# Patient Record
Sex: FEMALE | Race: WHITE | NOT HISPANIC OR LATINO | Employment: FULL TIME | URBAN - METROPOLITAN AREA
[De-identification: names, ages, dates, MRNs, and addresses within clinical notes are randomized per-mention and may not be internally consistent; named-entity substitution may affect disease eponyms.]

---

## 2017-01-03 ENCOUNTER — TELEPHONE (OUTPATIENT)
Dept: NEUROLOGY | Facility: CLINIC | Age: 57
End: 2017-01-03

## 2017-01-03 DIAGNOSIS — G36.0 DEVIC'S DISEASE (HCC): Primary | ICD-10-CM

## 2017-01-03 NOTE — TELEPHONE ENCOUNTER
1. Patient would like an order for PT    2. Patient wants to know if she has DDD. If so she wants to know if she should get MRIs of L-spine and sacrum

## 2017-01-16 ENCOUNTER — HOSPITAL ENCOUNTER (OUTPATIENT)
Dept: PHYSICAL THERAPY | Facility: HOSPITAL | Age: 57
Setting detail: THERAPIES SERIES
Discharge: HOME | End: 2017-01-16
Attending: PSYCHIATRY & NEUROLOGY

## 2017-01-16 DIAGNOSIS — R20.0 NUMBNESS AND TINGLING: ICD-10-CM

## 2017-01-16 DIAGNOSIS — M54.2 NECK PAIN: Primary | ICD-10-CM

## 2017-01-16 DIAGNOSIS — R20.2 NUMBNESS AND TINGLING: ICD-10-CM

## 2017-01-16 PROCEDURE — 97162 PT EVAL MOD COMPLEX 30 MIN: CPT | Performed by: PHYSICAL THERAPIST

## 2017-01-16 PROCEDURE — 97110 THERAPEUTIC EXERCISES: CPT | Performed by: PHYSICAL THERAPIST

## 2017-01-16 NOTE — PROGRESS NOTES
"    Outpatient Physical Therapy Ortho Initial Evaluation  [facility]     Patient Name: Brittny Han  : 1960  MRN: [id number]  Today's Date: 2017      Visit Date: 2017    Patient Active Problem List   Diagnosis   • Dyspnea on exertion   • Obstructive sleep apnea, adult   • Allergic rhinitis   • Anxiety   • Gastroesophageal reflux disease   • Anemia   • Essential hypertension   • Asthma   • Peptic ulcer   • Periodic headache syndrome, not intractable   • Numbness and tingling        Past Medical History   Diagnosis Date   • Allergic rhinitis    • Anxiety    •  1 para 1    • History of echocardiogram 2011     mild ascending aortic root dilatation; mod RVC enlargement; global left EF 0.72 (normal 0.55-0.75) mild concentric LV hypertrophy; trace MR; mild TR    • History of PFTs 2016     good effort; normal lung volumes   • Hyperlipidemia         Past Surgical History   Procedure Laterality Date   • Hysterectomy     • Breast lumpectomy     • Bladder suspension     • Skin cancer excision         Visit Dx:     ICD-10-CM ICD-9-CM   1. Neck pain M54.2 723.1   2. Numbness and tingling R20.0 782.0    R20.2              Patient History       17 1400          History    Chief Complaint Tingling;Numbness;Pain  -MW      Type of Pain Back pain;Neck pain  -MW      Date Current Problem(s) Began --     -MW      Brief Description of Current Complaint Pt. reports having her hands and feet \"go to sleep\" this past summer and headaches in .  Pt. began seeing Dr. Yoo for her pain and had an MRI where they discoverd DJD along C .  Pt. referred to PT for increased pain.  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 No  -MW      What clinical tests have you " "had for this problem? MRI  -MW      Results of Clinical Tests C5/6 DJD  -MW      Pain     Pain Location Back   mid and low back  -MW      Pain at Present 1  -MW      Pain at Best 0  -MW      Pain at Worst 7  -MW      Pain Frequency Intermittent  -MW      What Performance Factors Make the Current Problem(s) WORSE? standing  -MW      What Performance Factors Make the Current Problem(s) BETTER? sitting  -MW      Difficulties at work? no  -MW      Difficulties with ADL's? no  -MW      Fall Risk Assessment    Any falls in the past year: No  -MW        User Key  (r) = Recorded By, (t) = Taken By, (c) = Cosigned By    Initials Name Provider Type    MW Madalyn Garcia, PT Physical Therapist                PT Ortho       01/16/17 1400    Subjective Comments    Subjective Comments \"I also have a pinched nerve in my R arm and carpal tunnel in my R wrist.  I also feel off balance b/c I can be dizzy with turns.\"  -MW    Subjective Pain    Able to rate subjective pain? yes  -MW    Pre-Treatment Pain Level 0  -MW    Post-Treatment Pain Level 0  -MW    Pain Assessment    Pain Assessment --   LBP: 0-7/10, mid back: 1-3-4/10, neck: 0-10/10  -MW    Posture/Observations    Forward Head Mild;Moderate  -MW    Cervical Lordosis Mild;Moderate  -MW    Thoracic Kyphosis Mild  -MW    Rounded Shoulders Mild;Moderate  -MW    Sensation    Light Touch No apparent deficits  -MW    DTR- Upper Quarter Clearing    Biceps (C5/6) 2- Normal response  -MW    Brachioradialis (C6) 2- Normal response  -MW    Triceps (C7) 2- Normal response  -MW    Sensory Screen for Light Touch- Upper Quarter Clearing    C4 (posterior shoulder) Intact  -MW    C5 (lateral upper arm) Intact  -MW    C6 (tip of thumb) Intact  -MW    C7 (tip of 3rd finger) Intact  -MW    C8 (tip of 5th finger) Intact  -MW    T1 (medial lower arm) Intact  -MW    Myotomal Screen- Upper Quarter Clearing    Shoulder flexion (C5) 4+ (Good +)  -MW    Elbow flexion/wrist extension (C6) 4 (Good)  -MW " "   Elbow extension/wrist flexion (C7) 4 (Good)  -MW    Finger flexion/ (C8) 4 (Good)  -MW    Finger abduction (T1) 4- (Good -)  -MW    Cervical/Shoulder ROM Screen    Cervical flexion --   27  -MW    Cervical extension --   35  -MW    Cervical rotation --   45  -MW    Cervical quadrant (Spurling's) Normal  -MW    Shoulder elevation  Normal  -MW    Special Tests/Palpation    Special Tests/Palpation Cervical/Thoracic  -MW    Cervical Palpation    Cervical Palpation- Location? Suboccipital;Spinous process;Cervical facets;Scalenes;Upper traps;Middle traps;Lower traps   tenderness upon palpation  -MW    Suboccipital Tender  -MW    Cervical Facets Tender  -MW    Scalenes Tender  -MW    Spinous Process Tender   C5/6/7  -MW    Upper Traps Tender  -MW    Middle Traps Tender  -MW    Lower Traps Tender  -MW    Cervical/Thoracic Special Tests    Cervical Compression (Forarminal Compression vs. Facet Pain) Negative  -MW    Cervical Distraction (Foraminal Compression vs. Facet Pain) Negative   pt reports \"that feels good\"  -MW    Left Hip    Hip Flexion Gross Movement (4+/5) good plus  -MW    Hip Extension Gross Movement (4/5) good  -MW    Hip ABduction Gross Movement (4+/5) good plus  -MW    Hip ADduction Gross Movement (4+/5) good plus  -MW    Right Hip    Hip Flexion Gross Movement (4+/5) good plus  -MW    Hip Extension Gross Movement (4/5) good  -MW    Hip ABduction Gross Movement (4+/5) good plus  -MW    Hip ADduction Gross Movement (4+/5) good plus  -MW    Left Knee    Knee Extension Gross Movement (5/5) normal  -MW    Knee Flexion Gross Movement (4+/5) good plus  -MW    Right Knee    Knee Extension Gross Movement (5/5) normal  -MW    Knee Flexion Gross Movement (4+/5) good plus  -MW    Left Ankle/Foot    Ankle PF Gross Movement (4+/5) good plus  -MW    Ankle Dorsiflexion Gross Movement (5/5) normal  -MW    Right Ankle/Foot    Ankle PF Gross Movement (4+/5) good plus  -MW    Ankle Dorsiflexion Gross Movement (5/5) " normal  -MW    Flexibility    Flexibility Tested? --   WNL  -MW      User Key  (r) = Recorded By, (t) = Taken By, (c) = Cosigned By    Initials Name Provider Type    MERVAT Garcia PT Physical Therapist                            Therapy Education       01/16/17 2051    Therapy Education    Given Symptoms/condition management  -MW    How Provided Verbal  -MW    Provided to Patient  -MW    Level of Understanding Verbalized  -MW      User Key  (r) = Recorded By, (t) = Taken By, (c) = Cosigned By    Initials Name Provider Type    MERVAT Garcia PT Physical Therapist                PT OP Goals       01/16/17 1400          PT Short Term Goals    STG Date to Achieve 02/06/17  -MW      STG 1 Pt. to report pain in neck to be no greater then 5/10 VAS for improved functional mobility.  -MW      STG 2 Pt. to report not having any off balance episodes secondary to being dizzy with turning for improved functional mobility.  -MW      Long Term Goals    LTG Date to Achieve 02/27/17  -MW      LTG 1 Pt. to demonstrate B cervical AROM >/= 55 degrees for improved functional mobility.  -MW      LTG 2 Pt. to report pain in low back to be no greater then 3/10 VAS for improved functional mobility.  -MW      LTG 3 Pt. to report pain in neck to be no greater then 3/10 VAS for improved functional mobility.  -MW      LTG 4 Pt. to be I with HEP.  -MW      Time Calculation    PT Goal Re-Cert Due Date 02/13/17  -MW        User Key  (r) = Recorded By, (t) = Taken By, (c) = Cosigned By    Initials Name Provider Type    MERVAT Garcia PT Physical Therapist                PT Assessment/Plan       01/16/17 1400          PT Assessment    Functional Limitations --   impaired functional mobilty secondary to pain  -MW      Impairments Pain;Muscle strength;Sensation;Posture  -MW      Assessment Comments Pt. to benefit from skilled PT services to improve posture, strength and decrease pain.  -MW      Please refer to paper survey for  "additional self-reported information Yes  -MW      Rehab Potential Good  -MW      Patient/caregiver participated in establishment of treatment plan and goals Yes  -MW      Patient would benefit from skilled therapy intervention Yes  -MW      PT Plan    PT Frequency 2x/week  -MW      Predicted Duration of Therapy Intervention (days/wks) 6 weeks  -MW      Planned CPT's? PT EVAL: 15163;PT THER PROC EA 15 MIN: 60284;PT ELECTRICAL STIM UNATTEND: ;PT ELECTRICAL STIM ATTD EA 15 MIN: 44733;PT ULTRASOUND EA 15 MIN: 22493;PT HOT/COLD PACK WC NONMCARE: 81144;PT MANUAL THERAPY EA 15 MIN: 99728  -MW      PT Plan Comments PT services to improve strength, ROM, decrease pain and improve functional mobility.  -MW        User Key  (r) = Recorded By, (t) = Taken By, (c) = Cosigned By    Initials Name Provider Type    MERVAT Garcia PT Physical Therapist                  Exercises       01/16/17 1400          Subjective Comments    Subjective Comments \"I also have a pinched nerve in my R arm and carpal tunnel in my R wrist.  I also feel off balance b/c I can be dizzy with turns.\"  -MW      Subjective Pain    Able to rate subjective pain? yes  -MW      Pre-Treatment Pain Level 0  -MW      Post-Treatment Pain Level 0  -MW        User Key  (r) = Recorded By, (t) = Taken By, (c) = Cosigned By    Initials Name Provider Type    MERVAT Garcia PT Physical Therapist                              Time Calculation:   Start Time: 1400  Total Timed Code Minutes- PT: 15 minute(s)     Therapy Charges for Today     Code Description Service Date Service Provider Modifiers Qty    21656064482 HC PT EVAL MOD COMPLEXITY 3 1/16/2017 Madalyn Garcia, PT GP 1    [id number]  PT THER PROC EA 15 MIN 1/16/2017 Madalyn Garcia PT GP 1                    Madalyn Garcia PT  1/16/2017      "

## 2017-01-17 DIAGNOSIS — M54.2 NECK PAIN: Primary | ICD-10-CM

## 2017-01-24 ENCOUNTER — HOSPITAL ENCOUNTER (OUTPATIENT)
Dept: PHYSICAL THERAPY | Facility: HOSPITAL | Age: 57
Setting detail: THERAPIES SERIES
Discharge: HOME | End: 2017-01-24

## 2017-01-24 DIAGNOSIS — M54.2 NECK PAIN: Primary | ICD-10-CM

## 2017-01-24 PROCEDURE — 97012 MECHANICAL TRACTION THERAPY: CPT | Performed by: PHYSICAL THERAPIST

## 2017-01-24 PROCEDURE — 97110 THERAPEUTIC EXERCISES: CPT | Performed by: PHYSICAL THERAPIST

## 2017-01-24 NOTE — PROGRESS NOTES
Outpatient Physical Therapy Ortho Treatment Note   [facility]     Patient Name: Brittny Han  : 1960  MRN: [id number]  Today's Date: 2017      Visit Date: 2017    Visit Dx:    ICD-10-CM ICD-9-CM   1. Neck pain M54.2 723.1       Patient Active Problem List   Diagnosis   • Dyspnea on exertion   • Obstructive sleep apnea, adult   • Allergic rhinitis   • Anxiety   • Gastroesophageal reflux disease   • Anemia   • Essential hypertension   • Asthma   • Peptic ulcer   • Periodic headache syndrome, not intractable   • Numbness and tingling        Past Medical History   Diagnosis Date   • Allergic rhinitis    • Anxiety    •  1 para 1    • History of echocardiogram 2011     mild ascending aortic root dilatation; mod RVC enlargement; global left EF 0.72 (normal 0.55-0.75) mild concentric LV hypertrophy; trace MR; mild TR    • History of PFTs 2016     good effort; normal lung volumes   • Hyperlipidemia         Past Surgical History   Procedure Laterality Date   • Hysterectomy     • Breast lumpectomy     • Bladder suspension     • Skin cancer excision                               PT Assessment/Plan       17 1700          PT Assessment    Assessment Comments Minimal neck pain today.  No change in R hand tingling with cervical traction.  Pt. is known to have CTS and has night splint which she does not wear regularly.  -ISABEL      PT Plan    PT Plan Comments Continue PT.  Progress stabilization and postural exercises.  -ISABEL        User Key  (r) = Recorded By, (t) = Taken By, (c) = Cosigned By    Initials Name Provider Type    ISABEL Osborne, PT Physical Therapist                Modalities       17 1700          Traction 75917    Traction Type Cervical  -ISABEL      Rx Minutes 20  -ISABEL      Position Hook-lying  -ISABEL      Weight 18  -ISABEL      Hold 60  -ISABEL      Relax 12  -ISABEL      Progression 1  -ISABEL      Regression 2  -ISABEL        User Key  (r) = Recorded By, (t) = Taken By, (c) =  Cosigned By    Initials Name Provider Type    ISABEL Osborne, PT Physical Therapist                Exercises       01/24/17 1700          Subjective Comments    Subjective Comments Pt. reports improvement in neck pain with use of gabapentin.  She has very mild neck discomfort today.  She continues to reports tingling in primarily R hand today.  -ISABEL      Subjective Pain    Able to rate subjective pain? yes  -ISABEL      Pre-Treatment Pain Level 1  -ISABEL      Post-Treatment Pain Level 1  -ISABEL      Exercise 1    Exercise Name 1 Reviewed and discussed office ergonomics, posture, frequent position changes, use of towel roll below shoulder blades to promote scapular retraction and depression.  -ISABEL        User Key  (r) = Recorded By, (t) = Taken By, (c) = Cosigned By    Initials Name Provider Type    ISABEL Osborne, PT Physical Therapist                               PT OP Goals       01/16/17 1400          PT Short Term Goals    STG Date to Achieve 02/06/17  -MW      STG 1 Pt. to report pain in neck to be no greater then 5/10 VAS for improved functional mobility.  -MW      STG 2 Pt. to report not having any off balance episodes secondary to being dizzy with turning for improved functional mobility.  -MW      Long Term Goals    LTG Date to Achieve 02/27/17  -MW      LTG 1 Pt. to demonstrate B cervical AROM >/= 55 degrees for improved functional mobility.  -MW      LTG 2 Pt. to report pain in low back to be no greater then 3/10 VAS for improved functional mobility.  -MW      LTG 3 Pt. to report pain in neck to be no greater then 3/10 VAS for improved functional mobility.  -MW      LTG 4 Pt. to be I with HEP.  -MW      Time Calculation    PT Goal Re-Cert Due Date 02/13/17  -        User Key  (r) = Recorded By, (t) = Taken By, (c) = Cosigned By    Initials Name Provider Type    MERVAT Garcia, PT Physical Therapist                    Time Calculation:   Start Time: 0800  Total Timed Code Minutes- PT: 25  minute(s)    Therapy Charges for Today     Code Description Service Date Service Provider Modifiers Qty    52246448151 HC PT THER PROC EA 15 MIN 1/24/2017 Fifi Osborne, PT GP 2    [id number]  PT-TRACTION MECHANICAL 1/24/2017 Fifi Osborne, PT  1                    Fifi Osborne, PT  1/24/2017

## 2017-01-26 ENCOUNTER — HOSPITAL ENCOUNTER (OUTPATIENT)
Dept: PHYSICAL THERAPY | Facility: HOSPITAL | Age: 57
Setting detail: THERAPIES SERIES
Discharge: HOME | End: 2017-01-26

## 2017-01-26 DIAGNOSIS — M54.2 NECK PAIN: Primary | ICD-10-CM

## 2017-01-26 PROCEDURE — 97110 THERAPEUTIC EXERCISES: CPT | Performed by: PHYSICAL THERAPIST

## 2017-01-26 NOTE — PROGRESS NOTES
Outpatient Physical Therapy Ortho Treatment Note  [facility]     Patient Name: Brittny Han  : 1960  MRN: [id number]  Today's Date: 2017      Visit Date: 2017    Visit Dx:    ICD-10-CM ICD-9-CM   1. Neck pain M54.2 723.1       Patient Active Problem List   Diagnosis   • Dyspnea on exertion   • Obstructive sleep apnea, adult   • Allergic rhinitis   • Anxiety   • Gastroesophageal reflux disease   • Anemia   • Essential hypertension   • Asthma   • Peptic ulcer   • Periodic headache syndrome, not intractable   • Numbness and tingling        Past Medical History   Diagnosis Date   • Allergic rhinitis    • Anxiety    •  1 para 1    • History of echocardiogram 2011     mild ascending aortic root dilatation; mod RVC enlargement; global left EF 0.72 (normal 0.55-0.75) mild concentric LV hypertrophy; trace MR; mild TR    • History of PFTs 2016     good effort; normal lung volumes   • Hyperlipidemia         Past Surgical History   Procedure Laterality Date   • Hysterectomy     • Breast lumpectomy     • Bladder suspension     • Skin cancer excision               PT Ortho       17 1000    Subjective Comments    Subjective Comments Pt. reports very minimal discomfort in her neck.  She has intermittent UE paresthesia.  -ISABEL    Subjective Pain    Able to rate subjective pain? yes  -ISABEL    Pre-Treatment Pain Level 1  -ISABEL    Post-Treatment Pain Level 1  -ISABEL      User Key  (r) = Recorded By, (t) = Taken By, (c) = Cosigned By    Initials Name Provider Type    ISABEL Osborne, PT Physical Therapist                            PT Assessment/Plan       17 1000 17 1700       PT Assessment    Assessment Comments Pt. demonstrated all exercises in clinic with minimal cueing.  -ISABEL Minimal neck pain today.  No change in R hand tingling with cervical traction.  Pt. is known to have CTS and has night splint which she does not wear regularly.  -ISABEL     PT Plan    PT Plan Comments  Continue and progress as tolerated.  -ISABEL Continue PT.  Progress stabilization and postural exercises.  -ISABEL       User Key  (r) = Recorded By, (t) = Taken By, (c) = Cosigned By    Initials Name Provider Type    ISABEL Osborne, CITLALI Physical Therapist                    Exercises       01/26/17 1000          Subjective Comments    Subjective Comments Pt. reports very minimal discomfort in her neck.  She has intermittent UE paresthesia.  -ISABEL      Subjective Pain    Able to rate subjective pain? yes  -ISABEL      Pre-Treatment Pain Level 1  -ISABEL      Post-Treatment Pain Level 1  -ISABEL      Exercise 1    Exercise Name 1 Initiated exercise in clinical setting per flow sheet, emphasizing postural stability and control and neural pliability.  Provided illustrations and red theraband for HEP.  -ISABEL        User Key  (r) = Recorded By, (t) = Taken By, (c) = Cosigned By    Initials Name Provider Type    ISABEL Osborne, PT Physical Therapist                               PT OP Goals       01/16/17 1400          PT Short Term Goals    STG Date to Achieve 02/06/17  -MW      STG 1 Pt. to report pain in neck to be no greater then 5/10 VAS for improved functional mobility.  -MW      STG 2 Pt. to report not having any off balance episodes secondary to being dizzy with turning for improved functional mobility.  -MW      Long Term Goals    LTG Date to Achieve 02/27/17  -MW      LTG 1 Pt. to demonstrate B cervical AROM >/= 55 degrees for improved functional mobility.  -MW      LTG 2 Pt. to report pain in low back to be no greater then 3/10 VAS for improved functional mobility.  -MW      LTG 3 Pt. to report pain in neck to be no greater then 3/10 VAS for improved functional mobility.  -MW      LTG 4 Pt. to be I with HEP.  -MW      Time Calculation    PT Goal Re-Cert Due Date 02/13/17  -MW        User Key  (r) = Recorded By, (t) = Taken By, (c) = Cosigned By    Initials Name Provider Type    MERVAT Garcia, PT Physical Therapist                 Therapy Education       01/26/17 1005    Therapy Education    Given HEP  -ISABEL    Program New  -ISABEL    How Provided Verbal;Demonstration;Written  -ISABEL    Provided to Patient  -ISABEL    Level of Understanding Demonstrated  -ISABEL      User Key  (r) = Recorded By, (t) = Taken By, (c) = Cosigned By    Initials Name Provider Type    ISABEL Osborne, PT Physical Therapist                Time Calculation:   Start Time: 0925  Total Timed Code Minutes- PT: 40 minute(s)    Therapy Charges for Today     Code Description Service Date Service Provider Modifiers Qty    56113508771  PT THER PROC EA 15 MIN 1/26/2017 Fifi Osborne, PT GP 3                    Fifi Osborne, PT  1/26/2017

## 2017-01-31 ENCOUNTER — HOSPITAL ENCOUNTER (OUTPATIENT)
Dept: PHYSICAL THERAPY | Facility: HOSPITAL | Age: 57
Setting detail: THERAPIES SERIES
Discharge: HOME | End: 2017-01-31

## 2017-01-31 DIAGNOSIS — M54.2 NECK PAIN: Primary | ICD-10-CM

## 2017-01-31 PROCEDURE — 97110 THERAPEUTIC EXERCISES: CPT | Performed by: PHYSICAL THERAPIST

## 2017-01-31 PROCEDURE — 97140 MANUAL THERAPY 1/> REGIONS: CPT | Performed by: PHYSICAL THERAPIST

## 2017-02-02 ENCOUNTER — HOSPITAL ENCOUNTER (OUTPATIENT)
Dept: PHYSICAL THERAPY | Facility: HOSPITAL | Age: 57
Setting detail: THERAPIES SERIES
Discharge: HOME | End: 2017-02-02

## 2017-02-02 DIAGNOSIS — M54.2 NECK PAIN: Primary | ICD-10-CM

## 2017-02-02 PROCEDURE — 97110 THERAPEUTIC EXERCISES: CPT | Performed by: PHYSICAL THERAPIST

## 2017-02-02 PROCEDURE — 97035 APP MDLTY 1+ULTRASOUND EA 15: CPT | Performed by: PHYSICAL THERAPIST

## 2017-02-02 PROCEDURE — 97140 MANUAL THERAPY 1/> REGIONS: CPT | Performed by: PHYSICAL THERAPIST

## 2017-02-02 NOTE — PROGRESS NOTES
Outpatient Physical Therapy Ortho Treatment Note  [facility]     Patient Name: Brittny Han  : 1960  MRN: [id number]  Today's Date: 2017      Visit Date: 2017    Visit Dx:    ICD-10-CM ICD-9-CM   1. Neck pain M54.2 723.1       Patient Active Problem List   Diagnosis   • Dyspnea on exertion   • Obstructive sleep apnea, adult   • Allergic rhinitis   • Anxiety   • Gastroesophageal reflux disease   • Anemia   • Essential hypertension   • Asthma   • Peptic ulcer   • Periodic headache syndrome, not intractable   • Numbness and tingling        Past Medical History   Diagnosis Date   • Allergic rhinitis    • Anxiety    •  1 para 1    • History of echocardiogram 2011     mild ascending aortic root dilatation; mod RVC enlargement; global left EF 0.72 (normal 0.55-0.75) mild concentric LV hypertrophy; trace MR; mild TR    • History of PFTs 2016     good effort; normal lung volumes   • Hyperlipidemia         Past Surgical History   Procedure Laterality Date   • Hysterectomy     • Breast lumpectomy     • Bladder suspension     • Skin cancer excision               PT Ortho       17 1000    Subjective Comments    Subjective Comments Pt. reports increase in neck pain which she noticed last night and has continued this morning.  -ISABEL    Subjective Pain    Able to rate subjective pain? yes  -ISABEL    Pre-Treatment Pain Level 6  -ISABEL    Post-Treatment Pain Level 1  -ISABEL      17 0800    Subjective Comments    Subjective Comments Pt. reports neck tightness, a little more than last week.  She has been doing her exercises mostly at work.  -ISABEL    Subjective Pain    Able to rate subjective pain? yes  -ISABEL    Pre-Treatment Pain Level 3  -ISABEL    Post-Treatment Pain Level 1  -ISABEL      User Key  (r) = Recorded By, (t) = Taken By, (c) = Cosigned By    Initials Name Provider Type    ISABEL Osborne PT Physical Therapist                            PT Assessment/Plan       17 1000  01/31/17 0900       PT Assessment    Assessment Comments Pt. reported significant decrease in pain and feeling much looser after treatment today.  -ISABEL Pt. is beginning to see benefits of exercise and posture in controlling symptoms.  -ISABEL     PT Plan    PT Plan Comments Continue and progress as tolerated.  -ISABEL Continue and progress as tolerated.  -ISABEL       User Key  (r) = Recorded By, (t) = Taken By, (c) = Cosigned By    Initials Name Provider Type    ISABEL Osborne PT Physical Therapist                Modalities       02/02/17 1000          Ultrasound [id number]    Location bilateral cervical paraspinals and UT bilaterally  -ISABEL      Rx Minutes 8  -ISABEL      Duty Cycle 100  -ISABEL      Frequency 1.0 MHz  -ISABEL      Intensity - Wts/cm 1.6  -ISABEL        User Key  (r) = Recorded By, (t) = Taken By, (c) = Cosigned By    Initials Name Provider Type    ISABEL Osborne PT Physical Therapist                Exercises       02/02/17 1000          Subjective Comments    Subjective Comments Pt. reports increase in neck pain which she noticed last night and has continued this morning.  -ISABEL      Subjective Pain    Able to rate subjective pain? yes  -ISABEL      Pre-Treatment Pain Level 6  -ISABEL      Post-Treatment Pain Level 1  -ISABEL      Exercise 1    Exercise Name 1 Active warm up on UBE prior to Astym and active stretching and scapular stability exercises after Astym per flow sheet.  Reinforced role of posture in controlling symptoms.  -ISABEL        User Key  (r) = Recorded By, (t) = Taken By, (c) = Cosigned By    Initials Name Provider Type    ISABEL Osborne PT Physical Therapist                        Manual Rx (last 36 hours)      Manual Treatments       02/02/17 0900          Manual Rx 1    Manual Rx 1 Location CT spine  -ISABEL      Manual Rx 1 Type Astym and STM to bilateral cervical and UT region in sitting.  -ISABEL        User Key  (r) = Recorded By, (t) = Taken By, (c) = Cosigned By    Initials Name Provider Type    ISABEL CARRIZALES  Dylon PT Physical Therapist                        Time Calculation:   Start Time: 0930  Total Timed Code Minutes- PT: 45 minute(s)    Therapy Charges for Today     Code Description Service Date Service Provider Modifiers Qty    25414091895  PT THER PROC EA 15 MIN 2/2/2017 Fifi Osborne, PT GP 1    85620331695 HC PT MANUAL THERAPY EA 15 MIN 2/2/2017 Fifi Osborne, PT GP 1    21002116836 HC PT ULTRASOUND EA 15 MIN 2/2/2017 Fifi Osborne, PT GP 1                    Fifi Osborne, PT  2/2/2017

## 2017-02-06 ENCOUNTER — APPOINTMENT (OUTPATIENT)
Dept: PHYSICAL THERAPY | Facility: HOSPITAL | Age: 57
End: 2017-02-06

## 2017-02-07 ENCOUNTER — HOSPITAL ENCOUNTER (OUTPATIENT)
Dept: PHYSICAL THERAPY | Facility: HOSPITAL | Age: 57
Setting detail: THERAPIES SERIES
Discharge: HOME | End: 2017-02-07

## 2017-02-07 DIAGNOSIS — M54.2 NECK PAIN: Primary | ICD-10-CM

## 2017-02-07 PROCEDURE — 97140 MANUAL THERAPY 1/> REGIONS: CPT | Performed by: PHYSICAL THERAPIST

## 2017-02-07 PROCEDURE — 97110 THERAPEUTIC EXERCISES: CPT | Performed by: PHYSICAL THERAPIST

## 2017-02-07 NOTE — PROGRESS NOTES
Outpatient Physical Therapy Ortho Treatment Note  [facility]     Patient Name: Brittny Han  : 1960  MRN: [id number]  Today's Date: 2017      Visit Date: 2017    Visit Dx:    ICD-10-CM ICD-9-CM   1. Neck pain M54.2 723.1       Patient Active Problem List   Diagnosis   • Dyspnea on exertion   • Obstructive sleep apnea, adult   • Allergic rhinitis   • Anxiety   • Gastroesophageal reflux disease   • Anemia   • Essential hypertension   • Asthma   • Peptic ulcer   • Periodic headache syndrome, not intractable   • Numbness and tingling        Past Medical History   Diagnosis Date   • Allergic rhinitis    • Anxiety    •  1 para 1    • History of echocardiogram 2011     mild ascending aortic root dilatation; mod RVC enlargement; global left EF 0.72 (normal 0.55-0.75) mild concentric LV hypertrophy; trace MR; mild TR    • History of PFTs 2016     good effort; normal lung volumes   • Hyperlipidemia         Past Surgical History   Procedure Laterality Date   • Hysterectomy     • Breast lumpectomy     • Bladder suspension     • Skin cancer excision               PT Ortho       17 0900    Subjective Comments    Subjective Comments Pt. reports tightness in her neck.  She feels better for about 2 days after coming for treatment.  She continues to have difficulty with sleeping posture.  She uses a CPAP device and also needs to have head elevated due to GERD.    -ISABEL    Subjective Pain    Able to rate subjective pain? yes  -ISABEL    Pre-Treatment Pain Level 3  -ISABEL    Post-Treatment Pain Level 2  -ISABEL      User Key  (r) = Recorded By, (t) = Taken By, (c) = Cosigned By    Initials Name Provider Type    ISABEL Osborne PT Physical Therapist                            PT Assessment/Plan       17 0900 17 1000       PT Assessment    Assessment Comments Pt. responds well to treatment in clinic.  Needs to be more consistent in applying techniques outside of treatment sessions  to achieve lasting relief of symptoms.  -ISABEL Pt. reported significant decrease in pain and feeling much looser after treatment today.  -ISABEL     PT Plan    PT Plan Comments Continue and progress as tolerated.  -ISABEL Continue and progress as tolerated.  -ISABEL       User Key  (r) = Recorded By, (t) = Taken By, (c) = Cosigned By    Initials Name Provider Type    ISABEL Osborne PT Physical Therapist                    Exercises       02/07/17 0900          Subjective Comments    Subjective Comments Pt. reports tightness in her neck.  She feels better for about 2 days after coming for treatment.  She continues to have difficulty with sleeping posture.  She uses a CPAP device and also needs to have head elevated due to GERD.    -ISABEL      Subjective Pain    Able to rate subjective pain? yes  -ISABEL      Pre-Treatment Pain Level 3  -ISABEL      Post-Treatment Pain Level 2  -ISABEL      Exercise 1    Exercise Name 1 Active warm up on UBE prior to Astym and active stretching and exercise per flow sheet after Astym.  Practiced active CROM combined with chin tuck.  Added light cervical isometrics to program today to improve cervical muscualr control during motion.  -ISABEL        User Key  (r) = Recorded By, (t) = Taken By, (c) = Cosigned By    Initials Name Provider Type    ISABEL Osborne PT Physical Therapist                        Manual Rx (last 36 hours)      Manual Treatments       02/07/17 0900          Manual Rx 1    Manual Rx 1 Location CT spine  -ISABEL      Manual Rx 1 Type Astym and STM to bilateral cervical and UT region in sitting.  -ISABEL        User Key  (r) = Recorded By, (t) = Taken By, (c) = Cosigned By    Initials Name Provider Type    ISABEL Osborne PT Physical Therapist                    Therapy Education       02/07/17 0940    Therapy Education    Given Posture/body mechanics   sleeping posture, towel roll in pillow case  -ISABEL    Program Reinforced  -ISABEL    How Provided Verbal;Demonstration  -ISABEL    Provided to  Patient  -ISABEL    Level of Understanding Verbalized  -ISABLE      User Key  (r) = Recorded By, (t) = Taken By, (c) = Cosigned By    Initials Name Provider Type    ISABEL Osborne PT Physical Therapist                Time Calculation:   Start Time: 0805  Total Timed Code Minutes- PT: 40 minute(s)    Therapy Charges for Today     Code Description Service Date Service Provider Modifiers Qty    45537384258  PT THER PROC EA 15 MIN 2/7/2017 Fifi Osborne, PT GP 2    52918063515  PT MANUAL THERAPY EA 15 MIN 2/7/2017 Fifi Osborne PT GP 1                    Fifi Osborne, PT  2/7/2017

## 2017-02-09 ENCOUNTER — HOSPITAL ENCOUNTER (OUTPATIENT)
Dept: PHYSICAL THERAPY | Facility: HOSPITAL | Age: 57
Setting detail: THERAPIES SERIES
Discharge: HOME | End: 2017-02-09

## 2017-02-09 DIAGNOSIS — M54.2 NECK PAIN: Primary | ICD-10-CM

## 2017-02-09 PROCEDURE — 97140 MANUAL THERAPY 1/> REGIONS: CPT | Performed by: PHYSICAL THERAPIST

## 2017-02-09 PROCEDURE — 97035 APP MDLTY 1+ULTRASOUND EA 15: CPT | Performed by: PHYSICAL THERAPIST

## 2017-02-09 PROCEDURE — 97110 THERAPEUTIC EXERCISES: CPT | Performed by: PHYSICAL THERAPIST

## 2017-02-09 NOTE — PROGRESS NOTES
"    Outpatient Physical Therapy Ortho Treatment Note  [facility]     Patient Name: Brittny Han  : 1960  MRN: [id number]  Today's Date: 2017      Visit Date: 2017    Visit Dx:    ICD-10-CM ICD-9-CM   1. Neck pain M54.2 723.1       Patient Active Problem List   Diagnosis   • Dyspnea on exertion   • Obstructive sleep apnea, adult   • Allergic rhinitis   • Anxiety   • Gastroesophageal reflux disease   • Anemia   • Essential hypertension   • Asthma   • Peptic ulcer   • Periodic headache syndrome, not intractable   • Numbness and tingling        Past Medical History   Diagnosis Date   • Allergic rhinitis    • Anxiety    •  1 para 1    • History of echocardiogram 2011     mild ascending aortic root dilatation; mod RVC enlargement; global left EF 0.72 (normal 0.55-0.75) mild concentric LV hypertrophy; trace MR; mild TR    • History of PFTs 2016     good effort; normal lung volumes   • Hyperlipidemia         Past Surgical History   Procedure Laterality Date   • Hysterectomy     • Breast lumpectomy     • Bladder suspension     • Skin cancer excision               PT Ortho       17 1000    Subjective Comments    Subjective Comments Pt. reports a \"twinge\" and tingling near CT junction upon arrival today.  She did not rest well last night because of her pets going in and out.  -ISABEL    Subjective Pain    Able to rate subjective pain? yes  -ISABEL    Pre-Treatment Pain Level 3  -ISABEL    Post-Treatment Pain Level 0  -ISABEL      17 0900    Subjective Comments    Subjective Comments Pt. reports tightness in her neck.  She feels better for about 2 days after coming for treatment.  She continues to have difficulty with sleeping posture.  She uses a CPAP device and also needs to have head elevated due to GERD.    -ISABEL    Subjective Pain    Able to rate subjective pain? yes  -ISABEL    Pre-Treatment Pain Level 3  -ISABEL    Post-Treatment Pain Level 2  -ISABEL      User Key  (r) = Recorded By, (t) = Taken " "By, (c) = Cosigned By    Initials Name Provider Type    ISABEL Osborne PT Physical Therapist                            PT Assessment/Plan       02/09/17 1000 02/07/17 0900       PT Assessment    Assessment Comments No symptoms reported after treatment today.  UT trigger points are palpably more relaxed and CT kyphosis is temporarily reduced with active postural correction.   -ISABEL Pt. responds well to treatment in clinic.  Needs to be more consistent in applying techniques outside of treatment sessions to achieve lasting relief of symptoms.  -ISABEL     PT Plan    PT Plan Comments Continue PT.  -ISABEL Continue and progress as tolerated.  -ISABEL       User Key  (r) = Recorded By, (t) = Taken By, (c) = Cosigned By    Initials Name Provider Type    ISABEL Osborne PT Physical Therapist                Modalities       02/09/17 1000          Ultrasound 28920    Location bilateral cervical paraspinals and UT bilaterally  -ISABEL      Rx Minutes 10  -ISABEL      Duty Cycle 100  -ISABEL      Frequency 1.0 MHz  -ISABEL      Intensity - Wts/cm 1.6  -ISABEL        User Key  (r) = Recorded By, (t) = Taken By, (c) = Cosigned By    Initials Name Provider Type    ISABEL Osborne PT Physical Therapist                Exercises       02/09/17 1000          Subjective Comments    Subjective Comments Pt. reports a \"twinge\" and tingling near CT junction upon arrival today.  She did not rest well last night because of her pets going in and out.  -ISABEL      Subjective Pain    Able to rate subjective pain? yes  -ISABEL      Pre-Treatment Pain Level 3  -ISABEL      Post-Treatment Pain Level 0  -ISABEL      Exercise 1    Exercise Name 1 Active warm up on UBE followed by foam roll postural and neural gliding exercises per flow sheet.  Continued postural education and practice in clinic.  -ISABEL        User Key  (r) = Recorded By, (t) = Taken By, (c) = Cosigned By    Initials Name Provider Type    ISABEL Osborne PT Physical Therapist                        Manual " Rx (last 36 hours)      Manual Treatments       02/09/17 0900          Manual Rx 1    Manual Rx 1 Location CT spine  -ISABEL      Manual Rx 1 Type Astym and STM to bilateral cervical and UT region in sitting.  -ISABEL      Manual Rx 2    Manual Rx 2 Location Bilateral passive scapular retraction and depression.  -ISABEL        User Key  (r) = Recorded By, (t) = Taken By, (c) = Cosigned By    Initials Name Provider Type    ISABEL Osborne PT Physical Therapist                    Therapy Education       02/07/17 0940    Therapy Education    Given Posture/body mechanics   sleeping posture, towel roll in pillow case  -ISABEL    Program Reinforced  -ISABEL    How Provided Verbal;Demonstration  -ISABEL    Provided to Patient  -ISABEL    Level of Understanding Verbalized  -ISABEL      User Key  (r) = Recorded By, (t) = Taken By, (c) = Cosigned By    Initials Name Provider Type    ISABEL Osborne PT Physical Therapist                Time Calculation:   Start Time: 0930  Total Timed Code Minutes- PT: 45 minute(s)    Therapy Charges for Today     Code Description Service Date Service Provider Modifiers Qty    14120465402 HC PT THER PROC EA 15 MIN 2/9/2017 Fifi Osborne PT GP 1    85471871098 HC PT MANUAL THERAPY EA 15 MIN 2/9/2017 Fifi Osborne PT GP 1    64942164129 HC PT ULTRASOUND EA 15 MIN 2/9/2017 Fifi Osborne PT GP 1                    Fifi Osborne PT  2/9/2017

## 2017-02-13 ENCOUNTER — HOSPITAL ENCOUNTER (OUTPATIENT)
Dept: PHYSICAL THERAPY | Facility: HOSPITAL | Age: 57
Setting detail: THERAPIES SERIES
Discharge: HOME | End: 2017-02-13

## 2017-02-13 DIAGNOSIS — M54.2 NECK PAIN: Primary | ICD-10-CM

## 2017-02-13 PROCEDURE — 97035 APP MDLTY 1+ULTRASOUND EA 15: CPT | Performed by: PHYSICAL THERAPIST

## 2017-02-13 PROCEDURE — 97110 THERAPEUTIC EXERCISES: CPT | Performed by: PHYSICAL THERAPIST

## 2017-02-13 PROCEDURE — 97140 MANUAL THERAPY 1/> REGIONS: CPT | Performed by: PHYSICAL THERAPIST

## 2017-02-13 NOTE — PROGRESS NOTES
Outpatient Physical Therapy Ortho Re-Assessment   [place]     Patient Name: Brittny Han  : 1960  MRN: [id number]  Today's Date: 2017      Visit Date: 2017    Patient Active Problem List   Diagnosis   • Dyspnea on exertion   • Obstructive sleep apnea, adult   • Allergic rhinitis   • Anxiety   • Gastroesophageal reflux disease   • Anemia   • Essential hypertension   • Asthma   • Peptic ulcer   • Periodic headache syndrome, not intractable   • Numbness and tingling        Past Medical History   Diagnosis Date   • Allergic rhinitis    • Anxiety    •  1 para 1    • History of echocardiogram 2011     mild ascending aortic root dilatation; mod RVC enlargement; global left EF 0.72 (normal 0.55-0.75) mild concentric LV hypertrophy; trace MR; mild TR    • History of PFTs 2016     good effort; normal lung volumes   • Hyperlipidemia         Past Surgical History   Procedure Laterality Date   • Hysterectomy     • Breast lumpectomy     • Bladder suspension     • Skin cancer excision         Visit Dx:     ICD-10-CM ICD-9-CM   1. Neck pain M54.2 723.1             Patient History       17 1600          Pain     Pain Location Neck  -ISABEL      Pain at Present 3  -ISABEL      Pain at Best 1  -ISABEL      Pain at Worst 6  -ISABEL        User Key  (r) = Recorded By, (t) = Taken By, (c) = Cosigned By    Initials Name Provider Type    ISABEL Osborne, PT Physical Therapist                PT Ortho       17 1600    Subjective Comments    Subjective Comments Headache frequency decreased to one per week vs. one per day.  Exercises seem to help when pain begins to increase.  -ISABEL    Subjective Pain    Able to rate subjective pain? yes  -ISABEL    Pre-Treatment Pain Level 3  -ISABEL    Post-Treatment Pain Level 1  -ISABEL    Myotomal Screen- Upper Quarter Clearing    Shoulder flexion (C5) 4+ (Good +);Bilateral:  -ISABEL    Elbow flexion/wrist extension (C6) 4+ (Good +);Bilateral:  -ISABEL    Elbow extension/wrist  flexion (C7) 5 (Normal);Bilateral:  -ISABEL    Neck    Flexion AROM Deficit 49  -ISABEL    Extension AROM Deficit 50  -ISABEL    Lt Lat Flexion AROM Deficit 39   R stretch  -ISABEL    Rt Lateral Flexion AROM Deficit 35   L stretch  -ISABEL    Lt Rotation AROM Deficit 61  -ISABEL    Rt Rotation AROM Deficit 65  -ISABEL      User Key  (r) = Recorded By, (t) = Taken By, (c) = Cosigned By    Initials Name Provider Type    ISABEL Osborne PT Physical Therapist           Hand Therapy (last 24 hours)      Hand Eval       02/13/17 1600           Strength Right    # Reps 3  -ISABEL      Right Rung 2  -ISABEL      Right  Test 1 60  -ISABEL      Right  Test 2 55  -ISABEL      Right  Test 3 65  -ISABEL       Strength Average Right 60  -ISABEL       Strength Left    # Reps 3  -ISABEL      Left Rung 2  -ISABEL      Left  Test 1 50  -ISABEL      Left  Test 2 53  -ISABEL      Left  Test 3 53  -ISABEL       Strength Average Left 52  -ISABEL        User Key  (r) = Recorded By, (t) = Taken By, (c) = Cosigned By    Initials Name Provider Type    ISABEL Osborne PT Physical Therapist                          Therapy Education       02/07/17 0940    Therapy Education    Given Posture/body mechanics   sleeping posture, towel roll in pillow case  -ISABEL    Program Reinforced  -ISABEL    How Provided Verbal;Demonstration  -ISABEL    Provided to Patient  -ISABEL    Level of Understanding Verbalized  -ISABEL      User Key  (r) = Recorded By, (t) = Taken By, (c) = Cosigned By    Initials Name Provider Type    ISABEL Osborne PT Physical Therapist                PT OP Goals       02/13/17 1600          PT Short Term Goals    STG Date to Achieve 02/06/17  -ISABEL      STG 1 Pt. to report pain in neck to be no greater than 5/10 NPRS for improved functional mobility.  -ISABEL      STG 1 Progress Progressing;Ongoing  -ISABEL      STG 1 Progress Comments Pain can be as bad as 6/10, but not as often as before.  -ISABEL      STG 2 Pt. to report not having any off balance episodes secondary to being  dizzy with turning for improved functional mobility.  -ISABEL      STG 2 Progress Met  -ISABEL      Long Term Goals    LTG Date to Achieve 02/27/17  -ISABEL      LTG 1 Pt. to demonstrate B cervical rotation >/= 55 degrees for improved functional mobility.  -ISABEL      LTG 1 Progress Met  -ISABEL      LTG 2 Pt. demonstrates corresct sitting and standing posture to reduce recurrence of symptoms.  -ISABEL      LTG 3 Pt. to report pain in neck to be no greater than 3/10 NPRS for improved functional mobility.  -ISABEL      LTG 3 Progress Progressing;Ongoing  -ISABEL      LTG 4 Pt. to be I with HEP.  -ISABEL      LTG 4 Progress Met  -ISABEL      LTG 4 Progress Comments NDI score is improved by 6% (24% or less).  -ISABEL        User Key  (r) = Recorded By, (t) = Taken By, (c) = Cosigned By    Initials Name Provider Type    ISABEL Osborne, PT Physical Therapist                PT Assessment/Plan       02/13/17 1617 02/09/17 1000 02/07/17 0900    PT Assessment    Functional Limitations Limitations in functional capacity and performance  -ISABEL      Impairments Pain;Posture;Impaired flexibility;Muscle strength  -      Assessment Comments Pt. demonstrates improvement with decreasing frequency and intensity of symptoms overall.  -ISABEL No symptoms reported after treatment today.  UT trigger points are palpably more relaxed and CT kyphosis is temporarily reduced with active postural correction.   -ISABEL Pt. responds well to treatment in clinic.  Needs to be more consistent in applying techniques outside of treatment sessions to achieve lasting relief of symptoms.  -ISABEL    Please refer to paper survey for additional self-reported information Yes  -ISABEL      Rehab Potential Good  -ISABEL      Patient/caregiver participated in establishment of treatment plan and goals Yes  -ISABEL      Patient would benefit from skilled therapy intervention Yes  -ISABEL      PT Plan    PT Frequency 2x/week  -ISABEL      Predicted Duration of Therapy Intervention (days/wks) 4 weeks  -ISABEL      Planned CPT's? PT  THER PROC EA 15 MIN: 84231;PT NEUROMUSC RE-EDUCATION EA 15 MIN: 41341;PT MANUAL THERAPY EA 15 MIN: 83005;PT ELECTRICAL STIM UNATTEND: ;PT HOT/COLD PACK WC NONMCARE: 05170;PT ULTRASOUND EA 15 MIN: 05397;PT TRACTION CERVICAL: 30244  -ISABEL      PT Plan Comments Continue PT.  Progress exercise as tolerated.  -ISABEL Continue PT.  -ISABEL Continue and progress as tolerated.  -ISABEL      User Key  (r) = Recorded By, (t) = Taken By, (c) = Cosigned By    Initials Name Provider Type    ISABEL Osborne PT Physical Therapist                  Exercises       02/13/17 1600          Subjective Comments    Subjective Comments Headache frequency decreased to one per week vs. one per day.  Exercises seem to help when pain begins to increase.  -ISABEL      Subjective Pain    Able to rate subjective pain? yes  -ISABEL      Pre-Treatment Pain Level 3  -ISABEL      Post-Treatment Pain Level 1  -ISABEL        User Key  (r) = Recorded By, (t) = Taken By, (c) = Cosigned By    Initials Name Provider Type    ISABEL Osborne PT Physical Therapist                              Outcome Measures       02/13/17 1600          Neck Disability Index    Section 1 - Pain Intensity 2  -ISABEL      Section 2 - Personal Care 0  -ISABEL      Section 3 - Lifting 1  -ISABEL      Section 4 - Work 2  -ISABEL      Section 5 - Headaches 1  -ISABEL      Section 6 - Concentration 1  -ISABEL      Section 7 - Sleeping 2  -ISABEL      Section 8 - Driving 2  -ISABEL      Section 9 - Reading 3  -ISABEL      Section 10 - Recreation 1  -ISABEL      Neck Disability Index Score 15  -ISABEL      Neck Disability Index Comments 30%  -ISABEL      Functional Assessment    Outcome Measure Options Neck Disability Index (NDI)  -        User Key  (r) = Recorded By, (t) = Taken By, (c) = Cosigned By    Initials Name Provider Type    ISABEL Osborne PT Physical Therapist            Time Calculation:   Start Time: 0930  Total Timed Code Minutes- PT: 45 minute(s)     Therapy Charges for Today     Code Description Service Date Service  Provider Modifiers Qty    76336249542 HC PT THER PROC EA 15 MIN 2/13/2017 Fifi Osborne, PT GP 1    69563423287 HC PT MANUAL THERAPY EA 15 MIN 2/13/2017 Fifi Osborne, PT GP 1    71160585234 HC PT ULTRASOUND EA 15 MIN 2/13/2017 Fifi Osborne, PT GP 1          PT G-Codes  Outcome Measure Options: Neck Disability Index (NDI)         Fifi Osborne, PT  2/13/2017

## 2017-02-15 ENCOUNTER — HOSPITAL ENCOUNTER (OUTPATIENT)
Dept: PHYSICAL THERAPY | Facility: HOSPITAL | Age: 57
Setting detail: THERAPIES SERIES
Discharge: HOME | End: 2017-02-15

## 2017-02-15 DIAGNOSIS — M54.2 NECK PAIN: Primary | ICD-10-CM

## 2017-02-15 PROCEDURE — 97140 MANUAL THERAPY 1/> REGIONS: CPT | Performed by: PHYSICAL THERAPIST

## 2017-02-15 PROCEDURE — 97035 APP MDLTY 1+ULTRASOUND EA 15: CPT | Performed by: PHYSICAL THERAPIST

## 2017-02-15 PROCEDURE — 97010 HOT OR COLD PACKS THERAPY: CPT | Performed by: PHYSICAL THERAPIST

## 2017-02-15 PROCEDURE — 97110 THERAPEUTIC EXERCISES: CPT | Performed by: PHYSICAL THERAPIST

## 2017-02-15 NOTE — PROGRESS NOTES
Outpatient Physical Therapy Ortho Treatment Note   [place]     Patient Name: Brittny Han  : 1960  MRN: [id number]  Today's Date: 2/15/2017      Visit Date: 02/15/2017    Visit Dx:    ICD-10-CM ICD-9-CM   1. Neck pain M54.2 723.1       Patient Active Problem List   Diagnosis   • Dyspnea on exertion   • Obstructive sleep apnea, adult   • Allergic rhinitis   • Anxiety   • Gastroesophageal reflux disease   • Anemia   • Essential hypertension   • Asthma   • Peptic ulcer   • Periodic headache syndrome, not intractable   • Numbness and tingling        Past Medical History   Diagnosis Date   • Allergic rhinitis    • Anxiety    •  1 para 1    • History of echocardiogram 2011     mild ascending aortic root dilatation; mod RVC enlargement; global left EF 0.72 (normal 0.55-0.75) mild concentric LV hypertrophy; trace MR; mild TR    • History of PFTs 2016     good effort; normal lung volumes   • Hyperlipidemia         Past Surgical History   Procedure Laterality Date   • Hysterectomy     • Breast lumpectomy     • Bladder suspension     • Skin cancer excision               PT Ortho       02/15/17 1100    Subjective Comments    Subjective Comments Pt. reports increased stress at work which has contributed to increase in neck pain and headache.  -ISABEL    Subjective Pain    Able to rate subjective pain? yes  -ISABEL    Pre-Treatment Pain Level 5  -ISABEL    Post-Treatment Pain Level 2  -ISABEL      17 1600    Subjective Comments    Subjective Comments Headache frequency decreased to one per week vs. one per day.  Exercises seem to help when pain begins to increase.  -ISABEL    Subjective Pain    Able to rate subjective pain? yes  -ISABEL    Pre-Treatment Pain Level 3  -ISABEL    Post-Treatment Pain Level 1  -ISABEL    Myotomal Screen- Upper Quarter Clearing    Shoulder flexion (C5) 4+ (Good +);Bilateral:  -ISABEL    Elbow flexion/wrist extension (C6) 4+ (Good +);Bilateral:  -ISABEL    Elbow extension/wrist flexion (C7) 5  (Normal);Bilateral:  -ISABEL    Neck    Flexion AROM Deficit 49  -ISABEL    Extension AROM Deficit 50  -ISABEL    Lt Lat Flexion AROM Deficit 39   R stretch  -ISABEL    Rt Lateral Flexion AROM Deficit 35   L stretch  -ISABEL    Lt Rotation AROM Deficit 61  -ISABEL    Rt Rotation AROM Deficit 65  -ISABEL      User Key  (r) = Recorded By, (t) = Taken By, (c) = Cosigned By    Initials Name Provider Type    ISABEL Osborne PT Physical Therapist                            PT Assessment/Plan       02/13/17 1617 02/09/17 1000       PT Assessment    Functional Limitations Limitations in functional capacity and performance  -ISABEL      Impairments Pain;Posture;Impaired flexibility;Muscle strength  -ISABEL      Assessment Comments Pt. demonstrates improvement with decreasing frequency and intensity of symptoms overall.  -ISABEL No symptoms reported after treatment today.  UT trigger points are palpably more relaxed and CT kyphosis is temporarily reduced with active postural correction.   -ISABEL     Please refer to paper survey for additional self-reported information Yes  -ISABEL      Rehab Potential Good  -ISABEL      Patient/caregiver participated in establishment of treatment plan and goals Yes  -ISABEL      Patient would benefit from skilled therapy intervention Yes  -ISABEL      PT Plan    PT Frequency 2x/week  -ISABEL      Predicted Duration of Therapy Intervention (days/wks) 4 weeks  -ISABEL      Planned CPT's? PT THER PROC EA 15 MIN: 21516;PT NEUROMUSC RE-EDUCATION EA 15 MIN: 15174;PT MANUAL THERAPY EA 15 MIN: 04713;PT ELECTRICAL STIM UNATTEND: ;PT HOT/COLD PACK WC NONMCARE: 17164;PT ULTRASOUND EA 15 MIN: 69469;PT TRACTION CERVICAL: 87125  -ISABEL      PT Plan Comments Continue PT.  Progress exercise as tolerated.  -ISABEL Continue PT.  -ISABEL       User Key  (r) = Recorded By, (t) = Taken By, (c) = Cosigned By    Initials Name Provider Type    ISABEL Osborne PT Physical Therapist                Modalities       02/15/17 1100          Moist Heat    MH Applied Yes  -ISABEL       Location cervical spine including CT junction  -ISABEL      Rx Minutes 10 mins  -ISABEL      MH S/P Rx Yes  -ISABEL      Ultrasound 46912    Location bilateral cervical paraspinals and UT bilaterally  -ISABEL      Rx Minutes 10  -ISABEL      Duty Cycle 100  -ISABEL      Frequency 1.0 MHz  -ISABEL      Intensity - Wts/cm 1.6  -ISABEL        User Key  (r) = Recorded By, (t) = Taken By, (c) = Cosigned By    Initials Name Provider Type    ISABEL Osborne PT Physical Therapist                Exercises       02/15/17 1100          Subjective Comments    Subjective Comments Pt. reports increased stress at work which has contributed to increase in neck pain and headache.  -ISABEL      Subjective Pain    Able to rate subjective pain? yes  -ISABEL      Pre-Treatment Pain Level 5  -ISABEL      Post-Treatment Pain Level 2  -ISABEL      Exercise 1    Exercise Name 1 Active warm up on UBE with resistance increased to level 5.  -        User Key  (r) = Recorded By, (t) = Taken By, (c) = Cosigned By    Initials Name Provider Type    ISABEL Osborne PT Physical Therapist                        Manual Rx (last 36 hours)      Manual Treatments       02/15/17 1100          Manual Rx 1    Manual Rx 1 Location CT spine  -      Manual Rx 1 Type Astym and STM to bilateral cervical and UT region in sitting.  -ISABEL      Manual Rx 2    Manual Rx 2 Location Bilateral passive scapular retraction and depression, suboccipital release and manual cervical distraction.  Passive stretching into sidebending and rotation bilaterally.  -        User Key  (r) = Recorded By, (t) = Taken By, (c) = Cosigned By    Initials Name Provider Type    ISABEL Osborne PT Physical Therapist                PT OP Goals       02/13/17 1600          PT Short Term Goals    STG Date to Achieve 02/06/17  -      STG 1 Pt. to report pain in neck to be no greater than 5/10 NPRS for improved functional mobility.  -ISABEL      STG 1 Progress Progressing;Ongoing  -      STG 1 Progress Comments Pain can be  as bad as 6/10, but not as often as before.  -      STG 2 Pt. to report not having any off balance episodes secondary to being dizzy with turning for improved functional mobility.  -      STG 2 Progress Met  -      Long Term Goals    LTG Date to Achieve 02/27/17  -      LTG 1 Pt. to demonstrate B cervical rotation >/= 55 degrees for improved functional mobility.  -      LTG 1 Progress Met  -      LTG 2 Pt. demonstrates corresct sitting and standing posture to reduce recurrence of symptoms.  -      LTG 3 Pt. to report pain in neck to be no greater than 3/10 NPRS for improved functional mobility.  -      LTG 3 Progress Progressing;Ongoing  -      LTG 4 Pt. to be I with HEP.  -      LTG 4 Progress Met  -      LTG 4 Progress Comments NDI score is improved by 6% (24% or less).  -        User Key  (r) = Recorded By, (t) = Taken By, (c) = Cosigned By    Initials Name Provider Type    ISABEL Osborne PT Physical Therapist                    Outcome Measures       02/13/17 1600          Neck Disability Index    Section 1 - Pain Intensity 2  -ISABEL      Section 2 - Personal Care 0  -ISABEL      Section 3 - Lifting 1  -ISABEL      Section 4 - Work 2  -ISABEL      Section 5 - Headaches 1  -ISABEL      Section 6 - Concentration 1  -ISABEL      Section 7 - Sleeping 2  -ISABEL      Section 8 - Driving 2  -ISABEL      Section 9 - Reading 3  -ISABEL      Section 10 - Recreation 1  -      Neck Disability Index Score 15  -      Neck Disability Index Comments 30%  -      Functional Assessment    Outcome Measure Options Neck Disability Index (NDI)  -        User Key  (r) = Recorded By, (t) = Taken By, (c) = Cosigned By    Initials Name Provider Type    ISABEL Osborne PT Physical Therapist            Time Calculation:   Start Time: 0920  Total Timed Code Minutes- PT: 45 minute(s)    Therapy Charges for Today     Code Description Service Date Service Provider Modifiers Qty    01859215914  PT HOT/COLD PACK WC NONMCARE 2/15/2017  Fifi Osborne, PT GP 1    59395306286 HC PT ULTRASOUND EA 15 MIN 2/15/2017 Fifi Osborne, PT GP 1    98267895632 HC PT MANUAL THERAPY EA 15 MIN 2/15/2017 Fifi Osborne, PT GP 1    70870733446 HC PT THER PROC EA 15 MIN 2/15/2017 Fifi Osborne, PT GP 1                    Fifi Osborne, PT  2/15/2017

## 2017-02-16 ENCOUNTER — APPOINTMENT (OUTPATIENT)
Dept: PHYSICAL THERAPY | Facility: HOSPITAL | Age: 57
End: 2017-02-16

## 2017-02-20 ENCOUNTER — APPOINTMENT (OUTPATIENT)
Dept: PHYSICAL THERAPY | Facility: HOSPITAL | Age: 57
End: 2017-02-20

## 2017-02-21 ENCOUNTER — HOSPITAL ENCOUNTER (OUTPATIENT)
Dept: PHYSICAL THERAPY | Facility: HOSPITAL | Age: 57
Setting detail: THERAPIES SERIES
Discharge: HOME | End: 2017-02-21

## 2017-02-21 DIAGNOSIS — M54.2 NECK PAIN: Primary | ICD-10-CM

## 2017-02-21 PROCEDURE — 97110 THERAPEUTIC EXERCISES: CPT | Performed by: PHYSICAL THERAPIST

## 2017-02-21 NOTE — PROGRESS NOTES
Outpatient Physical Therapy Ortho Treatment Note  [facility]     Patient Name: Brittny Han  : 1960  MRN: [id number]  Today's Date: 2017      Visit Date: 2017    Visit Dx:    ICD-10-CM ICD-9-CM   1. Neck pain M54.2 723.1       Patient Active Problem List   Diagnosis   • Dyspnea on exertion   • Obstructive sleep apnea, adult   • Allergic rhinitis   • Anxiety   • Gastroesophageal reflux disease   • Anemia   • Essential hypertension   • Asthma   • Peptic ulcer   • Periodic headache syndrome, not intractable   • Numbness and tingling        Past Medical History   Diagnosis Date   • Allergic rhinitis    • Anxiety    •  1 para 1    • History of echocardiogram 2011     mild ascending aortic root dilatation; mod RVC enlargement; global left EF 0.72 (normal 0.55-0.75) mild concentric LV hypertrophy; trace MR; mild TR    • History of PFTs 2016     good effort; normal lung volumes   • Hyperlipidemia         Past Surgical History   Procedure Laterality Date   • Hysterectomy     • Breast lumpectomy     • Bladder suspension     • Skin cancer excision               PT Ortho       17 0800    Subjective Comments    Subjective Comments Pt. is feeling much better today than at last session.  -ISABEL    Subjective Pain    Able to rate subjective pain? yes  -ISABEL    Pre-Treatment Pain Level 1  -ISABEL    Post-Treatment Pain Level 1  -ISABEL      User Key  (r) = Recorded By, (t) = Taken By, (c) = Cosigned By    Initials Name Provider Type    ISABEL Osborne, PT Physical Therapist                            PT Assessment/Plan       17 0800          PT Assessment    Assessment Comments Palpably decreased muscle tension in suprascapular region.  Improving postural awareness.  Decreasing pain.  -ISABEL      PT Plan    PT Plan Comments Continue PT.  Consider transition to independent exercise if pt. continues to do well.  -ISABEL        User Key  (r) = Recorded By, (t) = Taken By, (c) = Cosigned By     Initials Name Provider Type    ISABEL Osborne, CITLALI Physical Therapist                    Exercises       02/21/17 0800          Subjective Comments    Subjective Comments Pt. is feeling much better today than at last session.  -ISABEL      Subjective Pain    Able to rate subjective pain? yes  -ISABEL      Pre-Treatment Pain Level 1  -ISABEL      Post-Treatment Pain Level 1  -ISABEL      Exercise 1    Exercise Name 1 Acive warm-up on UBE followed by postural/scapular stability exercises per flow sheet.  -ISABEL        User Key  (r) = Recorded By, (t) = Taken By, (c) = Cosigned By    Initials Name Provider Type    ISABEL Osborne PT Physical Therapist                               PT OP Goals       02/13/17 1600          PT Short Term Goals    STG Date to Achieve 02/06/17  -      STG 1 Pt. to report pain in neck to be no greater than 5/10 NPRS for improved functional mobility.  -      STG 1 Progress Progressing;Ongoing  -      STG 1 Progress Comments Pain can be as bad as 6/10, but not as often as before.  -      STG 2 Pt. to report not having any off balance episodes secondary to being dizzy with turning for improved functional mobility.  -      STG 2 Progress Met  -      Long Term Goals    LTG Date to Achieve 02/27/17  -ISABEL      LTG 1 Pt. to demonstrate B cervical rotation >/= 55 degrees for improved functional mobility.  -      LTG 1 Progress Met  -      LTG 2 Pt. demonstrates corresct sitting and standing posture to reduce recurrence of symptoms.  -      LTG 3 Pt. to report pain in neck to be no greater than 3/10 NPRS for improved functional mobility.  -      LTG 3 Progress Progressing;Ongoing  -      LTG 4 Pt. to be I with HEP.  -      LTG 4 Progress Met  -      LTG 4 Progress Comments NDI score is improved by 6% (24% or less).  -ISABEL        User Key  (r) = Recorded By, (t) = Taken By, (c) = Cosigned By    Initials Name Provider Type    ISABEL Osborne PT Physical Therapist                     Time Calculation:   Start Time: 0800  Total Timed Code Minutes- PT: 30 minute(s)    Therapy Charges for Today     Code Description Service Date Service Provider Modifiers Qty    38278225109  PT THER PROC EA 15 MIN 2/21/2017 Fifi Osborne, PT GP 2                    Fifi Osborne, PT  2/21/2017

## 2017-02-23 ENCOUNTER — HOSPITAL ENCOUNTER (OUTPATIENT)
Dept: PHYSICAL THERAPY | Facility: HOSPITAL | Age: 57
Setting detail: THERAPIES SERIES
Discharge: HOME | End: 2017-02-23

## 2017-02-23 DIAGNOSIS — M54.2 NECK PAIN: Primary | ICD-10-CM

## 2017-02-23 PROCEDURE — 97140 MANUAL THERAPY 1/> REGIONS: CPT | Performed by: PHYSICAL THERAPIST

## 2017-02-23 PROCEDURE — 97035 APP MDLTY 1+ULTRASOUND EA 15: CPT | Performed by: PHYSICAL THERAPIST

## 2017-02-23 PROCEDURE — 97110 THERAPEUTIC EXERCISES: CPT | Performed by: PHYSICAL THERAPIST

## 2017-02-23 NOTE — PROGRESS NOTES
Outpatient Physical Therapy Ortho Treatment Note   [place]     Patient Name: Brittny Han  : 1960  MRN: [id number]  Today's Date: 2017      Visit Date: 2017    Visit Dx:    ICD-10-CM ICD-9-CM   1. Neck pain M54.2 723.1       Patient Active Problem List   Diagnosis   • Dyspnea on exertion   • Obstructive sleep apnea, adult   • Allergic rhinitis   • Anxiety   • Gastroesophageal reflux disease   • Anemia   • Essential hypertension   • Asthma   • Peptic ulcer   • Periodic headache syndrome, not intractable   • Numbness and tingling        Past Medical History   Diagnosis Date   • Allergic rhinitis    • Anxiety    •  1 para 1    • History of echocardiogram 2011     mild ascending aortic root dilatation; mod RVC enlargement; global left EF 0.72 (normal 0.55-0.75) mild concentric LV hypertrophy; trace MR; mild TR    • History of PFTs 2016     good effort; normal lung volumes   • Hyperlipidemia         Past Surgical History   Procedure Laterality Date   • Hysterectomy     • Breast lumpectomy     • Bladder suspension     • Skin cancer excision               PT Ortho       17 1100    Subjective Comments    Subjective Comments Pt. reports mild neck discomfort this morning.  -ISABEL    Subjective Pain    Able to rate subjective pain? yes  -ISABEL    Pre-Treatment Pain Level 2  -ISABEL    Post-Treatment Pain Level 1  -ISABEL      17 0800    Subjective Comments    Subjective Comments Pt. is feeling much better today than at last session.  -ISABEL    Subjective Pain    Able to rate subjective pain? yes  -ISABEL    Pre-Treatment Pain Level 1  -ISABEL    Post-Treatment Pain Level 1  -ISABEL      User Key  (r) = Recorded By, (t) = Taken By, (c) = Cosigned By    Initials Name Provider Type    ISABEL Osborne PT Physical Therapist                            PT Assessment/Plan       17 1100 17 0800       PT Assessment    Assessment Comments Symptoms localized to mid-lower cervical spine  today.  Pain decreased after treatment.  -ISABEL Palpably decreased muscle tension in suprascapular region.  Improving postural awareness.  Decreasing pain.  -ISABEL     PT Plan    PT Plan Comments Continue PT.  Transition to independent exercise if symptoms remain well-controlled.  -ISABEL Continue PT.  Consider transition to independent exercise if pt. continues to do well.  -ISABEL       User Key  (r) = Recorded By, (t) = Taken By, (c) = Cosigned By    Initials Name Provider Type    ISABEL Osborne PT Physical Therapist                Modalities       02/23/17 1100          Ultrasound [id number]    Location bilateral cervical paraspinals and UT bilaterally  -ISABEL      Rx Minutes 10  -ISABEL      Duty Cycle 100  -ISABEL      Frequency 1.0 MHz  -ISABEL      Intensity - Wts/cm 1.6  -ISABEL        User Key  (r) = Recorded By, (t) = Taken By, (c) = Cosigned By    Initials Name Provider Type    ISABEL Osborne PT Physical Therapist                Exercises       02/23/17 1100          Subjective Comments    Subjective Comments Pt. reports mild neck discomfort this morning.  -ISABEL      Subjective Pain    Able to rate subjective pain? yes  -ISABEL      Pre-Treatment Pain Level 2  -ISABEL      Post-Treatment Pain Level 1  -ISABEL      Exercise 1    Exercise Name 1 Active warm-up on UBE prior to Astym and active stretching after Astym.  -ISABEL        User Key  (r) = Recorded By, (t) = Taken By, (c) = Cosigned By    Initials Name Provider Type    ISABEL Osborne PT Physical Therapist                        Manual Rx (last 36 hours)      Manual Treatments       02/23/17 1100          Manual Rx 1    Manual Rx 1 Location Cervical spine  -ISABEL      Manual Rx 1 Type Astym cervical and UT area.  -ISABEL        User Key  (r) = Recorded By, (t) = Taken By, (c) = Cosigned By    Initials Name Provider Type    ISABEL Osborne PT Physical Therapist                PT OP Goals       02/13/17 1600          PT Short Term Goals    STG Date to Achieve 02/06/17  -ISABEL      STG 1  Pt. to report pain in neck to be no greater than 5/10 NPRS for improved functional mobility.  -ISABEL      STG 1 Progress Progressing;Ongoing  -ISABEL      STG 1 Progress Comments Pain can be as bad as 6/10, but not as often as before.  -      STG 2 Pt. to report not having any off balance episodes secondary to being dizzy with turning for improved functional mobility.  -      STG 2 Progress Met  -      Long Term Goals    LTG Date to Achieve 02/27/17  -      LTG 1 Pt. to demonstrate B cervical rotation >/= 55 degrees for improved functional mobility.  -      LTG 1 Progress Met  -      LTG 2 Pt. demonstrates corresct sitting and standing posture to reduce recurrence of symptoms.  -      LTG 3 Pt. to report pain in neck to be no greater than 3/10 NPRS for improved functional mobility.  -      LTG 3 Progress Progressing;Ongoing  -      LTG 4 Pt. to be I with HEP.  -      LTG 4 Progress Met  -      LTG 4 Progress Comments NDI score is improved by 6% (24% or less).  -        User Key  (r) = Recorded By, (t) = Taken By, (c) = Cosigned By    Initials Name Provider Type    ISABEL Osborne PT Physical Therapist                    Time Calculation:   Start Time: 0805  Total Timed Code Minutes- PT: 40 minute(s)    Therapy Charges for Today     Code Description Service Date Service Provider Modifiers Qty    63495917251 HC PT THER PROC EA 15 MIN 2/23/2017 Fifi Osborne PT GP 1    52283934354 HC PT MANUAL THERAPY EA 15 MIN 2/23/2017 Fifi Osborne PT GP 1    77354628908 HC PT ULTRASOUND EA 15 MIN 2/23/2017 Fifi Osborne, PT GP 1                    Fifi Osborne, PT  2/23/2017

## 2017-02-27 ENCOUNTER — APPOINTMENT (OUTPATIENT)
Dept: PHYSICAL THERAPY | Facility: HOSPITAL | Age: 57
End: 2017-02-27

## 2017-02-28 ENCOUNTER — HOSPITAL ENCOUNTER (OUTPATIENT)
Dept: PHYSICAL THERAPY | Facility: HOSPITAL | Age: 57
Setting detail: THERAPIES SERIES
Discharge: HOME | End: 2017-02-28

## 2017-02-28 DIAGNOSIS — M54.2 NECK PAIN: Primary | ICD-10-CM

## 2017-02-28 PROCEDURE — 97110 THERAPEUTIC EXERCISES: CPT | Performed by: PHYSICAL THERAPIST

## 2017-02-28 PROCEDURE — 97035 APP MDLTY 1+ULTRASOUND EA 15: CPT | Performed by: PHYSICAL THERAPIST

## 2017-02-28 PROCEDURE — 97140 MANUAL THERAPY 1/> REGIONS: CPT | Performed by: PHYSICAL THERAPIST

## 2017-03-02 ENCOUNTER — HOSPITAL ENCOUNTER (OUTPATIENT)
Dept: PHYSICAL THERAPY | Facility: HOSPITAL | Age: 57
Setting detail: THERAPIES SERIES
Discharge: HOME | End: 2017-03-02

## 2017-03-02 DIAGNOSIS — M54.2 NECK PAIN: Primary | ICD-10-CM

## 2017-03-02 PROCEDURE — 97035 APP MDLTY 1+ULTRASOUND EA 15: CPT | Performed by: PHYSICAL THERAPIST

## 2017-03-02 PROCEDURE — 97110 THERAPEUTIC EXERCISES: CPT | Performed by: PHYSICAL THERAPIST

## 2017-03-02 PROCEDURE — 97140 MANUAL THERAPY 1/> REGIONS: CPT | Performed by: PHYSICAL THERAPIST

## 2017-03-02 NOTE — THERAPY DISCHARGE NOTE
Outpatient Physical Therapy Ortho Treatment Note/Discharge Summary   [facility]     Patient Name: Brittny Han  : 1960  MRN: [id number]  Today's Date: 3/2/2017      Visit Date: 2017    Visit Dx:    ICD-10-CM ICD-9-CM   1. Neck pain M54.2 723.1       Patient Active Problem List   Diagnosis   • Dyspnea on exertion   • Obstructive sleep apnea, adult   • Allergic rhinitis   • Anxiety   • Gastroesophageal reflux disease   • Anemia   • Essential hypertension   • Asthma   • Peptic ulcer   • Periodic headache syndrome, not intractable   • Numbness and tingling        Past Medical History   Diagnosis Date   • Allergic rhinitis    • Anxiety    •  1 para 1    • History of echocardiogram 2011     mild ascending aortic root dilatation; mod RVC enlargement; global left EF 0.72 (normal 0.55-0.75) mild concentric LV hypertrophy; trace MR; mild TR    • History of PFTs 2016     good effort; normal lung volumes   • Hyperlipidemia         Past Surgical History   Procedure Laterality Date   • Hysterectomy     • Breast lumpectomy     • Bladder suspension     • Skin cancer excision               PT Ortho       17 0800    Subjective Comments    Subjective Comments Pt. feels good today.    -ISABEL    Subjective Pain    Able to rate subjective pain? yes  -ISABEL    Pre-Treatment Pain Level 1  -ISABEL    Post-Treatment Pain Level 1  -ISABEL      17 1000    Subjective Comments    Subjective Comments Pt. had discomfort last evening related to work stress.  This morning, symptoms are mild and pt. notices improvement in distal UE symptoms overall.  -ISABEL    Subjective Pain    Able to rate subjective pain? yes  -ISABEL    Pre-Treatment Pain Level 2  -ISABEL    Post-Treatment Pain Level 1  -ISABEL      User Key  (r) = Recorded By, (t) = Taken By, (c) = Cosigned By    Initials Name Provider Type    ISABEL Osborne PT Physical Therapist                            PT Assessment/Plan       17 0800       PT  Assessment    Assessment Comments Pt. feels that she can continue with self-management of symptoms.  -ISABEL     PT Plan    PT Plan Comments DC to independent HEP.  -ISABEL       User Key  (r) = Recorded By, (t) = Taken By, (c) = Cosigned By    Initials Name Provider Type    ISABEL Osborne PT Physical Therapist                Modalities       03/02/17 0800          Ultrasound 28622    Location bilateral cervical paraspinals and UT bilaterally  -ISABEL      Rx Minutes 10  -ISABEL      Duty Cycle 100  -ISABEL      Frequency 1.0 MHz  -ISABEL      Intensity - Wts/cm 1.6  -ISABEL        User Key  (r) = Recorded By, (t) = Taken By, (c) = Cosigned By    Initials Name Provider Type    ISABEL Osborne PT Physical Therapist                Exercises       03/02/17 0800          Subjective Comments    Subjective Comments Pt. feels good today.    -ISABEL      Subjective Pain    Able to rate subjective pain? yes  -ISABEL      Pre-Treatment Pain Level 1  -ISABEL      Post-Treatment Pain Level 1  -ISABEL      Exercise 1    Exercise Name 1 Briefly reviewed HEP in preparation for discharge.  Pt. completed active stretching after Astym.  -ISABEL        User Key  (r) = Recorded By, (t) = Taken By, (c) = Cosigned By    Initials Name Provider Type    ISABEL Osborne PT Physical Therapist                        Manual Rx (last 36 hours)      Manual Treatments       03/02/17 0700          Manual Rx 1    Manual Rx 1 Location Cervical spine  -ISABEL      Manual Rx 1 Type Astym cervical and UT area.  -ISABEL        User Key  (r) = Recorded By, (t) = Taken By, (c) = Cosigned By    Initials Name Provider Type    ISABEL Osborne PT Physical Therapist                PT OP Goals       03/02/17 0800       PT Short Term Goals    STG Date to Achieve 02/06/17  -ISABEL     STG 1 Pt. to report pain in neck to be no greater than 5/10 NPRS for improved functional mobility.  -ISABEL     STG 1 Progress Met  -ISABEL     STG 2 Pt. to report not having any off balance episodes secondary to being dizzy  with turning for improved functional mobility.  -     STG 2 Progress Met  -     Long Term Goals    LTG Date to Achieve 02/27/17  -     LTG 1 Pt. to demonstrate B cervical rotation >/= 55 degrees for improved functional mobility.  -     LTG 1 Progress Met  -     LTG 2 Pt. demonstrates corresct sitting and standing posture to reduce recurrence of symptoms.  -     LTG 2 Progress Met  -     LTG 3 Pt. to report pain in neck to be no greater than 3/10 NPRS for improved functional mobility.  -     LTG 3 Progress Progressing  -     LTG 4 Pt. to be I with HEP.  -     LTG 4 Progress Met  -       User Key  (r) = Recorded By, (t) = Taken By, (c) = Cosigned By    Initials Name Provider Type    ISABEL Osborne PT Physical Therapist                    Outcome Measures       03/02/17 0900          Neck Disability Index    Section 1 - Pain Intensity 0  -ISABEL      Section 2 - Personal Care 0  -ISABEL      Section 3 - Lifting 1  -ISABEL      Section 4 - Work 0  -ISABEL      Section 5 - Headaches 1  -ISABEL      Section 6 - Concentration 0  -ISABEL      Section 7 - Sleeping 1  -ISABEL      Section 8 - Driving 1  -ISABEL      Section 9 - Reading 1  -ISABEL      Section 10 - Recreation 0  -ISABEL      Neck Disability Index Score 5  -ISABEL      Neck Disability Index Comments 10%  -ISABEL      Functional Assessment    Outcome Measure Options Neck Disability Index (NDI)  -        User Key  (r) = Recorded By, (t) = Taken By, (c) = Cosigned By    Initials Name Provider Type    ISABEL Osborne PT Physical Therapist            Time Calculation:   Start Time: 0805  Total Timed Code Minutes- PT: 40 minute(s)    Therapy Charges for Today     Code Description Service Date Service Provider Modifiers Qty    47039351384 HC PT ULTRASOUND EA 15 MIN 3/2/2017 Fifi Osborne, PT GP 1    25989890567 HC PT MANUAL THERAPY EA 15 MIN 3/2/2017 Fifi Osborne, PT GP 1    66033133137 HC PT THER PROC EA 15 MIN 3/2/2017 Fifi Osborne PT GP 1          PT  G-Codes  Outcome Measure Options: Neck Disability Index (NDI)     OP PT Discharge Summary  Date of Discharge: 03/02/17  Reason for Discharge: All goals achieved  Outcomes Achieved: Able to achieve all goals within established timeline  Discharge Destination: Home with home program  Discharge Instructions: Continue HEP and postural awareness.      Fifi Osborne, PT  3/2/2017

## 2017-03-07 ENCOUNTER — APPOINTMENT (OUTPATIENT)
Dept: PHYSICAL THERAPY | Facility: HOSPITAL | Age: 57
End: 2017-03-07

## 2017-03-09 ENCOUNTER — APPOINTMENT (OUTPATIENT)
Dept: PHYSICAL THERAPY | Facility: HOSPITAL | Age: 57
End: 2017-03-09

## 2017-03-16 ENCOUNTER — APPOINTMENT (OUTPATIENT)
Dept: PHYSICAL THERAPY | Facility: HOSPITAL | Age: 57
End: 2017-03-16

## 2017-03-21 ENCOUNTER — APPOINTMENT (OUTPATIENT)
Dept: PHYSICAL THERAPY | Facility: HOSPITAL | Age: 57
End: 2017-03-21

## 2017-03-23 ENCOUNTER — APPOINTMENT (OUTPATIENT)
Dept: PHYSICAL THERAPY | Facility: HOSPITAL | Age: 57
End: 2017-03-23

## 2017-03-28 ENCOUNTER — APPOINTMENT (OUTPATIENT)
Dept: PHYSICAL THERAPY | Facility: HOSPITAL | Age: 57
End: 2017-03-28

## 2017-03-30 ENCOUNTER — APPOINTMENT (OUTPATIENT)
Dept: PHYSICAL THERAPY | Facility: HOSPITAL | Age: 57
End: 2017-03-30

## 2017-05-02 ENCOUNTER — OFFICE VISIT (OUTPATIENT)
Dept: NEUROLOGY | Facility: CLINIC | Age: 57
End: 2017-05-02

## 2017-05-02 VITALS
WEIGHT: 221.2 LBS | HEIGHT: 67 IN | HEART RATE: 77 BPM | BODY MASS INDEX: 34.72 KG/M2 | OXYGEN SATURATION: 95 % | SYSTOLIC BLOOD PRESSURE: 124 MMHG | DIASTOLIC BLOOD PRESSURE: 86 MMHG

## 2017-05-02 DIAGNOSIS — G43.C0 PERIODIC HEADACHE SYNDROME, NOT INTRACTABLE: Primary | ICD-10-CM

## 2017-05-02 DIAGNOSIS — G25.81 RESTLESS LEGS SYNDROME (RLS): ICD-10-CM

## 2017-05-02 PROCEDURE — 99213 OFFICE O/P EST LOW 20 MIN: CPT | Performed by: PSYCHIATRY & NEUROLOGY

## 2017-05-12 ENCOUNTER — TRANSCRIBE ORDERS (OUTPATIENT)
Dept: ADMINISTRATIVE | Facility: HOSPITAL | Age: 57
End: 2017-05-12

## 2017-05-12 DIAGNOSIS — Z12.31 VISIT FOR SCREENING MAMMOGRAM: Primary | ICD-10-CM

## 2017-06-19 ENCOUNTER — OFFICE VISIT (OUTPATIENT)
Dept: PULMONOLOGY | Facility: CLINIC | Age: 57
End: 2017-06-19

## 2017-06-19 VITALS
WEIGHT: 212 LBS | BODY MASS INDEX: 33.27 KG/M2 | TEMPERATURE: 98.9 F | SYSTOLIC BLOOD PRESSURE: 116 MMHG | HEART RATE: 81 BPM | DIASTOLIC BLOOD PRESSURE: 78 MMHG | RESPIRATION RATE: 16 BRPM | HEIGHT: 67 IN | OXYGEN SATURATION: 97 %

## 2017-06-19 DIAGNOSIS — J30.1 SEASONAL ALLERGIC RHINITIS DUE TO POLLEN: ICD-10-CM

## 2017-06-19 DIAGNOSIS — G25.81 RESTLESS LEGS SYNDROME (RLS): ICD-10-CM

## 2017-06-19 DIAGNOSIS — K21.9 GASTROESOPHAGEAL REFLUX DISEASE WITHOUT ESOPHAGITIS: ICD-10-CM

## 2017-06-19 DIAGNOSIS — G47.33 OBSTRUCTIVE SLEEP APNEA, ADULT: ICD-10-CM

## 2017-06-19 DIAGNOSIS — J45.30 MILD PERSISTENT ASTHMA WITHOUT COMPLICATION: ICD-10-CM

## 2017-06-19 PROCEDURE — 99214 OFFICE O/P EST MOD 30 MIN: CPT | Performed by: INTERNAL MEDICINE

## 2017-06-19 RX ORDER — SACCHAROMYCES BOULARDII 250 MG
1 CAPSULE ORAL DAILY
Refills: 0 | COMMUNITY
Start: 2017-06-05 | End: 2018-11-06

## 2017-06-19 RX ORDER — DOCUSATE SODIUM 250 MG/1
CAPSULE, LIQUID FILLED ORAL 2 TIMES DAILY
Refills: 0 | COMMUNITY
Start: 2017-06-16 | End: 2018-11-06

## 2017-06-19 NOTE — PROGRESS NOTES
"  PULMONARY  NOTE    Chief Complaint     Chronic persistent asthma, obstructive sleep apnea, restless legs, allergic rhinitis    History of Present Illness     57-year-old white female returns today for follow-up.  She was last seen in November.    She has obstructive sleep apnea and is on CPAP.  She has had some trouble the past cutting supplies from her [employer] services company, [employer], although currently things are apparently okay.  She did not bring in her data chip today but she indicates that she uses the CPAP on a daily basis.  Despite that, she doesn't always feel rested when she wakes up in the morning although doesn't fall sleep easily during the day once she has gotten up.    She continues to see Dr. Casillas and is starting allergy shots again for her seasonal allergic rhinitis.    She remains on Advair, Singulair, and albuterol for her asthma.  Depending upon the time of the year, she uses the albuterol more frequently.  During the spring when allergies were \"the worst\" she was using albuterol 2-3 times per week.  She had qualified for Nucala but didn't want to go through an IV infusion on a monthly basis.  She did not qualify for Xolair.    She is fairly allergic to her pets but isn't willing to get rid of her pets.    Patient Active Problem List   Diagnosis   • Dyspnea on exertion   • Obstructive sleep apnea, adult   • Allergic rhinitis   • Anxiety   • Gastroesophageal reflux disease   • Anemia   • Essential hypertension   • Asthma   • Peptic ulcer   • Periodic headache syndrome, not intractable   • Restless legs syndrome (RLS)     Allergies   Allergen Reactions   • Brevital Sodium [Methohexital]    • Percocet [Oxycodone-Acetaminophen]      Breathing difficulty, tongue swelling, severe        Current Outpatient Prescriptions:   •  ADVAIR DISKUS 250-50 MCG/DOSE DISKUS, inhale 1 dose by mouth twice a day, Disp: , Rfl: 0  •  albuterol (PROVENTIL HFA;VENTOLIN HFA) 108 (90 BASE) MCG/ACT inhaler, Inhale 2 puffs as " "needed for wheezing., Disp: , Rfl:   •  Cholecalciferol (VITAMIN D3) 5000 UNITS tablet, Take 1 tablet by mouth Daily., Disp: , Rfl:   •  EPINEPHrine (EPIPEN) 0.3 MG/0.3ML solution auto-injector injection, Use as directed as needed for anaphylaxis., Disp: 2 each, Rfl: 2  •  FLORASTOR 250 MG capsule, Take 1 tablet by mouth Daily., Disp: , Rfl: 0  •  hydrochlorothiazide (HYDRODIURIL) 25 MG tablet, Take 25 mg by mouth Daily., Disp: , Rfl: 1  •  loratadine (CLARITIN) 10 MG tablet, Take 10 mg by mouth Daily., Disp: , Rfl:   •  montelukast (SINGULAIR) 10 MG tablet, take 1 tablet by mouth once daily, Disp: , Rfl: 1  •  potassium chloride (K-DUR) 10 MEQ CR tablet, Take 10 mEq by mouth Daily., Disp: , Rfl: 0  •  RA COL-RITE 250 MG capsule, 2 (Two) Times a Day., Disp: , Rfl: 0  •  RA VITAMIN B-12 TR 1000 MCG tablet controlled-release, Take 1 tablet by mouth Daily., Disp: , Rfl: 0  •  venlafaxine XR (EFFEXOR-XR) 75 MG 24 hr capsule, take 1 capsule by mouth once daily, Disp: , Rfl: 0  MEDICATION LIST AND ALLERGIES REVIEWED.    Family History   Problem Relation Age of Onset   • Heart disease Mother    • COPD Father      Father is alive at age 80 with COPD and asthma   • Asthma Father    • Basal cell carcinoma Father    • Sleep apnea Other    • Ulcerative colitis Other    • Crohn's disease Other    • Diabetes Other    • Hypertension Other      Social History   Substance Use Topics   • Smoking status: Never Smoker   • Smokeless tobacco: Not on file   • Alcohol use Yes      Comment: occasional     Social History     Social History Narrative    Single.    Rastafari.    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    Has a dog and cat.    Doesn't drink alcohol.    Is a lifelong nonsmoker.     FAMILY AND SOCIAL HISTORY REVIEWED.    Review of Systems  ALSO REFER TO SCANNED ROS SHEET FROM SAME DATE.    /78  Pulse 81  Temp 98.9 °F (37.2 °C)  Resp 16  Ht 67\" (170.2 cm)  Wt 212 lb (96.2 kg)  SpO2 97% Comment: RA  BMI 33.2 " kg/m2  Physical Exam   Constitutional: She is oriented to person, place, and time. She appears well-developed. No distress.   HENT:   Head: Normocephalic and atraumatic.   Neck: No thyromegaly present.   Cardiovascular: Normal rate, regular rhythm and normal heart sounds.    No murmur heard.  Pulmonary/Chest: Effort normal and breath sounds normal. No stridor.   Abdominal: Soft. Bowel sounds are normal.   Musculoskeletal: Normal range of motion. She exhibits no edema.   Lymphadenopathy:     She has no cervical adenopathy.        Right: No supraclavicular and no epitrochlear adenopathy present.        Left: No supraclavicular and no epitrochlear adenopathy present.   Neurological: She is alert and oriented to person, place, and time.   Skin: Skin is warm and dry. She is not diaphoretic.   Psychiatric: She has a normal mood and affect. Her behavior is normal.   Nursing note and vitals reviewed.      Results       Problem List       ICD-10-CM ICD-9-CM   1. Obstructive sleep apnea, adult G47.33 327.23   2. Seasonal allergic rhinitis  J30.1 477.0   3. Chronic persistent asthma J45.30 493.90   4. Restless legs syndrome (RLS) G25.81 333.94   5. Gastroesophageal reflux disease K21.9 530.81       Discussion     We reviewed her diagnoses today and treatment.    She appears to be compliant with her CPAP.  I've asked her to bring in her data chip for me to review.  Her reported compliance is high and she appears to be benefiting from it.  At least at this time, she appears to be getting supplies from her [employer] company okay.    She didn't want to proceed with Nucala.  She didn't want a IV infusion.  She didn't qualify for Xolair.  Therefore, she is going to remain on her current regimen of Advair, Singulair, and albuterol on an as-needed basis.  Her symptoms do seem to be aggravated on a seasonal basis.  In addition, she is fairly allergic to pets and isn't willing to get rid of her pets at this time.    We discussed adding more  inhaled steroids by adding another ICS but she doesn't want to do that at this time.    At the very least, I'll plan to see her back in 6 months with repeat spirometry    Jin Cat MD  Note electronically signed    CC: No primary care provider on file.

## 2017-10-05 ENCOUNTER — HOSPITAL ENCOUNTER (OUTPATIENT)
Dept: MAMMOGRAPHY | Facility: HOSPITAL | Age: 57
Discharge: HOME | End: 2017-10-05
Admitting: FAMILY MEDICINE

## 2017-10-05 DIAGNOSIS — Z12.31 VISIT FOR SCREENING MAMMOGRAM: ICD-10-CM

## 2017-10-05 PROCEDURE — G0202 SCR MAMMO BI INCL CAD: HCPCS

## 2017-10-05 PROCEDURE — 77063 BREAST TOMOSYNTHESIS BI: CPT | Performed by: RADIOLOGY

## 2017-10-05 PROCEDURE — 77067 SCR MAMMO BI INCL CAD: CPT | Performed by: RADIOLOGY

## 2017-10-05 PROCEDURE — 77063 BREAST TOMOSYNTHESIS BI: CPT

## 2017-10-16 ENCOUNTER — LAB REQUISITION (OUTPATIENT)
Dept: LAB | Facility: HOSPITAL | Age: 57
End: 2017-10-16

## 2017-10-16 ENCOUNTER — OUTSIDE FACILITY SERVICE (OUTPATIENT)
Dept: GASTROENTEROLOGY | Facility: CLINIC | Age: 57
End: 2017-10-16

## 2017-10-16 DIAGNOSIS — D12.4 BENIGN NEOPLASM OF DESCENDING COLON: ICD-10-CM

## 2017-10-16 PROCEDURE — 45385 COLONOSCOPY W/LESION REMOVAL: CPT | Performed by: INTERNAL MEDICINE

## 2017-10-16 PROCEDURE — 45388 COLONOSCOPY W/ABLATION: CPT | Performed by: INTERNAL MEDICINE

## 2017-10-16 PROCEDURE — G0500 MOD SEDAT ENDO SERVICE >5YRS: HCPCS | Performed by: INTERNAL MEDICINE

## 2017-10-16 PROCEDURE — 88305 TISSUE EXAM BY PATHOLOGIST: CPT | Performed by: INTERNAL MEDICINE

## 2017-10-17 LAB
CYTO UR: NORMAL
LAB AP CASE REPORT: NORMAL
LAB AP CLINICAL INFORMATION: NORMAL
Lab: NORMAL
PATH REPORT.FINAL DX SPEC: NORMAL
PATH REPORT.GROSS SPEC: NORMAL

## 2017-10-19 ENCOUNTER — TELEPHONE (OUTPATIENT)
Dept: GASTROENTEROLOGY | Facility: CLINIC | Age: 57
End: 2017-10-19

## 2017-10-19 NOTE — TELEPHONE ENCOUNTER
----- Message from Shaheed Christianson MD sent at 10/17/2017  4:22 PM EDT -----  Please call Brittny and inform her that she had 2 adenomas.  These are precancerous.  They're benign however.  A follow-up colonoscopy is indicated in 5 years time.  Dr. Christianson

## 2017-11-01 ENCOUNTER — OFFICE VISIT (OUTPATIENT)
Dept: NEUROLOGY | Facility: CLINIC | Age: 57
End: 2017-11-01

## 2017-11-01 VITALS
BODY MASS INDEX: 30.61 KG/M2 | HEIGHT: 67 IN | DIASTOLIC BLOOD PRESSURE: 74 MMHG | WEIGHT: 195 LBS | OXYGEN SATURATION: 99 % | HEART RATE: 88 BPM | SYSTOLIC BLOOD PRESSURE: 116 MMHG | RESPIRATION RATE: 18 BRPM

## 2017-11-01 DIAGNOSIS — G43.C0 PERIODIC HEADACHE SYNDROME, NOT INTRACTABLE: Primary | ICD-10-CM

## 2017-11-01 PROCEDURE — 99212 OFFICE O/P EST SF 10 MIN: CPT | Performed by: PSYCHIATRY & NEUROLOGY

## 2017-11-01 NOTE — PROGRESS NOTES
Subjective:   Chief Complaint   Patient presents with   • Periodic Headache syndrome       Patient ID: Brittny Han is a 57 y.o. female.    History of Present Illness     57 y.o.  woman returns in follow up for migraines, dizziness and N/T.  Last visit on 5/2/17 continued  mg BID    MRI Brain and Cervical spine - normal  Labs for inflammatory and hypercoaguable state are normal.      Update:    Finished PT and has minimal neck pain.  HA frequency less than once a month.  Stopped GBP but sx did not increase.  Feet continue with N/T.       PMH:    Two years ago developed gradual sensation of squeezing sensation in entire body.  Sx lasted for 3 to 4 months.  Started on water pill and sx improved with losing 18 lbs.  Followed by feet and hands falling asleep.  Sx have worsened and are constant.  Last two months has pain and swelling in feet going up to knees.  Night time sx are worse. Mid thoracic back pain at night.  Right arm has developed N/T  In last 2 weeks.     HA are daily in bitemporal location.  Squeezing and tingling sensation of moderate severity.  Sensitive to light and sound and dizziness.  Excedrin is taken daily with mild relief.      Reviewed Dr Ruffin's records:    11/2014 developed tingling in her hands and feet.  EMG of UE had mild CTS.  Resolved with PT.  Then recurrence of sx of bilateral hands and feet.  Sx worse with sitting and improved with movement.  Concurrent dizziness and HA.  2 - 3 ha weeks.    Treated for ALANNA by Dr Jin Cisse.  Dr Iglesia Cat for DINERO, allergic rhinitis.  Eosinophil count 440, IgE 1442    CBC unremarkable  The following portions of the patient's history were reviewed and updated as appropriate: allergies, current medications, past family history, past medical history, past social history, past surgical history and problem list.    Review of Systems   Constitutional: Positive for fatigue. Negative for activity change and unexpected weight change.   HENT:  "Negative for tinnitus and trouble swallowing.    Eyes: Negative for photophobia and visual disturbance.   Respiratory: Negative for apnea, cough and choking.    Cardiovascular: Negative for leg swelling.   Gastrointestinal: Negative for nausea and vomiting.   Endocrine: Negative for cold intolerance and heat intolerance.   Genitourinary: Negative for difficulty urinating, frequency, menstrual problem and urgency.   Musculoskeletal: Positive for myalgias and neck pain. Negative for back pain and gait problem.   Skin: Negative for color change and rash.   Allergic/Immunologic: Negative for immunocompromised state.   Neurological: Positive for dizziness, numbness and headaches. Negative for tremors, seizures, syncope, facial asymmetry, speech difficulty, weakness and light-headedness.   Hematological: Negative for adenopathy. Does not bruise/bleed easily.   Psychiatric/Behavioral: Positive for sleep disturbance. Negative for behavioral problems, confusion, decreased concentration and hallucinations.        Objective:  Vitals:    11/01/17 0905   BP: 116/74   Pulse: 88   Resp: 18   SpO2: 99%   Weight: 195 lb (88.5 kg)   Height: 67\" (170.2 cm)       Neurologic Exam     Mental Status   Registration: recalls 3 of 3 objects. Recall at 5 minutes: recalls 3 of 3 objects. Follows 3 step commands.   Attention: normal. Concentration: normal.   Level of consciousness: alert  Knowledge: good and consistent with education.   Able to name object. Able to read. Able to repeat. Able to write. Normal comprehension.     Cranial Nerves     CN II   Visual fields full to confrontation.   Visual acuity: normal  Right visual field deficit: none  Left visual field deficit: none     CN III, IV, VI   Right pupil: Shape: regular. Reactivity: brisk. Consensual response: intact.   Left pupil: Shape: regular. Reactivity: brisk. Consensual response: intact.   Nystagmus: none   Diplopia: none  Ophthalmoparesis: none  Upgaze: normal  Downgaze: " normal  Conjugate gaze: present  Vestibulo-ocular reflex: present    CN V   Facial sensation intact.   Right corneal reflex: normal  Left corneal reflex: normal    CN VII   Right facial weakness: none  Left facial weakness: none    CN VIII   Hearing: intact    CN IX, X   Palate: symmetric  Right gag reflex: normal  Left gag reflex: normal    CN XI   Right sternocleidomastoid strength: normal  Left sternocleidomastoid strength: normal    CN XII   Tongue: not atrophic  Fasciculations: absent  Tongue deviation: none    Motor Exam   Muscle bulk: normal  Overall muscle tone: normal  Right arm tone: normal  Left arm tone: normal  Right leg tone: normal  Left leg tone: normal    Sensory Exam   Right arm light touch: decreased from fingers  Left arm light touch: decreased from fingers  Right leg light touch: decreased from ankle  Left leg light touch: decreased from ankle  Right arm vibration: decreased from fingers  Left arm vibration: decreased from fingers  Right leg vibration: decreased from ankle  Left leg vibration: decreased from ankle  Right arm proprioception: decreased from fingers  Left arm proprioception: decreased from fingers  Right leg proprioception: decreased from ankle  Left leg proprioception: decreased from ankle  Right arm pinprick: decreased from fingers  Left arm pinprick: decreased from fingers  Right leg pinprick: decreased from ankle  Left leg pinprick: decreased from ankle             Gait, Coordination, and Reflexes     Tremor   Resting tremor: absent  Intention tremor: absent  Action tremor: absent    Reflexes   Reflexes 2+ except as noted.       Physical Exam   Constitutional: She appears well-developed and well-nourished.   Nursing note and vitals reviewed.      Assessment/Plan:       Problems Addressed this Visit        Cardiovascular and Mediastinum    Periodic headache syndrome, not intractable - Primary     Headaches are improving with treatment.  Continue current treatment regimen.

## 2018-01-24 ENCOUNTER — TELEPHONE (OUTPATIENT)
Dept: NEUROLOGY | Facility: CLINIC | Age: 58
End: 2018-01-24

## 2018-01-24 DIAGNOSIS — M54.2 NECK PAIN: Primary | ICD-10-CM

## 2018-01-24 NOTE — TELEPHONE ENCOUNTER
----- Message from Barb Nguyen sent at 1/24/2018  2:56 PM EST -----  Regarding: PT REQUEST  Contact: [phone]  Patient request PT order. Patient stated PT helped with her neck and her pain has started to return.

## 2018-01-25 PROBLEM — M54.2 NECK PAIN: Status: ACTIVE | Noted: 2018-01-25

## 2018-02-07 ENCOUNTER — OFFICE VISIT (OUTPATIENT)
Dept: PULMONOLOGY | Facility: CLINIC | Age: 58
End: 2018-02-07

## 2018-02-07 VITALS
OXYGEN SATURATION: 95 % | RESPIRATION RATE: 18 BRPM | SYSTOLIC BLOOD PRESSURE: 124 MMHG | TEMPERATURE: 97.9 F | HEART RATE: 76 BPM | BODY MASS INDEX: 32.55 KG/M2 | HEIGHT: 67 IN | DIASTOLIC BLOOD PRESSURE: 80 MMHG | WEIGHT: 207.4 LBS

## 2018-02-07 DIAGNOSIS — K21.9 CHRONIC GERD: ICD-10-CM

## 2018-02-07 DIAGNOSIS — J45.909 IDIOPATHIC ASTHMA: ICD-10-CM

## 2018-02-07 DIAGNOSIS — G47.33 OBSTRUCTIVE SLEEP APNEA, ADULT: ICD-10-CM

## 2018-02-07 DIAGNOSIS — J30.89 SEASONAL AND PERENNIAL ALLERGIC RHINITIS: ICD-10-CM

## 2018-02-07 DIAGNOSIS — R06.09 DYSPNEA ON EXERTION: Primary | ICD-10-CM

## 2018-02-07 DIAGNOSIS — J30.2 SEASONAL AND PERENNIAL ALLERGIC RHINITIS: ICD-10-CM

## 2018-02-07 PROCEDURE — 94010 BREATHING CAPACITY TEST: CPT | Performed by: INTERNAL MEDICINE

## 2018-02-07 PROCEDURE — 99214 OFFICE O/P EST MOD 30 MIN: CPT | Performed by: INTERNAL MEDICINE

## 2018-02-07 RX ORDER — LEVOCETIRIZINE DIHYDROCHLORIDE 5 MG/1
TABLET, FILM COATED ORAL
Refills: 0 | COMMUNITY
Start: 2018-02-06 | End: 2018-11-06

## 2018-02-07 RX ORDER — FLUTICASONE PROPIONATE AND SALMETEROL XINAFOATE 230; 21 UG/1; UG/1
AEROSOL, METERED RESPIRATORY (INHALATION)
Refills: 0 | COMMUNITY
Start: 2017-12-21 | End: 2018-11-06

## 2018-02-08 PROBLEM — J30.89 SEASONAL AND PERENNIAL ALLERGIC RHINITIS: Status: ACTIVE | Noted: 2018-02-08

## 2018-02-08 PROBLEM — J30.2 SEASONAL AND PERENNIAL ALLERGIC RHINITIS: Status: ACTIVE | Noted: 2018-02-08

## 2018-02-08 PROBLEM — K21.9 CHRONIC GERD: Status: ACTIVE | Noted: 2018-02-08

## 2018-02-08 PROBLEM — J45.909 IDIOPATHIC ASTHMA: Status: ACTIVE | Noted: 2018-02-08

## 2018-02-08 NOTE — PROGRESS NOTES
PULMONARY  NOTE    Chief Complaint     Asthma, obstructive sleep apnea, restless legs, allergic rhinitis    History of Present Illness     58-year-old white female returns today for follow-up.  I saw her in June 2017.    She has obstructive sleep apnea and is on CPAP.  When I last saw her she was having trouble getting supplies but that apparently has been resolved.  She is using it nightly and tolerating it well.    She continues to have seasonal sinus drainage and postnasal drip.  She is still getting allergy shots.  She remains on Singulair.    She has a history of chronic persistent asthma and has been on Advair, single air, and albuterol.  She's had no major exacerbations.  She had been on Advair HFA but would like to switch back to Advair Diskus.  She has pro-air which she uses infrequently    Patient Active Problem List   Diagnosis   • Dyspnea on exertion   • Obstructive sleep apnea, adult   • Anxiety   • Anemia   • Essential hypertension   • Peptic ulcer   • Periodic headache syndrome, not intractable   • Restless legs syndrome (RLS)   • Neck pain   • GERD   • Allergic rhinitis   • Asthma     Allergies   Allergen Reactions   • Brevital Sodium [Methohexital]    • Percocet [Oxycodone-Acetaminophen]      Breathing difficulty, tongue swelling, severe        Current Outpatient Prescriptions:   •  ADVAIR DISKUS 250-50 MCG/DOSE DISKUS, inhale 1 dose by mouth twice a day, Disp: , Rfl: 0  •  ADVAIR -21 MCG/ACT inhaler, , Disp: , Rfl: 0  •  albuterol (PROVENTIL HFA;VENTOLIN HFA) 108 (90 BASE) MCG/ACT inhaler, Inhale 2 puffs as needed for wheezing., Disp: , Rfl:   •  Cholecalciferol (VITAMIN D3) 5000 UNITS tablet, Take 1 tablet by mouth Daily., Disp: , Rfl:   •  EPINEPHrine (EPIPEN) 0.3 MG/0.3ML solution auto-injector injection, Use as directed as needed for anaphylaxis., Disp: 2 each, Rfl: 2  •  FLORASTOR 250 MG capsule, Take 1 tablet by mouth Daily., Disp: , Rfl: 0  •  hydrochlorothiazide (HYDRODIURIL) 25 MG  "tablet, Take 25 mg by mouth Daily., Disp: , Rfl: 1  •  levocetirizine (XYZAL) 5 MG tablet, take 1 tablet by mouth once daily, Disp: , Rfl: 0  •  montelukast (SINGULAIR) 10 MG tablet, take 1 tablet by mouth once daily, Disp: , Rfl: 1  •  potassium chloride (K-DUR) 10 MEQ CR tablet, Take 10 mEq by mouth Daily., Disp: , Rfl: 0  •  RA VITAMIN B-12 TR 1000 MCG tablet controlled-release, Take 1 tablet by mouth Daily., Disp: , Rfl: 0  •  venlafaxine XR (EFFEXOR-XR) 75 MG 24 hr capsule, take 1 capsule by mouth once daily, Disp: , Rfl: 0  •  loratadine (CLARITIN) 10 MG tablet, Take 10 mg by mouth Daily., Disp: , Rfl:   •  RA COL-RITE 250 MG capsule, 2 (Two) Times a Day., Disp: , Rfl: 0  MEDICATION LIST AND ALLERGIES REVIEWED.    Family History   Problem Relation Age of Onset   • Heart disease Mother    • COPD Father      Father is alive at age 80 with COPD and asthma   • Asthma Father    • Basal cell carcinoma Father    • Sleep apnea Other    • Ulcerative colitis Other    • Crohn's disease Other    • Diabetes Other    • Hypertension Other    • Breast cancer Neg Hx    • Ovarian cancer Neg Hx      Social History   Substance Use Topics   • Smoking status: Never Smoker   • Smokeless tobacco: None   • Alcohol use Yes      Comment: occasional     Social History     Social History Narrative    Single.    Mandaeism.    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    Has a dog and cat.    Doesn't drink alcohol.    Is a lifelong nonsmoker.     FAMILY AND SOCIAL HISTORY REVIEWED.    Review of Systems  ALSO REFER TO SCANNED ROS SHEET FROM SAME DATE.    /80  Pulse 76  Temp 97.9 °F (36.6 °C)  Resp 18  Ht 170.2 cm (67\")  Wt 94.1 kg (207 lb 6.4 oz)  SpO2 95%  BMI 32.48 kg/m2  Physical Exam   Constitutional: She is oriented to person, place, and time. She appears well-developed. No distress.   HENT:   Head: Normocephalic and atraumatic.   Neck: No thyromegaly present.   Cardiovascular: Normal rate, regular rhythm and normal " heart sounds.    No murmur heard.  Pulmonary/Chest: Effort normal and breath sounds normal. No stridor.   Abdominal: Bowel sounds are normal.   Musculoskeletal: Normal range of motion. She exhibits no edema.   Lymphadenopathy:     She has no cervical adenopathy.        Right: No supraclavicular and no epitrochlear adenopathy present.        Left: No supraclavicular and no epitrochlear adenopathy present.   Neurological: She is alert and oriented to person, place, and time.   Skin: Skin is warm and dry. She is not diaphoretic.   Psychiatric: She has a normal mood and affect. Her behavior is normal.   Nursing note and vitals reviewed.      Results     Spirometry reveals no airway obstruction and no restriction    Problem List       ICD-10-CM ICD-9-CM   1. Dyspnea on exertion R06.09 786.09   2. Obstructive sleep apnea, adult G47.33 327.23   3. Asthma J45.909 493.90   4. Allergic rhinitis J30.89 477.9    J30.2    5. GERD K21.9 530.81       Discussion     She's had no major exacerbations and appears stable from the standpoint of her asthma.  She would like to change from the Advair HFA back to the Advair Diskus.    She will also remain on Singulair for her asthma and allergic rhinitis.  She remains on allergy shots for her seasonal sinus symptoms.    I've encouraged continued compliance with her CPAP.  We reviewed her data printout today.    I'll plan to see her back in a year with spirometry or earlier if there are problems in the meantime    Jin Cat MD  Note electronically signed    CC: No Known Provider

## 2018-02-09 ENCOUNTER — HOSPITAL ENCOUNTER (OUTPATIENT)
Dept: PHYSICAL THERAPY | Facility: HOSPITAL | Age: 58
Setting detail: THERAPIES SERIES
Discharge: HOME | End: 2018-02-09

## 2018-02-09 DIAGNOSIS — M54.2 NECK PAIN: Primary | ICD-10-CM

## 2018-02-09 PROCEDURE — 97162 PT EVAL MOD COMPLEX 30 MIN: CPT | Performed by: PHYSICAL THERAPIST

## 2018-02-09 NOTE — THERAPY EVALUATION
Outpatient Physical Therapy Ortho Initial Evaluation  [facility]     Patient Name: Brittny Han  : 1960  MRN: [id number]  Today's Date: 2018      Visit Date: 2018    Patient Active Problem List   Diagnosis   • Dyspnea on exertion   • Obstructive sleep apnea, adult   • Anxiety   • Anemia   • Essential hypertension   • Peptic ulcer   • Periodic headache syndrome, not intractable   • Restless legs syndrome (RLS)   • Neck pain   • GERD   • Allergic rhinitis   • Asthma        Past Medical History:   Diagnosis Date   • Allergic rhinitis    • Anxiety    •  1 para 1    • History of echocardiogram 2011    mild ascending aortic root dilatation; mod RVC enlargement; global left EF 0.72 (normal 0.55-0.75) mild concentric LV hypertrophy; trace MR; mild TR    • History of PFTs 2016    good effort; normal lung volumes   • Hyperlipidemia         Past Surgical History:   Procedure Laterality Date   • BLADDER SUSPENSION     • BREAST EXCISIONAL BIOPSY Left    • HYSTERECTOMY     • SKIN CANCER EXCISION         Visit Dx:     ICD-10-CM ICD-9-CM   1. Neck pain M54.2 723.1             Patient History       18 1100 18 0900       History    Chief Complaint  Pain;Numbness;Tingling;Joint stiffness  -ISABEL     Type of Pain  Neck pain  -ISABEL     Date Current Problem(s) Began  18  -ISABEL     Brief Description of Current Complaint  This 58 year-old female presents with recurrence of neck pain, headaches, and intermittent numbness and tingling in hands and feet.  She says diuretics have been helpful in decreasing N&T with use of diuretics.  -ISABEL     Onset Date- PT  18  -ISABEL     Patient/Caregiver Goals  Relieve pain   review exercises which helped before  -ISABEL     Occupation/sports/leisure activities  Works at [employer] in [place].  This requires frequent computer and phone use.  No recreational interests besides gardening.  Sedentary in winter.  -ISABEL     What clinical tests have you had for this  problem?  --   no recent diagnostics  -ISABEL     Results of Clinical Tests  C5/6 DJD  -ISABEL     Pain     Pain Location  Neck  -ISABEL     Pain at Present  3  -ISABEL     Pain at Best  2  -ISABEL     Pain at Worst  7  -ISABEL     What Performance Factors Make the Current Problem(s) WORSE?  computer use, driving  -ISABEL     What Performance Factors Make the Current Problem(s) BETTER?  stretches  -ISABEL     Is your sleep disturbed?  --   intermittently, sleep apnea, wears CPAP  -ISABEL     Total hours of sleep per night  6-7  -ISABEL     Fall Risk Assessment    Any falls in the past year:  No  -ISABEL     Daily Activities    Primary Language English  -ISABEL      Are you able to read Yes  -ISABEL      Are you able to write Yes  -ISABEL      How does patient learn best? Demonstration  -ISABEL      Teaching needs identified Management of Condition;Home Exercise Program  -ISABEL      Safety    Are you being hurt, hit, or frightened by anyone at home or in your life? No  -ISABEL      Are you being neglected by a caregiver No  -ISABEL        User Key  (r) = Recorded By, (t) = Taken By, (c) = Cosigned By    Initials Name Provider Type    ISABEL Osborne PT Physical Therapist                PT Ortho       02/09/18 0900    Posture/Observations    Posture/Observations Comments FHP, increased CT kyphosis, upper cervical extension, lower cervical flexion  -ISABEL    Neural Tension Signs- Upper Quarter Clearing    ULNTT 1 Bilateral:;Postive  -ISABEL    ULNTT 3 Bilateral:;Postive  -ISABEL    ULNTT 4 Bilateral:;Negative  -ISABEL    Sensory Screen for Light Touch- Upper Quarter Clearing    C4 (posterior shoulder) Intact  -ISABEL    C5 (lateral upper arm) Intact  -ISABEL    C6 (tip of thumb) --   intermittent tingling  -ISABEL    C7 (tip of 3rd finger) --   intermittent tingling  -ISABEL    C8 (tip of 5th finger) --   intermittent tingling  -ISABEL    T1 (medial lower arm) Intact  -ISABEL    Myotomal Screen- Upper Quarter Clearing    Shoulder flexion (C5) 4+ (Good +);Bilateral:  -ISABEL    Elbow flexion/wrist extension (C6) 5  (Normal);Bilateral:  -ISABEL    Elbow extension/wrist flexion (C7) 5 (Normal);Bilateral:  -ISABEL     5 (Normal);Bilateral:  -ISABEL    Cervical/Shoulder ROM Screen    Cervical flexion --   53 degrees, mid-cervical mild pain  -ISABEL    Cervical extension --   50 degrees, mid-cervical pain  -ISABEL    Cervical lateral flexion --   R 40, L pulling: L 35, tighter on R  -ISABEL    Cervical rotation --   R 60, R pain: L 43, stiff on left  -ISABEL    Cervical Palpation    Upper Traps Bilateral:;Tender;Guarded/taut   mild tenderness  -ISABEL    ROM (Range of Motion)    General ROM Detail UE AROM is WNL's  -ISABEL    Pathomechanics    Spine Pathomechanics Cervical protrusion/OA hyperextension with cervical extension  -      User Key  (r) = Recorded By, (t) = Taken By, (c) = Cosigned By    Initials Name Provider Type    ISABEL Osborne PT Physical Therapist                      Therapy Education  Given: HEP  Program: New  How Provided: Verbal, Demonstration, Written  Provided to: Patient  Level of Understanding: Verbalized, Demonstrated           PT OP Goals       02/09/18 1100       PT Short Term Goals    STG Date to Achieve 03/09/18  -     STG 1 Pt. demonstrates independence in effective HEP.  -     STG 2 Pt. demonstrates CROM WNL's without increase in pain.  -     STG 3 NDI score is improved by 10%.  -     STG 4 Pt. demonstrates improving postural awareness.  -     Long Term Goals    LTG Date to Achieve 03/23/18  -     LTG 1 Pt. is independent in self-management of chronic symptoms to prevent/reduce recurrence.  -     LTG 2 Pain is no greater than 3/10 with performance of usual daily activities.  -     LTG 3 NDI score is 15% or less.  -     Time Calculation    PT Goal Re-Cert Due Date 05/10/18  -       User Key  (r) = Recorded By, (t) = Taken By, (c) = Cosigned By    Initials Name Provider Type    ISABEL Osborne PT Physical Therapist                PT Assessment/Plan       02/09/18 1156 02/09/18 1154    PT Assessment     Functional Limitations  Limitations in functional capacity and performance  -ISABEL    Impairments  Pain;Posture;Impaired flexibility;Muscle strength;Joint mobility;Range of motion  -ISABEL    Assessment Comments  Pt. presents with postural dysfunction contributing to neck pain.  She will benefit from PT intervention to improve postural awareness and support, as well as to educate in ongoing self-management of chronic symptoms.  -ISABEL    Please refer to paper survey for additional self-reported information  Yes  -ISABEL    Rehab Potential  Good  -ISABEL    Patient/caregiver participated in establishment of treatment plan and goals  Yes  -ISABEL    Patient would benefit from skilled therapy intervention  Yes  -ISABEL    PT Plan    PT Frequency  2x/week  -ISABEL    Predicted Duration of Therapy Intervention (days/wks)  12 visits  -ISABEL    Planned CPT's? PT EVAL MOD COMPLELITY: 62167;PT MANUAL THERAPY EA 15 MIN: 25988;PT ULTRASOUND EA 15 MIN: 96590;PT NEUROMUSC RE-EDUCATION EA 15 MIN: 96888;PT THER PROC EA 15 MIN: 70754  -ISABEL     PT Plan Comments PT up to 2x/week per POC.  -ISABEL       User Key  (r) = Recorded By, (t) = Taken By, (c) = Cosigned By    Initials Name Provider Type    ISABEL Osborne, PT Physical Therapist                              Outcome Measure Options: Neck Disability Index (NDI)  Neck Disability Index  Section 1 - Pain Intensity: The pain is very mild at the moment.  Section 2 - Personal Care: I can look after myself normally, but it causes extra pain.  Section 3 - Lifting: I can lift heavy weights without causing extra pain  Section 4 - Work: I can only do my usual work, but no more  Section 5 - Headaches: I have moderate headaches that come frequently  Section 6 - Concentration: I can concentrate fully with slight difficulty.  Section 7 - Sleeping: My sleep is slightly disturbed for less than 1 hour.  Section 8 - Driving: I can drive as long as I want with moderate neck pain.  Section 9 - Reading: I can't read as much as I  want because of moderate neck pain.  Section 10 - Recreation: I have some neck pain with all recreational activities.  Neck Disability Index Score: 14  Neck Disability Index Comments: 28%      Time Calculation:   Start Time: 0930     Therapy Charges for Today     Code Description Service Date Service Provider Modifiers Qty    96316177669 HC PT EVAL MOD COMPLEXITY 3 2/9/2018 Fifi Osborne, PT GP 1          PT G-Codes  Outcome Measure Options: Neck Disability Index (NDI)         Fifi Osborne, PT  2/9/2018

## 2018-02-15 ENCOUNTER — HOSPITAL ENCOUNTER (OUTPATIENT)
Dept: PHYSICAL THERAPY | Facility: HOSPITAL | Age: 58
Setting detail: THERAPIES SERIES
Discharge: HOME | End: 2018-02-15

## 2018-02-15 DIAGNOSIS — M54.2 NECK PAIN: Primary | ICD-10-CM

## 2018-02-15 PROCEDURE — 97110 THERAPEUTIC EXERCISES: CPT | Performed by: PHYSICAL THERAPIST

## 2018-02-15 NOTE — THERAPY TREATMENT NOTE
Outpatient Physical Therapy Ortho Treatment Note  [facility]     Patient Name: Brittny Han  : 1960  MRN: [id number]  Today's Date: 2/15/2018      Visit Date: 02/15/2018    Visit Dx:    ICD-10-CM ICD-9-CM   1. Neck pain M54.2 723.1       Patient Active Problem List   Diagnosis   • Dyspnea on exertion   • Obstructive sleep apnea, adult   • Anxiety   • Anemia   • Essential hypertension   • Peptic ulcer   • Periodic headache syndrome, not intractable   • Restless legs syndrome (RLS)   • Neck pain   • GERD   • Allergic rhinitis   • Asthma        Past Medical History:   Diagnosis Date   • Allergic rhinitis    • Anxiety    •  1 para 1    • History of echocardiogram 2011    mild ascending aortic root dilatation; mod RVC enlargement; global left EF 0.72 (normal 0.55-0.75) mild concentric LV hypertrophy; trace MR; mild TR    • History of PFTs 2016    good effort; normal lung volumes   • Hyperlipidemia         Past Surgical History:   Procedure Laterality Date   • BLADDER SUSPENSION     • BREAST EXCISIONAL BIOPSY Left    • HYSTERECTOMY     • SKIN CANCER EXCISION               PT Ortho       02/15/18 1100    Subjective Comments    Subjective Comments Pt. reports very mild symptoms this morning, rated 1/10.  She says they become more noticeable in the afternoon.  -ISABEL    Subjective Pain    Able to rate subjective pain? yes  -ISABEL    Pre-Treatment Pain Level 1  -ISABEL    Post-Treatment Pain Level 1  -ISABEL      User Key  (r) = Recorded By, (t) = Taken By, (c) = Cosigned By    Initials Name Provider Type    ISABEL Osborne, PT Physical Therapist                            PT Assessment/Plan       02/15/18 1100       PT Assessment    Assessment Comments Pt. recalls many of the exercise performed today from a previous course of treatment.  -ISABEL     PT Plan    PT Plan Comments Continue and progress as tolerated.  -ISABEL       User Key  (r) = Recorded By, (t) = Taken By, (c) = Cosigned By    Initials Name  Provider Type    ISABEL Osborne PT Physical Therapist                    Exercises       02/15/18 1100          Subjective Comments    Subjective Comments Pt. reports very mild symptoms this morning, rated 1/10.  She says they become more noticeable in the afternoon.  -ISABEL      Subjective Pain    Able to rate subjective pain? yes  -ISABEL      Pre-Treatment Pain Level 1  -ISABEL      Post-Treatment Pain Level 1  -ISABEL      Exercise 1    Exercise Name 1 Initiated exercise in clinical setting per flow sheet, emphasizing postural control and cervical and scapulothoracic mobility.  Reviewed sitting posture and office ergonomics.  Encouraged frequent, brief stretch breaks through the day.  -ISABEL        User Key  (r) = Recorded By, (t) = Taken By, (c) = Cosigned By    Initials Name Provider Type    ISABEL Osborne PT Physical Therapist                                            Time Calculation:   Start Time: 1030  Total Timed Code Minutes- PT: 30 minute(s)    Therapy Charges for Today     Code Description Service Date Service Provider Modifiers Qty    53131646136 HC PT THER PROC EA 15 MIN 2/15/2018 Fifi Osborne PT GP 2                    Fifi Osborne PT  2/15/2018

## 2018-02-22 ENCOUNTER — HOSPITAL ENCOUNTER (OUTPATIENT)
Dept: PHYSICAL THERAPY | Facility: HOSPITAL | Age: 58
Setting detail: THERAPIES SERIES
Discharge: HOME | End: 2018-02-22

## 2018-02-22 DIAGNOSIS — M54.2 NECK PAIN: Primary | ICD-10-CM

## 2018-02-22 PROCEDURE — 97110 THERAPEUTIC EXERCISES: CPT | Performed by: PHYSICAL THERAPIST

## 2018-02-22 PROCEDURE — 97140 MANUAL THERAPY 1/> REGIONS: CPT | Performed by: PHYSICAL THERAPIST

## 2018-02-22 NOTE — THERAPY TREATMENT NOTE
Outpatient Physical Therapy Ortho Treatment Note   [facility]     Patient Name: Brittny Han  : 1960  MRN: [id number]  Today's Date: 2018      Visit Date: 2018    Visit Dx:    ICD-10-CM ICD-9-CM   1. Neck pain M54.2 723.1       Patient Active Problem List   Diagnosis   • Dyspnea on exertion   • Obstructive sleep apnea, adult   • Anxiety   • Anemia   • Essential hypertension   • Peptic ulcer   • Periodic headache syndrome, not intractable   • Restless legs syndrome (RLS)   • Neck pain   • GERD   • Allergic rhinitis   • Asthma        Past Medical History:   Diagnosis Date   • Allergic rhinitis    • Anxiety    •  1 para 1    • History of echocardiogram 2011    mild ascending aortic root dilatation; mod RVC enlargement; global left EF 0.72 (normal 0.55-0.75) mild concentric LV hypertrophy; trace MR; mild TR    • History of PFTs 2016    good effort; normal lung volumes   • Hyperlipidemia         Past Surgical History:   Procedure Laterality Date   • BLADDER SUSPENSION     • BREAST EXCISIONAL BIOPSY Left    • HYSTERECTOMY     • SKIN CANCER EXCISION               PT Ortho       18 0900    Subjective Comments    Subjective Comments Pt. had trouble sleeping last night.  She has a little more discomfort this morning.  -ISABEL    Subjective Pain    Able to rate subjective pain? yes  -ISABEL    Pre-Treatment Pain Level 2  -ISABEL    Post-Treatment Pain Level 0  -ISABEL      User Key  (r) = Recorded By, (t) = Taken By, (c) = Cosigned By    Initials Name Provider Type    ISABEL Osborne, PT Physical Therapist                            PT Assessment/Plan       18 0900       PT Assessment    Assessment Comments Pt. tolerated progression of exercises and reported nop ain at end of session today.  Suprascapular musculature is palpably tight, but improves with scapular repositioning and Astym/STM.  -ISABEL     PT Plan    PT Plan Comments Continue PT.  Progress as tolerated.  -ISABEL       User  Key  (r) = Recorded By, (t) = Taken By, (c) = Cosigned By    Initials Name Provider Type    ISABEL Osborne PT Physical Therapist                    Exercises       02/22/18 0900          Subjective Comments    Subjective Comments Pt. had trouble sleeping last night.  She has a little more discomfort this morning.  -ISABEL      Subjective Pain    Able to rate subjective pain? yes  -ISABEL      Pre-Treatment Pain Level 2  -ISABEL      Post-Treatment Pain Level 0  -ISABEL      Exercise 1    Exercise Name 1 Continued and progressed exercise in clinical setting per flow sheet.  Pt. completed active warm-up on UBE followed by pectoral stretch in doorway and UE wall slide for scapular activation.  Continued with scapular stability exercises and cervical flexibility exercises per flow sheet.  -ISABEL        User Key  (r) = Recorded By, (t) = Taken By, (c) = Cosigned By    Initials Name Provider Type    ISABEL Osborne PT Physical Therapist                        Manual Rx (last 36 hours)      Manual Treatments       02/22/18 0900          Manual Rx 1    Manual Rx 1 Location Cervical spine  -ISABEL      Manual Rx 1 Type Shana, CECY  -ISABEL        User Key  (r) = Recorded By, (t) = Taken By, (c) = Cosigned By    Initials Name Provider Type    ISABEL Osborne PT Physical Therapist                             Time Calculation:   Start Time: 0800  Total Timed Code Minutes- PT: 40 minute(s)    Therapy Charges for Today     Code Description Service Date Service Provider Modifiers Qty    10641667490  PT THER PROC EA 15 MIN 2/22/2018 Fifi Osborne PT GP 2    [id number]  PT MANUAL THERAPY EA 15 MIN 2/22/2018 Fifi Osborne PT GP 1                    Fifi Osborne PT  2/22/2018

## 2018-02-28 ENCOUNTER — HOSPITAL ENCOUNTER (OUTPATIENT)
Dept: PHYSICAL THERAPY | Facility: HOSPITAL | Age: 58
Setting detail: THERAPIES SERIES
Discharge: HOME | End: 2018-02-28

## 2018-02-28 DIAGNOSIS — M54.2 NECK PAIN: Primary | ICD-10-CM

## 2018-02-28 PROCEDURE — 97110 THERAPEUTIC EXERCISES: CPT | Performed by: PHYSICAL THERAPIST

## 2018-02-28 PROCEDURE — 97140 MANUAL THERAPY 1/> REGIONS: CPT | Performed by: PHYSICAL THERAPIST

## 2018-03-01 ENCOUNTER — APPOINTMENT (OUTPATIENT)
Dept: PHYSICAL THERAPY | Facility: HOSPITAL | Age: 58
End: 2018-03-01

## 2018-03-06 ENCOUNTER — APPOINTMENT (OUTPATIENT)
Dept: PHYSICAL THERAPY | Facility: HOSPITAL | Age: 58
End: 2018-03-06

## 2018-03-13 ENCOUNTER — APPOINTMENT (OUTPATIENT)
Dept: PHYSICAL THERAPY | Facility: HOSPITAL | Age: 58
End: 2018-03-13

## 2018-03-20 ENCOUNTER — HOSPITAL ENCOUNTER (OUTPATIENT)
Dept: PHYSICAL THERAPY | Facility: HOSPITAL | Age: 58
Setting detail: THERAPIES SERIES
Discharge: HOME | End: 2018-03-20

## 2018-03-20 DIAGNOSIS — M54.2 NECK PAIN: Primary | ICD-10-CM

## 2018-03-20 PROCEDURE — 97110 THERAPEUTIC EXERCISES: CPT | Performed by: PHYSICAL THERAPIST

## 2018-03-20 PROCEDURE — 97140 MANUAL THERAPY 1/> REGIONS: CPT | Performed by: PHYSICAL THERAPIST

## 2018-03-20 NOTE — THERAPY PROGRESS REPORT/RE-CERT
Outpatient Physical Therapy Ortho Re-Assessment   [place]     Patient Name: Brittny Han  : 1960  MRN: [id number]  Today's Date: 3/20/2018      Visit Date: 2018    Patient Active Problem List   Diagnosis   • Dyspnea on exertion   • Obstructive sleep apnea, adult   • Anxiety   • Anemia   • Essential hypertension   • Peptic ulcer   • Periodic headache syndrome, not intractable   • Restless legs syndrome (RLS)   • Neck pain   • GERD   • Allergic rhinitis   • Asthma        Past Medical History:   Diagnosis Date   • Allergic rhinitis    • Anxiety    •  1 para 1    • History of echocardiogram 2011    mild ascending aortic root dilatation; mod RVC enlargement; global left EF 0.72 (normal 0.55-0.75) mild concentric LV hypertrophy; trace MR; mild TR    • History of PFTs 2016    good effort; normal lung volumes   • Hyperlipidemia         Past Surgical History:   Procedure Laterality Date   • BLADDER SUSPENSION     • BREAST EXCISIONAL BIOPSY Left    • HYSTERECTOMY     • SKIN CANCER EXCISION         Visit Dx:     ICD-10-CM ICD-9-CM   1. Neck pain M54.2 723.1             Patient History     Row Name 18 0800             Pain     Pain Location Neck  -ISABEL      Pain at Present 1  -ISABEL      Pain at Best 0  -ISABEL      Pain at Worst 6  -ISABEL        User Key  (r) = Recorded By, (t) = Taken By, (c) = Cosigned By    Initials Name Provider Type    ISABEL Osborne PT Physical Therapist                PT Ortho     Row Name 18 0800       Subjective Comments    Subjective Comments Pt. had increased discomfort after helping her sister move a couch, but overall, feels she is doing better.  She says she still needs to try using the neck roll for sleeping and needs to improve her sitting posture at work..  -ISABEL       Subjective Pain    Able to rate subjective pain? yes  -ISABEL    Pre-Treatment Pain Level 1  -ISABEL    Post-Treatment Pain Level 1  -ISABEL       Cervical/Shoulder ROM Screen    Cervical  flexion --   55, no pain  -ISABEL    Cervical extension --   50, no pain  -ISABEL    Cervical lateral flexion --   40 R, 40 L  -ISABEL    Cervical rotation --   R 63, L47  -      User Key  (r) = Recorded By, (t) = Taken By, (c) = Cosigned By    Initials Name Provider Type    ISABEL Osborne PT Physical Therapist                      Therapy Education  Given: HEP  Program: Progressed  How Provided: Verbal, Demonstration, Written  Provided to: Patient  Level of Understanding: Verbalized, Demonstrated           PT OP Goals     Row Name 03/20/18 0800          PT Short Term Goals    STG Date to Achieve 03/09/18  -     STG 1 Pt. demonstrates independence in effective HEP.  -ISABEL     STG 1 Progress Met  -     STG 2 Pt. demonstrates CROM WNL's without increase in pain.  -ISABEL     STG 2 Progress Partially Met  -     STG 3 NDI score is improved by 10%.  -ISABEL     STG 3 Progress Met  -     STG 4 Pt. demonstrates improving postural awareness.  -     STG 4 Progress Partially Met  -        Long Term Goals    LTG Date to Achieve 03/23/18  -     LTG 1 Pt. is independent in self-management of chronic symptoms to prevent/reduce recurrence.  -     LTG 1 Progress Met  -     LTG 2 Pain is no greater than 3/10 with performance of usual daily activities.  -     LTG 2 Progress Progressing;Ongoing  -     LTG 3 NDI score is 15% or less.  -     LTG 3 Progress Progressing  -       User Key  (r) = Recorded By, (t) = Taken By, (c) = Cosigned By    Initials Name Provider Type    ISABEL Osborne PT Physical Therapist                PT Assessment/Plan     Row Name 03/20/18 0837          PT Assessment    Functional Limitations Limitations in functional capacity and performance  -     Impairments Posture;Pain;Impaired flexibility;Joint mobility  -     Assessment Comments Pt. shows improvement on functional outcome measure and demonstrates improvement in CROM with less pain.  Cervical rotation is initially limited, with  improvement noted after treatment today.  -ISABEL     Please refer to paper survey for additional self-reported information Yes  -ISABEL     Rehab Potential Good  -ISABEL     Patient/caregiver participated in establishment of treatment plan and goals Yes  -ISABEL     Patient would benefit from skilled therapy intervention Yes  -ISABEL        PT Plan    PT Frequency 1x/week  -ISABEL     Predicted Duration of Therapy Intervention (OT Eval) 1 visit  -ISABEL     Planned CPT's? PT EVAL MOD COMPLELITY: 89576;PT MANUAL THERAPY EA 15 MIN: 98299;PT THER PROC EA 15 MIN: 59783;PT ULTRASOUND EA 15 MIN: 95802;PT NEUROMUSC RE-EDUCATION EA 15 MIN: 15962  -ISABEL     PT Plan Comments Continue for 1 remaining visit, then DC to independent self-management.  -ISABEL        Clinical Impression    Predicted Duration of Therapy Intervention (days/wks) 1 visit  -ISABEL       User Key  (r) = Recorded By, (t) = Taken By, (c) = Cosigned By    Initials Name Provider Type    ISABEL Osborne PT Physical Therapist                  Exercises     Row Name 03/20/18 0800             Subjective Comments    Subjective Comments Pt. had increased discomfort after helping her sister move a couch, but overall, feels she is doing better.  She says she still needs to try using the neck roll for sleeping and needs to improve her sitting posture at work..  -ISABEL         Subjective Pain    Able to rate subjective pain? yes  -ISABEL      Pre-Treatment Pain Level 1  -ISABEL      Post-Treatment Pain Level 1  -ISABEL         Exercise 1    Exercise Name 1 Reassessment completed today and new exercises added to HEP after practice in clinical setting.  -ISABEL        User Key  (r) = Recorded By, (t) = Taken By, (c) = Cosigned By    Initials Name Provider Type    ISABEL Osborne PT Physical Therapist           Manual Rx (last 36 hours)      Manual Treatments     Row Name 03/20/18 0700             Manual Rx 1    Manual Rx 1 Location Cervical spine  -ISABEL      Manual Rx 1 Type Assisted rotation, manual distraction,  upper thoracic PA glides in sitting  -ISABEL        User Key  (r) = Recorded By, (t) = Taken By, (c) = Cosigned By    Initials Name Provider Type    ISABEL Osborne PT Physical Therapist                      Outcome Measure Options: Neck Disability Index (NDI)  Neck Disability Index  Section 1 - Pain Intensity: The pain is very mild at the moment.  Section 2 - Personal Care: I can look after myself normally without causing extra pain.  Section 3 - Lifting: I can lift heavy weights without causing extra pain  Section 4 - Work: I can do as much work as I want.  Section 5 - Headaches: I have no headaches at all.  Section 6 - Concentration: I can concentrate fully with slight difficulty.  Section 7 - Sleeping: My sleep is slightly disturbed for less than 1 hour.  Section 8 - Driving: I can't drive as long as I want because of moderate neck pain.  Section 9 - Reading: I can read as much as I want with moderate neck pain.  Section 10 - Recreation: I have some neck pain with all recreational activities.  Neck Disability Index Score: 9  Neck Disability Index Comments: 18%      Time Calculation:   Start Time: 0800  Total Timed Code Minutes- PT: 35 minute(s)     Therapy Charges for Today     Code Description Service Date Service Provider Modifiers Qty    49941609244 HC PT THER PROC EA 15 MIN 3/20/2018 Fifi Osborne, PT GP 1    95146869620 HC PT MANUAL THERAPY EA 15 MIN 3/20/2018 Fifi Osborne, PT GP 1          PT G-Codes  Outcome Measure Options: Neck Disability Index (NDI)         Fifi Osborne PT  3/20/2018

## 2018-03-27 ENCOUNTER — HOSPITAL ENCOUNTER (OUTPATIENT)
Dept: PHYSICAL THERAPY | Facility: HOSPITAL | Age: 58
Setting detail: THERAPIES SERIES
Discharge: HOME | End: 2018-03-27

## 2018-03-27 DIAGNOSIS — M54.2 NECK PAIN: Primary | ICD-10-CM

## 2018-03-27 PROCEDURE — 97110 THERAPEUTIC EXERCISES: CPT | Performed by: PHYSICAL THERAPIST

## 2018-03-27 PROCEDURE — 97140 MANUAL THERAPY 1/> REGIONS: CPT | Performed by: PHYSICAL THERAPIST

## 2018-11-06 ENCOUNTER — OFFICE VISIT (OUTPATIENT)
Dept: OBSTETRICS AND GYNECOLOGY | Facility: CLINIC | Age: 58
End: 2018-11-06

## 2018-11-06 DIAGNOSIS — Z01.419 WOMEN'S ANNUAL ROUTINE GYNECOLOGICAL EXAMINATION: Primary | ICD-10-CM

## 2018-11-06 DIAGNOSIS — N95.1 MENOPAUSAL SYMPTOMS: ICD-10-CM

## 2018-11-06 PROBLEM — D12.6 ADENOMATOUS COLON POLYP: Status: ACTIVE | Noted: 2018-11-06

## 2018-11-06 PROBLEM — Z90.710 HISTORY OF ROBOT-ASSISTED LAPAROSCOPIC HYSTERECTOMY: Status: ACTIVE | Noted: 2018-11-06

## 2018-11-06 PROBLEM — Z98.890 H/O LEFT BREAST BIOPSY: Status: ACTIVE | Noted: 2018-11-06

## 2018-11-06 PROCEDURE — 99396 PREV VISIT EST AGE 40-64: CPT | Performed by: OBSTETRICS & GYNECOLOGY

## 2018-11-06 RX ORDER — ESTRADIOL 0.07 MG/D
1 FILM, EXTENDED RELEASE TRANSDERMAL 2 TIMES WEEKLY
Qty: 8 PATCH | Refills: 12 | Status: SHIPPED | OUTPATIENT
Start: 2018-11-08 | End: 2018-12-20

## 2018-11-06 NOTE — PROGRESS NOTES
Subjective   Chief Complaint   Patient presents with   • Annual Exam   • Hot Flashes     Brittny Han is a 58 y.o. year old  who is post-menopausal.  She is S/P hysterectomy presenting to be seen for her annual exam.  This past year she has not been on hormone replacement therapy.  There has not been vaginal bleeding in the last 12 months.  Menopausal symptoms are present (hot flashes and night sweats) and are significantly affecting her quality of life.  She reports that these been going on for about 18 months.  She sweats and runs down her face.  She feels like she is raining reports that she's melting.  It's very irritating during the day.  She sleeps with a fan on periods increased over last year.  Had a normal thyroid level checked recently for her recollection.  She will like to have something done about these.  Discussed safety of estrogen alone.  Should not be contraindication as long sutures no estrogen positive breast cancer.  Status post hysterectomy.  Does not need progesterone    Discussed urinary leakage.  Only leaks a little bit when she coughs.  She is status post a TVT with her hysterectomy in .  She asked that has more frequency than anything else.  She drinks about 32 ounces of water daily and 3 cups of coffee a day.  She also reports that she holds it too long she can go.  There is no nocturia.  She does take HCTZ water pill this may have some.  There with leakage.  Also discussed avoidance of citric acid, tomatoes, strawberries.  Ask her to cut down to 24 ounces of water 2 cups of coffee and see if this doesn't help or frequency.  Could do a Kegel when her she coughs.    Problem with constipation.  May go about every 3 days but then she may also loose stools.  Reports she saw Dr. Christianson in  of  a polyp was removed and she is to return in 5 years.  I reviewed that note was an adenomatous polyp.  He asked her to take something called Florastor and Benefiber and when she did  that it worked very well.  Previously that she been on MiraLAX stool softer but other doctors told her about.  I think that she should go back to Dr. Sammy whitley since that's working.  I'm not going to override him with any prescription medication since the above seem to work and this is his area of expertise.    SEXUAL Hx:  She is not currently sexually active.    [place] is painful: not asked  HEALTH Hx:  She exercises regularly: no (and has no plans to become more active).  She wears her seat belt: yes.  She has concerns about domestic violence: no.  Calcium intake is less than adequate.  Some yogurt some greens and a Centrum Silver so she is close to 1000 day        The following portions of the patient's history were reviewed and updated as appropriate:problem list, current medications, allergies, past family history, past medical history, past social history and past surgical history.    Smoking status: Never Smoker                                                              Smokeless tobacco: Never Used                        Review of Systems  Constitutional POS: fatigue and Dry cough    NEG: anorexia, chills or fevers   Genitourinary POS: frequency    NEG: dysuria or hematuria   Gastointestinal POS: constipation (chronic); she has had some recent rectal bleeding.  History of hemorrhoids     NEG: bloating, change in bowel habits, melena or reflux symptoms   Integument POS: nothing reported    NEG: moles that are changing in size, shape, color or rashes   Breast POS: nothing reported    NEG: persistent breast lump, skin dimpling or nipple discharge        Objective   There were no vitals taken for this visit.    General:  well developed; well nourished  no acute distress  appears stated age   Skin:  No suspicious lesions seen   Thyroid: not examined   Breasts:  Examined in supine position  Symmetric without masses or skin dimpling  Nipples normal without inversion, lesions or discharge  There are no  palpable axillary nodes   Abdomen: soft, non-tender; no masses  no umbilical or inginual hernias are present  no hepato-splenomegaly   Pelvis: Clinical staff was present for exam  External genitalia:  normal appearance of the external genitalia including Bartholin's and [place]'s glands.  :  urethral meatus normal; urethral hypermobility is absent.  Vaginal:  Good support to the vagina  Uterus:  absent.  Adnexa:  non palpable bilaterally.  Rectal:  anus visually normal appearing. recto-vaginal exam unremarkable and confirms findings; good sphincter tone; no masses; guaiac negative;        Assessment   1. Symptomatic hot flashes mainly problem will try Minivelle as this seems to be covered better with her insurance  2. She is up to date on all relevant gynecologic and colorectal screenings     Plan   1. As above also given information regarding calcium in her diet.  2. The importance of keeping all planned follow-up and taking all medications as prescribed was emphasized.  3. Self breast awareness and mammogram protocols discussed.  4. Follow up for annual exam or PRN     New Medications Ordered This Visit   Medications   • MINIVELLE 0.075 MG/24HR patch     Sig: Place 1 patch on the skin as directed by provider 2 (Two) Times a Week.     Dispense:  8 patch     Refill:  12          This note was electronically signed.  I spent over 25 minutes with this patient greater than 50% of time was with consultation and answering questions.    Nic Hernandez MD  November 6, 2018    Note: Speech recognition transcription software may have been used to create portions of this document.  An attempt at proofreading has been made but errors in transcription could still be present.

## 2018-11-19 ENCOUNTER — TRANSCRIBE ORDERS (OUTPATIENT)
Dept: ADMINISTRATIVE | Facility: HOSPITAL | Age: 58
End: 2018-11-19

## 2018-11-19 DIAGNOSIS — R16.0 HEPATOMEGALY: Primary | ICD-10-CM

## 2018-11-21 ENCOUNTER — HOSPITAL ENCOUNTER (OUTPATIENT)
Dept: MRI IMAGING | Facility: HOSPITAL | Age: 58
Discharge: HOME | End: 2018-11-21
Admitting: FAMILY MEDICINE

## 2018-11-21 DIAGNOSIS — R16.0 HEPATOMEGALY: ICD-10-CM

## 2018-11-21 PROCEDURE — 82565 ASSAY OF CREATININE: CPT

## 2018-11-21 PROCEDURE — A9577 INJ MULTIHANCE: HCPCS | Performed by: FAMILY MEDICINE

## 2018-11-21 PROCEDURE — 74183 MRI ABD W/O CNTR FLWD CNTR: CPT

## 2018-11-21 PROCEDURE — 0 GADOBENATE DIMEGLUMINE 529 MG/ML SOLUTION: Performed by: FAMILY MEDICINE

## 2018-11-21 RX ADMIN — GADOBENATE DIMEGLUMINE 20 ML: 529 INJECTION, SOLUTION INTRAVENOUS at 08:14

## 2018-11-24 LAB — CREAT BLDA-MCNC: 0.8 MG/DL (ref 0.6–1.3)

## 2018-11-26 ENCOUNTER — TRANSCRIBE ORDERS (OUTPATIENT)
Dept: ADMINISTRATIVE | Facility: HOSPITAL | Age: 58
End: 2018-11-26

## 2018-11-26 DIAGNOSIS — R10.9 ABDOMINAL PAIN, UNSPECIFIED ABDOMINAL LOCATION: Primary | ICD-10-CM

## 2018-12-04 ENCOUNTER — HOSPITAL ENCOUNTER (OUTPATIENT)
Dept: NUCLEAR MEDICINE | Facility: HOSPITAL | Age: 58
Discharge: HOME | End: 2018-12-04

## 2018-12-04 DIAGNOSIS — R10.9 ABDOMINAL PAIN, UNSPECIFIED ABDOMINAL LOCATION: ICD-10-CM

## 2018-12-04 PROCEDURE — 78227 HEPATOBIL SYST IMAGE W/DRUG: CPT

## 2018-12-04 PROCEDURE — A9537 TC99M MEBROFENIN: HCPCS | Performed by: FAMILY MEDICINE

## 2018-12-04 PROCEDURE — 0 TECHNETIUM TC 99M MEBROFENIN KIT: Performed by: FAMILY MEDICINE

## 2018-12-04 RX ORDER — KIT FOR THE PREPARATION OF TECHNETIUM TC 99M MEBROFENIN 45 MG/10ML
1 INJECTION, POWDER, LYOPHILIZED, FOR SOLUTION INTRAVENOUS
Status: COMPLETED | OUTPATIENT
Start: 2018-12-04 | End: 2018-12-04

## 2018-12-04 RX ADMIN — MEBROFENIN 1 DOSE: 45 INJECTION, POWDER, LYOPHILIZED, FOR SOLUTION INTRAVENOUS at 12:25

## 2018-12-17 ENCOUNTER — TELEPHONE (OUTPATIENT)
Dept: OBSTETRICS AND GYNECOLOGY | Facility: CLINIC | Age: 58
End: 2018-12-17

## 2018-12-17 RX ORDER — ESTRADIOL 0.07 MG/D
1 FILM, EXTENDED RELEASE TRANSDERMAL 2 TIMES WEEKLY
Qty: 8 PATCH | Refills: 1 | Status: SHIPPED | OUTPATIENT
Start: 2018-12-17 | End: 2019-01-18 | Stop reason: SDUPTHER

## 2018-12-17 NOTE — TELEPHONE ENCOUNTER
Pt called requesting refill on Rx Adviar. Pt states that she is no longer wanting Rx Advair Diskus and wants to go back on the regular Advair Inhaler. Reordered Rx Advair Inhaler per chart via fax. Pt notified and verbalized understanding. Pt also states that she has had 2 rounds of antibiotics and still not feeling any better. Advised pt to schedule aaron pt. Pt transferred to get scheduled with Xray.

## 2018-12-20 RX ORDER — ESTRADIOL 0.07 MG/D
1 FILM, EXTENDED RELEASE TRANSDERMAL 2 TIMES WEEKLY
Qty: 8 PATCH | Refills: 12 | Status: SHIPPED | OUTPATIENT
Start: 2018-12-20 | End: 2019-02-06 | Stop reason: SDUPTHER

## 2018-12-28 ENCOUNTER — OFFICE VISIT (OUTPATIENT)
Dept: PULMONOLOGY | Facility: CLINIC | Age: 58
End: 2018-12-28

## 2018-12-28 VITALS
SYSTOLIC BLOOD PRESSURE: 130 MMHG | DIASTOLIC BLOOD PRESSURE: 80 MMHG | HEART RATE: 86 BPM | OXYGEN SATURATION: 96 % | WEIGHT: 223 LBS | TEMPERATURE: 98.6 F | HEIGHT: 69 IN | BODY MASS INDEX: 33.03 KG/M2

## 2018-12-28 DIAGNOSIS — R06.02 SOB (SHORTNESS OF BREATH): Primary | ICD-10-CM

## 2018-12-28 DIAGNOSIS — K21.9 CHRONIC GERD: ICD-10-CM

## 2018-12-28 DIAGNOSIS — J45.909 IDIOPATHIC ASTHMA: ICD-10-CM

## 2018-12-28 DIAGNOSIS — G47.33 OBSTRUCTIVE SLEEP APNEA, ADULT: ICD-10-CM

## 2018-12-28 PROCEDURE — 99213 OFFICE O/P EST LOW 20 MIN: CPT | Performed by: NURSE PRACTITIONER

## 2018-12-28 NOTE — PROGRESS NOTES
[facility] Follow up    CHIEF COMPLAINT    Persistent cough, asthma    HISTORY OF PRESENT ILLNESS    Brittny Han is a 58 y.o.female here today for evaluation of a continued cough.  She's had cough and congestion since August.  She's undergone 2 rounds of antibiotics and steroids.  She still having quite a bit of shortness of breath and chest tightness as well as generalized fatigue and weakness.  She has complained of some congestion with runny nose and hoarseness as well.    She also has obstructive sleep apnea wears a CPAP at night.  She did find some mold in the component of her CPAP machine is wondering if it keeps reinfecting her.    Her last IgE and eosinophil count was in 2016.  She has about twice annual exacerbations.  This one has lasted quite a bit longer than her normal exacerbation.  I chronically she is on Advair as well as Singulair and allergy shots due to her seasonal allergies.    Patient Active Problem List   Diagnosis   • Dyspnea on exertion   • Obstructive sleep apnea, adult   • Anxiety   • Anemia   • Essential hypertension   • Peptic ulcer   • Periodic headache syndrome, not intractable   • Restless legs syndrome (RLS)   • Neck pain   • GERD   • Allergic rhinitis   • Asthma   • Vaginal delivery 8#3oz female 1996   • History of robot-assisted laparoscopic hysterectomy/TVT 2011   • H/O left breast biopsy 1981   • Adenomatous colon polyp; 2012, 2017   • Menopausal symptoms       Allergies   Allergen Reactions   • Brevital Sodium [Methohexital]    • Percocet [Oxycodone-Acetaminophen]      Breathing difficulty, tongue swelling, severe        Current Outpatient Medications:   •  ADVAIR DISKUS 250-50 MCG/DOSE DISKUS, inhale 1 dose by mouth twice a day, Disp: , Rfl: 0  •  albuterol (PROVENTIL HFA;VENTOLIN HFA) 108 (90 Base) MCG/ACT inhaler, Inhale 2 puffs Every 4 (Four) Hours., Disp: 8.5 g, Rfl: 1  •  Cholecalciferol (VITAMIN D3) 5000 UNITS tablet, Take 1 tablet by mouth Daily., Disp: ,  Rfl:   •  EPINEPHrine (EPIPEN) 0.3 MG/0.3ML solution auto-injector injection, Inject 0.3 mL into the appropriate muscle as directed by prescriber As Needed., Disp: 2 each, Rfl: 2  •  estradiol (MINIVELLE, VIVELLE-DOT) 0.075 MG/24HR patch, Place 1 patch on the skin as directed by provider 2 (Two) Times a Week., Disp: 8 patch, Rfl: 1  •  estradiol (MINIVELLE, VIVELLE-DOT) 0.075 MG/24HR patch, Place 1 patch on the skin as directed by provider 2 (Two) Times a Week., Disp: 8 patch, Rfl: 12  •  fluticasone-salmeterol (ADVAIR HFA) 115-21 MCG/ACT inhaler, Inhale 2 puffs 2 (Two) Times a Day., Disp: 12 g, Rfl: 4  •  hydrochlorothiazide (HYDRODIURIL) 25 MG tablet, Take 1 tablet by mouth Daily., Disp: 90 tablet, Rfl: 1  •  ipratropium (ATROVENT) 0.06 % nasal spray, Use 1 spray into the nostril(s) as directed by provider 2 (Two) Times a Day., Disp: 15 mL, Rfl: 11  •  levocetirizine (XYZAL) 5 MG tablet, Take 1 tablet by mouth Daily., Disp: 90 tablet, Rfl: 1  •  montelukast (SINGULAIR) 10 MG tablet, Take 1 tablet by mouth Daily., Disp: 90 tablet, Rfl: 1  •  potassium chloride (K-DUR) 10 MEQ CR tablet, Take 1 tablet by mouth Daily., Disp: 90 tablet, Rfl: 1  •  venlafaxine XR (EFFEXOR-XR) 75 MG 24 hr capsule, Take 1 capsule by mouth Daily., Disp: 90 capsule, Rfl: 1  •  HYDROcod Polst-CPM Polst ER (TUSSIONEX PENNKINETIC ER) 10-8 MG/5ML ER suspension, Take 5 mL by mouth every night at bedtime., Disp: 115 mL, Rfl: 0  MEDICATION LIST AND ALLERGIES REVIEWED.    Social History     Tobacco Use   • Smoking status: Never Smoker   • Smokeless tobacco: Never Used   Substance Use Topics   • Alcohol use: Yes     Comment: occasional   • Drug use: No       FAMILY AND SOCIAL HISTORY REVIEWED.    Review of Systems   Constitutional: Positive for fatigue. Negative for chills, fever and unexpected weight change.   HENT: Positive for congestion, postnasal drip and rhinorrhea. Negative for nosebleeds, sinus pressure and trouble swallowing.   "  Respiratory: Positive for cough, chest tightness and shortness of breath. Negative for wheezing.    Cardiovascular: Negative for chest pain and leg swelling.   Gastrointestinal: Positive for constipation. Negative for abdominal pain, diarrhea, nausea and vomiting.   Genitourinary: Negative for dysuria, frequency, hematuria and urgency.   Musculoskeletal: Negative for myalgias.   Neurological: Positive for dizziness and light-headedness. Negative for weakness, numbness and headaches.   All other systems reviewed and are negative.  .    /80   Pulse 86   Temp 98.6 °F (37 °C)   Ht 175.3 cm (69\")   Wt 101 kg (223 lb)   SpO2 96% Comment: ROOM AIR AT REST  BMI 32.93 kg/m²     Immunization History   Administered Date(s) Administered   • Flu Vaccine Quad PF >36MO 10/10/2018   • Pneumococcal Conjugate 13-Valent (PCV13) 04/04/2016   • Pneumococcal Polysaccharide (PPSV23) 10/06/2009, 10/10/2014   • Tdap 02/12/2010       Physical Exam   Constitutional: She is oriented to person, place, and time. She appears well-developed and well-nourished.   HENT:   Head: Normocephalic and atraumatic.   Eyes: EOM are normal. Pupils are equal, round, and reactive to light.   Neck: Normal range of motion. Neck supple.   Cardiovascular: Normal rate and regular rhythm.   No murmur heard.  Pulmonary/Chest: Effort normal and breath sounds normal. No respiratory distress. She has no wheezes. She has no rales.   Abdominal: Soft. Bowel sounds are normal. She exhibits no distension.   Musculoskeletal: Normal range of motion. She exhibits no edema.   Neurological: She is alert and oriented to person, place, and time.   Skin: Skin is warm and dry. No erythema.   Psychiatric: She has a normal mood and affect. Her behavior is normal.   Vitals reviewed.        RESULTS        PROBLEM LIST    Problem List Items Addressed This Visit        Respiratory    Obstructive sleep apnea, adult    Asthma       Digestive    GERD      Other Visit Diagnoses  "    SOB (shortness of breath)    -  Primary    Relevant Orders    XR Chest PA & Lateral            DISCUSSION    She does not appear to have any bronchospasms today in the office.  She is completed several rounds of antibiotics and steroids at this point.  Most likely she has some airway irritation with her asthma and drainage.  We did discuss that the CPAP mask and tubing may be reinfecting her, she is going to get new mask tubing and filters from her DME, we care.    Also we discussed cough suppression with the use of Tessalon Perles as well as Tussionex at night, to relieve the airway irritation and inflammation.    Continue on her Advair, Xyzal and Singulair.  She does have some nebulizers at home she uses as needed as well.    Follow up in 7-10 days if no improvement, otherwise routine follow-up in 6 months with full PFTs    I spent 15 minutes with the patient. I spent > 50% percent of this time counseling and discussing evaluation, current status and treatment options.    Hien Shi, BLANCA  12/28/20182:06 PM  Electronically signed     Please note that portions of this note were completed with a voice recognition program. Efforts were made to edit the dictations, but occasionally words are mistranscribed.      CC: Provider, No Known

## 2019-01-17 ENCOUNTER — OFFICE VISIT (OUTPATIENT)
Dept: PULMONOLOGY | Facility: CLINIC | Age: 59
End: 2019-01-17

## 2019-01-17 ENCOUNTER — TELEPHONE (OUTPATIENT)
Dept: PULMONOLOGY | Facility: CLINIC | Age: 59
End: 2019-01-17

## 2019-01-17 VITALS
RESPIRATION RATE: 18 BRPM | HEART RATE: 81 BPM | SYSTOLIC BLOOD PRESSURE: 126 MMHG | BODY MASS INDEX: 34.86 KG/M2 | TEMPERATURE: 98.3 F | HEIGHT: 68 IN | DIASTOLIC BLOOD PRESSURE: 84 MMHG | OXYGEN SATURATION: 95 % | WEIGHT: 230 LBS

## 2019-01-17 DIAGNOSIS — J45.41 MODERATE PERSISTENT ASTHMA WITH ACUTE EXACERBATION: ICD-10-CM

## 2019-01-17 DIAGNOSIS — J45.909 IDIOPATHIC ASTHMA: ICD-10-CM

## 2019-01-17 DIAGNOSIS — J30.89 SEASONAL AND PERENNIAL ALLERGIC RHINITIS: ICD-10-CM

## 2019-01-17 DIAGNOSIS — R06.09 DYSPNEA ON EXERTION: ICD-10-CM

## 2019-01-17 DIAGNOSIS — R06.02 SHORTNESS OF BREATH: Primary | ICD-10-CM

## 2019-01-17 DIAGNOSIS — G47.33 OBSTRUCTIVE SLEEP APNEA, ADULT: ICD-10-CM

## 2019-01-17 DIAGNOSIS — J30.2 SEASONAL AND PERENNIAL ALLERGIC RHINITIS: ICD-10-CM

## 2019-01-17 LAB
BASOPHILS # BLD AUTO: 0.06 10*3/MM3 (ref 0–0.2)
BASOPHILS NFR BLD AUTO: 1 % (ref 0–1)
DEPRECATED RDW RBC AUTO: 45.3 FL (ref 37–54)
EOSINOPHIL # BLD AUTO: 0.26 10*3/MM3 (ref 0–0.3)
EOSINOPHIL NFR BLD AUTO: 4.3 % (ref 0–3)
ERYTHROCYTE [DISTWIDTH] IN BLOOD BY AUTOMATED COUNT: 13 % (ref 11.3–14.5)
HCT VFR BLD AUTO: 41.6 % (ref 34.5–44)
HGB BLD-MCNC: 13.2 G/DL (ref 11.5–15.5)
IMM GRANULOCYTES # BLD AUTO: 0.01 10*3/MM3 (ref 0–0.03)
IMM GRANULOCYTES NFR BLD AUTO: 0.2 % (ref 0–0.6)
LYMPHOCYTES # BLD AUTO: 1.95 10*3/MM3 (ref 0.6–4.8)
LYMPHOCYTES NFR BLD AUTO: 31.9 % (ref 24–44)
MCH RBC QN AUTO: 29.9 PG (ref 27–31)
MCHC RBC AUTO-ENTMCNC: 31.7 G/DL (ref 32–36)
MCV RBC AUTO: 94.1 FL (ref 80–99)
MONOCYTES # BLD AUTO: 0.7 10*3/MM3 (ref 0–1)
MONOCYTES NFR BLD AUTO: 11.5 % (ref 0–12)
NEUTROPHILS # BLD AUTO: 3.13 10*3/MM3 (ref 1.5–8.3)
NEUTROPHILS NFR BLD AUTO: 51.1 % (ref 41–71)
PLATELET # BLD AUTO: 315 10*3/MM3 (ref 150–450)
PMV BLD AUTO: 9.8 FL (ref 6–12)
RBC # BLD AUTO: 4.42 10*6/MM3 (ref 3.89–5.14)
WBC NRBC COR # BLD: 6.11 10*3/MM3 (ref 3.5–10.8)

## 2019-01-17 PROCEDURE — 86003 ALLG SPEC IGE CRUDE XTRC EA: CPT | Performed by: NURSE PRACTITIONER

## 2019-01-17 PROCEDURE — 86606 ASPERGILLUS ANTIBODY: CPT | Performed by: NURSE PRACTITIONER

## 2019-01-17 PROCEDURE — 94726 PLETHYSMOGRAPHY LUNG VOLUMES: CPT | Performed by: NURSE PRACTITIONER

## 2019-01-17 PROCEDURE — 94729 DIFFUSING CAPACITY: CPT | Performed by: NURSE PRACTITIONER

## 2019-01-17 PROCEDURE — 85025 COMPLETE CBC W/AUTO DIFF WBC: CPT | Performed by: NURSE PRACTITIONER

## 2019-01-17 PROCEDURE — 94375 RESPIRATORY FLOW VOLUME LOOP: CPT | Performed by: NURSE PRACTITIONER

## 2019-01-17 PROCEDURE — 99214 OFFICE O/P EST MOD 30 MIN: CPT | Performed by: NURSE PRACTITIONER

## 2019-01-17 PROCEDURE — 82785 ASSAY OF IGE: CPT | Performed by: NURSE PRACTITIONER

## 2019-01-17 PROCEDURE — 86615 BORDETELLA ANTIBODY: CPT | Performed by: NURSE PRACTITIONER

## 2019-01-17 RX ORDER — AZELASTINE HYDROCHLORIDE, FLUTICASONE PROPIONATE 137; 50 UG/1; UG/1
2 SPRAY, METERED NASAL NIGHTLY
Qty: 23 G | Refills: 6 | Status: SHIPPED | OUTPATIENT
Start: 2019-01-17 | End: 2019-01-18 | Stop reason: ALTCHOICE

## 2019-01-17 NOTE — TELEPHONE ENCOUNTER
Pt's PCP called and stated that she is scheduled to see Hien on 2/25/19 but will need to be sooner due to severe asthma flare up. PAR notified to schedule pt ASAP.

## 2019-01-17 NOTE — PROGRESS NOTES
[facility] up    CHIEF COMPLAINT    persistent allergy symptoms     HISTORY OF PRESENT ILLNESS    Brittny Han is a 59 y.o.female here today for follow up on persistent asthma symptoms.      Initially seen by Dr SANTANA in 2016, last in Feb 2018, I saw her 12/2018 for worsening cough and congestion, with several rounds of antibiotics and steroids.  She has also had another round of steroids from her PCP.      I gave her tussionex and Tessalon Perles to help with cough suppression, and an extra ICS for airway inflamation, to help the chronic cough.  It helped the cough some, but has not resolved    She also has ALANNA on CPAP,  we did discuss proper cleaning and replacement of her CPAP mask and tubing with her recent illnesses.  Has now got a SOClean, plans on starting tomorrow, all new supplies.    Feels like symptoms have worsened with her new machine.  Discussed turning the heater down on her CPAP, talk to her DME.      Still follows up with allergiest for injections.  Had been evaluated for Nucala in the past, IgE over 1000, EOS elevated, significantly positive RAST in 2016.      Patient Active Problem List   Diagnosis   • Dyspnea on exertion   • Obstructive sleep apnea, adult   • Anxiety   • Anemia   • Essential hypertension   • Peptic ulcer   • Periodic headache syndrome, not intractable   • Restless legs syndrome (RLS)   • Neck pain   • GERD   • Allergic rhinitis   • Moderate persistent asthma with acute exacerbation   • Vaginal delivery 8#3oz female 1996   • History of robot-assisted laparoscopic hysterectomy/TVT 2011   • H/O left breast biopsy 1981   • Adenomatous colon polyp; 2012, 2017   • Menopausal symptoms       Allergies   Allergen Reactions   • Brevital Sodium [Methohexital]    • Percocet [Oxycodone-Acetaminophen]      Breathing difficulty, tongue swelling, severe        Current Outpatient Medications:   •  ADVAIR DISKUS 250-50 MCG/DOSE DISKUS, inhale 1 dose by mouth twice a day, Disp: ,  Rfl: 0  •  albuterol (PROVENTIL HFA;VENTOLIN HFA) 108 (90 Base) MCG/ACT inhaler, Inhale 2 puffs Every 4 (Four) Hours., Disp: 8.5 g, Rfl: 1  •  Cholecalciferol (VITAMIN D3) 5000 UNITS tablet, Take 1 tablet by mouth Daily., Disp: , Rfl:   •  diclofenac (VOLTAREN) 75 MG EC tablet, Take 1 tablet by mouth 2 (Two) Times a Day., Disp: 60 tablet, Rfl: 2  •  EPINEPHrine (EPIPEN) 0.3 MG/0.3ML solution auto-injector injection, Inject 0.3 mL into the appropriate muscle as directed by prescriber As Needed., Disp: 2 each, Rfl: 2  •  estradiol (MINIVELLE, VIVELLE-DOT) 0.075 MG/24HR patch, Place 1 patch on the skin as directed by provider 2 (Two) Times a Week., Disp: 8 patch, Rfl: 1  •  fluticasone-salmeterol (ADVAIR HFA) 115-21 MCG/ACT inhaler, Inhale 2 puffs 2 (Two) Times a Day., Disp: 12 g, Rfl: 4  •  hydrochlorothiazide (HYDRODIURIL) 25 MG tablet, Take 1 tablet by mouth Daily., Disp: 90 tablet, Rfl: 1  •  ipratropium (ATROVENT) 0.06 % nasal spray, Use 1 spray into the nostril(s) as directed by provider 2 (Two) Times a Day., Disp: 15 mL, Rfl: 11  •  levocetirizine (XYZAL) 5 MG tablet, Take 1 tablet by mouth Daily., Disp: 90 tablet, Rfl: 1  •  montelukast (SINGULAIR) 10 MG tablet, Take 1 tablet by mouth Daily., Disp: 90 tablet, Rfl: 1  •  potassium chloride (K-DUR) 10 MEQ CR tablet, Take 1 tablet by mouth Daily., Disp: 90 tablet, Rfl: 1  •  venlafaxine XR (EFFEXOR-XR) 75 MG 24 hr capsule, Take 1 capsule by mouth Daily., Disp: 90 capsule, Rfl: 1  MEDICATION LIST AND ALLERGIES REVIEWED.    Social History     Tobacco Use   • Smoking status: Never Smoker   • Smokeless tobacco: Never Used   Substance Use Topics   • Alcohol use: Yes     Comment: occasional   • Drug use: No       FAMILY AND SOCIAL HISTORY REVIEWED.    Review of Systems   Constitutional: Positive for activity change and fatigue. Negative for chills, fever and unexpected weight change.   HENT: Positive for congestion, postnasal drip, rhinorrhea and sinus pressure.  "Negative for nosebleeds and trouble swallowing.    Respiratory: Positive for cough, chest tightness and shortness of breath. Negative for wheezing.    Cardiovascular: Negative for chest pain and leg swelling.   Gastrointestinal: Negative for abdominal pain, constipation, diarrhea, nausea and vomiting.   Genitourinary: Negative for dysuria, frequency, hematuria and urgency.   Musculoskeletal: Positive for arthralgias and back pain. Negative for myalgias.   Allergic/Immunologic: Positive for environmental allergies.   Neurological: Negative for dizziness, weakness, numbness and headaches.   All other systems reviewed and are negative.  .    /84 (BP Location: Right arm, Patient Position: Sitting, Cuff Size: Adult)   Pulse 81   Temp 98.3 °F (36.8 °C)   Resp 18   Ht 172.7 cm (68\")   Wt 104 kg (230 lb)   SpO2 95% Comment: room air at rest  BMI 34.97 kg/m²     Immunization History   Administered Date(s) Administered   • Flu Vaccine Quad PF >36MO 10/10/2018   • Pneumococcal Conjugate 13-Valent (PCV13) 04/04/2016   • Pneumococcal Polysaccharide (PPSV23) 10/06/2009, 10/10/2014   • Tdap 02/12/2010       Physical Exam   Constitutional: She is oriented to person, place, and time. She appears well-developed and well-nourished.   HENT:   Head: Normocephalic and atraumatic.   Eyes: EOM are normal. Pupils are equal, round, and reactive to light.   Neck: Normal range of motion. Neck supple.   Cardiovascular: Normal rate and regular rhythm.   No murmur heard.  Pulmonary/Chest: Effort normal. No respiratory distress. She has wheezes. She has rales.   Inspiratory wheezing and rales predominantly in the upper lobes.  Cleared with a cough.   Abdominal: Soft. Bowel sounds are normal. She exhibits no distension.   Musculoskeletal: Normal range of motion. She exhibits no edema.   Neurological: She is alert and oriented to person, place, and time.   Skin: Skin is warm and dry. No erythema.   Psychiatric: She has a normal mood and " affect. Her behavior is normal.   Vitals reviewed.        RESULTS    PFTS in the office today, read by me:  No obstruction or restriction with a FVC of 3.19, 82% FEV1 2.50, 82%.  MVV 50%.  Total lung capacity 107% with some elevated residual volume 127%.  Normal adjusted diffusion.    PROBLEM LIST    Problem List Items Addressed This Visit        Respiratory    Dyspnea on exertion    Obstructive sleep apnea, adult    Allergic rhinitis    Moderate persistent asthma with acute exacerbation      Other Visit Diagnoses     Shortness of breath    -  Primary    Relevant Orders    Pulmonary Function Test (Completed)            DISCUSSION    Her asthma has worsened over the last few years with some persistent asthma symptoms and now quite a bit of steroid dependence.  Repeat allergic asthma testing for evaluation of new biologic agent.  Due to frequent exacerbations, poorly controlled asthma at this time.  She is agreeable to start on injectable medications to help with her asthma management.  High resolution CT chest to rule out bronchiectasis   Add on asthma dose Spiriva to help with worsening dyspnea.    Also do a fungal, hypersensitivity evaluation with a history of exposures.    Continue on her CPAP at night.  She is going to start using her so clean machine tonight.    Follow-up in 3-4 weeks.      I spent 25 minutes with the patient. I spent > 50% percent of this time counseling and discussing diagnostic testing, evaluation, current status, treatment options and management.    Hien Shi, APRN  01/17/201910:55 AM  Electronically signed     Please note that portions of this note were completed with a voice recognition program. Efforts were made to edit the dictations, but occasionally words are mistranscribed.      CC: Provider, No Known

## 2019-01-18 ENCOUNTER — TELEPHONE (OUTPATIENT)
Dept: PULMONOLOGY | Facility: CLINIC | Age: 59
End: 2019-01-18

## 2019-01-18 RX ORDER — AZELASTINE 1 MG/ML
2 SPRAY, METERED NASAL 2 TIMES DAILY
Qty: 30 ML | Refills: 12 | Status: SHIPPED | OUTPATIENT
Start: 2019-01-18 | End: 2021-10-05

## 2019-01-22 LAB
A ALTERNATA IGE QN: <0.1 KU/L
A FUMIGATUS IGE QN: <0.1 KU/L
AMER ROACH IGE QN: <0.1 KU/L
B PERT IGA SER QL IA: <1 INDEX (ref 0–0.9)
B PERT IGG SER-ACNC: 2.74 INDEX (ref 0–0.94)
B PERT IGM SER QL IA: <1 INDEX (ref 0–0.9)
BAHIA GRASS IGE QN: 0.18 KU/L
BERMUDA GRASS IGE QN: 0.18 KU/L
BOXELDER IGE QN: 0.77 KU/L
C HERBARUM IGE QN: <0.1 KU/L
CAT DANDER IGG QN: 65.5 KU/L
CMN PIGWEED IGE QN: 0.12 KU/L
COMMON RAGWEED IGE QN: 0.31 KU/L
CONV CLASS DESCRIPTION: ABNORMAL
D FARINAE IGE QN: 12 KU/L
D PTERONYSS IGE QN: 7.79 KU/L
DOG DANDER IGE QN: 48.1 KU/L
ENGL PLANTAIN IGE QN: 0.2 KU/L
HAZELNUT POLN IGE QN: 0.11 KU/L
JOHNSON GRASS IGE QN: 0.13 KU/L
KENT BLUE GRASS IGE QN: 0.46 KU/L
M RACEMOSUS IGE QN: <0.1 KU/L
MT JUNIPER IGE QN: 0.14 KU/L
MUGWORT IGE QN: 0.14 KU/L
NETTLE IGE QN: 0.18 KU/L
P NOTATUM IGE QN: <0.1 KU/L
S BOTRYOSUM IGE QN: <0.1 KU/L
SHEEP SORREL IGE QN: 0.15 KU/L
SWEET GUM IGE QN: 0.13 KU/L
T011-IGE MAPLE LEAF SYCAMORE: 0.28 KU/L
TOTAL IGE SMQN RAST: 830 IU/ML (ref 0–100)
WHITE ELM IGE QN: 0.33 KU/L
WHITE HICKORY IGE QN: 0.21 KU/L
WHITE MULBERRY IGE QN: <0.1 KU/L
WHITE OAK IGE QN: 0.2 KU/L

## 2019-01-23 LAB
A FLAVUS AB SER QL ID: NEGATIVE
A FUMIGATUS AB SER QL ID: NEGATIVE
A NIGER AB SER QL ID: NEGATIVE

## 2019-01-25 ENCOUNTER — HOSPITAL ENCOUNTER (OUTPATIENT)
Dept: CT IMAGING | Facility: HOSPITAL | Age: 59
Discharge: HOME | End: 2019-01-25
Admitting: NURSE PRACTITIONER

## 2019-01-25 PROCEDURE — 71250 CT THORAX DX C-: CPT

## 2019-01-29 ENCOUNTER — OFFICE VISIT (OUTPATIENT)
Dept: GASTROENTEROLOGY | Facility: CLINIC | Age: 59
End: 2019-01-29

## 2019-01-29 VITALS
WEIGHT: 228 LBS | OXYGEN SATURATION: 96 % | BODY MASS INDEX: 34.56 KG/M2 | RESPIRATION RATE: 18 BRPM | HEIGHT: 68 IN | HEART RATE: 94 BPM | TEMPERATURE: 97.4 F

## 2019-01-29 DIAGNOSIS — R19.4 CHANGE IN BOWEL HABITS: ICD-10-CM

## 2019-01-29 DIAGNOSIS — G89.29 CHRONIC ABDOMINAL PAIN: Primary | ICD-10-CM

## 2019-01-29 DIAGNOSIS — R10.9 CHRONIC ABDOMINAL PAIN: Primary | ICD-10-CM

## 2019-01-29 PROCEDURE — 99214 OFFICE O/P EST MOD 30 MIN: CPT | Performed by: INTERNAL MEDICINE

## 2019-01-29 NOTE — PROGRESS NOTES
Chief Complaint: Brittny is here because of chronic abdominal pain.      Abdominal Pain   This is a chronic problem. The current episode started more than 1 year ago. The onset quality is undetermined. The problem occurs daily. The most recent episode lasted 12 months. The problem has been waxing and waning. The pain is located in the RUQ. The pain is at a severity of 1/10. The quality of the pain is dull. The abdominal pain radiates to the RUQ. Associated symptoms include belching, constipation and flatus. Pertinent negatives include no anorexia, hematochezia, melena, nausea, vomiting or weight loss. Nothing aggravates the pain. The pain is relieved by nothing. Prior diagnostic workup includes ultrasound.   The above comments were made by the patient.  This pain is been present for over a year.  It's to the right of the umbilicus.  Nothing exacerbates it.  She's had an extensive workup which is included an ultrasound of the abdomen and MRI of the abdomen and a HIDA scan.  Apparently the ultrasound showed a possible liver lesion which is why the MRI was done.  Her HIDA scan was normal.  Again in questioning her she has a discomfort scale of 1 out of 10.  She relates that this may have been originally exacerbated or caused by a cough whooping cough.  Her other problem that she discussed later in this visit was that of difficulty in change in her bowel habits.  She has her bowel movements come down into her rectum but then she needs to manually extract them.  Her symptoms are suspicious of a rectocele    Past Medical History:   Past Medical History:   Diagnosis Date   • Allergic rhinitis    • Anxiety    • Basal cell carcinoma (BCC) of canthus of right eye     eyelid   •  1 para 1    • History of echocardiogram 2011    mild ascending aortic root dilatation; mod RVC enlargement; global left EF 0.72 (normal 0.55-0.75) mild concentric LV hypertrophy; trace MR; mild TR    • History of PFTs 2016    good  effort; normal lung volumes   • Hyperlipidemia        Family History:  Family History   Problem Relation Age of Onset   • Heart disease Mother    • COPD Father         Father is alive at age 80 with COPD and asthma   • Asthma Father    • Basal cell carcinoma Father    • Sleep apnea Other    • Ulcerative colitis Other    • Crohn's disease Other    • Diabetes Other    • Hypertension Other    • Breast cancer Neg Hx    • Ovarian cancer Neg Hx        Social History:   reports that  has never smoked. she has never used smokeless tobacco. She reports that she drinks alcohol. She reports that she does not use drugs.    Medications:     Current Outpatient Medications:   •  albuterol (PROVENTIL HFA;VENTOLIN HFA) 108 (90 Base) MCG/ACT inhaler, Inhale 2 puffs Every 4 (Four) Hours., Disp: 8.5 g, Rfl: 1  •  azelastine (ASTELIN) 0.1 % nasal spray, Use 2 sprays into the nostril(s) as directed by provider 2 (Two) Times a Day., Disp: 30 mL, Rfl: 12  •  Cholecalciferol (VITAMIN D3) 5000 UNITS tablet, Take 1 tablet by mouth Daily., Disp: , Rfl:   •  diclofenac (VOLTAREN) 75 MG EC tablet, Take 1 tablet by mouth 2 (Two) Times a Day., Disp: 60 tablet, Rfl: 2  •  EPINEPHrine (EPIPEN) 0.3 MG/0.3ML solution auto-injector injection, Inject 0.3 mL into the appropriate muscle as directed by prescriber As Needed., Disp: 2 each, Rfl: 2  •  estradiol (MINIVELLE, VIVELLE-DOT) 0.075 MG/24HR patch, Place 1 patch on the skin as directed by provider 2 (Two) Times a Week., Disp: 8 patch, Rfl: 12  •  fluticasone-salmeterol (ADVAIR HFA) 230-21 MCG/ACT inhaler, Inhale 2 puffs 2 (Two) Times a Day., Disp: 12 g, Rfl: 11  •  hydrochlorothiazide (HYDRODIURIL) 25 MG tablet, Take 1 tablet by mouth Daily., Disp: 90 tablet, Rfl: 1  •  levocetirizine (XYZAL) 5 MG tablet, Take 1 tablet by mouth Daily., Disp: 90 tablet, Rfl: 1  •  MethylPREDNISolone (MEDROL, HARSHA,) 4 MG tablet, Take as directed per package, Disp: 21 tablet, Rfl: 0  •  montelukast (SINGULAIR) 10 MG  tablet, Take 1 tablet by mouth Daily., Disp: 90 tablet, Rfl: 1  •  potassium chloride (K-DUR) 10 MEQ CR tablet, Take 1 tablet by mouth Daily., Disp: 90 tablet, Rfl: 1  •  promethazine-dextromethorphan (PROMETHAZINE-DM) 6.25-15 MG/5ML syrup, Take 5 mL by mouth Every 6 (Six) Hours., Disp: 200 mL, Rfl: 0  •  venlafaxine XR (EFFEXOR-XR) 75 MG 24 hr capsule, Take 1 capsule by mouth Daily., Disp: 90 capsule, Rfl: 1    Allergies:  Brevital sodium [methohexital] and Percocet [oxycodone-acetaminophen]    Review of Systems   Constitutional: Negative for weight loss.   Gastrointestinal: Positive for abdominal pain, anal bleeding, constipation and flatus. Negative for anorexia, hematochezia, melena, nausea and vomiting.   All other systems reviewed and are negative.            Physical Exam:   Constitutional: Pt is oriented to person, place, and time and well-developed, well-nourished, and in no distress.   HENT: Mouth/Throat: Oropharynx is clear and moist.   Neck: Normal range of motion. Neck supple.   Cardiovascular: Normal rate, regular rhythm and normal heart sounds.    Pulmonary/Chest: Effort normal and breath sounds normal. No respiratory distress. No  wheezes.   Abdominal: Soft. Bowel sounds are normal.   Skin: Skin is warm and dry.   Psychiatric: Mood, memory, affect and judgment normal.           Assessment and Plan Brittny has very nonspecific abdominal pain.  I recommended that she try a probiotic, Align.We elected to simply follow her abdominal pain at this time .  I reviewed her workup and her other evaluations that she has had.  She had an upper endoscopy done by Dr. Huitron.  All of these records were reviewed with her.  I did speak to Dr. Hernandez on the phone because her symptoms and change in her bowel habits were suspicious of a rectocele.  They will contact her for follow-up with regards to this.        ICD-10-CM ICD-9-CM   1. Chronic abdominal pain R10.9 789.00    G89.29 338.29   2. Change in bowel habits R19.4  787.99     Shaheed Christianson M.D.  [facility]  [phone]  Gastroenterology     EMR Dragon/Transcription disclaimer:   Much of this encounter note is an electronic transcription/translation of spoken language to printed text. The electronic translation of spoken language may permit erroneous, or at times, nonsensical words or phrases to be inadvertently transcribed; Although I have reviewed the note for such errors, some may still exist.

## 2019-01-30 ENCOUNTER — PROCEDURE VISIT (OUTPATIENT)
Dept: NEUROLOGY | Facility: CLINIC | Age: 59
End: 2019-01-30

## 2019-01-30 VITALS
WEIGHT: 229 LBS | RESPIRATION RATE: 18 BRPM | SYSTOLIC BLOOD PRESSURE: 132 MMHG | OXYGEN SATURATION: 98 % | HEIGHT: 68 IN | BODY MASS INDEX: 34.71 KG/M2 | DIASTOLIC BLOOD PRESSURE: 84 MMHG | HEART RATE: 82 BPM

## 2019-01-30 DIAGNOSIS — R20.0 NUMBNESS AND TINGLING OF BOTH FEET: Primary | ICD-10-CM

## 2019-01-30 DIAGNOSIS — R20.2 NUMBNESS AND TINGLING OF BOTH FEET: Primary | ICD-10-CM

## 2019-01-30 PROCEDURE — 95886 MUSC TEST DONE W/N TEST COMP: CPT | Performed by: PSYCHIATRY & NEUROLOGY

## 2019-01-30 PROCEDURE — 95910 NRV CNDJ TEST 7-8 STUDIES: CPT | Performed by: PSYCHIATRY & NEUROLOGY

## 2019-01-30 NOTE — PROGRESS NOTES
[facility]   Electrodiagnostic Laboratory    Nerve Conduction & EMG Report        Patient:  Brittny Han   Patient ID: [id number]   YOB: 1960  Sex:  female      Exam Physician: Zachary Yoo MD  Refer Physician: Jay Bejarano,*    Electromyogram and Nerve Conduction Velocity Procedure Note    Hx: 59 y.o. right handed female with complaint of numbness involving the balls of feet. Symptoms have been present for 1 year and were provoked by no clear event. Significant past medical history includes lumbar stenosis. Medications include Effexor. Family history no family history of nerve or muscle disease.    Exam: Motor power is normal. There is no atrophy. There are no fasciculations. Deep tendon reflexes are present and symmetrical. Sensory exam is abnormal distal symmetrical loss of pin in bilateral lower extremities.    Edx studies of the bilateral lower extremities were performed to evaluate for peripheral neuropathy.    NCS Examination   For sensory nerve conduction studies, the amplitude is measured peak-to-peak, the latency reported is the distal peak latency, and the conduction velocity, if measured, is determined from onset latencies and is over the forearm.   For motor nerve conduction studies, the amplitude is measured baseline-to-peak, the latency reported is the distal onset latency, the conduction velocity is calculated over the forearm, and the F wave latency is the minimum latency.   Unless otherwise noted, the hand temperature was monitored continuously and remained between 32°C and 36°C during the performance of the NCSs.     Sensory NCS      Nerve / Sites Rec. Site Onset Lat Peak Lat NP Amp PP Amp Segments Distance Velocity     ms ms µV µV  cm m/s   L SURAL - Lat Mall Antidr      Calf Lat Mall 2.45 3.10 11.0 142.1 Calf - Lat Mall 14 57.1      Ref.   4.20 5.0  Ref.     R SURAL - Lat Mall Antidr      Calf Lat Mall 2.95 3.25 11.0 5.0 Calf - Lat Mall 14  47.5      Ref.   4.20 5.0  Ref.               Motor NCS      Nerve / Sites Rec. Site Lat Amp Seq Amp Segments Dist Velocity     ms mV %  cm m/s   L COMM PERONEAL - EDB      Ankle EDB 3.45 9.9 100 Ankle - EDB 8       Ref.  6.00 2.0  Ref.        Fib Head EDB 11.75 8.3 84.2 Fib Head - Ankle 35 42.2      Ref.     Ref.  39.0      Knee EDB 12.65 7.8 93.5 Knee - Fib Head 8 88.9   R COMM PERONEAL - EDB      Ankle EDB 4.65 2.5 100 Ankle - EDB 8       Ref.  6.00 2.0  Ref.        Fib Head EDB 12.50 1.5 210 Fib Head - Ankle 34 43.3      Ref.     Ref.  39.0      Knee EDB 14.15 1.7 113 Knee - Fib Head 11 66.7   L TIBIAL (KNEE) - AH      Ankle AH 4.70 9.2 100 Ankle - AH 8       Ref.  6.00 2.5  Ref.        Knee AH 14.70 2.9 31.6 Knee - Ankle 41 41.0      Ref.     Ref.  39.0   R TIBIAL (KNEE) - AH      Ankle AH 5.70 4.8 100 Ankle - AH 8       Ref.  6.00 2.5  Ref.        Knee AH 14.85 5.4 113 Knee - Ankle 42 45.9      Ref.     Ref.  39.0               F  Wave      Nerve Fmin    ms   L COMM PERONEAL 50.50   REF 56.00   L TIBIAL (KNEE) 48.50   REF 56.00   R TIBIAL (KNEE) 55.10   REF 56.00   R COMM PERONEAL 44.95   REF 56.00               H Reflex      Nerve H Lat    ms   L TIBIAL (KNEE) - Soleus (S1) 33.15   Ref 36.00   R TIBIAL (KNEE) - Soleus (S1) 29.40   Ref 36.00                          EMG Examination   The study was performed with a concentric needle electrode. Fibrillation and fasciculation activity is graded from none (0) to continuous (4+). The configuration and recruitment pattern of motor unit action potentials under voluntary control, if not normal, are described below    EMG Summary Table     Spontaneous MUAP Recruitment    IA Fib PSW Fasc H.F. Amp Dur. PPP Pattern   L. ILIOPSOAS N None None None None N N N N   R. ILIOPSOAS N None None None None N N N N   L. GLUTEUS MAX N None None None None N N N N   R. GLUTEUS MAX N None None None None N N N N   L. QUADRICEPS N None None None None N N N N   R. QUADRICEPS N None None  None None N N N N   L. BIC FEM (L HEAD) N None None None None N N N N   R. BIC FEM (L HEAD) N None None None None N N N N   L. GASTROCN (LAT) N None None None None N N N N   R. GASTROCN (LAT) N None None None None N N N N   L. TIB ANTERIOR N None None None None N N N N   R. TIB ANTERIOR N None None None None N N N N     · Left common peroneal motor responses and F wave were normal  · Left tibial motor responses and F wave were normal  · Left sural sensory responses were normal  · Left H-reflex responses were normal  · Right  common peroneal motor responses and F wave were normal  · Right  tibial motor responses and F wave were normal  · Right  sural sensory responses were normal  · Right  H-reflex responses were normal  · Needle examination with a concentric needle electrode of selected muscles of the bilateral lower extremities were normal            Summary    Interpretation: Nerve conduction studies were performed on the right lower extremity and left lower extremity.    Needle electromyography was performed on the right lower extremity and left lower extremity. No meaningful abnormalities were found..  Conclusion: Normal NCV and EMG of the right lower extremity and left lower extremity          Instrument used:  Teca Synergy        Performed by:          Zachary Yoo MD

## 2019-02-06 ENCOUNTER — OFFICE VISIT (OUTPATIENT)
Dept: OBSTETRICS AND GYNECOLOGY | Facility: CLINIC | Age: 59
End: 2019-02-06

## 2019-02-06 VITALS
BODY MASS INDEX: 34.36 KG/M2 | WEIGHT: 226 LBS | SYSTOLIC BLOOD PRESSURE: 122 MMHG | DIASTOLIC BLOOD PRESSURE: 80 MMHG | RESPIRATION RATE: 16 BRPM

## 2019-02-06 DIAGNOSIS — N81.11 MIDLINE CYSTOCELE: Primary | ICD-10-CM

## 2019-02-06 DIAGNOSIS — N39.3 SUI (STRESS URINARY INCONTINENCE, FEMALE): ICD-10-CM

## 2019-02-06 PROCEDURE — 99214 OFFICE O/P EST MOD 30 MIN: CPT | Performed by: OBSTETRICS & GYNECOLOGY

## 2019-02-06 RX ORDER — ESTRADIOL 0.07 MG/D
1 FILM, EXTENDED RELEASE TRANSDERMAL 2 TIMES WEEKLY
Qty: 24 PATCH | Refills: 3 | Status: SHIPPED | OUTPATIENT
Start: 2019-02-07 | End: 2020-03-26

## 2019-02-06 NOTE — PROGRESS NOTES
"Subjective   Chief Complaint   Patient presents with   • Vaginal Prolapse     Brittny Han is a 59 y.o. year old  menopausal female presenting to be seen for her annual exam.  There has not been vaginal bleeding in the last 12 months.  Hot flashes and night sweats are not a significant problem.  She is on generic Minivelle due to cost.   Another isssue is with her bowels; she shows me a picture of anal canal and says her stool gets stuck and she has to dig it out.  This appears to be just outside the anal sphincter.  She is recently seen Dr. Shaheed Christianson and she had a draw him a picture but she found one on the Internet for me.  If she cannot go the bathroom she has to \"did get out \".  She said Dr. Christianson started on a new medication recently that he thinks will help but she is only been on it about a week.  I was considering potentially some thing like Linzess if she is really constipated.  She reports that her stools are sort of pellet-like or may be ribbonlike.  I briefly discussed the [place] stool scale.  SEXUAL Hx:  She is not sexually active.  Vaginal dryness is not a problem.  [place] is painful:not asked  She has concerns about domestic violence: no    HEALTH Hx:  She exercises regularly: no. Works 60-70 hours weekly 2 jobs  She wears her seat belt:yes.  Self breast awareness: yes  She has noticed changes in height: yes 1/2 or more? Now 5 \" 6 3/4' she has degenerative disc              Calcium intake is not adequate 2 daily              Caffeine intake: 3 equivalent cups of coffee    The following portions of the patient's history were reviewed and updated as appropriate:problem list, current medications, allergies, past family history, past medical history, past social history and past surgical history.    Current Outpatient Medications:   •  albuterol (PROVENTIL HFA;VENTOLIN HFA) 108 (90 Base) MCG/ACT inhaler, Inhale 2 puffs Every 4 (Four) Hours., Disp: 8.5 g, Rfl: 1  •  azelastine " (ASTELIN) 0.1 % nasal spray, Use 2 sprays into the nostril(s) as directed by provider 2 (Two) Times a Day., Disp: 30 mL, Rfl: 12  •  Cholecalciferol (VITAMIN D3) 5000 UNITS tablet, Take 1 tablet by mouth Daily., Disp: , Rfl:   •  diclofenac (VOLTAREN) 75 MG EC tablet, Take 1 tablet by mouth 2 (Two) Times a Day., Disp: 60 tablet, Rfl: 2  •  EPINEPHrine (EPIPEN) 0.3 MG/0.3ML solution auto-injector injection, Inject 0.3 mL into the appropriate muscle as directed by prescriber As Needed., Disp: 2 each, Rfl: 2  •  [START ON 2/7/2019] estradiol (MINIVELLE, VIVELLE-DOT) 0.075 MG/24HR patch, Place 1 patch on the skin as directed by provider 2 (Two) Times a Week., Disp: 24 patch, Rfl: 3  •  fluticasone-salmeterol (ADVAIR HFA) 230-21 MCG/ACT inhaler, Inhale 2 puffs 2 (Two) Times a Day., Disp: 12 g, Rfl: 11  •  hydrochlorothiazide (HYDRODIURIL) 25 MG tablet, Take 1 tablet by mouth Daily., Disp: 90 tablet, Rfl: 1  •  levocetirizine (XYZAL) 5 MG tablet, Take 1 tablet by mouth Daily., Disp: 90 tablet, Rfl: 1  •  montelukast (SINGULAIR) 10 MG tablet, Take 1 tablet by mouth Daily., Disp: 90 tablet, Rfl: 1  •  potassium chloride (K-DUR) 10 MEQ CR tablet, Take 1 tablet by mouth Daily., Disp: 90 tablet, Rfl: 1  •  venlafaxine XR (EFFEXOR-XR) 75 MG 24 hr capsule, Take 1 capsule by mouth Daily., Disp: 90 capsule, Rfl: 1    Social History    Tobacco Use      Smoking status: Never Smoker      Smokeless tobacco: Never Used    Alcohol :  occasional/rare    Review of Systems   Her  bladder is normal except she leaks if she coughs or sings; she used to sing in the choir but no longer.  ( quit due to pulmonary issues); if she holds it 1 min or more it takes forever to void, holding up panniculus helps  Kegel's no much help - she cant tell.  She has to wear a liner when she works at [employer] for 20-30 hours/week because she is on her feet she coughs a lot with pulmonary issues.     Objective   /80   Resp 16   Wt 103 kg (226 lb)    Breastfeeding? No   BMI 34.36 kg/m²      General:  well developed; well nourished  no acute distress  appears stated age   Skin:  No suspicious lesions seen   Thyroid: not examined   Breasts:  Not performed.   Abdomen: soft, non-tender; no masses  no umbilical or inguinal hernias are present  no hepato-splenomegaly   Pelvis: Clinical staff was present for exam  External genitalia:  normal appearance of the external genitalia including Bartholin's and [place]'s glands.  :  urethral meatus normal; urethral hypermobility is absent. Sterile Q-tip placed and only noticed about a 10-15 degree angle change with very hard cough.  No leakage noted.  However bladder fairly emptying  Vaginal:  normal pink mucosa without prolapse or lesions. caliber of the introitus is Normal; she is able to perform a Kegel contraction upon request;  Uterus:  absent.  Adnexa:  non palpable bilaterally.  Rectal:  anus visually normal appearing. recto-vaginal exam unremarkable and confirms findings; good sphincter tone; no masses;  Rectocele GRADE 1       Lab Review   No data reviewed and PATHOLOGY    Imaging review  Mammogram report       Assessment   1. Postmenopausal patient status post hysterectomy.  She is doing well on generic Minivelle regarding some previous issues with hot flashes.    2.  Urinary incontinence without hypermobility of the urethra.  She does an adequate Kegel upon request.  I would like her to do these for about 5 minutes a day to strengthen the muscles in the pelvic diaphragm and then continue to use as needed.  Reports that she only wears a pad when she is standing on her feet working at [employer] because she coughs a lot.  Decreasing fluids in suppressing cough would obviously help.  I do not think she is a surgical candidate for this leakage.  Potentially urology could use a bulking agent if this was significant enough.  3.  Pain or difficulty evacuating stool with a fairly minimal rectocele which is actually higher in  "the vagina with good sphincter tone.  It could be that the diagram she is showing me is not accurate as far as where the stool is actually getting \"hung up\" potentially could do posterior repair as on rectovaginal examination the tissue is thin and I can bring my rectal finger vaginally down to the introitus.  Currently I would rather see if medication Dr. Christianson has placed her on will help.  Other option may be Linzess type of medication if her stools are very hard however if the regular she may need something more on the lines of stool softener.  Problem would be adding fluid would increase potentially her urinary issues.  Suggested fiber in her diet and/or supplement.     Plan   1. Regular voiding avoid liquids at night.  Kegel's as needed  2. Let us give the medication she is recently started a more time to help also could add a stool softener and fiber to her diet.  3. Information regarding rectocele repair  4. Breast self awareness, mammograms discussed  5. Annual or sooner as needed    New Medications Ordered This Visit   Medications   • estradiol (MINIVELLE, VIVELLE-DOT) 0.075 MG/24HR patch     Sig: Place 1 patch on the skin as directed by provider 2 (Two) Times a Week.     Dispense:  24 patch     Refill:  3     PK per Jenifer to use generic (removed DNS)      No orders of the defined types were placed in this encounter.         This note was electronically signed.  I spent 25 minutes with Padmini patel greater than 50% was in counseling.    Nic Hernandez M.D.  February 6, 2019  "

## 2019-02-08 ENCOUNTER — OFFICE VISIT (OUTPATIENT)
Dept: PULMONOLOGY | Facility: CLINIC | Age: 59
End: 2019-02-08

## 2019-02-08 VITALS
WEIGHT: 229.25 LBS | HEART RATE: 73 BPM | DIASTOLIC BLOOD PRESSURE: 70 MMHG | RESPIRATION RATE: 18 BRPM | BODY MASS INDEX: 34.75 KG/M2 | TEMPERATURE: 97.6 F | HEIGHT: 68 IN | SYSTOLIC BLOOD PRESSURE: 104 MMHG | OXYGEN SATURATION: 98 %

## 2019-02-08 DIAGNOSIS — R06.09 DYSPNEA ON EXERTION: ICD-10-CM

## 2019-02-08 DIAGNOSIS — K21.9 CHRONIC GERD: ICD-10-CM

## 2019-02-08 DIAGNOSIS — G47.33 OBSTRUCTIVE SLEEP APNEA, ADULT: ICD-10-CM

## 2019-02-08 DIAGNOSIS — J45.41 MODERATE PERSISTENT ASTHMA WITH ACUTE EXACERBATION: Primary | ICD-10-CM

## 2019-02-08 PROBLEM — Z90.710 HISTORY OF ROBOT-ASSISTED LAPAROSCOPIC HYSTERECTOMY: Status: RESOLVED | Noted: 2018-11-06 | Resolved: 2019-02-08

## 2019-02-08 PROCEDURE — 99214 OFFICE O/P EST MOD 30 MIN: CPT | Performed by: NURSE PRACTITIONER

## 2019-02-08 NOTE — PROGRESS NOTES
[facility] Follow up    CHIEF COMPLAINT    Asthma, allergies, cough    Subjective   HISTORY OF PRESENT ILLNESS    Brittny Han is a 59 y.o.female here today for follow-up on her persistent asthma symptoms.  She had an acute episode in around November most likely looking at her labs a pertussis incident.  Cluster have this continued episodes of asthma exacerbation and bronchitis.  She's had a continued cough and chest tightness and wheezing for several months now.  She's been on multiple inhalers, antibiotics, steroid tapers, she does continue on her allergy medications as well including allergy injections.    Overall her symptoms have marginally improved she does still has a cough that is occasionally productive.  She still complains of dyspnea with fairly minimal activity.    She has tried cough suppression with Tussionex and Tessalon Perles.    Patient Active Problem List   Diagnosis   • Dyspnea on exertion   • Obstructive sleep apnea, adult   • Anxiety   • Anemia   • Essential hypertension   • Peptic ulcer   • Periodic headache syndrome, not intractable   • Restless legs syndrome (RLS)   • Neck pain   • GERD   • Allergic rhinitis   • Moderate persistent asthma with acute exacerbation   • Vaginal delivery 8#3oz female 1996   • History of robot-assisted laparoscopic hysterectomy/TVT 2011   • H/O left breast biopsy 1981   • Adenomatous colon polyp; 2012, 2017   • Menopausal symptoms   • Numbness and tingling of both feet       Allergies   Allergen Reactions   • Brevital Sodium [Methohexital]    • Percocet [Oxycodone-Acetaminophen]      Breathing difficulty, tongue swelling, severe        Current Outpatient Medications:   •  albuterol (PROVENTIL HFA;VENTOLIN HFA) 108 (90 Base) MCG/ACT inhaler, Inhale 2 puffs Every 4 (Four) Hours., Disp: 8.5 g, Rfl: 1  •  azelastine (ASTELIN) 0.1 % nasal spray, Use 2 sprays into the nostril(s) as directed by provider 2 (Two) Times a Day., Disp: 30 mL, Rfl: 12  •   Cholecalciferol (VITAMIN D3) 5000 UNITS tablet, Take 1 tablet by mouth Daily., Disp: , Rfl:   •  diclofenac (VOLTAREN) 75 MG EC tablet, Take 1 tablet by mouth 2 (Two) Times a Day., Disp: 60 tablet, Rfl: 2  •  EPINEPHrine (EPIPEN) 0.3 MG/0.3ML solution auto-injector injection, Inject 0.3 mL into the appropriate muscle as directed by prescriber As Needed., Disp: 2 each, Rfl: 2  •  estradiol (MINIVELLE, VIVELLE-DOT) 0.075 MG/24HR patch, Place 1 patch on the skin as directed by provider 2 (Two) Times a Week., Disp: 24 patch, Rfl: 3  •  fluticasone-salmeterol (ADVAIR HFA) 230-21 MCG/ACT inhaler, Inhale 2 puffs 2 (Two) Times a Day., Disp: 12 g, Rfl: 11  •  hydrochlorothiazide (HYDRODIURIL) 25 MG tablet, Take 1 tablet by mouth Daily., Disp: 90 tablet, Rfl: 1  •  levocetirizine (XYZAL) 5 MG tablet, Take 1 tablet by mouth Daily., Disp: 90 tablet, Rfl: 1  •  montelukast (SINGULAIR) 10 MG tablet, Take 1 tablet by mouth Daily., Disp: 90 tablet, Rfl: 1  •  potassium chloride (K-DUR) 10 MEQ CR tablet, Take 1 tablet by mouth Daily., Disp: 90 tablet, Rfl: 1  •  venlafaxine XR (EFFEXOR-XR) 75 MG 24 hr capsule, Take 1 capsule by mouth Daily., Disp: 90 capsule, Rfl: 1  •  Tiotropium Bromide Monohydrate (SPIRIVA RESPIMAT) 1.25 MCG/ACT aerosol solution inhaler, Inhale 2 puffs Daily., Disp: 4 g, Rfl: 11  MEDICATION LIST AND ALLERGIES REVIEWED.    Social History     Tobacco Use   • Smoking status: Never Smoker   • Smokeless tobacco: Never Used   Substance Use Topics   • Alcohol use: Yes     Types: 2 Glasses of wine per week     Comment: 2 glasses of wine about 4 x's per year   • Drug use: No       FAMILY AND SOCIAL HISTORY REVIEWED.    Review of Systems   Constitutional: Negative for chills, fatigue, fever and unexpected weight change.   HENT: Positive for congestion and postnasal drip. Negative for nosebleeds, rhinorrhea, sinus pressure and trouble swallowing.    Respiratory: Positive for cough, chest tightness and shortness of breath.  "Negative for wheezing.    Cardiovascular: Negative for chest pain and leg swelling.   Gastrointestinal: Negative for abdominal pain, constipation, diarrhea, nausea and vomiting.   Genitourinary: Negative for dysuria, frequency, hematuria and urgency.   Musculoskeletal: Negative for myalgias.   Neurological: Negative for dizziness, weakness, numbness and headaches.   All other systems reviewed and are negative.  .  Objective   /70 (BP Location: Right arm, Patient Position: Sitting, Cuff Size: Adult)   Pulse 73   Temp 97.6 °F (36.4 °C)   Resp 18   Ht 172.7 cm (68\")   Wt 104 kg (229 lb 4 oz)   SpO2 98% Comment: room air at rest  BMI 34.86 kg/m²     Immunization History   Administered Date(s) Administered   • Flu Vaccine Quad PF >36MO 10/10/2018   • Pneumococcal Conjugate 13-Valent (PCV13) 04/04/2016   • Pneumococcal Polysaccharide (PPSV23) 10/06/2009, 10/10/2014   • Tdap 02/12/2010       Physical Exam   Constitutional: She is oriented to person, place, and time. She appears well-developed and well-nourished.   HENT:   Head: Normocephalic and atraumatic.   Eyes: EOM are normal. Pupils are equal, round, and reactive to light.   Neck: Normal range of motion. Neck supple.   Cardiovascular: Normal rate and regular rhythm.   No murmur heard.  Pulmonary/Chest: Effort normal and breath sounds normal. No respiratory distress. She has no wheezes. She has no rales.   Abdominal: Soft. Bowel sounds are normal. She exhibits no distension.   Musculoskeletal: Normal range of motion. She exhibits no edema.   Neurological: She is alert and oriented to person, place, and time.   Skin: Skin is warm and dry. No erythema.   Psychiatric: She has a normal mood and affect. Her behavior is normal.   Vitals reviewed.        RESULTS        B pertussis IgG Ab, Quant 0.00 - 0.94 index 2.74 Abnormally high     Comment:                                Negative          <0.95                                  Equivocal   0.95 - 1.04         "                          Positive          >1.04   B pertussis IgM Ab, Quant 0.0 - 0.9 index <1.0    Comment:                                  Negative        <1.0                                    Borderline 1.0 - 1.1                                    Positive        >1.1   Bordetella pertussis IgA 0.0 - 0.9 index <1.0    Comment:                                  Negative        <1.0                                    Borderline 1.0 - 1.1                                    Positive        >1.1     IgE 0 - 100 IU/mL 830 Abnormally high       RAST positive    Eosinophils, Absolute 0.00 - 0.30 10*3/mm3 0.26      EXAMINATION: CT CHEST HIGH-RESOLUTION - 1/25/2019      INDICATION: J45.41-Moderate persistent asthma with (acute) exacerbation.        TECHNIQUE: CT chest high-resolution protocol without intravenous  contrast administration. Supine and prone imaging position sequences  were performed.     The radiation dose reduction device was turned on for each scan per the  ALARA (As Low as Reasonably Achievable) protocol.     COMPARISON: NONE     FINDINGS: Thyroid is homogeneous in attenuation. No bulky mediastinal  adenopathy. Central airways are patent. Esophagus in normal course and  caliber. Atherosclerotic nonaneurysmal thoracic aorta. Cardiac size  within normal limits without pericardial effusion. Extended lung windows  without significant subpleural reticulation or band formation. No  honeycombing or significant intralobular septal thickening or  irregularity. Mild peribronchial wall thickening suggesting  peribronchial inflammatory process as well as minimal bronchiectasis in  the right middle and right lower lobe in particular, however, no mucous  plugging. Small noncalcified right upper lobe posterior portion 4 mm  noncalcified pulmonary nodule. No dominant nodule or mass lesion,  however. No effusion. Degenerative changes of the spine. Soft tissue  body wall findings are unremarkable without bulky axillary  adenopathy.  Visualized portions of the upper abdomen unremarkable.     IMPRESSION:  No evidence for interstitial lung disease process with  peribronchial wall thickening suggesting peribronchial inflammatory  process such as bronchitis and/or reactive airways disease, however, no  focal consolidation to suggest acute pneumonia or evidence for  interstitial lung process. No pleural effusion. Mild central and right  predominant bronchiectasis may represent components of repeat  peribronchial inflammatory process. No mucous plugging or  bronchiolectasis is identified.     DICTATED:   1/25/2019  EDITED/ls :   1/25/2019      This report was finalized on 1/25/2019 4:56 PM by Dr. Jorge Fabian.         Assessment/Plan     PROBLEM LIST    Problem List Items Addressed This Visit        Respiratory    Dyspnea on exertion    Obstructive sleep apnea, adult    Moderate persistent asthma with acute exacerbation - Primary    Relevant Medications    Tiotropium Bromide Monohydrate (SPIRIVA RESPIMAT) 1.25 MCG/ACT aerosol solution inhaler       Digestive    GERD            DISCUSSION    Went over her testing in the office today.  She does have an elevated IgE level, extensively positive allergen panel with continued symptoms.  She has frequent exacerbations with use of antibiotics and steroids.  Her high-resolution CT scan also noted evidence of bronchial wall thickening and inflammation.  She been excellent candidate for Xolair.  We will go ahead and get that process started for her next week.    Continue on her current maximal therapy with the ICS/LABA combination as well as the asthma dose of LAMA.  Continue on her Flonase, Xyzal, and Singulair.  Continue on her allergy injections.    Follow-up in 6-8 weeks.    I spent 25 minutes with the patient. I spent > 50% percent of this time counseling and discussing diagnosis, diagnostic testing, evaluation, current status, treatment options and management.    Hien Shi,  APRN  02/08/20198:20 AM  Electronically signed     Please note that portions of this note were completed with a voice recognition program. Efforts were made to edit the dictations, but occasionally words are mistranscribed.      CC: Suhas Reed, DO

## 2019-03-28 ENCOUNTER — OFFICE VISIT (OUTPATIENT)
Dept: PULMONOLOGY | Facility: CLINIC | Age: 59
End: 2019-03-28

## 2019-03-28 VITALS
SYSTOLIC BLOOD PRESSURE: 132 MMHG | HEART RATE: 74 BPM | WEIGHT: 223.8 LBS | TEMPERATURE: 97.7 F | HEIGHT: 68 IN | OXYGEN SATURATION: 96 % | DIASTOLIC BLOOD PRESSURE: 80 MMHG | BODY MASS INDEX: 33.92 KG/M2

## 2019-03-28 DIAGNOSIS — J45.41 MODERATE PERSISTENT ASTHMA WITH ACUTE EXACERBATION: ICD-10-CM

## 2019-03-28 DIAGNOSIS — G47.33 OBSTRUCTIVE SLEEP APNEA, ADULT: ICD-10-CM

## 2019-03-28 DIAGNOSIS — R06.09 DYSPNEA ON EXERTION: Primary | ICD-10-CM

## 2019-03-28 PROCEDURE — 99213 OFFICE O/P EST LOW 20 MIN: CPT | Performed by: NURSE PRACTITIONER

## 2019-03-28 NOTE — PROGRESS NOTES
[facility] Pulmonary Follow up    CHIEF COMPLAINT    Severe persistent asthma    Subjective   HISTORY OF PRESENT ILLNESS    Brittny Han is a 59 y.o.female here today for recheck on her recent recurrent asthma symptoms.  She is actually done well the last few weeks.  She continues to use her Advair and Spiriva.  She has been able to cut down on her albuterol use.  Due to her worsening asthma symptoms and her recurrent exacerbations we ran her allergens as well as her IgE and eosinophils.  Her IgE is significantly elevated, her eosinophils are elevated, and her Rast was significantly positive.  We will get her set up for Xolair injections to help with her persistent asthma symptoms.    She has also has a history of reflux but takes a PPI and adheres to reflux precautions.  She said no worsening symptoms.    She does have obstructive sleep apnea wears a CPAP at night.  She tolerates it well and wears it nightly.  She recently got a So Clean machine.        Patient Active Problem List   Diagnosis   • Dyspnea on exertion   • Obstructive sleep apnea, adult   • Anxiety   • Anemia   • Essential hypertension   • Peptic ulcer   • Periodic headache syndrome, not intractable   • Restless legs syndrome (RLS)   • Neck pain   • GERD   • Allergic rhinitis   • Moderate persistent asthma with acute exacerbation   • H/O left breast biopsy 1981   • Adenomatous colon polyp; 2012, 2017   • Menopausal symptoms   • Numbness and tingling of both feet       Allergies   Allergen Reactions   • Brevital Sodium [Methohexital]    • Percocet [Oxycodone-Acetaminophen]      Breathing difficulty, tongue swelling, severe        Current Outpatient Medications:   •  albuterol (PROVENTIL HFA;VENTOLIN HFA) 108 (90 Base) MCG/ACT inhaler, Inhale 2 puffs Every 4 (Four) Hours., Disp: 8.5 g, Rfl: 1  •  azelastine (ASTELIN) 0.1 % nasal spray, Use 2 sprays into the nostril(s) as directed by provider 2 (Two) Times a Day., Disp: 30 mL, Rfl: 12  •   Cholecalciferol (VITAMIN D3) 5000 UNITS tablet, Take 1 tablet by mouth Daily., Disp: , Rfl:   •  diclofenac (VOLTAREN) 75 MG EC tablet, Take 1 tablet by mouth 2 (Two) Times a Day., Disp: 60 tablet, Rfl: 2  •  EPINEPHrine (EPIPEN) 0.3 MG/0.3ML solution auto-injector injection, Inject 0.3 mL into the appropriate muscle as directed by prescriber As Needed., Disp: 2 each, Rfl: 2  •  estradiol (MINIVELLE, VIVELLE-DOT) 0.075 MG/24HR patch, Place 1 patch on the skin as directed by provider 2 (Two) Times a Week., Disp: 24 patch, Rfl: 3  •  fluticasone-salmeterol (ADVAIR HFA) 230-21 MCG/ACT inhaler, Inhale 2 puffs 2 (Two) Times a Day., Disp: 12 g, Rfl: 11  •  hydrochlorothiazide (HYDRODIURIL) 25 MG tablet, Take 1 tablet by mouth Daily., Disp: 90 tablet, Rfl: 1  •  levocetirizine (XYZAL) 5 MG tablet, Take 1 tablet by mouth Daily., Disp: 90 tablet, Rfl: 1  •  levothyroxine (SYNTHROID, LEVOTHROID) 50 MCG tablet, Take 1 tablet by mouth Daily., Disp: 90 tablet, Rfl: 1  •  montelukast (SINGULAIR) 10 MG tablet, Take 1 tablet by mouth Daily., Disp: 90 tablet, Rfl: 1  •  potassium chloride (K-DUR) 10 MEQ CR tablet, Take 1 tablet by mouth Daily., Disp: 90 tablet, Rfl: 1  •  Tiotropium Bromide Monohydrate (SPIRIVA RESPIMAT) 1.25 MCG/ACT aerosol solution inhaler, Inhale 2 puffs Daily., Disp: 4 g, Rfl: 11  •  venlafaxine XR (EFFEXOR-XR) 75 MG 24 hr capsule, Take 1 capsule by mouth Daily., Disp: 90 capsule, Rfl: 1  MEDICATION LIST AND ALLERGIES REVIEWED.    Social History     Tobacco Use   • Smoking status: Never Smoker   • Smokeless tobacco: Never Used   Substance Use Topics   • Alcohol use: Yes     Types: 2 Glasses of wine per week     Comment: 2 glasses of wine about 4 x's per year   • Drug use: No       FAMILY AND SOCIAL HISTORY REVIEWED.    Review of Systems   Constitutional: Negative for chills, fatigue, fever and unexpected weight change.   HENT: Negative for congestion, nosebleeds, postnasal drip, rhinorrhea, sinus pressure and  "trouble swallowing.    Respiratory: Negative for cough, chest tightness, shortness of breath and wheezing.    Cardiovascular: Negative for chest pain and leg swelling.   Gastrointestinal: Negative for abdominal pain, constipation, diarrhea, nausea and vomiting.   Genitourinary: Negative for dysuria, frequency, hematuria and urgency.   Musculoskeletal: Negative for myalgias.   Neurological: Negative for dizziness, weakness, numbness and headaches.   All other systems reviewed and are negative.  .  Objective   /80 (BP Location: Right arm, Patient Position: Sitting, Cuff Size: Adult)   Pulse 74   Temp 97.7 °F (36.5 °C)   Ht 172.7 cm (68\")   Wt 102 kg (223 lb 12.8 oz)   SpO2 96% Comment: sitting room air  BMI 34.03 kg/m²     Immunization History   Administered Date(s) Administered   • Flu Vaccine Quad PF >36MO 10/10/2018   • Pneumococcal Conjugate 13-Valent (PCV13) 04/04/2016   • Pneumococcal Polysaccharide (PPSV23) 10/06/2009, 10/10/2014   • Tdap 02/12/2010       Physical Exam   Constitutional: She is oriented to person, place, and time. She appears well-developed and well-nourished.   HENT:   Head: Normocephalic and atraumatic.   Eyes: EOM are normal. Pupils are equal, round, and reactive to light.   Neck: Normal range of motion. Neck supple.   Cardiovascular: Normal rate and regular rhythm.   No murmur heard.  Pulmonary/Chest: Effort normal and breath sounds normal. No respiratory distress. She has no wheezes. She has no rales.   Abdominal: Soft. Bowel sounds are normal. She exhibits no distension.   Musculoskeletal: Normal range of motion. She exhibits no edema.   Neurological: She is alert and oriented to person, place, and time.   Skin: Skin is warm and dry. No erythema.   Psychiatric: She has a normal mood and affect. Her behavior is normal.   Vitals reviewed.        RESULTS        Assessment/Plan     PROBLEM LIST    Problem List Items Addressed This Visit        Respiratory    Dyspnea on exertion - " Primary    Obstructive sleep apnea, adult    Moderate persistent asthma with acute exacerbation            DISCUSSION    Currently she is doing much better.  We will go ahead and get the Xolair set up to help with these frequent exacerbations.  We went over risks and benefits today in the office.  She will speak to Sheryl today and go ahead and fill out all the forms to get that authorized.  We will let her know when her injection arrives.    Follow-up in 3 months or sooner if any issues.    I spent 15 minutes with the patient. I spent > 50% percent of this time counseling and discussing diagnostic testing, evaluation, current status and treatment options.    Hien Shi, APRN  03/28/20199:14 AM  Electronically signed     Please note that portions of this note were completed with a voice recognition program. Efforts were made to edit the dictations, but occasionally words are mistranscribed.      CC: Suhas Reed, DO

## 2019-04-17 DIAGNOSIS — J45.41 MODERATE PERSISTENT ASTHMA WITH ACUTE EXACERBATION: Primary | ICD-10-CM

## 2019-04-17 DIAGNOSIS — J30.9 ALLERGIC RHINITIS, UNSPECIFIED SEASONALITY, UNSPECIFIED TRIGGER: ICD-10-CM

## 2019-04-17 RX ORDER — EPINEPHRINE 0.3 MG/.3ML
0.3 INJECTION SUBCUTANEOUS
Qty: 2 EACH | Refills: 3 | Status: SHIPPED | OUTPATIENT
Start: 2019-04-17 | End: 2019-06-27 | Stop reason: SDUPTHER

## 2019-04-17 NOTE — TELEPHONE ENCOUNTER
Xolair Approved thru [facility] and can be ordered thru [facility] pharmacy   Patient notified  When she gets rx to call me to make appointment and I will be sending in Rx for epi-pen as well patient voiced understanding

## 2019-04-24 ENCOUNTER — CLINICAL SUPPORT (OUTPATIENT)
Dept: PULMONOLOGY | Facility: CLINIC | Age: 59
End: 2019-04-24

## 2019-04-24 DIAGNOSIS — J45.41 MODERATE PERSISTENT ASTHMA WITH ACUTE EXACERBATION: Primary | ICD-10-CM

## 2019-04-24 PROCEDURE — 96372 THER/PROPH/DIAG INJ SC/IM: CPT | Performed by: NURSE PRACTITIONER

## 2019-05-14 DIAGNOSIS — J45.41 MODERATE PERSISTENT ASTHMA WITH ACUTE EXACERBATION: Primary | ICD-10-CM

## 2019-05-24 ENCOUNTER — CLINICAL SUPPORT (OUTPATIENT)
Dept: PULMONOLOGY | Facility: CLINIC | Age: 59
End: 2019-05-24

## 2019-05-24 DIAGNOSIS — J45.41 MODERATE PERSISTENT ASTHMA WITH ACUTE EXACERBATION: Primary | ICD-10-CM

## 2019-05-24 PROCEDURE — 96372 THER/PROPH/DIAG INJ SC/IM: CPT | Performed by: NURSE PRACTITIONER

## 2019-06-18 DIAGNOSIS — J45.41 MODERATE PERSISTENT ASTHMA WITH ACUTE EXACERBATION: ICD-10-CM

## 2019-06-27 ENCOUNTER — CLINICAL SUPPORT (OUTPATIENT)
Dept: PULMONOLOGY | Facility: CLINIC | Age: 59
End: 2019-06-27

## 2019-06-27 ENCOUNTER — OFFICE VISIT (OUTPATIENT)
Dept: PULMONOLOGY | Facility: CLINIC | Age: 59
End: 2019-06-27

## 2019-06-27 VITALS
OXYGEN SATURATION: 95 % | HEIGHT: 68 IN | WEIGHT: 223 LBS | TEMPERATURE: 97.8 F | DIASTOLIC BLOOD PRESSURE: 80 MMHG | BODY MASS INDEX: 33.8 KG/M2 | SYSTOLIC BLOOD PRESSURE: 120 MMHG | HEART RATE: 80 BPM

## 2019-06-27 DIAGNOSIS — G47.33 OBSTRUCTIVE SLEEP APNEA, ADULT: ICD-10-CM

## 2019-06-27 DIAGNOSIS — J45.41 MODERATE PERSISTENT ASTHMA WITH ACUTE EXACERBATION: Primary | ICD-10-CM

## 2019-06-27 DIAGNOSIS — K21.9 CHRONIC GERD: ICD-10-CM

## 2019-06-27 DIAGNOSIS — J45.41 MODERATE PERSISTENT ASTHMA WITH ACUTE EXACERBATION: ICD-10-CM

## 2019-06-27 DIAGNOSIS — R06.09 DYSPNEA ON EXERTION: Primary | ICD-10-CM

## 2019-06-27 PROCEDURE — 96372 THER/PROPH/DIAG INJ SC/IM: CPT | Performed by: NURSE PRACTITIONER

## 2019-06-27 PROCEDURE — 94729 DIFFUSING CAPACITY: CPT | Performed by: NURSE PRACTITIONER

## 2019-06-27 PROCEDURE — 94375 RESPIRATORY FLOW VOLUME LOOP: CPT | Performed by: NURSE PRACTITIONER

## 2019-06-27 PROCEDURE — 99214 OFFICE O/P EST MOD 30 MIN: CPT | Performed by: NURSE PRACTITIONER

## 2019-06-27 PROCEDURE — 94726 PLETHYSMOGRAPHY LUNG VOLUMES: CPT | Performed by: NURSE PRACTITIONER

## 2019-06-27 RX ORDER — ALBUTEROL SULFATE 1.25 MG/3ML
1 SOLUTION RESPIRATORY (INHALATION) EVERY 6 HOURS PRN
Qty: 375 ML | Refills: 6 | Status: SHIPPED | OUTPATIENT
Start: 2019-06-27 | End: 2021-10-05

## 2019-06-27 NOTE — PROGRESS NOTES
"[facility] Pulmonary Follow up    CHIEF COMPLAINT    Moderate persistent asthma    Subjective   HISTORY OF PRESENT ILLNESS    Brittny Han is a 59 y.o.female here today for 3-month follow-up on her moderate persistent asthma with recurrent exacerbations.  In May we started her on Xolair.    She has noticed quite a bit of difference.  During the months of May and June are her \"worse months\" she usually has a chronic bronchitis or exacerbation during those episodes.  Now she is only having occasional cough with some postnasal drainage.  Her chest tightness and wheezing has improved.  They have not resolved but again much better this time a year than usual.    She did follow-up with gastroenterology as well for her chronic reflux.  Currently she is not having any symptoms.    She does wear a CPAP at night for her obstructive sleep apnea.  She tolerates it well wears it nightly.      Patient Active Problem List   Diagnosis   • Dyspnea on exertion   • Obstructive sleep apnea, adult   • Anxiety   • Anemia   • Essential hypertension   • Peptic ulcer   • Periodic headache syndrome, not intractable   • Restless legs syndrome (RLS)   • Neck pain   • GERD   • Allergic rhinitis   • Moderate persistent asthma with acute exacerbation   • H/O left breast biopsy 1981   • Adenomatous colon polyp; 2012, 2017   • Menopausal symptoms   • Numbness and tingling of both feet       Allergies   Allergen Reactions   • Brevital Sodium [Methohexital]    • Percocet [Oxycodone-Acetaminophen]      Breathing difficulty, tongue swelling, severe        Current Outpatient Medications:   •  albuterol sulfate  (90 Base) MCG/ACT inhaler, Inhale 2 puffs Every 4 (Four) Hours., Disp: 8.5 g, Rfl: 1  •  azelastine (ASTELIN) 0.1 % nasal spray, Use 2 sprays into the nostril(s) as directed by provider 2 (Two) Times a Day., Disp: 30 mL, Rfl: 12  •  Cholecalciferol (VITAMIN D3) 5000 UNITS tablet, Take 1 tablet by mouth Daily., Disp: , Rfl:   •  " EPINEPHrine (EPIPEN) 0.3 MG/0.3ML solution auto-injector injection, Inject 0.3 mL into the appropriate muscle as directed by prescriber As Needed., Disp: 2 each, Rfl: 2  •  estradiol (MINIVELLE, VIVELLE-DOT) 0.075 MG/24HR patch, Place 1 patch on the skin as directed by provider 2 (Two) Times a Week., Disp: 24 patch, Rfl: 3  •  fluticasone-salmeterol (ADVAIR HFA) 230-21 MCG/ACT inhaler, Inhale 2 puffs 2 (Two) Times a Day., Disp: 12 g, Rfl: 11  •  hydrochlorothiazide (HYDRODIURIL) 25 MG tablet, Take 1 tablet by mouth Daily., Disp: 90 tablet, Rfl: 1  •  levocetirizine (XYZAL) 5 MG tablet, Take 1 tablet by mouth Daily., Disp: 90 tablet, Rfl: 1  •  levothyroxine (SYNTHROID, LEVOTHROID) 50 MCG tablet, Take 1 tablet by mouth Daily., Disp: 90 tablet, Rfl: 1  •  montelukast (SINGULAIR) 10 MG tablet, Take 1 tablet by mouth Daily., Disp: 90 tablet, Rfl: 1  •  omalizumab (XOLAIR) 150 MG injection, Inject 300 mg under the skin into the appropriate area as directed Every 28 (Twenty-Eight) Days., Disp: 2 vial, Rfl: 11  •  potassium chloride (K-DUR) 10 MEQ CR tablet, Take 1 tablet by mouth Daily., Disp: 90 tablet, Rfl: 1  •  Tiotropium Bromide Monohydrate (SPIRIVA RESPIMAT) 1.25 MCG/ACT aerosol solution inhaler, Inhale 2 puffs Daily., Disp: 4 g, Rfl: 11  •  venlafaxine XR (EFFEXOR-XR) 75 MG 24 hr capsule, Take 1 capsule by mouth Daily., Disp: 90 capsule, Rfl: 1  •  diclofenac (VOLTAREN) 75 MG EC tablet, Take 1 tablet by mouth 2 (Two) Times a Day., Disp: 60 tablet, Rfl: 2    Current Facility-Administered Medications:   •  omalizumab (XOLAIR) injection 300 mg, 300 mg, Subcutaneous, Q28 Days, Hien Shi, APRN, 300 mg at 04/24/19 0845  MEDICATION LIST AND ALLERGIES REVIEWED.    Social History     Tobacco Use   • Smoking status: Never Smoker   • Smokeless tobacco: Never Used   Substance Use Topics   • Alcohol use: Yes     Types: 2 Glasses of wine per week     Comment: 2 glasses of wine about 4 x's per year   • Drug use: No  "      FAMILY AND SOCIAL HISTORY REVIEWED.    Review of Systems   Constitutional: Negative for chills, fatigue, fever and unexpected weight change.   HENT: Positive for postnasal drip. Negative for congestion, nosebleeds, rhinorrhea, sinus pressure and trouble swallowing.    Respiratory: Positive for cough and chest tightness. Negative for shortness of breath and wheezing.    Cardiovascular: Negative for chest pain and leg swelling.   Gastrointestinal: Negative for abdominal pain, constipation, diarrhea, nausea and vomiting.   Genitourinary: Negative for dysuria, frequency, hematuria and urgency.   Musculoskeletal: Negative for myalgias.   Neurological: Negative for dizziness, weakness, numbness and headaches.   All other systems reviewed and are negative.  .  Objective   /80   Pulse 80   Temp 97.8 °F (36.6 °C)   Ht 172.7 cm (68\")   Wt 101 kg (223 lb)   LMP  (LMP Unknown)   SpO2 95% Comment: resting, room air  Breastfeeding? No   BMI 33.91 kg/m²     Immunization History   Administered Date(s) Administered   • Flu Vaccine Quad PF >36MO 10/10/2018   • Pneumococcal Conjugate 13-Valent (PCV13) 04/04/2016   • Pneumococcal Polysaccharide (PPSV23) 10/06/2009, 10/10/2014   • Tdap 02/12/2010       Physical Exam   Constitutional: She is oriented to person, place, and time. She appears well-developed and well-nourished.   HENT:   Head: Normocephalic and atraumatic.   Eyes: EOM are normal. Pupils are equal, round, and reactive to light.   Neck: Normal range of motion. Neck supple.   Cardiovascular: Normal rate and regular rhythm.   No murmur heard.  Pulmonary/Chest: Effort normal and breath sounds normal. No respiratory distress. She has no wheezes. She has no rales.   Abdominal: Soft. Bowel sounds are normal. She exhibits no distension.   Musculoskeletal: Normal range of motion. She exhibits no edema.   Neurological: She is alert and oriented to person, place, and time.   Skin: Skin is warm and dry. No erythema. " "  Psychiatric: She has a normal mood and affect. Her behavior is normal.   Vitals reviewed.        RESULTS    PFTS in the office today, read by me:  No obstruction or restriction.  FVC has improved to 4.16, 107% from 3.19, 82%    Assessment/Plan     PROBLEM LIST    Problem List Items Addressed This Visit        Respiratory    Dyspnea on exertion - Primary    Relevant Orders    Pulmonary Function Test (Completed)    Obstructive sleep apnea, adult    Moderate persistent asthma with acute exacerbation       Digestive    GERD            DISCUSSION    At this time with his Xolair her moderate persistent asthma has significantly improved.  Her symptoms are minimal, and she is gone through her \"worst season\" without exacerbation.  Continue on the Xolair, chronic maintenance therapy with her Xyzal and Singulair, as well as has Spiriva and Advair.  Also give her a prescription for a nebulizer machine and albuterol nebulizers to use as needed.  She does not currently have any at home.    Continue on her CPAP at night.    Continue on her PPI and reflux precautions.    Follow-up in 6 months with repeat PFTs, sooner if needed.    I spent 25 minutes with the patient. I spent > 50% percent of this time counseling and discussing diagnostic testing, evaluation, current status, treatment options and management.    Hien Shi, APRN  06/27/20199:34 AM  Electronically signed     Please note that portions of this note were completed with a voice recognition program. Efforts were made to edit the dictations, but occasionally words are mistranscribed.      CC: Suhas Reed, DO  "

## 2019-07-17 ENCOUNTER — TELEPHONE (OUTPATIENT)
Dept: PULMONOLOGY | Facility: CLINIC | Age: 59
End: 2019-07-17

## 2019-07-17 NOTE — TELEPHONE ENCOUNTER
Patient called and needs Prescription faxed to [facility] for nebulizer. I have faxed it over.

## 2019-07-23 DIAGNOSIS — J45.41 MODERATE PERSISTENT ASTHMA WITH ACUTE EXACERBATION: ICD-10-CM

## 2019-07-30 ENCOUNTER — CLINICAL SUPPORT (OUTPATIENT)
Dept: PULMONOLOGY | Facility: CLINIC | Age: 59
End: 2019-07-30

## 2019-07-30 DIAGNOSIS — J45.41 MODERATE PERSISTENT ASTHMA WITH ACUTE EXACERBATION: Primary | ICD-10-CM

## 2019-07-30 PROCEDURE — 96372 THER/PROPH/DIAG INJ SC/IM: CPT | Performed by: NURSE PRACTITIONER

## 2019-08-29 ENCOUNTER — CLINICAL SUPPORT (OUTPATIENT)
Dept: PULMONOLOGY | Facility: CLINIC | Age: 59
End: 2019-08-29

## 2019-08-29 DIAGNOSIS — J45.41 MODERATE PERSISTENT ASTHMA WITH ACUTE EXACERBATION: Primary | ICD-10-CM

## 2019-08-29 PROCEDURE — 96372 THER/PROPH/DIAG INJ SC/IM: CPT | Performed by: NURSE PRACTITIONER

## 2019-09-20 ENCOUNTER — TELEPHONE (OUTPATIENT)
Dept: PULMONOLOGY | Facility: CLINIC | Age: 59
End: 2019-09-20

## 2019-09-20 DIAGNOSIS — J45.41 MODERATE PERSISTENT ASTHMA WITH ACUTE EXACERBATION: ICD-10-CM

## 2019-09-30 ENCOUNTER — CLINICAL SUPPORT (OUTPATIENT)
Dept: PULMONOLOGY | Facility: CLINIC | Age: 59
End: 2019-09-30

## 2019-09-30 DIAGNOSIS — J45.41 MODERATE PERSISTENT ASTHMA WITH ACUTE EXACERBATION: Primary | ICD-10-CM

## 2019-09-30 PROCEDURE — 96372 THER/PROPH/DIAG INJ SC/IM: CPT | Performed by: NURSE PRACTITIONER

## 2019-10-15 ENCOUNTER — TELEPHONE (OUTPATIENT)
Dept: PULMONOLOGY | Facility: CLINIC | Age: 59
End: 2019-10-15

## 2019-10-15 DIAGNOSIS — J45.41 MODERATE PERSISTENT ASTHMA WITH ACUTE EXACERBATION: ICD-10-CM

## 2019-10-30 ENCOUNTER — CLINICAL SUPPORT (OUTPATIENT)
Dept: PULMONOLOGY | Facility: CLINIC | Age: 59
End: 2019-10-30

## 2019-10-30 DIAGNOSIS — J45.41 MODERATE PERSISTENT ASTHMA WITH ACUTE EXACERBATION: Primary | ICD-10-CM

## 2019-10-30 PROCEDURE — 96372 THER/PROPH/DIAG INJ SC/IM: CPT | Performed by: NURSE PRACTITIONER

## 2019-11-12 ENCOUNTER — TELEPHONE (OUTPATIENT)
Dept: PULMONOLOGY | Facility: CLINIC | Age: 59
End: 2019-11-12

## 2019-11-12 DIAGNOSIS — J45.41 MODERATE PERSISTENT ASTHMA WITH ACUTE EXACERBATION: ICD-10-CM

## 2019-11-27 ENCOUNTER — CLINICAL SUPPORT (OUTPATIENT)
Dept: PULMONOLOGY | Facility: CLINIC | Age: 59
End: 2019-11-27

## 2019-11-27 DIAGNOSIS — J45.41 MODERATE PERSISTENT ASTHMA WITH ACUTE EXACERBATION: Primary | ICD-10-CM

## 2019-11-27 PROCEDURE — 96372 THER/PROPH/DIAG INJ SC/IM: CPT | Performed by: NURSE PRACTITIONER

## 2019-12-23 DIAGNOSIS — J45.41 MODERATE PERSISTENT ASTHMA WITH ACUTE EXACERBATION: ICD-10-CM

## 2020-01-14 ENCOUNTER — OFFICE VISIT (OUTPATIENT)
Dept: FAMILY MEDICINE CLINIC | Facility: CLINIC | Age: 60
End: 2020-01-14

## 2020-01-14 VITALS
WEIGHT: 222 LBS | TEMPERATURE: 98.2 F | OXYGEN SATURATION: 96 % | SYSTOLIC BLOOD PRESSURE: 134 MMHG | RESPIRATION RATE: 16 BRPM | HEART RATE: 81 BPM | BODY MASS INDEX: 33.65 KG/M2 | HEIGHT: 68 IN | DIASTOLIC BLOOD PRESSURE: 81 MMHG

## 2020-01-14 DIAGNOSIS — G89.29 CHRONIC BILATERAL LOW BACK PAIN WITH BILATERAL SCIATICA: Primary | ICD-10-CM

## 2020-01-14 DIAGNOSIS — Z12.31 ENCOUNTER FOR SCREENING MAMMOGRAM FOR MALIGNANT NEOPLASM OF BREAST: ICD-10-CM

## 2020-01-14 DIAGNOSIS — R42 VERTIGO: ICD-10-CM

## 2020-01-14 DIAGNOSIS — M54.42 CHRONIC BILATERAL LOW BACK PAIN WITH BILATERAL SCIATICA: Primary | ICD-10-CM

## 2020-01-14 DIAGNOSIS — J45.40 MODERATE PERSISTENT ASTHMA, UNSPECIFIED WHETHER COMPLICATED: ICD-10-CM

## 2020-01-14 DIAGNOSIS — M25.511 RIGHT SHOULDER PAIN, UNSPECIFIED CHRONICITY: ICD-10-CM

## 2020-01-14 DIAGNOSIS — M54.41 CHRONIC BILATERAL LOW BACK PAIN WITH BILATERAL SCIATICA: Primary | ICD-10-CM

## 2020-01-14 PROBLEM — E03.9 HYPOTHYROIDISM: Status: ACTIVE | Noted: 2019-02-18

## 2020-01-14 PROBLEM — K63.5 POLYP OF COLON: Status: ACTIVE | Noted: 2018-02-06

## 2020-01-14 PROBLEM — K59.04 CHRONIC IDIOPATHIC CONSTIPATION: Status: ACTIVE | Noted: 2017-05-12

## 2020-01-14 PROBLEM — J45.50 SEVERE PERSISTENT ASTHMA: Status: ACTIVE | Noted: 2019-02-15

## 2020-01-14 PROBLEM — K21.9 CHRONIC GERD: Status: RESOLVED | Noted: 2018-02-08 | Resolved: 2020-01-14

## 2020-01-14 PROCEDURE — 72100 X-RAY EXAM L-S SPINE 2/3 VWS: CPT | Performed by: FAMILY MEDICINE

## 2020-01-14 PROCEDURE — 73030 X-RAY EXAM OF SHOULDER: CPT | Performed by: FAMILY MEDICINE

## 2020-01-14 PROCEDURE — 99203 OFFICE O/P NEW LOW 30 MIN: CPT | Performed by: FAMILY MEDICINE

## 2020-01-14 NOTE — PROGRESS NOTES
Subjective   Brittny Han is a 59 y.o. female.     59-year-old female is a new patient to me presents today to discuss back pain shoulder pain and dizziness.    Dizziness: Was seen by neurology June 30, 2019 for numbness involving the balls of feet x1 year.  Per records medical history significant for lumbar stenosis.  Nerve conduction studies were performed on right lower extremity and left lower extremity and were normal.  In November 2017 she was evaluated by neurology for dizziness as well.  Dizziness essentially happens during positional changes.  No lightheadedness no sweating no palpitations associated.    Per records follows with pulmonary for moderate persistent asthma.  Last saw pulmonary nurse practitioner in June 2019.  History of ALANNA and wears Pap nightly.  Is on Xolair for asthma as well as Xyzal and Singulair Spiriva and Advair; she also has prescriptions for albuterol nebulizers to use as needed.  Advised to continue PPI and reflux precautions.  Advised to return to clinic in 6 months for follow-up or sooner if needed.    History of hypothyroidism: Levothyroxine 50 mcg a day.  Recent labs done at last PCP who is not [facility]; need record release form.    Depression: Effexor XR 75 mg once a day.    Hypertension: Hydrochlorothiazide 25 mg a day.    Back Pain   This is a new problem. The current episode started more than 1 month ago. The problem occurs daily. The problem has been waxing and waning since onset. The pain is present in the lumbar spine. The quality of the pain is described as aching. The pain radiates to the left foot, left thigh, right foot and right thigh. The pain is at a severity of 7/10. The pain is worse during the day. The symptoms are aggravated by bending and standing. Stiffness is present all day. Associated symptoms include headaches, leg pain, numbness, paresthesias, tingling and weakness. Pertinent negatives include no abdominal pain, bladder incontinence, bowel  incontinence, chest pain, dysuria, fever, paresis, pelvic pain, perianal numbness or weight loss. Risk factors include history of cancer, lack of exercise, obesity and sedentary lifestyle.        PHQ-2/PHQ-9 Depression Screening 1/14/2020   Little interest or pleasure in doing things 0   Feeling down, depressed, or hopeless 0   Total Score 0       The following portions of the patient's history were reviewed and updated as appropriate: allergies, current medications, past family history, past medical history, past social history, past surgical history and problem list.    Review of Systems   Constitutional: Negative for fever and unexpected weight loss.   Cardiovascular: Negative for chest pain, palpitations and leg swelling.   Gastrointestinal: Negative for abdominal pain and bowel incontinence.   Genitourinary: Negative for dysuria, pelvic pain and urinary incontinence.   Musculoskeletal: Positive for arthralgias, back pain and myalgias.   Neurological: Positive for dizziness, tingling, weakness, numbness and paresthesias.       Objective   Physical Exam   Constitutional: She appears well-developed and well-nourished.   HENT:   Head: Normocephalic and atraumatic.   Eyes: Pupils are equal, round, and reactive to light. Conjunctivae and EOM are normal.   Neck: Normal range of motion. Neck supple.   Pulmonary/Chest: Effort normal.   Musculoskeletal:        Right shoulder: She exhibits tenderness. She exhibits no swelling, no laceration, no pain and no spasm.        Cervical back: She exhibits tenderness.        Lumbar back: She exhibits tenderness.   Psychiatric: She has a normal mood and affect.         No results found for: COLORU, CLARITYU, SPECGRAV, PHUR, LEUKOCYTESUR, NITRITE, PROTEINPOCUA, GLUCOSEUR, KETONESU, UROBILINOGEN, BILIRUBINUR, RBCUR      Assessment/Plan     Brittny was seen today for establish care, back & shoulder pain and dizziness.    Diagnoses and all orders for this visit:    Chronic bilateral  low back pain with bilateral sciatica  -     XR Spine Lumbar 2 or 3 View (In Office)  -     Ambulatory Referral to Physical Therapy Evaluate and treat, Vestibular, Ortho  -     diclofenac (VOLTAREN) 1 % gel gel; Apply 4 g topically to the appropriate area as directed 2 (Two) Times a Day As Needed (back pain/shoulder pain).    Right shoulder pain, unspecified chronicity  -     Ambulatory Referral to Physical Therapy Evaluate and treat, Vestibular, Ortho  -     XR Shoulder 2+ View Right (In Office)  -     diclofenac (VOLTAREN) 1 % gel gel; Apply 4 g topically to the appropriate area as directed 2 (Two) Times a Day As Needed (back pain/shoulder pain).    Vertigo  -     Ambulatory Referral to Physical Therapy Evaluate and treat, Vestibular, Ortho    Encounter for screening mammogram for malignant neoplasm of breast  -     Mammo Screening Bilateral With CAD    Moderate persistent asthma, unspecified whether complicated  -     Cancel: Ambulatory Referral to Pulmonology    Follow-up x-rays continue plan of care per asthma/allergy specialist; PT for lower back pain right shoulder pain and vertigo.  Follow-up screening mammogram.  Return to clinic in 3 months for follow-up or sooner if needed.  Get records from last PCP to see if lab seem to be done soon.  Patient wanted to hold off today because she thinks it may have been done in the past 6 months or so.

## 2020-01-15 NOTE — PROGRESS NOTES
Lumbar spine x-ray shows normal alignment, normal disc spaces and vertebral bodies with normal height.  No recent or old fracture or subluxation.    Right shoulder x-ray shows no bony or articular abnormalities.  No evidence of arthritis or recent or old fracture or subluxation.    Continue with plan of care for physical therapy as discussed at last visit.  Keep follow-up visit with me after PT is complete.

## 2020-01-31 ENCOUNTER — TREATMENT (OUTPATIENT)
Dept: PHYSICAL THERAPY | Facility: CLINIC | Age: 60
End: 2020-01-31

## 2020-01-31 DIAGNOSIS — H81.10 BPPV (BENIGN PAROXYSMAL POSITIONAL VERTIGO), UNSPECIFIED LATERALITY: ICD-10-CM

## 2020-01-31 DIAGNOSIS — M54.50 THORACOLUMBAR BACK PAIN: ICD-10-CM

## 2020-01-31 DIAGNOSIS — M54.6 THORACOLUMBAR BACK PAIN: ICD-10-CM

## 2020-01-31 DIAGNOSIS — R42 VERTIGO: ICD-10-CM

## 2020-01-31 DIAGNOSIS — M25.511 RIGHT SHOULDER PAIN, UNSPECIFIED CHRONICITY: Primary | ICD-10-CM

## 2020-01-31 PROCEDURE — 95992 CANALITH REPOSITIONING PROC: CPT | Performed by: PHYSICAL THERAPIST

## 2020-01-31 PROCEDURE — 97162 PT EVAL MOD COMPLEX 30 MIN: CPT | Performed by: PHYSICAL THERAPIST

## 2020-01-31 NOTE — PROGRESS NOTES
Physical Therapy Initial Evaluation and Plan of Care    Patient: Brittny Han   : 1960  Diagnosis/ICD-10 Code:  Right shoulder pain, unspecified chronicity [M25.511]  Referring practitioner: Karuna Boston MD  PMx Reviewed : 2020    Subjective Evaluation    History of Present Illness  Mechanism of injury: Vertigo:  Pt presents to PT with vertigo that has been present for the last year (), but worse in the last 3 months.  Notice dizziness with transitions, rolling in bed, but also have times where you notice episodes of dizziness when sitting and standing still.      Just moved to [place] in 2019.  Saw her PCP in [place] that sent her to a Allergist that tested her and placed her on allergy shots.      I feel like I want to sleep more.  And my head feels likes it is floating on water.    (R) Shoulder:  Pain started in the last 6 months, with insidious onset.  No prior injury. The (R) shdr wakes her up at night.  The pt reports pain in the (R) RTC region, scapular region, into her the (R) side of her neck and numbness noticed in the (R) 3rd, 4th & 5th digits.      Had Rx by a chiropractor for the neck in 30 that helped relieve some of the symptoms.      Thoracolumbar Pain:  Pain below the bra strap    Noticed the symptoms  that has progressively gotten worse.  The pain is worse in the morning.  Insidious onset of symptoms.        The pt reports that her (B) hands and (B) feet got to sleep with sitting, standing and walking.  Had a LE nerve conduction study performed in 2019 that showed no abnormalities.          Occupation:  [employer]   Activities:  TV, Sedentary Lifestyle, thinking about joining Gym   PLOF: Independent  Medical Hx Reviewed.          Quality of life: fair    Pain  Pain scale: (R) Shdr: 3/10, Thoracolumbar: 2/10.  Pain scale at lowest: (R) Shdr: 2/10,  Thoracolumbar: 2/10.  Pain scale at highest: (R) Shdr: 8/10;  Thoracolumbar:  6/10.  Location: (R) Shdr, Thoracolumbar  Quality: (R) Shdr: Burning, Sharp;  Thoracolumbar: Ache, throbbing, nagging.  Relieving factors: medications (R) Shdr: Change Position, Anti-inflammatory Cream, stretching; Thoracolumbar: Stretching, laying supine  Exacerbated by: (R) Shdr: Using mouse, sleeping on (R) side, lifting;  Thoracolumbar: Sitting, laying down, standing, dishes.  Progression: worsening    Social Support  Lives in: apartment (1st floor)  Lives with: alone (1 cat )    Hand dominance: right             Objective       Active Range of Motion     Right Shoulder   Flexion: 154 degrees   Abduction: 120 degrees   External rotation 90°: 73 degrees  Internal rotation 90°: 25 degrees     Lumbar   Flexion: 75 (%) degrees   Extension: 0 (%) degrees   Left lateral flexion: 50 (%) degrees   Right lateral flexion: 25 (%) degrees   Left rotation: 75 (%) degrees   Right rotation: 50 (%) degrees     Strength/Myotome Testing     Left Shoulder     Isolated Muscles   Middle trapezius: 4   Serratus anterior: 5     Right Shoulder     Planes of Motion   Flexion: 4   Extension: 5   Abduction: 4-   External rotation at 0°: 4+   Internal rotation at 0°: 5     Isolated Muscles   Biceps: 5   Middle trapezius: 4+   Serratus anterior: 4 (Pain)   Triceps: 4-     Left Hip   Planes of Motion   Flexion: 4+  External rotation: 5  Internal rotation: 5    Right Hip   Planes of Motion   Flexion: 4  External rotation: 5  Internal rotation: 4    Left Knee   Flexion: 4+  Extension: 5    Right Knee   Flexion: 5  Extension: 5    Left Ankle/Foot   Dorsiflexion: 5  Eversion: 5  Great toe extension: 4    Right Ankle/Foot   Dorsiflexion: 5  Eversion: 5  Great toe extension: 5       See Vestibular Section of Logs for testing and Rx.    S/P Rx provided pt was escorted to a chair with CGA to a full seated position.  Pt sat for 20 mins with head stationary.      Pt not to lay down or do activities that change head level beyond 45 degrees from upright  until laying down for bed for the day.        Assessment & Plan     Assessment  Impairments: abnormal muscle firing, abnormal or restricted ROM, activity intolerance, impaired physical strength, lacks appropriate home exercise program and pain with function  Assessment details: Pt presents to PT with symptoms consistent with (R) shoulder strain,  Thoracolumbar strain and (R) BPPV.  Pt would benefit from skilled PT intervention to address the deficits noted.     The pt's symptoms suggest BPPV because of the vertigo symptoms with transitions, but the pt's report of vertigo symptoms with being stationary suggest otherwise.  The pt definitely has BPPV on the (R) and that will be Rx first.  Will have the pt continue to monitor her symptoms to better diagnosis if there is a central nervous system component or pathology to the vestibular otherwise.     Prognosis: good  Functional Limitations: carrying objects, lifting, sleeping, walking, pulling, pushing, uncomfortable because of pain, standing, reaching behind back, reaching overhead and unable to perform repetitive tasks  Goals  Plan Goals: SHORT TERM GOALS: 8 visits   1. Patient to be compliant with HEP and no diff. with sleeping  2. Increased (R) UE strength to 4+/5 with no pain> 4/10 to allow for household and work activities.   3. Pt to exhibit (R) shoulder active flexion / ABD to 155° in standing/sitting to assist with reaching overhead with less pain  4. Pt demonstrates improved posture in sitting and standing with minimal-no cues during treatment session  5.  Increased thoracic, lumbar and SIJ mobility to allow for increased lumbar AROM with less pain.  6. Pt reports that understand the information about BPPV and the treatment.      LONG TERM GOALS: 16 visits  1. Pt score <25% perceived disability on Quick DASH and <25% on Back Index  2. Pt. to exhibit (R) shoulder AROM to WFL (> 160° flex/abd. to allow for reaching overhead and behind back without pain limiting  function  3. Pt to exhibit 5/5 UE strength to allow for pushing/pulling and lifting >5 #to occur with pain <2/10  4. Pt able to reach overhead and lift 10# (B) x 10 to allow for return to doing work around home.   5.  (B) LE and lower abdominal strength to 5/5 to allow for pushing, pulling and activities to occur without pain (driving, sitting, household  & Job requirements)  6.  Lumbar AROM to WFL to allow for return to household & recreational activities w/o increased symptoms  7.  Pt to report absence of vertigo symptoms with all ADL's, IADL's, household, recreational and community activities, including all motions and positions.   8. Patient to score <20 on Dizziness Handicap Inventory demonstrating a decreased perception of handicap.        Plan  Therapy options: will be seen for skilled physical therapy services  Planned modality interventions: cryotherapy, electrical stimulation/Russian stimulation, TENS, thermotherapy (hydrocollator packs) and ultrasound  Planned therapy interventions: abdominal trunk stabilization, ADL retraining, flexibility, body mechanics training, home exercise program, functional ROM exercises, joint mobilization, manual therapy, neuromuscular re-education, postural training, soft tissue mobilization, spinal/joint mobilization, strengthening, stretching and therapeutic activities  Frequency: 2x week  Duration in visits: 16  Treatment plan discussed with: patient        Manual Therapy:          mins  52587;  Therapeutic Exercise:          mins  92427;     Neuromuscular Charley:         mins  56258;    Therapeutic Activity:           mins  91411;     Gait Training:            mins  71683;     Ultrasound:           mins  63556;    Electrical Stimulation:          mins  08841 ( );  Dry Needling           mins self-pay  Traction           mins 52612  Canalith Repositioning    15     mins 17019      Timed Treatment:   15   mins   Total Treatment:     85   mins    PT SIGNATURE: Marlon  Ashok, PT   [place] Lic #[id number]    DATE TREATMENT INITIATED: 1/31/2020    Initial Certification    Certification Period: 4/30/2020  I certify that the therapy services are furnished while this patient is under my care.  The services outlined above are required by this patient, and will be reviewed every 90 days.     PHYSICIAN: Karuna Boston MD      DATE:     Please sign and return via fax to [phone].. Thank you, [facility].

## 2020-02-12 ENCOUNTER — TREATMENT (OUTPATIENT)
Dept: PHYSICAL THERAPY | Facility: CLINIC | Age: 60
End: 2020-02-12

## 2020-02-12 DIAGNOSIS — H81.10 BPPV (BENIGN PAROXYSMAL POSITIONAL VERTIGO), UNSPECIFIED LATERALITY: ICD-10-CM

## 2020-02-12 DIAGNOSIS — M54.50 THORACOLUMBAR BACK PAIN: ICD-10-CM

## 2020-02-12 DIAGNOSIS — M25.511 RIGHT SHOULDER PAIN, UNSPECIFIED CHRONICITY: Primary | ICD-10-CM

## 2020-02-12 DIAGNOSIS — R42 VERTIGO: ICD-10-CM

## 2020-02-12 DIAGNOSIS — M54.6 THORACOLUMBAR BACK PAIN: ICD-10-CM

## 2020-02-12 PROCEDURE — 95992 CANALITH REPOSITIONING PROC: CPT | Performed by: PHYSICAL THERAPIST

## 2020-02-12 PROCEDURE — 97110 THERAPEUTIC EXERCISES: CPT | Performed by: PHYSICAL THERAPIST

## 2020-02-12 NOTE — PROGRESS NOTES
Physical Therapy Daily Progress Note  Visit: 2    Brittny Han reports: My dizziness is a lot better.  I don't notice the dizziness when I turn my head quickly. But I still notice it randomly hits when I am walking, in the shower or just sitting still.  I tend to notice the symptoms the most in the mornings.      I started take 400 mg of magneism 1x day for the last week and that has seemed to make me feel better with my neck, (R) shdr and low back.      Subjective     Objective   See Exercise, Manual, and Modality Logs for complete treatment.     See Vestibular Section of Logs for testing and Rx.    S/P Rx provided pt was escorted to a chair with CGA to a full seated position.  Pt sat for 20 mins with head stationary.      Pt not to lay down or do activities that change head level beyond 45 degrees from upright until laying down for bed for the day.        Assessment & Plan     Assessment  Assessment details: The pt seems to have improvement with her BPPV symptoms but not to PLOF.  Started exercise today with good tolerance.      Plan  Plan details: Progress ROM / strengthening / stabilization / functional activity as tolerated    Re-assess upon next visit for continued BPPV symptoms and treat appropriately.           Manual Therapy:          mins  79578;  Therapeutic Exercise:     20     mins  52485;     Neuromuscular Charley:         mins  90803;    Therapeutic Activity:           mins  25606;     Gait Training:            mins  00261;     Ultrasound:           mins  22391;    Electrical Stimulation:          mins  91615 ( );  Dry Needling           mins self-pay  Traction           mins 59358  Canalith Repositioning    15     mins 36059      Timed Treatment:  35    mins   Total Treatment:     55   mins    Marlon Pulido PT  [place] License #: [id number]    Physical Therapist

## 2020-02-13 ENCOUNTER — OFFICE VISIT (OUTPATIENT)
Dept: RETAIL CLINIC | Facility: CLINIC | Age: 60
End: 2020-02-13

## 2020-02-13 VITALS
TEMPERATURE: 98.4 F | DIASTOLIC BLOOD PRESSURE: 67 MMHG | SYSTOLIC BLOOD PRESSURE: 129 MMHG | RESPIRATION RATE: 16 BRPM | HEART RATE: 106 BPM | OXYGEN SATURATION: 97 %

## 2020-02-13 DIAGNOSIS — K52.9 GASTROENTERITIS: Primary | ICD-10-CM

## 2020-02-13 PROCEDURE — 99213 OFFICE O/P EST LOW 20 MIN: CPT | Performed by: NURSE PRACTITIONER

## 2020-02-13 RX ORDER — ONDANSETRON HYDROCHLORIDE 8 MG/1
8 TABLET, FILM COATED ORAL EVERY 8 HOURS PRN
Qty: 12 TABLET | Refills: 0 | Status: SHIPPED | OUTPATIENT
Start: 2020-02-13 | End: 2020-09-15

## 2020-02-13 NOTE — PROGRESS NOTES
Subjective:     Brittny Han is a 60 y.o.     Vomiting    The current episode started today. The problem occurs less than 2 times per day. There has been no fever. Associated symptoms include chills and myalgias. Pertinent negatives include no diarrhea, dizziness or sweats. Risk factors include ill contacts (norovirus and cdiff). She has tried nothing for the symptoms.         The following portions of the patient's history were reviewed and updated as appropriate: allergies, current medications, past family history, past medical history, past social history, past surgical history and problem list.      Review of Systems   Constitutional: Positive for chills.   Respiratory: Negative.    Cardiovascular: Negative.    Gastrointestinal: Positive for nausea and vomiting. Negative for blood in stool and diarrhea.        Epigastric discomfort   Musculoskeletal: Positive for myalgias.   Neurological: Negative for dizziness.         Objective:      Physical Exam   Constitutional: She appears well-developed and well-nourished.   HENT:   Head: Normocephalic and atraumatic.   Cardiovascular: Normal rate, regular rhythm, S1 normal, S2 normal and normal heart sounds.   Pulmonary/Chest: Effort normal and breath sounds normal.   Abdominal: Soft. Normal appearance. Bowel sounds are increased. There is tenderness in the epigastric area.   Vitals reviewed.          Brittny was seen today for norovirus.    Diagnoses and all orders for this visit:    Gastroenteritis    Other orders  -     ondansetron (ZOFRAN) 8 MG tablet; Take 1 tablet by mouth Every 8 (Eight) Hours As Needed for Nausea or Vomiting.    Islolate self until virus passes related to family member with cdiff. Make appointment with PCP for follow up. If diarrhea develops and persists make sure you keep follow up appt.

## 2020-02-13 NOTE — PATIENT INSTRUCTIONS
Viral Gastroenteritis, Adult    Viral gastroenteritis is also known as the stomach flu. This condition is caused by certain germs (viruses). These germs can be passed from person to person very easily (are very contagious). This condition can cause sudden watery poop (diarrhea), fever, and throwing up (vomiting).  Having watery poop and throwing up can make you feel weak and cause you to get dehydrated. Dehydration can make you tired and thirsty, make you have a dry mouth, and make it so you pee (urinate) less often. Older adults and people with other diseases or a weak defense system (immune system) are at higher risk for dehydration. It is important to replace the fluids that you lose from having watery poop and throwing up.  Follow these instructions at home:  Follow instructions from your doctor about how to care for yourself at home.  Eating and drinking  Follow these instructions as told by your doctor:  · Take an oral rehydration solution (ORS). This is a drink that is sold at pharmacies and stores.  · Drink clear fluids in small amounts as you are able, such as:  ? Water.  ? Ice chips.  ? Diluted fruit juice.  ? Low-calorie sports drinks.  · Eat bland, easy-to-digest foods in small amounts as you are able, such as:  ? Bananas.  ? Applesauce.  ? Rice.  ? Low-fat (lean) meats.  ? Toast.  ? Crackers.  · Avoid fluids that have a lot of sugar or caffeine in them.  · Avoid alcohol.  · Avoid spicy or fatty foods.  General instructions    · Drink enough fluid to keep your pee (urine) clear or pale yellow.  · Wash your hands often. If you cannot use soap and water, use hand .  · Make sure that all people in your home wash their hands well and often.  · Rest at home while you get better.  · Take over-the-counter and prescription medicines only as told by your doctor.  · Watch your condition for any changes.  · Take a warm bath to help with any burning or pain from having watery poop.  · Keep all follow-up  visits as told by your doctor. This is important.  Contact a doctor if:  · You cannot keep fluids down.  · Your symptoms get worse.  · You have new symptoms.  · You feel light-headed or dizzy.  · You have muscle cramps.  Get help right away if:  · You have chest pain.  · You feel very weak or you pass out (faint).  · You see blood in your throw-up.  · Your throw-up looks like coffee grounds.  · You have bloody or black poop (stools) or poop that look like tar.  · You have a very bad headache, a stiff neck, or both.  · You have a rash.  · You have very bad pain, cramping, or bloating in your belly (abdomen).  · You have trouble breathing.  · You are breathing very quickly.  · Your heart is beating very quickly.  · Your skin feels cold and clammy.  · You feel confused.  · You have pain when you pee.  · You have signs of dehydration, such as:  ? Dark pee, hardly any pee, or no pee.  ? Cracked lips.  ? Dry mouth.  ? Sunken eyes.  ? Sleepiness.  ? Weakness.  This information is not intended to replace advice given to you by your health care provider. Make sure you discuss any questions you have with your health care provider.  Document Released: 06/05/2009 Document Revised: 09/11/2019 Document Reviewed: 08/23/2016  [employer] Interactive Patient Education © 2019 [employer]

## 2020-02-17 ENCOUNTER — TREATMENT (OUTPATIENT)
Dept: PHYSICAL THERAPY | Facility: CLINIC | Age: 60
End: 2020-02-17

## 2020-02-17 DIAGNOSIS — R42 VERTIGO: ICD-10-CM

## 2020-02-17 DIAGNOSIS — M54.6 THORACOLUMBAR BACK PAIN: ICD-10-CM

## 2020-02-17 DIAGNOSIS — H81.10 BPPV (BENIGN PAROXYSMAL POSITIONAL VERTIGO), UNSPECIFIED LATERALITY: ICD-10-CM

## 2020-02-17 DIAGNOSIS — M25.511 RIGHT SHOULDER PAIN, UNSPECIFIED CHRONICITY: Primary | ICD-10-CM

## 2020-02-17 DIAGNOSIS — M54.50 THORACOLUMBAR BACK PAIN: ICD-10-CM

## 2020-02-17 PROCEDURE — 97110 THERAPEUTIC EXERCISES: CPT | Performed by: PHYSICAL THERAPIST

## 2020-02-18 NOTE — PROGRESS NOTES
Physical Therapy Daily Progress Note  Visit: 3    Brittnydaksha Looneylivan reports: I did ok after my last visit.  I have been all week and haven't been able to do any of my exercises.  I am feeling better today.      Subjective     Objective   See Exercise, Manual, and Modality Logs for complete treatment.       Assessment & Plan     Assessment  Assessment details: No BPPV Rx today because of recent illness, but the pt did rayray Rx progression of LBP.      Plan  Plan details: Progress ROM / strengthening / stabilization / functional activity as tolerated           Manual Therapy:          mins  43517;  Therapeutic Exercise:     30     mins  53332;     Neuromuscular Charley:         mins  25313;    Therapeutic Activity:           mins  74233;     Gait Training:            mins  71967;     Ultrasound:           mins  64979;    Electrical Stimulation:          mins  19277 ( );  Dry Needling           mins self-pay  Traction           mins 90108  Canalith Repositioning         mins 27701      Timed Treatment:   30   mins   Total Treatment:     30   mins    CITLALI Valdez License #: [id number]    Physical Therapist

## 2020-02-19 ENCOUNTER — TREATMENT (OUTPATIENT)
Dept: PHYSICAL THERAPY | Facility: CLINIC | Age: 60
End: 2020-02-19

## 2020-02-19 DIAGNOSIS — H81.10 BPPV (BENIGN PAROXYSMAL POSITIONAL VERTIGO), UNSPECIFIED LATERALITY: ICD-10-CM

## 2020-02-19 DIAGNOSIS — M54.6 THORACOLUMBAR BACK PAIN: ICD-10-CM

## 2020-02-19 DIAGNOSIS — M25.511 RIGHT SHOULDER PAIN, UNSPECIFIED CHRONICITY: Primary | ICD-10-CM

## 2020-02-19 DIAGNOSIS — M54.50 THORACOLUMBAR BACK PAIN: ICD-10-CM

## 2020-02-19 DIAGNOSIS — R42 VERTIGO: ICD-10-CM

## 2020-02-19 PROCEDURE — 95992 CANALITH REPOSITIONING PROC: CPT | Performed by: PHYSICAL THERAPIST

## 2020-02-19 PROCEDURE — 97110 THERAPEUTIC EXERCISES: CPT | Performed by: PHYSICAL THERAPIST

## 2020-02-19 NOTE — PROGRESS NOTES
Physical Therapy Daily Progress Note  Visit: 4    Brittny Han reports: I am doing ok with the HEP.  I still notice the dizziness a little bit.  I feel like I have recover from my virus.      Subjective     Objective   See Exercise, Manual, and Modality Logs for complete treatment.     See Vestibular Section of Logs for testing and Rx.    S/P Rx provided pt was escorted to a chair with CGA to a full seated position.  Pt sat for 20 mins with head stationary.      Pt not to lay down or do activities that change head level beyond 45 degrees from upright until laying down for bed for the day.        Assessment & Plan     Assessment  Assessment details: Progressed exercise today with good tolerance.  Working to improve cervical and thoracic mobility and strength.      Plan  Plan details: Progress ROM / strengthening / stabilization / functional activity as tolerated    Re-assess upon next visit for continued BPPV symptoms and treat appropriately.           Manual Therapy:          mins  56307;  Therapeutic Exercise:     40     mins  74765;     Neuromuscular Charley:         mins  87386;    Therapeutic Activity:           mins  55747;     Gait Training:            mins  26097;     Ultrasound:           mins  75209;    Electrical Stimulation:          mins  65448 ( );  Dry Needling           mins self-pay  Traction           mins 89119  Canalith Repositioning    15     mins 07028      Timed Treatment:   55   mins   Total Treatment:     75   mins    Marlon Pulido PT  [place] License #: [id number]    Physical Therapist

## 2020-02-24 ENCOUNTER — HOSPITAL ENCOUNTER (OUTPATIENT)
Dept: MAMMOGRAPHY | Facility: HOSPITAL | Age: 60
Discharge: HOME | End: 2020-02-24
Admitting: FAMILY MEDICINE

## 2020-02-24 ENCOUNTER — TREATMENT (OUTPATIENT)
Dept: PHYSICAL THERAPY | Facility: CLINIC | Age: 60
End: 2020-02-24

## 2020-02-24 DIAGNOSIS — R42 VERTIGO: ICD-10-CM

## 2020-02-24 DIAGNOSIS — H81.10 BPPV (BENIGN PAROXYSMAL POSITIONAL VERTIGO), UNSPECIFIED LATERALITY: ICD-10-CM

## 2020-02-24 DIAGNOSIS — M54.50 THORACOLUMBAR BACK PAIN: ICD-10-CM

## 2020-02-24 DIAGNOSIS — M54.6 THORACOLUMBAR BACK PAIN: ICD-10-CM

## 2020-02-24 DIAGNOSIS — M25.511 RIGHT SHOULDER PAIN, UNSPECIFIED CHRONICITY: Primary | ICD-10-CM

## 2020-02-24 PROCEDURE — 77067 SCR MAMMO BI INCL CAD: CPT

## 2020-02-24 PROCEDURE — 97110 THERAPEUTIC EXERCISES: CPT | Performed by: PHYSICAL THERAPIST

## 2020-02-24 PROCEDURE — 77063 BREAST TOMOSYNTHESIS BI: CPT

## 2020-02-24 PROCEDURE — 95992 CANALITH REPOSITIONING PROC: CPT | Performed by: PHYSICAL THERAPIST

## 2020-02-28 ENCOUNTER — TREATMENT (OUTPATIENT)
Dept: PHYSICAL THERAPY | Facility: CLINIC | Age: 60
End: 2020-02-28

## 2020-02-28 DIAGNOSIS — M54.50 THORACOLUMBAR BACK PAIN: ICD-10-CM

## 2020-02-28 DIAGNOSIS — M25.511 RIGHT SHOULDER PAIN, UNSPECIFIED CHRONICITY: Primary | ICD-10-CM

## 2020-02-28 DIAGNOSIS — R42 VERTIGO: ICD-10-CM

## 2020-02-28 DIAGNOSIS — M54.6 THORACOLUMBAR BACK PAIN: ICD-10-CM

## 2020-02-28 DIAGNOSIS — H81.10 BPPV (BENIGN PAROXYSMAL POSITIONAL VERTIGO), UNSPECIFIED LATERALITY: ICD-10-CM

## 2020-02-28 PROCEDURE — 97110 THERAPEUTIC EXERCISES: CPT | Performed by: PHYSICAL THERAPIST

## 2020-02-28 PROCEDURE — 95992 CANALITH REPOSITIONING PROC: CPT | Performed by: PHYSICAL THERAPIST

## 2020-02-28 NOTE — PROGRESS NOTES
Physical Therapy Daily Progress Note  Visit: 5    Brittny Looneylivan reports: I am feeling better today, but I am still noticing the dizziness with laying down and rolling over.      Subjective     Objective   See Exercise, Manual, and Modality Logs for complete treatment.     See Vestibular Section of Logs for testing and Rx.    S/P Rx provided pt was escorted to a chair with CGA to a full seated position.  Pt sat for 20 mins with head stationary.      Pt not to lay down or do activities that change head level beyond 45 degrees from upright until laying down for bed for the day.          Assessment & Plan     Assessment  Assessment details: The pt rayray Rx well with both TE and CRP Rx.  Cont to work increase postural strength and clear canals.  Returned to Rx the BPPV symptoms today with good rayray.      Plan  Plan details: Progress ROM / strengthening / stabilization / functional activity as tolerated    Re-assess upon next visit for continued BPPV symptoms and treat appropriately.           Manual Therapy:          mins  99084;  Therapeutic Exercise:     40     mins  61390;     Neuromuscular Charley:         mins  39959;    Therapeutic Activity:           mins  28337;     Gait Training:            mins  54712;     Ultrasound:           mins  57350;    Electrical Stimulation:          mins  26607 ( );  Dry Needling           mins self-pay  Traction           mins 43901  Canalith Repositioning    15     mins 01314      Timed Treatment:   55   mins   Total Treatment:     75   mins    Marlon Pulido PT  [place] License #: [id number]    Physical Therapist

## 2020-03-01 NOTE — PROGRESS NOTES
Physical Therapy Daily Progress Note  Visit: 6    Brittny Han reports: I am feeling better in my neck and with my dizziness, but I am still having pain in my low back.      Subjective     Objective   See Exercise, Manual, and Modality Logs for complete treatment.     See Vestibular Section of Logs for testing and Rx.    S/P Rx provided pt was escorted to a chair with CGA to a full seated position.  Pt sat for 20 mins with head stationary.      Pt not to lay down or do activities that change head level beyond 45 degrees from upright until laying down for bed for the day.          Assessment & Plan     Assessment  Assessment details: The pt continues to improve, but we have been limited on some of the lumbar exercises secondary to the likely irritation of the BPPV symptoms.  Will continue as the pt best tolerances.      Plan  Plan details: Progress ROM / strengthening / stabilization / functional activity as tolerated    Re-assess upon next visit for continued BPPV symptoms and treat appropriately.           Manual Therapy:          mins  92710;  Therapeutic Exercise:     40     mins  37292;     Neuromuscular Charley:         mins  75534;    Therapeutic Activity:           mins  05564;     Gait Training:            mins  24168;     Ultrasound:           mins  91334;    Electrical Stimulation:          mins  84850 ( );  Dry Needling           mins self-pay  Traction           mins 95886  Canalith Repositioning    15     mins 63956      Timed Treatment:   55   mins   Total Treatment:     75   mins    Marlon Pulido PT  [place] License #: [id number]    Physical Therapist

## 2020-03-02 ENCOUNTER — TREATMENT (OUTPATIENT)
Dept: PHYSICAL THERAPY | Facility: CLINIC | Age: 60
End: 2020-03-02

## 2020-03-02 DIAGNOSIS — R42 VERTIGO: ICD-10-CM

## 2020-03-02 DIAGNOSIS — M54.50 THORACOLUMBAR BACK PAIN: ICD-10-CM

## 2020-03-02 DIAGNOSIS — H81.10 BPPV (BENIGN PAROXYSMAL POSITIONAL VERTIGO), UNSPECIFIED LATERALITY: ICD-10-CM

## 2020-03-02 DIAGNOSIS — M25.511 RIGHT SHOULDER PAIN, UNSPECIFIED CHRONICITY: Primary | ICD-10-CM

## 2020-03-02 DIAGNOSIS — M54.6 THORACOLUMBAR BACK PAIN: ICD-10-CM

## 2020-03-02 PROCEDURE — 97110 THERAPEUTIC EXERCISES: CPT | Performed by: PHYSICAL THERAPIST

## 2020-03-02 RX ORDER — LEVOTHYROXINE SODIUM 0.05 MG/1
50 TABLET ORAL DAILY
Qty: 90 TABLET | Refills: 0 | Status: SHIPPED | OUTPATIENT
Start: 2020-03-02 | End: 2020-06-12 | Stop reason: SDUPTHER

## 2020-03-02 RX ORDER — HYDROCHLOROTHIAZIDE 25 MG/1
25 TABLET ORAL DAILY
Qty: 90 TABLET | Refills: 0 | Status: SHIPPED | OUTPATIENT
Start: 2020-03-02 | End: 2020-06-12 | Stop reason: SDUPTHER

## 2020-03-02 RX ORDER — POTASSIUM CHLORIDE 750 MG/1
10 TABLET, FILM COATED, EXTENDED RELEASE ORAL DAILY
Qty: 90 TABLET | Refills: 0 | Status: SHIPPED | OUTPATIENT
Start: 2020-03-02 | End: 2020-06-12 | Stop reason: SDUPTHER

## 2020-03-02 RX ORDER — MONTELUKAST SODIUM 10 MG/1
10 TABLET ORAL DAILY
Qty: 90 TABLET | Refills: 0 | Status: SHIPPED | OUTPATIENT
Start: 2020-03-02 | End: 2020-03-05 | Stop reason: SDUPTHER

## 2020-03-02 RX ORDER — LEVOCETIRIZINE DIHYDROCHLORIDE 5 MG/1
5 TABLET, FILM COATED ORAL DAILY
Qty: 90 TABLET | Refills: 0 | Status: SHIPPED | OUTPATIENT
Start: 2020-03-02 | End: 2020-03-05 | Stop reason: SDUPTHER

## 2020-03-02 NOTE — PATIENT INSTRUCTIONS
Access Code: [email or web address]   URL: [email or web address]   Date: 03/02/2020   Prepared by: Saida Rivera      Exercises Supine Shoulder Flexion PROM- 15 reps- 1 sets- 3 hold- 2x daily   Hooklying Single Knee to Chest- 2 reps- 1 sets- 20 hold- 2x daily

## 2020-03-02 NOTE — PROGRESS NOTES
"Physical Therapy Daily Progress Note    Visit # : 7  Brittny Han reports: my back is feeling a little better; I still notice a \"pulling\" sensation.  Overall my dizziness has also improved. I worked as a  at [employer] over the weekend and my shoulder is sore.  None of the exercises seem to bother me.     Subjective     Objective   See Exercise, Manual, and Modality Logs for complete treatment.     Assessment/Plan  Pt was able to complete full ex program without provoking vertigo.  Ex were advanced and tolerated well. Pt was issued updated HEP printout to facilitate compliance and recall.  Progress strengthening /stabilization /functional activity       Timed:  Manual Therapy:    -     mins  65131;  Therapeutic Exercise:    44     mins  12872;       Timed Treatment:   44   mins   Total Treatment:     44   mins      Dinorah Rivera PT  Physical Therapist  [place] License # [id number]      "

## 2020-03-04 ENCOUNTER — TREATMENT (OUTPATIENT)
Dept: PHYSICAL THERAPY | Facility: CLINIC | Age: 60
End: 2020-03-04

## 2020-03-04 DIAGNOSIS — M54.50 THORACOLUMBAR BACK PAIN: ICD-10-CM

## 2020-03-04 DIAGNOSIS — H81.10 BPPV (BENIGN PAROXYSMAL POSITIONAL VERTIGO), UNSPECIFIED LATERALITY: ICD-10-CM

## 2020-03-04 DIAGNOSIS — R42 VERTIGO: ICD-10-CM

## 2020-03-04 DIAGNOSIS — M54.6 THORACOLUMBAR BACK PAIN: ICD-10-CM

## 2020-03-04 DIAGNOSIS — M25.511 RIGHT SHOULDER PAIN, UNSPECIFIED CHRONICITY: Primary | ICD-10-CM

## 2020-03-04 PROCEDURE — 97110 THERAPEUTIC EXERCISES: CPT | Performed by: PHYSICAL THERAPIST

## 2020-03-04 PROCEDURE — 95992 CANALITH REPOSITIONING PROC: CPT | Performed by: PHYSICAL THERAPIST

## 2020-03-11 ENCOUNTER — TREATMENT (OUTPATIENT)
Dept: PHYSICAL THERAPY | Facility: CLINIC | Age: 60
End: 2020-03-11

## 2020-03-11 DIAGNOSIS — M25.511 RIGHT SHOULDER PAIN, UNSPECIFIED CHRONICITY: Primary | ICD-10-CM

## 2020-03-11 DIAGNOSIS — M54.6 THORACOLUMBAR BACK PAIN: ICD-10-CM

## 2020-03-11 DIAGNOSIS — R42 VERTIGO: ICD-10-CM

## 2020-03-11 DIAGNOSIS — M54.50 THORACOLUMBAR BACK PAIN: ICD-10-CM

## 2020-03-11 DIAGNOSIS — H81.10 BPPV (BENIGN PAROXYSMAL POSITIONAL VERTIGO), UNSPECIFIED LATERALITY: ICD-10-CM

## 2020-03-11 PROCEDURE — 97110 THERAPEUTIC EXERCISES: CPT | Performed by: PHYSICAL THERAPIST

## 2020-03-11 NOTE — PROGRESS NOTES
Physical Therapy Daily Progress Note  Visit: 9    Brittny Han reports: I am doing better with my (R) shoulder.  My dizziness is gone.  I am still having some low back pain.      Subjective     Objective   See Exercise, Manual, and Modality Logs for complete treatment.       Assessment & Plan     Assessment  Assessment details: The pt rayray Rx well with some new exercises with good tolerance.  The Side Arcs exercise should help.      Plan  Plan details: Progress ROM / strengthening / stabilization / functional activity as tolerated          Manual Therapy:          mins  36514;  Therapeutic Exercise:     46     mins  56957;     Neuromuscular Charley:         mins  66965;    Therapeutic Activity:           mins  63125;     Gait Training:            mins  47520;     Ultrasound:           mins  03729;    Electrical Stimulation:          mins  14748 ( );  Dry Needling           mins self-pay  Traction           mins 05515  Canalith Repositioning         mins 16665      Timed Treatment:   46   mins   Total Treatment:     46   mins    CITLALI Valdez License #: [id number]    Physical Therapist

## 2020-03-13 ENCOUNTER — TREATMENT (OUTPATIENT)
Dept: PHYSICAL THERAPY | Facility: CLINIC | Age: 60
End: 2020-03-13

## 2020-03-13 DIAGNOSIS — M25.511 RIGHT SHOULDER PAIN, UNSPECIFIED CHRONICITY: Primary | ICD-10-CM

## 2020-03-13 DIAGNOSIS — M54.6 THORACOLUMBAR BACK PAIN: ICD-10-CM

## 2020-03-13 DIAGNOSIS — M54.50 THORACOLUMBAR BACK PAIN: ICD-10-CM

## 2020-03-13 PROCEDURE — 97110 THERAPEUTIC EXERCISES: CPT | Performed by: PHYSICAL THERAPIST

## 2020-03-13 NOTE — PROGRESS NOTES
Physical Therapy Daily Progress Note  Visit: 10        Subjective     Brittny Han reports: the exercises are helping my shoulder and my back.  I still have constant pain in the mid-back, but it's less intense.  My tolerance to standing is better in my lower back, able to stand longer before onset of pain.  No dizziness for the past week.        Objective     See Exercise, Manual, and Modality Logs for complete treatment.       Assessment & Plan     Assessment  Assessment details: Cont to tolerate progression through therex well, no increased pain noted during or at conclusion of session.      Plan  Plan details: Progress ROM / strengthening / stabilization / functional activity as tolerated          Manual Therapy:          mins  77374;  Therapeutic Exercise:    46   mins  40360;     Neuromuscular Charley:         mins  16478;    Therapeutic Activity:           mins  00692;     Gait Training:            mins  32603;     Ultrasound:           mins  43289;    Electrical Stimulation:          mins  81356 ( );  Dry Needling           mins self-pay  Traction           mins 66271  Canalith Repositioning         mins 31621      Timed Treatment:   46   mins   Total Treatment:     56   mins    JLUIS Mariano License #: [id number]    Physical Therapist

## 2020-03-18 ENCOUNTER — TREATMENT (OUTPATIENT)
Dept: PHYSICAL THERAPY | Facility: CLINIC | Age: 60
End: 2020-03-18

## 2020-03-18 PROCEDURE — 97110 THERAPEUTIC EXERCISES: CPT | Performed by: PHYSICAL THERAPIST

## 2020-03-18 NOTE — PROGRESS NOTES
Physical Therapy Daily Progress Note  Visit: 11        Subjective   Brittny Han reports: I could tell I worked out last time.  Reports mm soreness/fatigue afterward.  I still have constant pain in the mid-back, but it's improving.  My tolerance to standing is better in my lower back, able to stand longer before onset of pain.  Only one instance of mild dizziness noted since last vestibular Rx.         Objective   See Exercise, Manual, and Modality Logs for complete treatment.       Assessment & Plan     Assessment  Assessment details: Cont to tolerate progression through therex well, no increased pain noted during or at conclusion of session.      Plan  Plan details: Progress ROM / strengthening / stabilization / functional activity as tolerated          Manual Therapy:          mins  66957;  Therapeutic Exercise:    54   mins  08910;     Neuromuscular Charley:         mins  95312;    Therapeutic Activity:           mins  28882;     Gait Training:            mins  70167;     Ultrasound:           mins  16407;    Electrical Stimulation:          mins  82519 ( );  Dry Needling           mins self-pay  Traction           mins 71398  Canalith Repositioning         mins 13961      Timed Treatment:   54   mins   Total Treatment:     64   mins    JLUIS Mariano License #[id number]  Physical Therapist Assistant

## 2020-03-27 RX ORDER — ESTRADIOL 0.07 MG/D
1 FILM, EXTENDED RELEASE TRANSDERMAL 2 TIMES WEEKLY
Qty: 24 PATCH | Refills: 0 | Status: SHIPPED | OUTPATIENT
Start: 2020-03-30 | End: 2020-07-09 | Stop reason: SDUPTHER

## 2020-04-01 ENCOUNTER — TELEPHONE (OUTPATIENT)
Dept: PHYSICAL THERAPY | Facility: CLINIC | Age: 60
End: 2020-04-01

## 2020-04-01 DIAGNOSIS — M25.511 RIGHT SHOULDER PAIN, UNSPECIFIED CHRONICITY: Primary | ICD-10-CM

## 2020-04-01 DIAGNOSIS — M54.6 THORACOLUMBAR BACK PAIN: ICD-10-CM

## 2020-04-01 DIAGNOSIS — M54.50 THORACOLUMBAR BACK PAIN: ICD-10-CM

## 2020-04-01 NOTE — TELEPHONE ENCOUNTER
Left message RE: current status of  Shoulder/back.  Left office number, advised pt to call if any questions or concerns regarding progression of HEP exercise or continued status of shoulder and back.

## 2020-05-22 ENCOUNTER — TREATMENT (OUTPATIENT)
Dept: PHYSICAL THERAPY | Facility: CLINIC | Age: 60
End: 2020-05-22

## 2020-05-22 DIAGNOSIS — M25.511 RIGHT SHOULDER PAIN, UNSPECIFIED CHRONICITY: Primary | ICD-10-CM

## 2020-05-22 DIAGNOSIS — M54.50 THORACOLUMBAR BACK PAIN: ICD-10-CM

## 2020-05-22 DIAGNOSIS — M54.6 THORACOLUMBAR BACK PAIN: ICD-10-CM

## 2020-05-22 PROCEDURE — 97162 PT EVAL MOD COMPLEX 30 MIN: CPT | Performed by: PHYSICAL THERAPIST

## 2020-05-22 PROCEDURE — 97110 THERAPEUTIC EXERCISES: CPT | Performed by: PHYSICAL THERAPIST

## 2020-05-22 NOTE — PROGRESS NOTES
Physical Therapy Initial Evaluation and Plan of Care    Patient: Brittny Han   : 1960  Diagnosis/ICD-10 Code:  Right shoulder pain, unspecified chronicity [M25.511]  Referring practitioner: Karuna Boston MD  Past Medical History Reviewed: 2020    PLOF: Independent and working from home    Subjective Evaluation    History of Present Illness  Date of onset: 2020  Mechanism of injury: I stopped because of the pandemic and have not my exercises at home and my shoulder is getting bad again. All I do is sit all day and I think that is part of my problem. I try to watch my posture.   My shoulder is bothering me on the outside and my neck bothers me all the time. I have gained 15 lbs since being at work. Standing doing dishes makes the back hurt. Stretching helps the neck. Have not tried ice or heat. I have carpal tunnel on both hands. Sleeping is difficult and I wake up with shoulder pain.      Patient Occupation: working from home on computer. Works for [employer]  Current pain ratin  At worst pain ratin  Location: (R) neck and shoulder  Alleviating factors: stretching.  Aggravating factors: outstretched reach, sleeping, standing and lifting (using mouse at work)  Progression: worsening    Hand dominance: right    Diagnostic Tests  X-ray: normal    Treatments  Previous treatment: physical therapy           Objective          Postural Observations  Seated posture: fair    Additional Postural Observation Details  Rounded shoulders    Palpation     Right Tenderness of the upper trapezius.     Tenderness     Additional Tenderness Details  No tenderness to palpation of R shoulder structures    Cervical/Thoracic Screen   Thoracic range of motion within normal limits with the following exceptions: Hypomobility through through thoracic spine    Active Range of Motion   Cervical/Thoracic Spine   Cervical    Flexion: 50 degrees   Extension: 29 degrees   Left lateral flexion: 31 degrees   Right  lateral flexion: 35 degrees   Left rotation: 47 degrees   Right rotation: 55 degrees   Left Shoulder   Normal active range of motion    Right Shoulder   Flexion: 135 degrees   Abduction: 69 degrees with pain  External rotation BTH: C6   Internal rotation BTB: L4     Additional Active Range of Motion Details  Compensates with overhead motion    Strength/Myotome Testing     Left Shoulder   Normal muscle strength    Right Shoulder     Planes of Motion   Flexion: 3+   Abduction: 3-   External rotation at 0°: 4+   Internal rotation at 0°: 4+     Tests     Right Shoulder   Positive Neer's, painful arc and Speed's.   Negative Hawkin's.     Additional Tests Details  (-) Spurlings (B)          Assessment & Plan     Assessment  Impairments: abnormal muscle firing, abnormal or restricted ROM, impaired physical strength and pain with function  Assessment details: Pt presents to PT with symptoms consistent with (R) shoulder pain and weakness as well as cervical and thoracic back pain and tightness secondary to poor posture and muscular imbalance.  Pt would benefit from skilled PT intervention to address the deficits noted.   Prognosis: good  Functional Limitations: lifting, sleeping, pushing, uncomfortable because of pain, sitting, standing, reaching behind back and reaching overhead  Goals  Plan Goals: SHORT TERM GOALS: 8 visits  1. Patient to be compliant with HEP and no diff. with sleeping  2. Increased (R) UE strength to 4/5 with no pain> 4/10 to allow for household and work activities.   3. Pt to exhibit (R) shoulder active flexion / ABD to 135° in standing/sitting to assist with reaching overhead with less pain  4. Pt demonstrates improved posture in sitting and standing with minimal-no cues during treatment session    LONG TERM GOALS: 16 visits  1. Pt score <20% perceived disability on DASH   2. Pt. to exhibit (R) shoulder AROM to WFL (> 160° flex/abd. to allow for reaching overhead and behind back without pain limiting  function  3. Pt to exhibit 4+/5 UE strength to allow for pushing/pulling and lifting >5 #to occur with pain <2/10  4. Pt will be able to rotate cervical spine 60 degrees or greater.       Plan  Therapy options: will be seen for skilled physical therapy services  Planned modality interventions: cryotherapy, electrical stimulation/Russian stimulation, TENS, thermotherapy (hydrocollator packs) and ultrasound  Other planned modality interventions: Dry Needling  Planned therapy interventions: abdominal trunk stabilization, ADL retraining, flexibility, body mechanics training, home exercise program, functional ROM exercises, joint mobilization, manual therapy, neuromuscular re-education, postural training, soft tissue mobilization, spinal/joint mobilization, strengthening, stretching and therapeutic activities  Duration in visits: 12  Treatment plan discussed with: patient        Manual Therapy:    -     mins  00683;  Therapeutic Exercise:    10     mins  90211;     Neuromuscular Charley:    -    mins  80328;    Therapeutic Activity:     -     mins  39288;     Gait Training:      -     mins  29932;     Ultrasound:     -     mins  74808;    Electrical Stimulation:    -     mins  37640 ( );  Dry Needling     -     mins self-pay    Timed Treatment:   10   mins   Total Treatment:     45   mins      PT SIGNATURE: Kourtney Story PT   DATE TREATMENT INITIATED: 5/22/2020    Initial Certification  Certification Period: 8/20/2020  I certify that the therapy services are furnished while this patient is under my care.  The services outlined above are required by this patient, and will be reviewed every 90 days.     PHYSICIAN: Karuna Boston MD      DATE:     Please sign and return via fax to [phone].. Thank you, [facility].

## 2020-05-26 ENCOUNTER — TREATMENT (OUTPATIENT)
Dept: PHYSICAL THERAPY | Facility: CLINIC | Age: 60
End: 2020-05-26

## 2020-05-26 DIAGNOSIS — M54.50 THORACOLUMBAR BACK PAIN: ICD-10-CM

## 2020-05-26 DIAGNOSIS — M25.511 RIGHT SHOULDER PAIN, UNSPECIFIED CHRONICITY: Primary | ICD-10-CM

## 2020-05-26 DIAGNOSIS — M54.6 THORACOLUMBAR BACK PAIN: ICD-10-CM

## 2020-05-26 PROCEDURE — 97140 MANUAL THERAPY 1/> REGIONS: CPT | Performed by: PHYSICAL THERAPIST

## 2020-05-26 PROCEDURE — 97035 APP MDLTY 1+ULTRASOUND EA 15: CPT | Performed by: PHYSICAL THERAPIST

## 2020-05-26 PROCEDURE — 97110 THERAPEUTIC EXERCISES: CPT | Performed by: PHYSICAL THERAPIST

## 2020-05-27 RX ORDER — LEVOTHYROXINE SODIUM 0.05 MG/1
50 TABLET ORAL DAILY
Qty: 90 TABLET | Refills: 0 | OUTPATIENT
Start: 2020-05-27

## 2020-05-27 RX ORDER — HYDROCHLOROTHIAZIDE 25 MG/1
25 TABLET ORAL DAILY
Qty: 90 TABLET | Refills: 0 | OUTPATIENT
Start: 2020-05-27

## 2020-05-29 ENCOUNTER — TREATMENT (OUTPATIENT)
Dept: PHYSICAL THERAPY | Facility: CLINIC | Age: 60
End: 2020-05-29

## 2020-05-29 DIAGNOSIS — M54.50 THORACOLUMBAR BACK PAIN: ICD-10-CM

## 2020-05-29 DIAGNOSIS — M25.511 RIGHT SHOULDER PAIN, UNSPECIFIED CHRONICITY: Primary | ICD-10-CM

## 2020-05-29 DIAGNOSIS — M54.6 THORACOLUMBAR BACK PAIN: ICD-10-CM

## 2020-05-29 PROCEDURE — 97140 MANUAL THERAPY 1/> REGIONS: CPT | Performed by: PHYSICAL THERAPIST

## 2020-05-29 PROCEDURE — 97035 APP MDLTY 1+ULTRASOUND EA 15: CPT | Performed by: PHYSICAL THERAPIST

## 2020-05-29 PROCEDURE — 97110 THERAPEUTIC EXERCISES: CPT | Performed by: PHYSICAL THERAPIST

## 2020-05-29 NOTE — PROGRESS NOTES
Physical Therapy Daily Progress Note  Visit: 14    Brittny Han reports: I did good after last visit, but today my neck and shoulder are hurting. I think I slept wrong    Subjective     Objective   See Exercise, Manual, and Modality Logs for complete treatment.       Assessment & Plan     Assessment  Assessment details: Pt tolerated treatment well. Continues to have C-T spine tension. Added levator scap stretch and also towel roll under T-spine for more thoracic extension in supine    Plan  Plan details: Progress per POC. Add ball push ups next session          Manual Therapy:    10     mins  80062;  Therapeutic Exercise:    25     mins  43963;     Neuromuscular Charley:    -    mins  65184;    Therapeutic Activity:     -     mins  67110;     Gait Training:      -     mins  29349;     Ultrasound:     10     mins  00048;    Electrical Stimulation:    -     mins  86127 ( );  Dry Needling     -     mins self-pay    Timed Treatment:   45   mins   Total Treatment:     46   mins    Kourtney Story PT  [place] License #: [id number]    Physical Therapist

## 2020-06-02 ENCOUNTER — TREATMENT (OUTPATIENT)
Dept: PHYSICAL THERAPY | Facility: CLINIC | Age: 60
End: 2020-06-02

## 2020-06-02 DIAGNOSIS — M25.511 RIGHT SHOULDER PAIN, UNSPECIFIED CHRONICITY: Primary | ICD-10-CM

## 2020-06-02 DIAGNOSIS — M54.6 THORACOLUMBAR BACK PAIN: ICD-10-CM

## 2020-06-02 DIAGNOSIS — M54.50 THORACOLUMBAR BACK PAIN: ICD-10-CM

## 2020-06-02 PROCEDURE — 97110 THERAPEUTIC EXERCISES: CPT | Performed by: PHYSICAL THERAPIST

## 2020-06-02 PROCEDURE — 97140 MANUAL THERAPY 1/> REGIONS: CPT | Performed by: PHYSICAL THERAPIST

## 2020-06-02 NOTE — PROGRESS NOTES
Physical Therapy Daily Progress Note  Visit: 15    Brittny Guille reports: I feel about the same. The pushing on my back did help I think.    Subjective     Objective   See Exercise, Manual, and Modality Logs for complete treatment.       Assessment & Plan     Assessment  Assessment details: Pt tolerated treatment well. Trial of KT tape today to help with R upper trap and C-T junction pain.     Plan  Plan details: Add cervical rotation and chin tucks with ball        Manual Therapy:    10     mins  26798;  Therapeutic Exercise:    39     mins  25443;     Neuromuscular Charley:    -    mins  37113;    Therapeutic Activity:     -     mins  17729;     Gait Training:      --     mins  80287;     Ultrasound:     -     mins  90048;    Electrical Stimulation:    -     mins  58533 ( );  Dry Needling     -     mins self-pay    Timed Treatment:   49   mins   Total Treatment:     50   mins    CITLALI Ferreira License #: [id number]    Physical Therapist

## 2020-06-08 RX ORDER — VENLAFAXINE HYDROCHLORIDE 75 MG/1
75 CAPSULE, EXTENDED RELEASE ORAL DAILY
Qty: 90 CAPSULE | Refills: 1 | OUTPATIENT
Start: 2020-06-08

## 2020-06-08 RX ORDER — HYDROCHLOROTHIAZIDE 25 MG/1
25 TABLET ORAL DAILY
Qty: 90 TABLET | Refills: 0 | OUTPATIENT
Start: 2020-06-08

## 2020-06-08 RX ORDER — LEVOTHYROXINE SODIUM 0.05 MG/1
50 TABLET ORAL DAILY
Qty: 90 TABLET | Refills: 0 | OUTPATIENT
Start: 2020-06-08

## 2020-06-08 RX ORDER — POTASSIUM CHLORIDE 750 MG/1
10 TABLET, FILM COATED, EXTENDED RELEASE ORAL DAILY
Qty: 90 TABLET | Refills: 0 | OUTPATIENT
Start: 2020-06-08

## 2020-06-12 ENCOUNTER — TELEPHONE (OUTPATIENT)
Dept: FAMILY MEDICINE CLINIC | Facility: CLINIC | Age: 60
End: 2020-06-12

## 2020-06-12 RX ORDER — POTASSIUM CHLORIDE 750 MG/1
10 TABLET, FILM COATED, EXTENDED RELEASE ORAL DAILY
Qty: 90 TABLET | Refills: 1 | Status: SHIPPED | OUTPATIENT
Start: 2020-06-12 | End: 2020-12-21 | Stop reason: SDUPTHER

## 2020-06-12 RX ORDER — VENLAFAXINE HYDROCHLORIDE 75 MG/1
75 CAPSULE, EXTENDED RELEASE ORAL DAILY
Qty: 90 CAPSULE | Refills: 1 | Status: SHIPPED | OUTPATIENT
Start: 2020-06-12 | End: 2020-12-04 | Stop reason: SDUPTHER

## 2020-06-12 RX ORDER — HYDROCHLOROTHIAZIDE 25 MG/1
25 TABLET ORAL DAILY
Qty: 90 TABLET | Refills: 1 | Status: SHIPPED | OUTPATIENT
Start: 2020-06-12 | End: 2020-12-04 | Stop reason: SDUPTHER

## 2020-06-12 RX ORDER — LEVOTHYROXINE SODIUM 0.05 MG/1
50 TABLET ORAL DAILY
Qty: 90 TABLET | Refills: 1 | Status: SHIPPED | OUTPATIENT
Start: 2020-06-12 | End: 2020-12-04 | Stop reason: SDUPTHER

## 2020-06-19 ENCOUNTER — OFFICE VISIT (OUTPATIENT)
Dept: FAMILY MEDICINE CLINIC | Facility: CLINIC | Age: 60
End: 2020-06-19

## 2020-06-19 VITALS
BODY MASS INDEX: 34.4 KG/M2 | RESPIRATION RATE: 16 BRPM | SYSTOLIC BLOOD PRESSURE: 123 MMHG | OXYGEN SATURATION: 99 % | TEMPERATURE: 96.8 F | HEART RATE: 68 BPM | WEIGHT: 227 LBS | DIASTOLIC BLOOD PRESSURE: 72 MMHG | HEIGHT: 68 IN

## 2020-06-19 DIAGNOSIS — E78.5 HYPERLIPIDEMIA, UNSPECIFIED HYPERLIPIDEMIA TYPE: ICD-10-CM

## 2020-06-19 DIAGNOSIS — N95.1 MENOPAUSAL SYMPTOMS: ICD-10-CM

## 2020-06-19 DIAGNOSIS — E03.9 HYPOTHYROIDISM, UNSPECIFIED TYPE: ICD-10-CM

## 2020-06-19 DIAGNOSIS — E66.9 OBESITY (BMI 30-39.9): ICD-10-CM

## 2020-06-19 DIAGNOSIS — I10 BENIGN ESSENTIAL HYPERTENSION: Primary | ICD-10-CM

## 2020-06-19 DIAGNOSIS — K64.8 INTERNAL HEMORRHOID, BLEEDING: ICD-10-CM

## 2020-06-19 PROCEDURE — 99214 OFFICE O/P EST MOD 30 MIN: CPT | Performed by: FAMILY MEDICINE

## 2020-06-19 NOTE — PROGRESS NOTES
Subjective   Brittny Han is a 60 y.o. female.     History of Present Illness     Answers for HPI/ROS submitted by the patient on 6/18/2020   What is the primary reason for your visit?: Other  Please describe your symptoms.: Tired, weight gain, low back pain when standing for short periods of time, Hormone imbalance, hemorrhoids bleed when have BM.  Have you had these symptoms before?: Yes  How long have you been having these symptoms?: Greater than 2 weeks  Please list any medications you are currently taking for this condition.: Hydrochlorothiazide, Venlafaxine, Levocetirizine, Levothyroxine, Singulair, Potassium, Centrum Silver, Dupixent, Estradiol, Brio, Spiriva  Please describe any probable cause for these symptoms. : Menopause, degenerative disk disease, eating from being board and have cravings      52-year-old female presents today for 6-month follow-up.    At last visit we discussed chronic low back pain with bilateral sciatica as well as right shoulder pain; x-rays on disc spaces no recent or old fractures.  He was referred to PT.  In terms of PT patient reports some improvement however had to stop due to COVID; will start again soon.    Vertigo: Was also referred to PT for this.  Patient reports this has resolved with PT.    Anxiety: Effexor XR 75 mg once a day.  Denies suicidal homicidal ideation or self injury.  Continues to control symptoms well.    Hypertension: Hydrochlorothiazide 25 mg a day.  Blood pressure in the office today 123/72.  Denies chest pain shortness of outside vision changes.    Hypothyroidism: Levothyroxine 50 mcg a day.  Due for labs.  Reports fatigue difficulty losing weight dry skin.    Bilateral extremity edema secondary to Varicose veins; is amenable to hearing compression stockings    Per records follows with pulmonary for moderate persistent asthma.  Per patient has appointment with [facility] coming up; is on injections right now.  Symptoms of shortness of  breath on exertion albuterol inhaler helps.    Would like to see gynecologist for menopausal hot flashes symptoms.    The following portions of the patient's history were reviewed and updated as appropriate: allergies, current medications, past family history, past medical history, past social history, past surgical history and problem list.    Review of Systems   Constitutional: Positive for fatigue. Negative for chills and fever.   HENT: Negative for congestion.    Respiratory: Negative for cough and shortness of breath.    Cardiovascular: Negative for chest pain, palpitations and leg swelling.   Gastrointestinal: Positive for blood in stool. Negative for abdominal pain, diarrhea and vomiting.   Skin: Positive for dry skin.       Objective   Physical Exam   Constitutional: She appears well-developed and well-nourished.   HENT:   Head: Normocephalic and atraumatic.   Mouth/Throat: Uvula is midline, oropharynx is clear and moist and mucous membranes are normal.   Eyes: Pupils are equal, round, and reactive to light. EOM are normal.   Cardiovascular: Normal rate and regular rhythm.   No murmur heard.  Pulmonary/Chest: Effort normal and breath sounds normal. No stridor. No respiratory distress. She has no wheezes. She has no rales.   Neurological: She is alert.   Skin: Skin is warm.   Psychiatric: She has a normal mood and affect. Her behavior is normal.               Assessment/Plan     Brittny was seen today for hypertension.    Diagnoses and all orders for this visit:    Benign essential hypertension  -     Comprehensive Metabolic Panel  -     CBC & Differential  -     Lipid Panel    Hypothyroidism, unspecified type  -     TSH Rfx On Abnormal To Free T4    Hyperlipidemia, unspecified hyperlipidemia type  -     CBC & Differential  -     Lipid Panel    Obesity (BMI 30-39.9)  -     Hemoglobin A1c    Menopausal symptoms  -     Ambulatory Referral to Gynecology    Internal hemorrhoid, bleeding  -     Ambulatory Referral  to Gastroenterology

## 2020-06-20 LAB
ALBUMIN SERPL-MCNC: 4.7 G/DL (ref 3.5–5.2)
ALBUMIN/GLOB SERPL: 1.7 G/DL
ALP SERPL-CCNC: 74 U/L (ref 39–117)
ALT SERPL-CCNC: 24 U/L (ref 1–33)
AST SERPL-CCNC: 19 U/L (ref 1–32)
BASOPHILS # BLD AUTO: 0.04 10*3/MM3 (ref 0–0.2)
BASOPHILS NFR BLD AUTO: 0.7 % (ref 0–1.5)
BILIRUB SERPL-MCNC: 0.3 MG/DL (ref 0.2–1.2)
BUN SERPL-MCNC: 14 MG/DL (ref 6–20)
BUN/CREAT SERPL: 13.7 (ref 7–25)
CALCIUM SERPL-MCNC: 10.1 MG/DL (ref 8.6–10.5)
CHLORIDE SERPL-SCNC: 100 MMOL/L (ref 98–107)
CHOLEST SERPL-MCNC: 234 MG/DL (ref 0–200)
CO2 SERPL-SCNC: 30.4 MMOL/L (ref 22–29)
CREAT SERPL-MCNC: 1.02 MG/DL (ref 0.57–1)
EOSINOPHIL # BLD AUTO: 0.2 10*3/MM3 (ref 0–0.4)
EOSINOPHIL NFR BLD AUTO: 3.3 % (ref 0.3–6.2)
ERYTHROCYTE [DISTWIDTH] IN BLOOD BY AUTOMATED COUNT: 12.8 % (ref 12.3–15.4)
GLOBULIN SER CALC-MCNC: 2.8 GM/DL
GLUCOSE SERPL-MCNC: 102 MG/DL (ref 65–99)
HBA1C MFR BLD: 5.8 % (ref 4.8–5.6)
HCT VFR BLD AUTO: 39.8 % (ref 34–46.6)
HDLC SERPL-MCNC: 64 MG/DL (ref 40–60)
HGB BLD-MCNC: 13.1 G/DL (ref 12–15.9)
IMM GRANULOCYTES # BLD AUTO: 0.01 10*3/MM3 (ref 0–0.05)
IMM GRANULOCYTES NFR BLD AUTO: 0.2 % (ref 0–0.5)
LDLC SERPL CALC-MCNC: 147 MG/DL (ref 0–100)
LYMPHOCYTES # BLD AUTO: 2.1 10*3/MM3 (ref 0.7–3.1)
LYMPHOCYTES NFR BLD AUTO: 35.1 % (ref 19.6–45.3)
MCH RBC QN AUTO: 30.1 PG (ref 26.6–33)
MCHC RBC AUTO-ENTMCNC: 32.9 G/DL (ref 31.5–35.7)
MCV RBC AUTO: 91.5 FL (ref 79–97)
MONOCYTES # BLD AUTO: 0.62 10*3/MM3 (ref 0.1–0.9)
MONOCYTES NFR BLD AUTO: 10.4 % (ref 5–12)
NEUTROPHILS # BLD AUTO: 3.01 10*3/MM3 (ref 1.7–7)
NEUTROPHILS NFR BLD AUTO: 50.3 % (ref 42.7–76)
NRBC BLD AUTO-RTO: 0 /100 WBC (ref 0–0.2)
PLATELET # BLD AUTO: 286 10*3/MM3 (ref 140–450)
POTASSIUM SERPL-SCNC: 3.8 MMOL/L (ref 3.5–5.2)
PROT SERPL-MCNC: 7.5 G/DL (ref 6–8.5)
RBC # BLD AUTO: 4.35 10*6/MM3 (ref 3.77–5.28)
SODIUM SERPL-SCNC: 140 MMOL/L (ref 136–145)
TRIGL SERPL-MCNC: 115 MG/DL (ref 0–150)
TSH SERPL DL<=0.005 MIU/L-ACNC: 3.32 UIU/ML (ref 0.27–4.2)
VLDLC SERPL CALC-MCNC: 23 MG/DL
WBC # BLD AUTO: 5.98 10*3/MM3 (ref 3.4–10.8)

## 2020-06-22 DIAGNOSIS — D64.9 ANEMIA, UNSPECIFIED TYPE: ICD-10-CM

## 2020-06-22 DIAGNOSIS — I10 BENIGN ESSENTIAL HYPERTENSION: Primary | ICD-10-CM

## 2020-06-22 DIAGNOSIS — E03.9 HYPOTHYROIDISM, UNSPECIFIED TYPE: ICD-10-CM

## 2020-06-22 RX ORDER — ESTRADIOL 0.07 MG/D
1 FILM, EXTENDED RELEASE TRANSDERMAL 2 TIMES WEEKLY
Qty: 24 PATCH | Refills: 0 | OUTPATIENT
Start: 2020-06-22

## 2020-06-22 NOTE — PROGRESS NOTES
All labs essentially normal except A1c is 5.8% which is in the prediabetic range.  Work on diet and exercise to improve this.  Recheck A1c in 6 months.  Please place lab order.    Also there are elevations in total cholesterol and LDL. 2013 AHA (American Heart Association) Cholesterol Risk Ratio Score Goal is <=7.5% and your score is:  6.5%.  This is a borderline score.  We can hold off on medication for now.  Must work on diet and exercise to improve these numbers to avoid medication.  Recheck lipid panel in 6 months.  Please place lab order.

## 2020-07-09 RX ORDER — ESTRADIOL 0.07 MG/D
1 FILM, EXTENDED RELEASE TRANSDERMAL 2 TIMES WEEKLY
Qty: 24 PATCH | Refills: 0 | Status: CANCELLED | OUTPATIENT
Start: 2020-07-09

## 2020-07-20 RX ORDER — ESTRADIOL 0.07 MG/D
1 FILM, EXTENDED RELEASE TRANSDERMAL 2 TIMES WEEKLY
Qty: 8 PATCH | Refills: 0 | Status: SHIPPED | OUTPATIENT
Start: 2020-07-20 | End: 2020-08-07

## 2020-07-20 NOTE — TELEPHONE ENCOUNTER
I will refill this time however she needs to schedule an appointment for follow-up with her gynecologist for further refills

## 2020-08-07 ENCOUNTER — OFFICE VISIT (OUTPATIENT)
Dept: OBSTETRICS AND GYNECOLOGY | Age: 60
End: 2020-08-07

## 2020-08-07 VITALS
SYSTOLIC BLOOD PRESSURE: 118 MMHG | DIASTOLIC BLOOD PRESSURE: 70 MMHG | HEIGHT: 68 IN | WEIGHT: 217 LBS | BODY MASS INDEX: 32.89 KG/M2

## 2020-08-07 DIAGNOSIS — N95.1 MENOPAUSAL SYMPTOMS: Primary | ICD-10-CM

## 2020-08-07 DIAGNOSIS — K62.5 RECTAL BLEEDING: ICD-10-CM

## 2020-08-07 DIAGNOSIS — K62.89 RECTAL MASS: ICD-10-CM

## 2020-08-07 DIAGNOSIS — Z01.419 ENCOUNTER FOR GYNECOLOGICAL EXAMINATION: ICD-10-CM

## 2020-08-07 PROCEDURE — 99386 PREV VISIT NEW AGE 40-64: CPT | Performed by: OBSTETRICS & GYNECOLOGY

## 2020-08-07 RX ORDER — ESTRADIOL 0.05 MG/D
1 FILM, EXTENDED RELEASE TRANSDERMAL 2 TIMES WEEKLY
Qty: 8 PATCH | Refills: 3 | Status: SHIPPED | OUTPATIENT
Start: 2020-08-10 | End: 2020-11-30 | Stop reason: SDUPTHER

## 2020-08-07 NOTE — PROGRESS NOTES
.  Subjective     Chief Complaint   Patient presents with   • Gynecologic Exam     New pt. referred by Dr. Boston. last MG 20 colonoscopy         History of Present Illness    Brittny Han is a 60 y.o.  who presents for annual exam and to establish care.   She is currently on an estrogen patch of menopausal hot flashes. She had a hysterectomy for benign disease in . She reports she has been on an estrogen patch for several years.  She denies vaginal bleeding or vaginal dryness. She denies pelvic pain.  Pt recently moved to [place] from [place]. 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 She has a grown daughter who lives in [place].    Obstetric History:  OB History        2    Para   2    Term   2            AB        Living   1       SAB        TAB        Ectopic        Molar        Multiple        Live Births   1               Menstrual History:     No LMP recorded (lmp unknown). Patient has had a hysterectomy.         Current contraception: status post hysterectomy--for fibroids per pt  History of abnormal Pap smear: no  Received Gardasil immunization: no  Perform regular self breast exam: yes - occ  Family history of uterine or ovarian cancer: no  Family History of colon cancer: no  Family history of breast cancer: no    Mammogram: up to date.  Colonoscopy: up to date per pt.   DEXA: not indicated.    Exercise: not active  Calcium/Vitamin D: adequate intake    The following portions of the patient's history were reviewed and updated as appropriate: allergies, current medications, past family history, past medical history, past social history, past surgical history and problem list.    Review of Systems    Review of Systems   Constitutional: Positivee for fatigue. negative for fever  Respiratory: Positive for occ shortness of breath.    Gastrointestinal: Positive for rectal bleeding Negative for abdominal pain.   Genitourinary: Negative for dysuria,   "negative for vaginal bleeding, pelvic pain or vaginal dryness  Neurological: Negative for headaches.   Psychiatric/Behavioral: Negative for dysphoric mood.   Endocrine: Positive for hot flashes        Objective   Physical Exam    /70   Ht 171.5 cm (67.5\")   Wt 98.4 kg (217 lb)   LMP  (LMP Unknown)   Breastfeeding No   BMI 33.49 kg/m²   General:   alert and no distress   Heart: regular rate and rhythm   Lungs: clear to auscultation bilaterally   Breast: Inspection negative; no masses, retractions, nipple discharge or axillary adenopathy   Neck: supple   Abdomen: Soft, nontender    No guarding or rebound   Pelvis: External genitalia: normal general appearance  Urinary system: urethral meatus normal  Vaginal: normal mucosa without prolapse or lesions  Cervix: absent  Adnexa: no masses or tenderness  Uterus: absent  Rectal: small mass palpated; suspect hemorrhoid   Extremities: Extremities normal, atraumatic, no cyanosis or edema   Neurologic: Alert and oriented   Psychiatric: Normal affect, judgement, and mood     Assessment/Plan   Brittny was seen today for gynecologic exam.    Diagnoses and all orders for this visit:    Menopausal symptoms    Encounter for gynecological examination    Rectal bleeding  -     Ambulatory Referral to Colorectal Surgery    Rectal mass  -     Ambulatory Referral to Colorectal Surgery    Other orders  -     estradiol (Minivelle) 0.05 MG/24HR patch; Place 1 patch on the skin as directed by provider 2 (Two) Times a Week.        All questions answered.  Breast self exam technique reviewed and patient encouraged to perform self-exam monthly.  Discussed healthy lifestyle modifications.  Recommended 60 minutes of exercise daily.  Discussed calcium/ vit D needs to prevent osteoporosis.  Pap deferred as pt had hysterectomy for benign disease    Discussed estrogen replacement therapy. Recommended she wean off ERT and discussed risks to include DVT/PE/CVE/breast and ovarian cancer. Pt " agrees with plan. Given current dose, discussed option of decreasing dose initially then weaning off. Pt agrees. Placed order for lower dose patch then reviewed weaning schedule with pt. Advised she call if she is unable to tolerate this process.     Advised pt to call if she does not receive contact to book appt with colorectal surgeon within 2 weeks.

## 2020-08-12 ENCOUNTER — TELEPHONE (OUTPATIENT)
Dept: FAMILY MEDICINE CLINIC | Facility: CLINIC | Age: 60
End: 2020-08-12

## 2020-08-12 NOTE — TELEPHONE ENCOUNTER
Pt called asking about getting pneumo vaccine.  She has had both 13 and 23, does she need anything else?

## 2020-09-18 ENCOUNTER — TRANSCRIBE ORDERS (OUTPATIENT)
Dept: SLEEP MEDICINE | Facility: HOSPITAL | Age: 60
End: 2020-09-18

## 2020-09-18 ENCOUNTER — OFFICE VISIT (OUTPATIENT)
Dept: SURGERY | Facility: CLINIC | Age: 60
End: 2020-09-18

## 2020-09-18 VITALS
BODY MASS INDEX: 34.67 KG/M2 | HEIGHT: 67 IN | TEMPERATURE: 97.5 F | DIASTOLIC BLOOD PRESSURE: 80 MMHG | SYSTOLIC BLOOD PRESSURE: 118 MMHG | OXYGEN SATURATION: 100 % | WEIGHT: 220.9 LBS | HEART RATE: 64 BPM

## 2020-09-18 DIAGNOSIS — Z01.818 OTHER SPECIFIED PRE-OPERATIVE EXAMINATION: Primary | ICD-10-CM

## 2020-09-18 DIAGNOSIS — R19.4 CHANGE IN BOWEL HABITS: Primary | ICD-10-CM

## 2020-09-18 PROCEDURE — 99244 OFF/OP CNSLTJ NEW/EST MOD 40: CPT | Performed by: COLON & RECTAL SURGERY

## 2020-09-18 PROCEDURE — 46600 DIAGNOSTIC ANOSCOPY SPX: CPT | Performed by: COLON & RECTAL SURGERY

## 2020-09-18 RX ORDER — SODIUM CHLORIDE, SODIUM LACTATE, POTASSIUM CHLORIDE, CALCIUM CHLORIDE 600; 310; 30; 20 MG/100ML; MG/100ML; MG/100ML; MG/100ML
30 INJECTION, SOLUTION INTRAVENOUS CONTINUOUS
Status: CANCELLED | OUTPATIENT
Start: 2020-09-23

## 2020-09-18 NOTE — PROGRESS NOTES
Brittny Han is a 60 y.o. female who is seen as a consult at the request of Ale Jacob MD for Consult (Bowel habits changed.), Rectal Bleeding, and Rectal Pain.    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 RACHANA GAYLE   • COPD (chronic obstructive pulmonary disease) (CMS/HCC)    • CTS (carpal tunnel syndrome) 2010    BILATERAL, Wear wrist braces at night   • Demyelinating disease (CMS/HCC)    • Depression    • Disease of thyroid gland     HYPOTHYROIDISM   • DINERO (dyspnea on exertion) 06/2019   • Essential hypertension    • Fatigue    • Gastroenteritis 02/2020   • GERD (gastroesophageal reflux disease)    • Head injury when I was 10    concussion   • Hemorrhoids    • Hepatomegaly 11/2018   • History of echocardiogram 05/31/2011    mild ascending aortic root dilatation; mod RVC enlargement; global left EF 0.72 (normal 0.55-0.75) mild concentric LV hypertrophy; trace MR; mild TR    • History of PFTs 02/02/2016    good effort; normal lung volumes   • Hyperlipidemia    • Hypertrophy, nasal, turbinate    • Midline cystocele    • Neuropathy of both feet 01/2019    FOLLOWED BY DR. VARSHA IRELAND   • ALANNA on CPAP  2007    Followed by Jin Cat @ [facility]   • Peptic ulceration     PUD   • Periodic headache syndrome, not intractable    • Plantar fasciitis, left 01/2019    FOLLOWED BY DR. VARSHA IRELAND   • Rectal bleeding 08/2020   • Rectal bleeding    • Rectal mass 08/2020   • RLS (restless legs syndrome) 06/2017   • Shortness of breath    • Strain of thoracic region 04/2017   • KALYAN (stress urinary incontinence, female)    • Uterine fibroid    • Vision loss 2008    wear glasses   • Vitamin B 12 deficiency    • Vitamin D deficiency        Past Surgical History:   Procedure Laterality Date   • BREAST EXCISIONAL BIOPSY Left 1981   • BREAST LUMPECTOMY Left    • COLONOSCOPY N/A 2019    Dr. Sammy OCONNOR.   • COLONOSCOPY N/A 9/23/2020    Procedure: COLONOSCOPY to cecum with biopsy / hot snare polypectomies;  Surgeon: Scott Gillette MD;  Location: [facility] ENDOSCOPY;  Service: General;  Laterality: N/A;  pre-change in bowel habits  post-polyp, anal fissure   • COLONOSCOPY W/ POLYPECTOMY N/A 09/14/2012    HEMORRHOIDS, 2 TUBULAR ADENOMA POLYPS IN SIGMOID, 5 TUBULAR ADENOMA POLYPS IN RECTUM, SPASTIC COLON IN DESCENDING AND SIGMOID, RESCOPE IN 5 YRS, DR. SAMMY REICH AT [facility]   • COLONOSCOPY W/ POLYPECTOMY N/A 10/16/2017    TUBULAR ADENOMA POLYP IN LEFT COLON, DR. 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   • HYSTERECTOMY N/A 10/24/2011    LAPAROSCOPIC, WITH BILATERAL SALPINGECTOMY, DR. ROSITA MATA AT [facility]   • SKIN CANCER EXCISION Right 2000    BCC ON RIGHT EYELID   • TRANSVAGINAL TAPING SUSPENSION N/A 10/24/2011    WITH CYSTOSCOPY, DR. ROSITA MATA AT [facility]       Social History:   reports that she has never smoked. She has never used smokeless tobacco. She reports current alcohol use. She reports that she does not use drugs.      Marriage status: Single    Family History   Problem Relation Age of Onset   • Heart disease Mother    • Hypertension Mother    • Sleep apnea Mother    •  Hyperlipidemia Mother    • Heart attack Mother    • COPD Father         Father is alive at age 80 with COPD and asthma   • Asthma Father    • Basal cell carcinoma Father    • Cancer Father    • Ulcerative colitis Other    • Crohn's disease Other    • Cancer Niece    • Melanoma Niece    • Narcolepsy Sister    • Sleep apnea Sister    • Diabetes Daughter    • Breast cancer Neg Hx    • Ovarian cancer Neg Hx          Current Outpatient Medications:   •  albuterol (ACCUNEB) 1.25 MG/3ML nebulizer solution, Take 3 mL by nebulization Every 6 (Six) Hours As Needed for Wheezing., Disp: 375 mL, Rfl: 6  •  albuterol sulfate  (90 Base) MCG/ACT inhaler, Inhale 2 puffs Every 4 (Four) Hours., Disp: 8.5 g, Rfl: 1  •  azelastine (ASTELIN) 0.1 % nasal spray, Use 2 sprays into the nostril(s) as directed by provider 2 (Two) Times a Day., Disp: 30 mL, Rfl: 12  •  budesonide-formoterol (SYMBICORT) 160-4.5 MCG/ACT inhaler, Inhale 2 puffs every 12 hours. Use with spacer. Rinse mouth after each use., Disp: 30.6 g, Rfl: 3  •  Dupilumab 300 MG/2ML solution prefilled syringe, Inject 600mg on day 1 then 300mg on day 15 and every 2 weeks thereafter, Disp: 2 mL, Rfl: 11  •  EPINEPHrine (EPIPEN) 0.3 MG/0.3ML solution auto-injector injection, Use as directed for acute allergic reaction., Disp: 2 each, Rfl: 3  •  estradiol (Minivelle) 0.05 MG/24HR patch, Place 1 patch on the skin as directed by provider 2 (Two) Times a Week., Disp: 8 patch, Rfl: 3  •  famotidine (PEPCID) 20 MG tablet, Take one tablet by mouth 2 hours before Cluster therapy, Disp: 15 tablet, Rfl: 0  •  hydroCHLOROthiazide (HYDRODIURIL) 25 MG tablet, Take 1 tablet by mouth Daily., Disp: 90 tablet, Rfl: 1  •  levocetirizine (XYZAL) 5 MG tablet, Take 1 tablet by mouth once daily, Disp: 30 tablet, Rfl: 11  •  levothyroxine (SYNTHROID, LEVOTHROID) 50 MCG tablet, Take 1 tablet by mouth Daily., Disp: 90 tablet, Rfl: 1  •  montelukast (SINGULAIR) 10 MG tablet, Take one tablet by mouth  at bedtime, Disp: 30 tablet, Rfl: 11  •  Multiple Vitamins-Minerals (CENTRUM SILVER PO), Centrum Silver, Disp: , Rfl:   •  potassium chloride 10 MEQ CR tablet, Take 1 tablet by mouth Daily., Disp: 90 tablet, Rfl: 1  •  Tiotropium Bromide Monohydrate (SPIRIVA RESPIMAT) 1.25 MCG/ACT aerosol solution inhaler, Inhale 2 puffs Daily., Disp: 4 g, Rfl: 11  •  venlafaxine XR (EFFEXOR-XR) 75 MG 24 hr capsule, Take 1 capsule by mouth Daily., Disp: 90 capsule, Rfl: 1    Allergy  Percocet [oxycodone-acetaminophen] and Brevital sodium [methohexital]    Review of Systems   Constitution: Negative for decreased appetite and weight gain.   HENT: Positive for hoarse voice. Negative for congestion and hearing loss.    Eyes: Negative for blurred vision, discharge and visual disturbance.   Cardiovascular: Negative for chest pain, cyanosis and leg swelling.   Respiratory: Positive for shortness of breath, sleep disturbances due to breathing and snoring. Negative for cough.    Endocrine: Negative for cold intolerance and heat intolerance.   Hematologic/Lymphatic: Does not bruise/bleed easily.   Skin: Negative for itching, poor wound healing and skin cancer.   Musculoskeletal: Positive for back pain and joint pain. Negative for arthritis and joint swelling.   Gastrointestinal: Positive for change in bowel habit. Negative for abdominal pain, bowel incontinence and constipation.   Genitourinary: Negative for bladder incontinence, dysuria and hematuria.   Neurological: Positive for excessive daytime sleepiness. Negative for brief paralysis, dizziness, focal weakness, headaches, light-headedness and weakness.   Psychiatric/Behavioral: Negative for altered mental status and hallucinations. The patient does not have insomnia.    Allergic/Immunologic: Negative for HIV exposure and persistent infections.   All other systems reviewed and are negative.      Vitals:    09/18/20 0847   BP: 118/80   Pulse: 64   Temp: 97.5 °F (36.4 °C)   SpO2: 100%      Body mass index is 34.6 kg/m².    Physical Exam  Exam conducted with a chaperone present.   Constitutional:       General: She is not in acute distress.     Appearance: She is well-developed.   HENT:      Head: Normocephalic and atraumatic.      Nose: Nose normal.   Eyes:      Conjunctiva/sclera: Conjunctivae normal.      Pupils: Pupils are equal, round, and reactive to light.   Neck:      Musculoskeletal: Normal range of motion.      Trachea: No tracheal deviation.   Pulmonary:      Effort: Pulmonary effort is normal. No respiratory distress.      Breath sounds: Normal breath sounds.   Abdominal:      General: Bowel sounds are normal. There is no distension.      Palpations: Abdomen is soft.   Genitourinary:     Comments: Perianal exam: external hem - minor  JIM- good tone, no masses. rectocele  Anoscopy performed:  Grade 1 x 3 internal hem      Musculoskeletal: Normal range of motion.         General: No deformity.   Skin:     General: Skin is warm and dry.   Neurological:      Mental Status: She is alert and oriented to person, place, and time.      Cranial Nerves: No cranial nerve deficit.      Coordination: Coordination normal.      Gait: Gait normal.   Psychiatric:         Behavior: Behavior normal.         Judgment: Judgment normal.       Review of Medical Record:  I reviewed gyn's note  Assessment:  1. Change in bowel habits      Plan:  For the change in bowel movement, I recommend doing a colonoscopy.  I described the patient risks benefits and alternatives and he wished to proceed.

## 2020-09-21 ENCOUNTER — LAB (OUTPATIENT)
Dept: LAB | Facility: HOSPITAL | Age: 60
End: 2020-09-21

## 2020-09-21 DIAGNOSIS — Z01.818 OTHER SPECIFIED PRE-OPERATIVE EXAMINATION: ICD-10-CM

## 2020-09-21 PROCEDURE — C9803 HOPD COVID-19 SPEC COLLECT: HCPCS

## 2020-09-21 PROCEDURE — U0004 COV-19 TEST NON-CDC HGH THRU: HCPCS

## 2020-09-22 LAB — SARS-COV-2 RNA RESP QL NAA+PROBE: NOT DETECTED

## 2020-09-23 ENCOUNTER — ANESTHESIA EVENT (OUTPATIENT)
Dept: GASTROENTEROLOGY | Facility: HOSPITAL | Age: 60
End: 2020-09-23

## 2020-09-23 ENCOUNTER — HOSPITAL ENCOUNTER (OUTPATIENT)
Facility: HOSPITAL | Age: 60
Setting detail: HOSPITAL OUTPATIENT SURGERY
Discharge: HOME | End: 2020-09-23
Attending: COLON & RECTAL SURGERY | Admitting: COLON & RECTAL SURGERY

## 2020-09-23 ENCOUNTER — ANESTHESIA (OUTPATIENT)
Dept: GASTROENTEROLOGY | Facility: HOSPITAL | Age: 60
End: 2020-09-23

## 2020-09-23 VITALS
HEART RATE: 73 BPM | RESPIRATION RATE: 16 BRPM | BODY MASS INDEX: 33.74 KG/M2 | WEIGHT: 215 LBS | HEIGHT: 67 IN | OXYGEN SATURATION: 99 % | SYSTOLIC BLOOD PRESSURE: 106 MMHG | TEMPERATURE: 97.8 F | DIASTOLIC BLOOD PRESSURE: 62 MMHG

## 2020-09-23 DIAGNOSIS — R19.4 CHANGE IN BOWEL HABITS: ICD-10-CM

## 2020-09-23 PROCEDURE — 88305 TISSUE EXAM BY PATHOLOGIST: CPT | Performed by: COLON & RECTAL SURGERY

## 2020-09-23 PROCEDURE — 25010000002 PROPOFOL 10 MG/ML EMULSION: Performed by: NURSE ANESTHETIST, CERTIFIED REGISTERED

## 2020-09-23 PROCEDURE — 45385 COLONOSCOPY W/LESION REMOVAL: CPT | Performed by: COLON & RECTAL SURGERY

## 2020-09-23 PROCEDURE — 45380 COLONOSCOPY AND BIOPSY: CPT | Performed by: COLON & RECTAL SURGERY

## 2020-09-23 RX ORDER — PROPOFOL 10 MG/ML
VIAL (ML) INTRAVENOUS CONTINUOUS PRN
Status: DISCONTINUED | OUTPATIENT
Start: 2020-09-23 | End: 2020-09-23 | Stop reason: SURG

## 2020-09-23 RX ORDER — LIDOCAINE HYDROCHLORIDE 20 MG/ML
INJECTION, SOLUTION INFILTRATION; PERINEURAL AS NEEDED
Status: DISCONTINUED | OUTPATIENT
Start: 2020-09-23 | End: 2020-09-23 | Stop reason: SURG

## 2020-09-23 RX ORDER — SODIUM CHLORIDE, SODIUM LACTATE, POTASSIUM CHLORIDE, CALCIUM CHLORIDE 600; 310; 30; 20 MG/100ML; MG/100ML; MG/100ML; MG/100ML
INJECTION, SOLUTION INTRAVENOUS CONTINUOUS PRN
Status: DISCONTINUED | OUTPATIENT
Start: 2020-09-23 | End: 2020-09-23 | Stop reason: SURG

## 2020-09-23 RX ORDER — PROPOFOL 10 MG/ML
VIAL (ML) INTRAVENOUS AS NEEDED
Status: DISCONTINUED | OUTPATIENT
Start: 2020-09-23 | End: 2020-09-23 | Stop reason: SURG

## 2020-09-23 RX ADMIN — LIDOCAINE HYDROCHLORIDE 60 MG: 20 INJECTION, SOLUTION INFILTRATION; PERINEURAL at 07:39

## 2020-09-23 RX ADMIN — SODIUM CHLORIDE, POTASSIUM CHLORIDE, SODIUM LACTATE AND CALCIUM CHLORIDE: 600; 310; 30; 20 INJECTION, SOLUTION INTRAVENOUS at 07:30

## 2020-09-23 RX ADMIN — PROPOFOL 100 MG: 10 INJECTION, EMULSION INTRAVENOUS at 07:40

## 2020-09-23 RX ADMIN — PROPOFOL 140 MCG/KG/MIN: 10 INJECTION, EMULSION INTRAVENOUS at 07:40

## 2020-09-23 NOTE — ANESTHESIA PREPROCEDURE EVALUATION
Anesthesia Evaluation     Patient summary reviewed and Nursing notes reviewed                Airway   Mallampati: III  TM distance: >3 FB  Neck ROM: full  Possible difficult intubation  Dental - normal exam     Pulmonary - normal exam   (+) COPD, asthma,shortness of breath, sleep apnea on CPAP,   Cardiovascular - normal exam    (+) hypertension less than 2 medications, DINERO, hyperlipidemia,       Neuro/Psych  (+) headaches, numbness, psychiatric history Anxiety and Depression,       ROS Comment: BPPV/bilat feet neuropathy/RLS/CTS/periodic HA/? hx of demyelinating dz, pt denies that/hx concussion  GI/Hepatic/Renal/Endo    (+) obesity,  GERD, PUD, GI bleeding lower , liver disease, thyroid problem hypothyroidism    Musculoskeletal     (+) back pain, chronic pain, neck pain,   Abdominal   (+) obese,    Substance History      OB/GYN          Other                      Anesthesia Plan    ASA 3     MAC     intravenous induction     Anesthetic plan, all risks, benefits, and alternatives have been provided, discussed and informed consent has been obtained with: patient.    Plan discussed with CRNA.

## 2020-09-23 NOTE — ANESTHESIA POSTPROCEDURE EVALUATION
Patient: Brittny Han    Procedure Summary     Date: 09/23/20 Room / Location: [facility] ENDOSCOPY 9 /  [facility]    Anesthesia Start: 0730 Anesthesia Stop: 0807    Procedure: COLONOSCOPY to cecum with biopsy / hot snare polypectomies (N/A ) Diagnosis:       Change in bowel habits      (Change in bowel habits [R19.4])    Surgeon: Scott Gillette MD Provider: Pablo Pickens MD    Anesthesia Type: MAC ASA Status: 3          Anesthesia Type: MAC    Vitals  Vitals Value Taken Time   /67 09/23/20 0806   Temp     Pulse 76 09/23/20 0806   Resp 16 09/23/20 0806   SpO2 97 % 09/23/20 0806           Post Anesthesia Care and Evaluation    Patient location during evaluation: PHASE II  Patient participation: complete - patient participated  Level of consciousness: awake and alert  Pain management: adequate  Airway patency: patent  Anesthetic complications: No anesthetic complications  PONV Status: none  Cardiovascular status: acceptable  Respiratory status: acceptable  Hydration status: acceptable

## 2020-09-23 NOTE — DISCHARGE INSTRUCTIONS
For the next 24 hours patient needs to be with a responsible adult.    For 24 hours DO NOT drive, operate machinery, appliances, drink alcohol, make important decisions or sign legal documents.    Start with a light or bland diet if you are feeling sick to your stomach otherwise advance to regular diet as tolerated.    Follow recommendations on procedure report if provided by your doctor.    Call Dr Gillette for problems .  Problems may include but not limited to: large amounts of bleeding, trouble breathing, repeated vomiting, severe unrelieved pain, fever or chills.

## 2020-09-23 NOTE — H&P
Brittny Han is a 60 y.o. female  who is referred by Scott Gillette MD for a colonoscopy. She   has an indications: change in bm.     She denies melena, or hematochezia.    Past Medical History:   Diagnosis Date   • Allergic rhinitis    • Anemia    • Anxiety    • Asthma     MODERATE, PERSISTANT   • BCC (basal cell carcinoma) 2000    RIGHT EYELID   • Bowel habit changes    • BPPV (benign paroxysmal positional vertigo) 01/2020   • Cat bite of left hand 05/16/2020    SEEN AT [facility]   • Chronic bilateral low back pain with bilateral sciatica    • Chronic idiopathic constipation    • Colon polyps     FOLLOWED BY DR. RACHANA GAYLE   • COPD (chronic obstructive pulmonary disease) (CMS/HCC)    • CTS (carpal tunnel syndrome) 2010    BILATERAL, Wear wrist braces at night   • Demyelinating disease (CMS/HCC)    • Depression    • Disease of thyroid gland     HYPOTHYROIDISM   • DINERO (dyspnea on exertion) 06/2019   • Essential hypertension    • Fatigue    • Gastroenteritis 02/2020   • GERD (gastroesophageal reflux disease)    • Head injury when I was 10    concussion   • Hemorrhoids    • Hepatomegaly 11/2018   • History of echocardiogram 05/31/2011    mild ascending aortic root dilatation; mod RVC enlargement; global left EF 0.72 (normal 0.55-0.75) mild concentric LV hypertrophy; trace MR; mild TR    • History of PFTs 02/02/2016    good effort; normal lung volumes   • Hyperlipidemia    • Hypertrophy, nasal, turbinate    • Midline cystocele    • Neuropathy of both feet 01/2019    FOLLOWED BY DR. VARSHA IRELAND   • ALANNA on CPAP 2007    Followed by Jin Cat @ [facility]   • Peptic ulceration     PUD   • Periodic headache syndrome, not intractable    • Plantar fasciitis, left 01/2019    FOLLOWED BY DR. VARSHA IRELAND   • Rectal bleeding 08/2020   • Rectal bleeding    • Rectal mass 08/2020   • RLS (restless legs syndrome) 06/2017   • Shortness of breath    • Strain of thoracic region 04/2017   • KALYAN (stress urinary  incontinence, female)    • Uterine fibroid    • Vision loss 2008    wear glasses   • Vitamin B 12 deficiency    • Vitamin D deficiency        Past Surgical History:   Procedure Laterality Date   • BREAST EXCISIONAL BIOPSY Left 1981   • BREAST LUMPECTOMY Left    • COLONOSCOPY N/A 2019    Dr. Sammy OCONNOR.   • COLONOSCOPY W/ POLYPECTOMY N/A 09/14/2012    HEMORRHOIDS, 2 TUBULAR ADENOMA POLYPS IN SIGMOID, 5 TUBULAR ADENOMA POLYPS IN RECTUM, SPASTIC COLON IN DESCENDING AND SIGMOID, RESCOPE IN 5 YRS, DR. SAMMY REICH AT [facility]   • COLONOSCOPY W/ POLYPECTOMY N/A 10/16/2017    TUBULAR ADENOMA POLYP IN LEFT COLON, DR. 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   • HYSTERECTOMY N/A 10/24/2011    LAPAROSCOPIC, WITH BILATERAL SALPINGECTOMY, DR. ROSITA MATA AT [facility]   • SKIN CANCER EXCISION Right 2000    BCC ON RIGHT EYELID   • TRANSVAGINAL TAPING SUSPENSION N/A 10/24/2011    WITH CYSTOSCOPY, DR. ROSITA MATA AT [facility]       Medications Prior to Admission   Medication Sig Dispense Refill Last Dose   • albuterol sulfate  (90 Base) MCG/ACT inhaler Inhale 2 puffs Every 4 (Four) Hours. 8.5 g 1 Past Week at Unknown time   • budesonide-formoterol (SYMBICORT) 160-4.5 MCG/ACT inhaler Inhale 2 puffs every 12 hours. Use with spacer. Rinse mouth after each use. 30.6 g 3 9/23/2020 at Unknown time   • Dupilumab 300 MG/2ML solution prefilled syringe Inject 600mg on day 1 then 300mg on day 15 and every 2 weeks thereafter 2 mL 11 Past Month at Unknown time   • estradiol (Minivelle) 0.05 MG/24HR patch Place 1 patch on the skin as directed by provider 2 (Two) Times a Week. 8 patch 3 9/23/2020 at Unknown time   • famotidine (PEPCID) 20 MG tablet Take one tablet by mouth 2 hours before Cluster therapy 15 tablet 0 Past Week at Unknown time   • hydroCHLOROthiazide (HYDRODIURIL) 25 MG tablet Take 1 tablet by mouth Daily. 90 tablet 1 9/22/2020 at Unknown time   • levocetirizine (XYZAL) 5 MG tablet Take  1 tablet by mouth once daily 30 tablet 11 9/22/2020 at Unknown time   • levothyroxine (SYNTHROID, LEVOTHROID) 50 MCG tablet Take 1 tablet by mouth Daily. 90 tablet 1 9/22/2020 at Unknown time   • montelukast (SINGULAIR) 10 MG tablet Take one tablet by mouth at bedtime 30 tablet 11 9/22/2020 at Unknown time   • Multiple Vitamins-Minerals (CENTRUM SILVER PO) Centrum Silver   9/22/2020 at Unknown time   • potassium chloride 10 MEQ CR tablet Take 1 tablet by mouth Daily. 90 tablet 1 9/22/2020 at Unknown time   • Tiotropium Bromide Monohydrate (SPIRIVA RESPIMAT) 1.25 MCG/ACT aerosol solution inhaler Inhale 2 puffs Daily. 4 g 11 9/23/2020 at Unknown time   • venlafaxine XR (EFFEXOR-XR) 75 MG 24 hr capsule Take 1 capsule by mouth Daily. 90 capsule 1 9/22/2020 at Unknown time   • albuterol (ACCUNEB) 1.25 MG/3ML nebulizer solution Take 3 mL by nebulization Every 6 (Six) Hours As Needed for Wheezing. 375 mL 6 More than a month at Unknown time   • azelastine (ASTELIN) 0.1 % nasal spray Use 2 sprays into the nostril(s) as directed by provider 2 (Two) Times a Day. 30 mL 12 More than a month at Unknown time   • EPINEPHrine (EPIPEN) 0.3 MG/0.3ML solution auto-injector injection Use as directed for acute allergic reaction. 2 each 3        Allergies   Allergen Reactions   • Percocet [Oxycodone-Acetaminophen] Anaphylaxis     Breathing difficulty, tongue swelling, severe    • Brevital Sodium [Methohexital] Paresthesia     CHILLS, SHAKING       Family History   Problem Relation Age of Onset   • Heart disease Mother    • Hypertension Mother    • Sleep apnea Mother    • Hyperlipidemia Mother    • Heart attack Mother    • COPD Father         Father is alive at age 80 with COPD and asthma   • Asthma Father    • Basal cell carcinoma Father    • Cancer Father    • Ulcerative colitis Other    • Crohn's disease Other    • Cancer Niece    • Melanoma Niece    • Narcolepsy Sister    • Sleep apnea Sister    • Diabetes Daughter    • Breast cancer  Neg Hx    • Ovarian cancer Neg Hx        Social History     Socioeconomic History   • Marital status: Single     Spouse name: Not on file   • Number of children: Not on file   • Years of education: Not on file   • Highest education level: Not on file   Tobacco Use   • Smoking status: Never Smoker   • Smokeless tobacco: Never Used   Substance and Sexual Activity   • Alcohol use: Yes     Comment: 4 glasses of wine per year   • Drug use: No   • Sexual activity: Not Currently     Birth control/protection: Post-menopausal, Surgical     Comment: single   Social History Narrative    Single.    [place].    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    Has a dog and cat.    Doesn't drink alcohol.    Is a lifelong nonsmoker.       Review of Systems   Gastrointestinal: Negative for abdominal pain, nausea and vomiting.   All other systems reviewed and are negative.      Vitals:    09/23/20 0649   BP: 121/70   Pulse:    Resp:    SpO2:          Physical Exam  Constitutional:       Appearance: She is well-developed.   HENT:      Head: Normocephalic and atraumatic.   Eyes:      Pupils: Pupils are equal, round, and reactive to light.   Cardiovascular:      Rate and Rhythm: Regular rhythm.   Pulmonary:      Effort: Pulmonary effort is normal.   Abdominal:      General: There is no distension.      Palpations: Abdomen is soft.   Musculoskeletal: Normal range of motion.   Skin:     General: Skin is warm and dry.   Neurological:      Mental Status: She is alert and oriented to person, place, and time.   Psychiatric:         Thought Content: Thought content normal.         Judgment: Judgment normal.           Assessment/Plan      indications: change in bm         I recommend colonoscopy.  I described risks, benefits of the procedure with the patient including but not limited to bleeding, infection, possibility of perforation and possible polypectomy. All of the patient's questions were answered and they would like to proceed with the  above recommendations.

## 2020-09-24 ENCOUNTER — OFFICE VISIT (OUTPATIENT)
Dept: PULMONOLOGY | Facility: CLINIC | Age: 60
End: 2020-09-24

## 2020-09-24 VITALS
BODY MASS INDEX: 32.49 KG/M2 | TEMPERATURE: 97.4 F | WEIGHT: 207 LBS | RESPIRATION RATE: 18 BRPM | HEIGHT: 67 IN | OXYGEN SATURATION: 97 % | HEART RATE: 74 BPM | SYSTOLIC BLOOD PRESSURE: 116 MMHG | DIASTOLIC BLOOD PRESSURE: 84 MMHG

## 2020-09-24 DIAGNOSIS — J45.40 MODERATE PERSISTENT ASTHMA, UNSPECIFIED WHETHER COMPLICATED: ICD-10-CM

## 2020-09-24 DIAGNOSIS — G47.33 OBSTRUCTIVE SLEEP APNEA SYNDROME: ICD-10-CM

## 2020-09-24 DIAGNOSIS — R06.09 DYSPNEA ON EXERTION: Primary | ICD-10-CM

## 2020-09-24 LAB
LAB AP CASE REPORT: NORMAL
LAB AP CLINICAL INFORMATION: NORMAL
PATH REPORT.FINAL DX SPEC: NORMAL
PATH REPORT.GROSS SPEC: NORMAL

## 2020-09-24 PROCEDURE — 94375 RESPIRATORY FLOW VOLUME LOOP: CPT | Performed by: NURSE PRACTITIONER

## 2020-09-24 PROCEDURE — 94729 DIFFUSING CAPACITY: CPT | Performed by: NURSE PRACTITIONER

## 2020-09-24 PROCEDURE — 99214 OFFICE O/P EST MOD 30 MIN: CPT | Performed by: NURSE PRACTITIONER

## 2020-09-24 PROCEDURE — 94726 PLETHYSMOGRAPHY LUNG VOLUMES: CPT | Performed by: NURSE PRACTITIONER

## 2020-09-24 NOTE — PROGRESS NOTES
[facility] Follow up    CHIEF COMPLAINT    Asthma, ALANNA     Subjective   HISTORY OF PRESENT ILLNESS    Brittny Han is a 60 y.o.female here today for annual follow-up.  I last saw her in June 2019.  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    She did establish with a pulmonologist in [place] as well.  We were seeing her here for moderate persistent asthma on Xolair with Advair and asthma dose Spiriva.  While seeing the new pulmonologist she underwent allergy testing and started on allergy injections.  He also switched her from Xolair to Dupixent.  He switched her Advair to Breo.  She states she really has not done well since the switching of all of her medications.  In the spring of the year she had a bit of a upper respiratory infection and since then she does have a chronic cough as well as worsening wheezing and shortness of breath.      She also has history obstructive sleep apnea and on CPAP at night.  She has noticed some mold growing in her CPAP machine.  She does use a so clean machine daily as well as replaces her tubings frequently.  She has not been changing her filters secondary to not receiving supplies from her DME.  Her last x-ray was in 2016 and did had evidence of moderate obstructive sleep apnea with nocturnal hypoxemia.  Her current CPAP is set at 16, her sleep study indicated that she could tolerate a auto CPAP of 14-20      Patient Active Problem List   Diagnosis   • Dyspnea on exertion   • Obstructive sleep apnea syndrome   • Anxiety   • Anemia   • Benign essential hypertension   • Peptic ulcer   • Periodic headache syndrome, not intractable   • Restless legs syndrome (RLS)   • Neck pain   • Allergic rhinitis   • Moderate persistent asthma with acute exacerbation   • H/O left breast biopsy 1981   • Adenomatous colon polyp; 2012, 2017   • Menopausal symptoms    • Numbness and tingling of both feet   • Allergic rhinitis   • Chronic idiopathic constipation   • Hypothyroidism   • Moderate persistent asthma   • Polyp of colon   • Severe persistent asthma   • Bowel habit changes   • Rectal bleeding   • Change in bowel habits       Allergies   Allergen Reactions   • Percocet [Oxycodone-Acetaminophen] Anaphylaxis     Breathing difficulty, tongue swelling, severe    • Brevital Sodium [Methohexital] Paresthesia     CHILLS, SHAKING       Current Outpatient Medications:   •  albuterol (ACCUNEB) 1.25 MG/3ML nebulizer solution, Take 3 mL by nebulization Every 6 (Six) Hours As Needed for Wheezing., Disp: 375 mL, Rfl: 6  •  albuterol sulfate  (90 Base) MCG/ACT inhaler, Inhale 2 puffs Every 4 (Four) Hours., Disp: 8.5 g, Rfl: 1  •  azelastine (ASTELIN) 0.1 % nasal spray, Use 2 sprays into the nostril(s) as directed by provider 2 (Two) Times a Day., Disp: 30 mL, Rfl: 12  •  budesonide-formoterol (SYMBICORT) 160-4.5 MCG/ACT inhaler, Inhale 2 puffs every 12 hours. Use with spacer. Rinse mouth after each use., Disp: 30.6 g, Rfl: 3  •  Dupilumab 300 MG/2ML solution prefilled syringe, Inject 600mg on day 1 then 300mg on day 15 and every 2 weeks thereafter, Disp: 2 mL, Rfl: 11  •  EPINEPHrine (EPIPEN) 0.3 MG/0.3ML solution auto-injector injection, Use as directed for acute allergic reaction., Disp: 2 each, Rfl: 3  •  estradiol (Minivelle) 0.05 MG/24HR patch, Place 1 patch on the skin as directed by provider 2 (Two) Times a Week., Disp: 8 patch, Rfl: 3  •  famotidine (PEPCID) 20 MG tablet, Take one tablet by mouth 2 hours before Cluster therapy, Disp: 15 tablet, Rfl: 0  •  hydroCHLOROthiazide (HYDRODIURIL) 25 MG tablet, Take 1 tablet by mouth Daily., Disp: 90 tablet, Rfl: 1  •  levocetirizine (XYZAL) 5 MG tablet, Take 1 tablet by mouth once daily, Disp: 30 tablet, Rfl: 11  •  levothyroxine (SYNTHROID, LEVOTHROID) 50 MCG tablet, Take 1 tablet by mouth Daily., Disp: 90 tablet, Rfl: 1  •   "montelukast (SINGULAIR) 10 MG tablet, Take one tablet by mouth at bedtime, Disp: 30 tablet, Rfl: 11  •  Multiple Vitamins-Minerals (CENTRUM SILVER PO), Centrum Silver, Disp: , Rfl:   •  potassium chloride 10 MEQ CR tablet, Take 1 tablet by mouth Daily., Disp: 90 tablet, Rfl: 1  •  Tiotropium Bromide Monohydrate (SPIRIVA RESPIMAT) 1.25 MCG/ACT aerosol solution inhaler, Inhale 2 puffs Daily., Disp: 4 g, Rfl: 11  •  venlafaxine XR (EFFEXOR-XR) 75 MG 24 hr capsule, Take 1 capsule by mouth Daily., Disp: 90 capsule, Rfl: 1  MEDICATION LIST AND ALLERGIES REVIEWED.    Social History     Tobacco Use   • Smoking status: Never Smoker   • Smokeless tobacco: Never Used   Substance Use Topics   • Alcohol use: Yes     Comment: 4 glasses of wine per year   • Drug use: No       FAMILY AND SOCIAL HISTORY REVIEWED.    Review of Systems   Constitutional: Negative for chills, fatigue, fever and unexpected weight change.   HENT: Negative for congestion, nosebleeds, postnasal drip, rhinorrhea, sinus pressure and trouble swallowing.    Respiratory: Positive for cough and chest tightness. Negative for shortness of breath and wheezing.    Cardiovascular: Negative for chest pain and leg swelling.   Gastrointestinal: Negative for abdominal pain, constipation, diarrhea, nausea and vomiting.   Genitourinary: Negative for dysuria, frequency, hematuria and urgency.   Musculoskeletal: Negative for myalgias.   Neurological: Negative for dizziness, weakness, numbness and headaches.   All other systems reviewed and are negative.  .  Objective   /84 (BP Location: Left arm, Patient Position: Sitting, Cuff Size: Adult)   Pulse 74   Temp 97.4 °F (36.3 °C)   Resp 18   Ht 170.2 cm (67\")   Wt 93.9 kg (207 lb)   LMP  (LMP Unknown)   SpO2 97% Comment: room air at rest  BMI 32.42 kg/m²     Immunization History   Administered Date(s) Administered   • Flu Vaccine Intradermal Quad 18-64YR 10/06/2014, 10/01/2015   • Flu Vaccine Quad PF >36MO " 10/10/2018   • Flu Vaccine Split Quad 10/10/2018   • Influenza, Unspecified 10/21/2019   • Pneumococcal Conjugate 13-Valent (PCV13) 04/04/2016   • Pneumococcal Polysaccharide (PPSV23) 10/06/2009, 10/10/2014   • TD Preservative Free 05/16/2020   • Tdap 02/12/2010       Physical Exam  Vitals signs reviewed.   Constitutional:       Appearance: She is well-developed.   HENT:      Head: Normocephalic and atraumatic.   Eyes:      Pupils: Pupils are equal, round, and reactive to light.   Neck:      Musculoskeletal: Normal range of motion and neck supple.   Cardiovascular:      Rate and Rhythm: Normal rate and regular rhythm.      Heart sounds: No murmur.   Pulmonary:      Effort: Pulmonary effort is normal. No respiratory distress.      Breath sounds: Normal breath sounds. No wheezing or rales.   Abdominal:      General: Bowel sounds are normal. There is no distension.      Palpations: Abdomen is soft.   Musculoskeletal: Normal range of motion.   Skin:     General: Skin is warm and dry.      Findings: No erythema.   Neurological:      Mental Status: She is alert and oriented to person, place, and time.   Psychiatric:         Behavior: Behavior normal.           RESULTS    PFTS in the office today, read by me:  No obstruction or restriction, normal PFTs        High-resolution CT scan in January 2019 showed no evidence of interstitial lung disease but some peribronchial wall thickening, with some mild bronchiectasis      Assessment/Plan     PROBLEM LIST         ICD-10-CM ICD-9-CM   1. Dyspnea on exertion  R06.00 786.09   2. Moderate persistent asthma, unspecified whether complicated  J45.40 493.90   3. Obstructive sleep apnea syndrome  G47.33 327.23       Problem List Items Addressed This Visit        Respiratory    Dyspnea on exertion - Primary    Relevant Orders    Pulmonary Function Test (Completed)    Obstructive sleep apnea syndrome    Relevant Orders    CPAP Therapy    Moderate persistent asthma             DISCUSSION    At this time her asthma is moderately controlled.  She does feel like since she has been on the Dupixent since May and the Symbicort for the last couple weeks maybe it is leveling out.  She will wait to see how the next few months ago especially moving back to [place] for other medication changes.  Social stay on the Dupixent injector at home, Symbicort twice a day, and Spiriva 1.25 mcg daily.  She does have albuterol HFA as well as a nebulizer at home to use as needed.    She will reestablish with a her allergist here in town to resume her injections.    She does need her CPAP set up in the local area with a new CPAP machine and supplies.  Hers has been contaminated with mold.  Hers is almost 5 years old.  Go ahead and set her up with patient in so she can go and investigate the type of equipment they have.    Follow-up in 3 months with CPAP download.    I spent 25 minutes face to face with the patient. I spent > 50% percent of this time counseling and discussing diagnostic testing, evaluation, current status, treatment options and management.    BLANCA Stevens  09/24/202015:31 EDT  Electronically signed     Please note that portions of this note were completed with a voice recognition program. Efforts were made to edit the dictations, but occasionally words are mistranscribed.      CC: Karuna Boston MD

## 2020-11-30 RX ORDER — ESTRADIOL 0.05 MG/D
1 FILM, EXTENDED RELEASE TRANSDERMAL 2 TIMES WEEKLY
Qty: 8 PATCH | Refills: 3 | Status: CANCELLED | OUTPATIENT
Start: 2020-11-30

## 2020-11-30 RX ORDER — ESTRADIOL 0.05 MG/D
1 FILM, EXTENDED RELEASE TRANSDERMAL 2 TIMES WEEKLY
Qty: 8 PATCH | Refills: 3 | OUTPATIENT
Start: 2020-11-30

## 2020-12-07 RX ORDER — HYDROCHLOROTHIAZIDE 25 MG/1
25 TABLET ORAL DAILY
Qty: 90 TABLET | Refills: 1 | Status: SHIPPED | OUTPATIENT
Start: 2020-12-07 | End: 2021-07-08 | Stop reason: SDUPTHER

## 2020-12-07 RX ORDER — LEVOTHYROXINE SODIUM 0.05 MG/1
50 TABLET ORAL DAILY
Qty: 90 TABLET | Refills: 1 | Status: SHIPPED | OUTPATIENT
Start: 2020-12-07 | End: 2021-02-11

## 2020-12-07 RX ORDER — VENLAFAXINE HYDROCHLORIDE 75 MG/1
75 CAPSULE, EXTENDED RELEASE ORAL DAILY
Qty: 90 CAPSULE | Refills: 1 | Status: SHIPPED | OUTPATIENT
Start: 2020-12-07 | End: 2021-07-08 | Stop reason: SDUPTHER

## 2020-12-15 ENCOUNTER — LAB REQUISITION (OUTPATIENT)
Dept: LAB | Facility: HOSPITAL | Age: 60
End: 2020-12-15

## 2020-12-15 RX ORDER — ESTRADIOL 0.05 MG/D
1 FILM, EXTENDED RELEASE TRANSDERMAL 2 TIMES WEEKLY
Qty: 8 PATCH | Refills: 3 | Status: SHIPPED | OUTPATIENT
Start: 2020-12-17 | End: 2021-02-10 | Stop reason: SDUPTHER

## 2020-12-16 ENCOUNTER — LAB REQUISITION (OUTPATIENT)
Dept: FAMILY MEDICINE CLINIC | Facility: CLINIC | Age: 60
End: 2020-12-16

## 2020-12-16 DIAGNOSIS — Z13.79 ENCOUNTER FOR OTHER SCREENING FOR GENETIC AND CHROMOSOMAL ANOMALIES: ICD-10-CM

## 2020-12-21 RX ORDER — POTASSIUM CHLORIDE 750 MG/1
10 TABLET, FILM COATED, EXTENDED RELEASE ORAL DAILY
Qty: 90 TABLET | Refills: 1 | Status: SHIPPED | OUTPATIENT
Start: 2020-12-21 | End: 2021-02-26 | Stop reason: SDUPTHER

## 2020-12-23 ENCOUNTER — OFFICE VISIT (OUTPATIENT)
Dept: PULMONOLOGY | Facility: CLINIC | Age: 60
End: 2020-12-23

## 2020-12-23 VITALS
BODY MASS INDEX: 35 KG/M2 | SYSTOLIC BLOOD PRESSURE: 130 MMHG | WEIGHT: 223 LBS | HEIGHT: 67 IN | HEART RATE: 87 BPM | DIASTOLIC BLOOD PRESSURE: 80 MMHG | TEMPERATURE: 97.5 F | OXYGEN SATURATION: 97 %

## 2020-12-23 DIAGNOSIS — Z23 IMMUNIZATION DUE: ICD-10-CM

## 2020-12-23 DIAGNOSIS — G47.33 OBSTRUCTIVE SLEEP APNEA SYNDROME: ICD-10-CM

## 2020-12-23 DIAGNOSIS — R06.02 SHORTNESS OF BREATH: ICD-10-CM

## 2020-12-23 DIAGNOSIS — J45.40 MODERATE PERSISTENT ASTHMA, UNSPECIFIED WHETHER COMPLICATED: Primary | ICD-10-CM

## 2020-12-23 PROBLEM — J45.41 MODERATE PERSISTENT ASTHMA WITH ACUTE EXACERBATION: Status: RESOLVED | Noted: 2018-02-08 | Resolved: 2020-12-23

## 2020-12-23 PROCEDURE — 90732 PPSV23 VACC 2 YRS+ SUBQ/IM: CPT | Performed by: NURSE PRACTITIONER

## 2020-12-23 PROCEDURE — 90471 IMMUNIZATION ADMIN: CPT | Performed by: NURSE PRACTITIONER

## 2020-12-23 PROCEDURE — 99214 OFFICE O/P EST MOD 30 MIN: CPT | Performed by: NURSE PRACTITIONER

## 2020-12-23 NOTE — PROGRESS NOTES
[facility], [place], operated by [facility] Follow up    CHIEF COMPLAINT    Obstructive sleep apnea, asthma, shortness of breath    Subjective   HISTORY OF PRESENT ILLNESS    Brittny Han is a 60 y.o.female here today for initially follow-up on a new CPAP and download.  However she did not get her new CPAP since her last follow-up.  Originally she was going to follow-up with a new DME for a new CPAP machine and secondary to her is having some mold or mildew contamination.  However she did not follow-up with a new DME company since her last visit.    She does continue to wear her current CPAP machine nightly.  Her last sleep study was detailed 16 and did show moderate obstructive sleep apnea with a auto CPAP of 14-20 indicated.    We also follow her for some underlying asthma.  Since her change in medication about a year ago she has not done very well.  She continues to complain of shortness of breath with activity.  She denies any wheezing.  She does have a cough that is at night but not acutely worsened at this time.    When she did the best she was on Advair, Spiriva, Xolair and allergy injections.  Currently she is on Symbicort at twice daily, Spiriva, Dupixent and no allergy injections.  She is going to try to get back reestablished with an allergist and get back on her allergy shots.  She does take her Dupixent regularly but will be effused a slight on occasion.    She has not had a recent acute exacerbation of her asthma.        Patient Active Problem List   Diagnosis   • Shortness of breath   • Obstructive sleep apnea syndrome   • Anxiety   • Anemia   • Benign essential hypertension   • Peptic ulcer   • Periodic headache syndrome, not intractable   • Restless legs syndrome (RLS)   • Neck pain   • Allergic rhinitis   • H/O left breast biopsy 1981   • Adenomatous colon polyp; 2012, 2017   • Menopausal symptoms   • Numbness and tingling of both feet   • Allergic rhinitis   • Chronic idiopathic constipation   • Hypothyroidism   •  Moderate persistent asthma   • Polyp of colon   • Severe persistent asthma   • Bowel habit changes   • Rectal bleeding   • Change in bowel habits       Allergies   Allergen Reactions   • Percocet [Oxycodone-Acetaminophen] Anaphylaxis     Breathing difficulty, tongue swelling, severe    • Brevital Sodium [Methohexital] Paresthesia     CHILLS, SHAKING       Current Outpatient Medications:   •  albuterol (ACCUNEB) 1.25 MG/3ML nebulizer solution, Take 3 mL by nebulization Every 6 (Six) Hours As Needed for Wheezing., Disp: 375 mL, Rfl: 6  •  albuterol sulfate  (90 Base) MCG/ACT inhaler, Inhale 2 puffs Every 4 (Four) Hours., Disp: 8.5 g, Rfl: 1  •  azelastine (ASTELIN) 0.1 % nasal spray, Use 2 sprays into the nostril(s) as directed by provider 2 (Two) Times a Day., Disp: 30 mL, Rfl: 12  •  budesonide-formoterol (SYMBICORT) 160-4.5 MCG/ACT inhaler, Inhale 2 puffs every 12 hours. Use with spacer. Rinse mouth after each use., Disp: 30.6 g, Rfl: 3  •  Dupilumab 300 MG/2ML solution prefilled syringe, Inject 600mg on day 1 then 300mg on day 15 and every 2 weeks thereafter, Disp: 4 mL, Rfl: 11  •  EPINEPHrine (EPIPEN) 0.3 MG/0.3ML solution auto-injector injection, Use as directed for acute allergic reaction., Disp: 2 each, Rfl: 3  •  estradiol (Minivelle) 0.05 MG/24HR patch, Place 1 patch on the skin as directed by provider 2 (Two) Times a Week., Disp: 8 patch, Rfl: 3  •  famotidine (PEPCID) 20 MG tablet, Take one tablet by mouth 2 hours before Cluster therapy, Disp: 15 tablet, Rfl: 0  •  hydroCHLOROthiazide (HYDRODIURIL) 25 MG tablet, Take 1 tablet by mouth Daily., Disp: 90 tablet, Rfl: 1  •  levocetirizine (XYZAL) 5 MG tablet, Take 1 tablet by mouth once daily, Disp: 30 tablet, Rfl: 11  •  levothyroxine (SYNTHROID, LEVOTHROID) 50 MCG tablet, Take 1 tablet by mouth Daily., Disp: 90 tablet, Rfl: 1  •  montelukast (SINGULAIR) 10 MG tablet, Take one tablet by mouth at bedtime, Disp: 30 tablet, Rfl: 11  •  Multiple  "Vitamins-Minerals (CENTRUM SILVER PO), Centrum Silver, Disp: , Rfl:   •  potassium chloride 10 MEQ CR tablet, Take 1 tablet by mouth Daily., Disp: 90 tablet, Rfl: 1  •  Tiotropium Bromide Monohydrate (SPIRIVA RESPIMAT) 1.25 MCG/ACT aerosol solution inhaler, Inhale 2 puffs Daily., Disp: 4 g, Rfl: 11  •  venlafaxine XR (EFFEXOR-XR) 75 MG 24 hr capsule, Take 1 capsule by mouth Daily., Disp: 90 capsule, Rfl: 1  MEDICATION LIST AND ALLERGIES REVIEWED.    Social History     Tobacco Use   • Smoking status: Never Smoker   • Smokeless tobacco: Never Used   Substance Use Topics   • Alcohol use: Yes     Comment: 4 glasses of wine per year   • Drug use: No       FAMILY AND SOCIAL HISTORY REVIEWED.    Review of Systems   Constitutional: Negative for chills, fatigue, fever and unexpected weight change.   HENT: Negative for congestion, nosebleeds, postnasal drip, rhinorrhea, sinus pressure and trouble swallowing.    Respiratory: Positive for shortness of breath. Negative for cough, chest tightness and wheezing.    Cardiovascular: Negative for chest pain and leg swelling.   Gastrointestinal: Negative for abdominal pain, constipation, diarrhea, nausea and vomiting.   Genitourinary: Negative for dysuria, frequency, hematuria and urgency.   Musculoskeletal: Negative for myalgias.   Neurological: Negative for dizziness, weakness, numbness and headaches.   All other systems reviewed and are negative.  .  Objective   /80   Pulse 87   Temp 97.5 °F (36.4 °C)   Ht 170.2 cm (67\")   Wt 101 kg (223 lb)   LMP  (LMP Unknown)   SpO2 97% Comment: resting, room air  Breastfeeding No   BMI 34.93 kg/m²     Immunization History   Administered Date(s) Administered   • Flu Vaccine Intradermal Quad 18-64YR 10/06/2014, 10/01/2015   • Flu Vaccine Quad PF >36MO 10/10/2018   • Flu Vaccine Split Quad 10/10/2018   • Influenza, Unspecified 10/21/2019, 09/23/2020   • Pneumococcal Conjugate 13-Valent (PCV13) 04/04/2016   • Pneumococcal " Polysaccharide (PPSV23) 10/06/2009, 10/10/2014, 12/23/2020   • TD Preservative Free 05/16/2020   • Tdap 02/12/2010       Physical Exam  Vitals signs reviewed.   Constitutional:       Appearance: She is well-developed.   HENT:      Head: Normocephalic and atraumatic.   Eyes:      Pupils: Pupils are equal, round, and reactive to light.   Neck:      Musculoskeletal: Normal range of motion and neck supple.   Cardiovascular:      Rate and Rhythm: Normal rate and regular rhythm.      Heart sounds: No murmur.   Pulmonary:      Effort: Pulmonary effort is normal. No respiratory distress.      Breath sounds: Normal breath sounds. No wheezing or rales.   Abdominal:      General: Bowel sounds are normal. There is no distension.      Palpations: Abdomen is soft.   Musculoskeletal: Normal range of motion.   Skin:     General: Skin is warm and dry.      Findings: No erythema.   Neurological:      Mental Status: She is alert and oriented to person, place, and time.   Psychiatric:         Behavior: Behavior normal.           RESULTS    PFTS in the office today, read by me:  Unable to complete secondary to the COVID epidemic        Results for orders placed in visit on 01/17/19   CT Chest Hi Resolution    Narrative EXAMINATION: CT CHEST HIGH-RESOLUTION - 1/25/2019      INDICATION: J45.41-Moderate persistent asthma with (acute) exacerbation.        TECHNIQUE: CT chest high-resolution protocol without intravenous  contrast administration. Supine and prone imaging position sequences  were performed.     The radiation dose reduction device was turned on for each scan per the  ALARA (As Low as Reasonably Achievable) protocol.     COMPARISON: NONE     FINDINGS: Thyroid is homogeneous in attenuation. No bulky mediastinal  adenopathy. Central airways are patent. Esophagus in normal course and  caliber. Atherosclerotic nonaneurysmal thoracic aorta. Cardiac size  within normal limits without pericardial effusion. Extended lung  windows  without significant subpleural reticulation or band formation. No  honeycombing or significant intralobular septal thickening or  irregularity. Mild peribronchial wall thickening suggesting  peribronchial inflammatory process as well as minimal bronchiectasis in  the right middle and right lower lobe in particular, however, no mucous  plugging. Small noncalcified right upper lobe posterior portion 4 mm  noncalcified pulmonary nodule. No dominant nodule or mass lesion,  however. No effusion. Degenerative changes of the spine. Soft tissue  body wall findings are unremarkable without bulky axillary adenopathy.  Visualized portions of the upper abdomen unremarkable.       Impression No evidence for interstitial lung disease process with  peribronchial wall thickening suggesting peribronchial inflammatory  process such as bronchitis and/or reactive airways disease, however, no  focal consolidation to suggest acute pneumonia or evidence for  interstitial lung process. No pleural effusion. Mild central and right  predominant bronchiectasis may represent components of repeat  peribronchial inflammatory process. No mucous plugging or  bronchiolectasis is identified.     DICTATED:   1/25/2019  EDITED/ls :   1/25/2019      This report was finalized on 1/25/2019 4:56 PM by Dr. Jorge Fabian.            Assessment/Plan     PROBLEM LIST         ICD-10-CM ICD-9-CM   1. Moderate persistent asthma, unspecified whether complicated  J45.40 493.90   2. Immunization due  Z23 V05.9   3. Obstructive sleep apnea syndrome  G47.33 327.23   4. Shortness of breath  R06.02 786.05       Problem List Items Addressed This Visit        Respiratory    Shortness of breath    Obstructive sleep apnea syndrome    Moderate persistent asthma - Primary      Other Visit Diagnoses     Immunization due        Relevant Orders    Pneumococcal Polysaccharide Vaccine 23-Valent (PPSV23) Greater Than or Equal To 3yo Subcutaneous / IM (Completed)             DISCUSSION    At this time I did encourage her to reestablish with the allergist and resume her allergy injections.  If she continues to persist with her shortness of breath with activity I would follow-up on a CPX to rule out any exercise-induced worsening of her asthma.  At this time her asthma is fairly well controlled on her current regimen of Symbicort, Spiriva, and Dupixent.    Continue on her CPAP.  We did send orders over to patient needs for a new machine if she would like to follow-up with them.    Follow-up in 3 months with full PFTs.        I spent 20-29 minutes face to face with the patient. I spent > 50% percent of this time counseling and discussing diagnostic testing, evaluation, current status, treatment options and management.    Hien Shi, APRN  12/23/202008:52 EST  Electronically signed     Please note that portions of this note were completed with a voice recognition program. Efforts were made to edit the dictations, but occasionally words are mistranscribed.      CC: Karuna Boston MD

## 2021-01-12 ENCOUNTER — PHARMACOGENOMICS (OUTPATIENT)
Dept: PHARMACY | Facility: HOSPITAL | Age: 61
End: 2021-01-12

## 2021-01-19 ENCOUNTER — PHARMACOGENOMICS (OUTPATIENT)
Dept: PHARMACY | Facility: HOSPITAL | Age: 61
End: 2021-01-19

## 2021-01-19 NOTE — PROGRESS NOTES
Pharmacogenomics    Brittny Han is a 62 yo patient who agreed to participate in the [facility] Pharmacogenomics  Program.      The Patient Genomic Profile reported the following information:      VENLAFAXINE  CYP2D6 Intermediate Metabolizer, decreased metabolism and increased risk of adverse events.      Recommendations:    1. Pt is an intermediate CYP2D6 metabolizer, putting her at increased risk for adverse effects with venlafaxine.  Pt is on relatively low dose which would minimize her risk.  If patient experiences adverse effects, could consider Savella or Pristiq, if clinically appropriate.  Duloxetine would pose the same potential risk as venlafaxine so it would not likely be a better option than venlafaxine.      Thank you,   Rose Hargrove, PharmD  01/19/21  09:49 EST

## 2021-02-10 ENCOUNTER — OFFICE VISIT (OUTPATIENT)
Dept: INTERNAL MEDICINE | Facility: CLINIC | Age: 61
End: 2021-02-10

## 2021-02-10 ENCOUNTER — LAB (OUTPATIENT)
Dept: LAB | Facility: HOSPITAL | Age: 61
End: 2021-02-10

## 2021-02-10 VITALS
HEIGHT: 67 IN | HEART RATE: 80 BPM | WEIGHT: 226 LBS | OXYGEN SATURATION: 98 % | RESPIRATION RATE: 16 BRPM | BODY MASS INDEX: 35.47 KG/M2 | TEMPERATURE: 97.1 F | SYSTOLIC BLOOD PRESSURE: 144 MMHG | DIASTOLIC BLOOD PRESSURE: 82 MMHG

## 2021-02-10 DIAGNOSIS — M54.50 CHRONIC BILATERAL LOW BACK PAIN WITHOUT SCIATICA: Primary | ICD-10-CM

## 2021-02-10 DIAGNOSIS — I10 ESSENTIAL HYPERTENSION: ICD-10-CM

## 2021-02-10 DIAGNOSIS — N81.6 RECTOCELE: ICD-10-CM

## 2021-02-10 DIAGNOSIS — E87.6 HYPOKALEMIA: ICD-10-CM

## 2021-02-10 DIAGNOSIS — G89.29 CHRONIC BILATERAL LOW BACK PAIN WITHOUT SCIATICA: Primary | ICD-10-CM

## 2021-02-10 DIAGNOSIS — E03.9 ACQUIRED HYPOTHYROIDISM: ICD-10-CM

## 2021-02-10 DIAGNOSIS — G54.2 CERVICAL NERVE ROOT IMPINGEMENT: ICD-10-CM

## 2021-02-10 DIAGNOSIS — N95.1 POST MENOPAUSAL SYNDROME: ICD-10-CM

## 2021-02-10 LAB
ALBUMIN SERPL-MCNC: 4.4 G/DL (ref 3.5–5.2)
ALBUMIN/GLOB SERPL: 1.4 G/DL
ALP SERPL-CCNC: 83 U/L (ref 39–117)
ALT SERPL W P-5'-P-CCNC: 30 U/L (ref 1–33)
ANION GAP SERPL CALCULATED.3IONS-SCNC: 9.7 MMOL/L (ref 5–15)
AST SERPL-CCNC: 27 U/L (ref 1–32)
BILIRUB SERPL-MCNC: 0.2 MG/DL (ref 0–1.2)
BUN SERPL-MCNC: 13 MG/DL (ref 6–20)
BUN/CREAT SERPL: 15.5 (ref 7–25)
CALCIUM SPEC-SCNC: 9.9 MG/DL (ref 8.6–10.5)
CHLORIDE SERPL-SCNC: 100 MMOL/L (ref 98–107)
CO2 SERPL-SCNC: 28.3 MMOL/L (ref 22–29)
CREAT SERPL-MCNC: 0.84 MG/DL (ref 0.57–1)
GFR SERPL CREATININE-BSD FRML MDRD: 69 ML/MIN/1.73
GLOBULIN UR ELPH-MCNC: 3.2 GM/DL
GLUCOSE SERPL-MCNC: 79 MG/DL (ref 65–99)
POTASSIUM SERPL-SCNC: 3.7 MMOL/L (ref 3.5–5.2)
PROT SERPL-MCNC: 7.6 G/DL (ref 6–8.5)
SODIUM SERPL-SCNC: 138 MMOL/L (ref 136–145)
TSH SERPL DL<=0.05 MIU/L-ACNC: 8.33 UIU/ML (ref 0.27–4.2)

## 2021-02-10 PROCEDURE — 84443 ASSAY THYROID STIM HORMONE: CPT | Performed by: INTERNAL MEDICINE

## 2021-02-10 PROCEDURE — 99214 OFFICE O/P EST MOD 30 MIN: CPT | Performed by: INTERNAL MEDICINE

## 2021-02-10 PROCEDURE — 84439 ASSAY OF FREE THYROXINE: CPT | Performed by: INTERNAL MEDICINE

## 2021-02-10 PROCEDURE — 80053 COMPREHEN METABOLIC PANEL: CPT | Performed by: INTERNAL MEDICINE

## 2021-02-10 RX ORDER — BUDESONIDE AND FORMOTEROL FUMARATE DIHYDRATE 160; 4.5 UG/1; UG/1
2 AEROSOL RESPIRATORY (INHALATION) 2 TIMES DAILY
COMMUNITY
End: 2021-02-10

## 2021-02-10 RX ORDER — ESTRADIOL 0.05 MG/D
1 FILM, EXTENDED RELEASE TRANSDERMAL 2 TIMES WEEKLY
Qty: 8 PATCH | Refills: 3 | Status: SHIPPED | OUTPATIENT
Start: 2021-02-11 | End: 2021-02-10

## 2021-02-10 RX ORDER — ESTRADIOL 0.07 MG/D
1 FILM, EXTENDED RELEASE TRANSDERMAL 2 TIMES WEEKLY
Qty: 8 EACH | Refills: 3 | Status: SHIPPED | OUTPATIENT
Start: 2021-02-11 | End: 2021-04-28 | Stop reason: SDUPTHER

## 2021-02-10 NOTE — PROGRESS NOTES
Office Note      Date: 02/10/2021  Patient Name: Brittny Han  MRN: [id number]  : 1960    Chief Complaint   Patient presents with   • Back Pain   • Hormone Therapy       History of Present Illness: Brittny Han is a 61 y.o. female who presents for Back Pain and Hormone Therapy.     Back pain located mid back and worse when laying down at night. Placing pillows under knees at night helps w/ pain. Has back stiffness when standing up and in the AM. Hands/feet fall asleep. NCS done in the past and negative.   These issues are chronic dating back to 2016. Had MRI c spine significant for C5-6 disc protrusion. Could not tolerate gabapentin.     Would like to go back on horomone therapy for post menopausal sx. Was on estradiol patches. On effexor. Hx of hysterectomy.     Has rectocele and needs referral to colorectal sx    On synthroid 50mcg for hypothyroidism.   Subjective      Review of Systems:   Pertinent review of systems per HPI.    Review of Systems   Constitutional: Negative for activity change, appetite change, chills, diaphoresis, fatigue, fever and unexpected weight change.   HENT: Negative for congestion, dental problem, drooling, ear discharge, ear pain, facial swelling, hearing loss and mouth sores.    Eyes: Negative for pain, discharge and itching.   Respiratory: Negative for apnea, cough, choking, chest tightness and shortness of breath.    Cardiovascular: Negative for chest pain, palpitations and leg swelling.   Gastrointestinal: Negative for abdominal distention, abdominal pain, blood in stool, constipation and diarrhea.   Endocrine: Negative for cold intolerance, heat intolerance, polydipsia and polyuria.   Genitourinary: Negative for difficulty urinating, dysuria, frequency and hematuria.   Musculoskeletal: Positive for back pain and neck pain.   Skin: Negative for color change, pallor, rash and wound.   Allergic/Immunologic: Negative for environmental allergies, food allergies  "and immunocompromised state.   Neurological: Negative for dizziness, weakness and light-headedness.   Psychiatric/Behavioral: Negative for agitation, behavioral problems, confusion, decreased concentration and self-injury. The patient is not nervous/anxious.    All other systems reviewed and are negative.    Allergies   Allergen Reactions   • Percocet [Oxycodone-Acetaminophen] Anaphylaxis     Breathing difficulty, tongue swelling, severe    • Brevital Sodium [Methohexital] Paresthesia     CHILLS, SHAKING       Objective     Physical Exam:  Vital Signs:   Vitals:    02/10/21 1352   BP: 144/82   Pulse: 80   Resp: 16   Temp: 97.1 °F (36.2 °C)   TempSrc: Temporal   SpO2: 98%   Weight: 103 kg (226 lb)   Height: 170.2 cm (67\")   PainSc:   6   PainLoc: Back      Body mass index is 35.4 kg/m².    Physical Exam  Vitals signs and nursing note reviewed.   Constitutional:       General: She is not in acute distress.     Appearance: She is well-developed.   HENT:      Head: Normocephalic and atraumatic.      Right Ear: External ear normal.      Left Ear: External ear normal.   Eyes:      General:         Right eye: No discharge.         Left eye: No discharge.      Extraocular Movements: Extraocular movements intact.      Conjunctiva/sclera: Conjunctivae normal.   Cardiovascular:      Rate and Rhythm: Normal rate and regular rhythm.      Heart sounds: Normal heart sounds. No murmur. No friction rub. No gallop.    Pulmonary:      Effort: Pulmonary effort is normal. No respiratory distress.      Breath sounds: Normal breath sounds. No wheezing or rales.   Skin:     General: Skin is warm and dry.      Coloration: Skin is not pale.         Assessment / Plan      Assessment & Plan:    1. Chronic bilateral low back pain without sciatica  Referral to neurosx. Advised weight loss.     2. Rectocele    - Ambulatory Referral to Colorectal Surgery    3. Cervical nerve root impingement    - Ambulatory Referral to Neurosurgery    4. Essential " hypertension  BP mildly elevated. Advised weight loss and low salt diet. F/u in 1-3 months for BP check.   5. Post menopausal syndrome  Refilled estradiol patches. She is aware of SE of horomone therapy including blood clots.     6. Hypokalemia    - Comprehensive Metabolic Panel    7. Acquired hypothyroidism  Sent message to staff to let her know that synthroid was increased to 75mcg.   - TSH Rfx On Abnormal To Free T4  - T4, Free      Mariaelena Banks MD  02/10/2021     Please note that portions of this note may have been completed with a voice recognition program. Efforts were made to edit the dictations, but occasionally words are mistranscribed.

## 2021-02-11 LAB — T4 FREE SERPL-MCNC: 0.83 NG/DL (ref 0.93–1.7)

## 2021-02-11 RX ORDER — LEVOTHYROXINE SODIUM 0.07 MG/1
75 TABLET ORAL DAILY
Qty: 30 TABLET | Refills: 2 | Status: SHIPPED | OUTPATIENT
Start: 2021-02-11 | End: 2021-05-05 | Stop reason: SDUPTHER

## 2021-02-24 DIAGNOSIS — M54.2 NECK PAIN: Primary | ICD-10-CM

## 2021-02-26 ENCOUNTER — LAB (OUTPATIENT)
Dept: LAB | Facility: HOSPITAL | Age: 61
End: 2021-02-26

## 2021-02-26 ENCOUNTER — OFFICE VISIT (OUTPATIENT)
Dept: INTERNAL MEDICINE | Facility: CLINIC | Age: 61
End: 2021-02-26

## 2021-02-26 VITALS
BODY MASS INDEX: 35.47 KG/M2 | DIASTOLIC BLOOD PRESSURE: 86 MMHG | HEIGHT: 67 IN | WEIGHT: 226 LBS | HEART RATE: 78 BPM | OXYGEN SATURATION: 98 % | RESPIRATION RATE: 16 BRPM | TEMPERATURE: 97.3 F | SYSTOLIC BLOOD PRESSURE: 140 MMHG

## 2021-02-26 DIAGNOSIS — G89.29 CHRONIC MIDLINE LOW BACK PAIN WITHOUT SCIATICA: ICD-10-CM

## 2021-02-26 DIAGNOSIS — M54.50 CHRONIC MIDLINE LOW BACK PAIN WITHOUT SCIATICA: ICD-10-CM

## 2021-02-26 DIAGNOSIS — Z00.00 ANNUAL PHYSICAL EXAM: Primary | ICD-10-CM

## 2021-02-26 LAB
25(OH)D3 SERPL-MCNC: 37.8 NG/ML
CHOLEST SERPL-MCNC: 211 MG/DL (ref 0–200)
DEPRECATED RDW RBC AUTO: 44 FL (ref 37–54)
ERYTHROCYTE [DISTWIDTH] IN BLOOD BY AUTOMATED COUNT: 12.8 % (ref 12.3–15.4)
HBA1C MFR BLD: 5.92 % (ref 4.8–5.6)
HCT VFR BLD AUTO: 39.2 % (ref 34–46.6)
HDLC SERPL-MCNC: 48 MG/DL (ref 40–60)
HGB BLD-MCNC: 13 G/DL (ref 12–15.9)
LDLC SERPL CALC-MCNC: 143 MG/DL (ref 0–100)
LDLC/HDLC SERPL: 2.93 {RATIO}
MCH RBC QN AUTO: 30.8 PG (ref 26.6–33)
MCHC RBC AUTO-ENTMCNC: 33.2 G/DL (ref 31.5–35.7)
MCV RBC AUTO: 92.9 FL (ref 79–97)
PLATELET # BLD AUTO: 277 10*3/MM3 (ref 140–450)
PMV BLD AUTO: 10.1 FL (ref 6–12)
RBC # BLD AUTO: 4.22 10*6/MM3 (ref 3.77–5.28)
T4 FREE SERPL-MCNC: 0.84 NG/DL (ref 0.93–1.7)
TRIGL SERPL-MCNC: 113 MG/DL (ref 0–150)
TSH SERPL DL<=0.05 MIU/L-ACNC: 6.01 UIU/ML (ref 0.27–4.2)
VIT B12 BLD-MCNC: 589 PG/ML (ref 211–946)
VLDLC SERPL-MCNC: 20 MG/DL (ref 5–40)
WBC # BLD AUTO: 6.53 10*3/MM3 (ref 3.4–10.8)

## 2021-02-26 PROCEDURE — 83036 HEMOGLOBIN GLYCOSYLATED A1C: CPT | Performed by: INTERNAL MEDICINE

## 2021-02-26 PROCEDURE — 84443 ASSAY THYROID STIM HORMONE: CPT | Performed by: INTERNAL MEDICINE

## 2021-02-26 PROCEDURE — 82306 VITAMIN D 25 HYDROXY: CPT | Performed by: INTERNAL MEDICINE

## 2021-02-26 PROCEDURE — 85027 COMPLETE CBC AUTOMATED: CPT | Performed by: INTERNAL MEDICINE

## 2021-02-26 PROCEDURE — 99213 OFFICE O/P EST LOW 20 MIN: CPT | Performed by: INTERNAL MEDICINE

## 2021-02-26 PROCEDURE — 80061 LIPID PANEL: CPT | Performed by: INTERNAL MEDICINE

## 2021-02-26 PROCEDURE — 82607 VITAMIN B-12: CPT | Performed by: INTERNAL MEDICINE

## 2021-02-26 PROCEDURE — 99396 PREV VISIT EST AGE 40-64: CPT | Performed by: INTERNAL MEDICINE

## 2021-02-26 PROCEDURE — 84439 ASSAY OF FREE THYROXINE: CPT | Performed by: INTERNAL MEDICINE

## 2021-02-26 RX ORDER — POTASSIUM CHLORIDE 750 MG/1
10 TABLET, FILM COATED, EXTENDED RELEASE ORAL DAILY
Qty: 90 TABLET | Refills: 1 | Status: SHIPPED | OUTPATIENT
Start: 2021-02-26 | End: 2021-08-30 | Stop reason: SDUPTHER

## 2021-02-26 NOTE — PROGRESS NOTES
"     Office Note      Date: 2021  Patient Name: Brittny Han  MRN: [id number]  : 1960    Chief Complaint   Patient presents with   • Annual Exam       History of Present Illness: Brittny Han is a 61 y.o. female who presents for Annual Exam. Seen 2 weeks ago and TSH was elevated.     Feels better on increased synthroid 75mcg.     Has partial hysterectomy in  w/ removal of cervix done due to fibroids. Has never had f/u pap smear.     Getting cervical mri due to nerve root impingment pain, also requesting MRI of lower back due to chronic lower back pain prior to pain med OV.  Subjective      Review of Systems:   Pertinent review of systems per HPI.    Review of Systems  Allergies   Allergen Reactions   • Percocet [Oxycodone-Acetaminophen] Anaphylaxis     Breathing difficulty, tongue swelling, severe    • Brevital Sodium [Methohexital] Paresthesia     CHILLS, SHAKING       Objective     Physical Exam:  Vital Signs:   Vitals:    21 0937   BP: 140/86   Pulse: 78   Resp: 16   Temp: 97.3 °F (36.3 °C)   TempSrc: Temporal   SpO2: 98%   Weight: 103 kg (226 lb)   Height: 170.2 cm (67\")   PainSc:   5   PainLoc: Neck      Body mass index is 35.4 kg/m².    Physical Exam  Vitals signs and nursing note reviewed.   Constitutional:       General: She is not in acute distress.     Appearance: She is well-developed. She is obese.   HENT:      Head: Normocephalic and atraumatic.      Right Ear: External ear normal.      Left Ear: External ear normal.   Cardiovascular:      Rate and Rhythm: Normal rate and regular rhythm.      Heart sounds: Normal heart sounds. No murmur. No friction rub. No gallop.    Pulmonary:      Effort: Pulmonary effort is normal. No respiratory distress.      Breath sounds: Normal breath sounds. No wheezing or rales.   Skin:     General: Skin is warm and dry.      Coloration: Skin is not pale.         Assessment / Plan      Assessment & Plan:    1. Annual physical " exam  Counseled on:  Mammo starting at age 40  Pap smear q5 years if normal pap cytology with neg HPV, otherwise q3 years  Colonoscopy at 45-51 y/o  PNA vaccinations starting at age 65  shingrix at age 50  Td/Tdap q 10 years  Wear seatbelt when driving  Flu shot annually    - CBC (No Diff)  - Lipid Panel  - TSH Rfx On Abnormal To Free T4  - Vitamin B12  - Vitamin D 25 Hydroxy  - Hemoglobin A1c    2. Chronic midline low back pain without sciatica  MRI ordered so that pain medicine can evaluate for further pain management.   - MRI Lumbar Spine Without Contrast; Future      Mariaelena Banks MD  02/26/2021     Please note that portions of this note may have been completed with a voice recognition program. Efforts were made to edit the dictations, but occasionally words are mistranscribed.

## 2021-03-11 ENCOUNTER — APPOINTMENT (OUTPATIENT)
Dept: MRI IMAGING | Facility: HOSPITAL | Age: 61
End: 2021-03-11

## 2021-03-12 ENCOUNTER — TRANSCRIBE ORDERS (OUTPATIENT)
Dept: LAB | Facility: HOSPITAL | Age: 61
End: 2021-03-12

## 2021-03-12 ENCOUNTER — HOSPITAL ENCOUNTER (OUTPATIENT)
Dept: MRI IMAGING | Facility: HOSPITAL | Age: 61
Discharge: HOME | End: 2021-03-12

## 2021-03-12 ENCOUNTER — LAB (OUTPATIENT)
Dept: LAB | Facility: HOSPITAL | Age: 61
End: 2021-03-12

## 2021-03-12 ENCOUNTER — TRANSCRIBE ORDERS (OUTPATIENT)
Dept: ADMINISTRATIVE | Facility: HOSPITAL | Age: 61
End: 2021-03-12

## 2021-03-12 ENCOUNTER — HOSPITAL ENCOUNTER (OUTPATIENT)
Dept: GENERAL RADIOLOGY | Facility: HOSPITAL | Age: 61
Discharge: HOME | End: 2021-03-12

## 2021-03-12 DIAGNOSIS — G47.00 INSOMNIA WITH SLEEP APNEA: Primary | ICD-10-CM

## 2021-03-12 DIAGNOSIS — M54.50 CHRONIC MIDLINE LOW BACK PAIN WITHOUT SCIATICA: ICD-10-CM

## 2021-03-12 DIAGNOSIS — M54.2 NECK PAIN: ICD-10-CM

## 2021-03-12 DIAGNOSIS — G47.30 SLEEP APNEA WITH USE OF CONTINUOUS POSITIVE AIRWAY PRESSURE (CPAP): Primary | ICD-10-CM

## 2021-03-12 DIAGNOSIS — G47.30 SLEEP APNEA WITH USE OF CONTINUOUS POSITIVE AIRWAY PRESSURE (CPAP): ICD-10-CM

## 2021-03-12 DIAGNOSIS — G89.29 CHRONIC MIDLINE LOW BACK PAIN WITHOUT SCIATICA: ICD-10-CM

## 2021-03-12 DIAGNOSIS — G47.30 INSOMNIA WITH SLEEP APNEA: ICD-10-CM

## 2021-03-12 DIAGNOSIS — G47.30 INSOMNIA WITH SLEEP APNEA: Primary | ICD-10-CM

## 2021-03-12 DIAGNOSIS — G47.00 INSOMNIA WITH SLEEP APNEA: ICD-10-CM

## 2021-03-12 LAB
ALBUMIN SERPL-MCNC: 4.6 G/DL (ref 3.5–5.2)
ALBUMIN/GLOB SERPL: 1.5 G/DL
ALP SERPL-CCNC: 84 U/L (ref 39–117)
ALT SERPL W P-5'-P-CCNC: 27 U/L (ref 1–33)
ANION GAP SERPL CALCULATED.3IONS-SCNC: 11.3 MMOL/L (ref 5–15)
AST SERPL-CCNC: 30 U/L (ref 1–32)
BASOPHILS # BLD AUTO: 0.07 10*3/MM3 (ref 0–0.2)
BASOPHILS NFR BLD AUTO: 1.2 % (ref 0–1.5)
BILIRUB SERPL-MCNC: 0.2 MG/DL (ref 0–1.2)
BUN SERPL-MCNC: 12 MG/DL (ref 6–20)
BUN/CREAT SERPL: 13.8 (ref 7–25)
CALCIUM SPEC-SCNC: 9.2 MG/DL (ref 8.6–10.5)
CHLORIDE SERPL-SCNC: 101 MMOL/L (ref 98–107)
CO2 SERPL-SCNC: 27.7 MMOL/L (ref 22–29)
CREAT SERPL-MCNC: 0.87 MG/DL (ref 0.57–1)
DEPRECATED RDW RBC AUTO: 40.9 FL (ref 37–54)
EOSINOPHIL # BLD AUTO: 0.27 10*3/MM3 (ref 0–0.4)
EOSINOPHIL NFR BLD AUTO: 4.4 % (ref 0.3–6.2)
ERYTHROCYTE [DISTWIDTH] IN BLOOD BY AUTOMATED COUNT: 12.6 % (ref 12.3–15.4)
GFR SERPL CREATININE-BSD FRML MDRD: 67 ML/MIN/1.73
GLOBULIN UR ELPH-MCNC: 3 GM/DL
GLUCOSE SERPL-MCNC: 105 MG/DL (ref 65–99)
HCT VFR BLD AUTO: 39.7 % (ref 34–46.6)
HGB BLD-MCNC: 13.3 G/DL (ref 12–15.9)
IMM GRANULOCYTES # BLD AUTO: 0.02 10*3/MM3 (ref 0–0.05)
IMM GRANULOCYTES NFR BLD AUTO: 0.3 % (ref 0–0.5)
LYMPHOCYTES # BLD AUTO: 2.24 10*3/MM3 (ref 0.7–3.1)
LYMPHOCYTES NFR BLD AUTO: 36.9 % (ref 19.6–45.3)
MCH RBC QN AUTO: 29.9 PG (ref 26.6–33)
MCHC RBC AUTO-ENTMCNC: 33.5 G/DL (ref 31.5–35.7)
MCV RBC AUTO: 89.2 FL (ref 79–97)
MONOCYTES # BLD AUTO: 0.62 10*3/MM3 (ref 0.1–0.9)
MONOCYTES NFR BLD AUTO: 10.2 % (ref 5–12)
NEUTROPHILS NFR BLD AUTO: 2.85 10*3/MM3 (ref 1.7–7)
NEUTROPHILS NFR BLD AUTO: 47 % (ref 42.7–76)
NRBC BLD AUTO-RTO: 0 /100 WBC (ref 0–0.2)
PLATELET # BLD AUTO: 328 10*3/MM3 (ref 140–450)
PMV BLD AUTO: 10 FL (ref 6–12)
POTASSIUM SERPL-SCNC: 3.6 MMOL/L (ref 3.5–5.2)
PROT SERPL-MCNC: 7.6 G/DL (ref 6–8.5)
QT INTERVAL: 386 MS
QTC INTERVAL: 456 MS
RBC # BLD AUTO: 4.45 10*6/MM3 (ref 3.77–5.28)
SODIUM SERPL-SCNC: 140 MMOL/L (ref 136–145)
WBC # BLD AUTO: 6.07 10*3/MM3 (ref 3.4–10.8)

## 2021-03-12 PROCEDURE — 72141 MRI NECK SPINE W/O DYE: CPT

## 2021-03-12 PROCEDURE — 93005 ELECTROCARDIOGRAM TRACING: CPT | Performed by: COLON & RECTAL SURGERY

## 2021-03-12 PROCEDURE — 71046 X-RAY EXAM CHEST 2 VIEWS: CPT

## 2021-03-12 PROCEDURE — 80053 COMPREHEN METABOLIC PANEL: CPT

## 2021-03-12 PROCEDURE — 93010 ELECTROCARDIOGRAM REPORT: CPT | Performed by: INTERNAL MEDICINE

## 2021-03-12 PROCEDURE — 36415 COLL VENOUS BLD VENIPUNCTURE: CPT

## 2021-03-12 PROCEDURE — 85025 COMPLETE CBC W/AUTO DIFF WBC: CPT

## 2021-03-12 PROCEDURE — 72148 MRI LUMBAR SPINE W/O DYE: CPT

## 2021-03-16 ENCOUNTER — OFFICE VISIT (OUTPATIENT)
Dept: NEUROSURGERY | Facility: CLINIC | Age: 61
End: 2021-03-16

## 2021-03-16 VITALS — TEMPERATURE: 97.7 F | BODY MASS INDEX: 35 KG/M2 | HEIGHT: 67 IN | WEIGHT: 223 LBS

## 2021-03-16 DIAGNOSIS — M50.30 DDD (DEGENERATIVE DISC DISEASE), CERVICAL: ICD-10-CM

## 2021-03-16 DIAGNOSIS — M51.36 DDD (DEGENERATIVE DISC DISEASE), LUMBAR: ICD-10-CM

## 2021-03-16 DIAGNOSIS — M54.2 NECK PAIN: ICD-10-CM

## 2021-03-16 DIAGNOSIS — M54.9 MECHANICAL BACK PAIN: Primary | ICD-10-CM

## 2021-03-16 PROCEDURE — 99203 OFFICE O/P NEW LOW 30 MIN: CPT | Performed by: NEUROLOGICAL SURGERY

## 2021-03-16 NOTE — PROGRESS NOTES
Patient: Brittny Han  : 1960    Primary Care Provider: Mariaelena Banks MD    Requesting Provider: As above        History    Chief Complaint:   1.  Chronic low back pain.  2.  Chronic neck pain.  3.  Numbness and tingling in the hands and feet.    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  She has a 10-year history of some of the above-noted complaints.  Her neck symptoms are tolerable and do tend to come and go.  Her low back pain and stiffness is her larger complaint.  She does not really have radicular symptoms.  Her symptoms are worse with lying flat, sitting, standing.  Sometimes stretching exercises are helpful.  She has done physical therapy in the past.  She could not tolerate gabapentin.  Her hands and feet will go to sleep and then the numbness will extend up into her arms and legs.  Remotely she may have undergone electrodiagnostic studies.  I have cared for her mother and sister.    Review of Systems   Constitutional: Positive for fatigue. Negative for activity change, appetite change, chills, diaphoresis, fever and unexpected weight change.   HENT: Positive for voice change. Negative for congestion, dental problem, drooling, ear discharge, ear pain, facial swelling, hearing loss, mouth sores, nosebleeds, postnasal drip, rhinorrhea, sinus pressure, sinus pain, sneezing, sore throat, tinnitus and trouble swallowing.    Eyes: Negative for photophobia, pain, discharge, redness, itching and visual disturbance.   Respiratory: Positive for apnea and cough. Negative for choking, chest tightness, shortness of breath, wheezing and stridor.    Cardiovascular: Negative for chest pain, palpitations and leg swelling.   Gastrointestinal: Positive for anal bleeding and rectal pain. Negative for abdominal distention, abdominal pain, blood in stool, constipation, diarrhea, nausea and vomiting.   Endocrine: Negative for cold intolerance,  "heat intolerance, polydipsia, polyphagia and polyuria.   Genitourinary: Negative for decreased urine volume, difficulty urinating, dyspareunia, dysuria, enuresis, flank pain, frequency, genital sores, hematuria, menstrual problem, pelvic pain, urgency, vaginal bleeding, vaginal discharge and vaginal pain.   Musculoskeletal: Positive for back pain, neck pain and neck stiffness. Negative for arthralgias, gait problem, joint swelling and myalgias.   Skin: Negative for color change, pallor, rash and wound.   Allergic/Immunologic: Positive for environmental allergies. Negative for food allergies and immunocompromised state.   Neurological: Positive for weakness and numbness. Negative for dizziness, tremors, seizures, syncope, facial asymmetry, speech difficulty, light-headedness and headaches.   Hematological: Negative for adenopathy. Does not bruise/bleed easily.   Psychiatric/Behavioral: Negative for agitation, behavioral problems, confusion, decreased concentration, dysphoric mood, hallucinations, self-injury, sleep disturbance and suicidal ideas. The patient is not nervous/anxious and is not hyperactive.        The patient's past medical history, past surgical history, family history, and social history have been reviewed at length in the electronic medical record.    Physical Exam:   Temp 97.7 °F (36.5 °C)   Ht 170.2 cm (67\")   Wt 101 kg (223 lb)   LMP  (LMP Unknown)   BMI 34.93 kg/m²   CONSTITUTIONAL: Patient is well-nourished, pleasant and appears stated age.  CV: Heart regular rate and rhythm without murmur, rub, or gallop.  PULMONARY: Lungs are clear to ascultation.  MUSCULOSKELETAL:  Neck tenderness to palpation is not observed.   ROM in neck is normal.  Straight leg raising is negative.  Elroy signs are negative.  NEUROLOGICAL:  Orientation, memory, attention span, language function, and cognition have been examined and are intact.  Strength is intact in the upper and lower extremities to direct " testing.  Muscle tone is normal throughout.  Station and gait are normal.  Sensation is intact to light touch testing throughout.  Deep tendon reflexes are 2+ and symmetrical but difficult to elicit at the ankles.  Niko's Sign is negative bilaterally. No clonus is elicited.  Coordination is intact.      Medical Decision Making    Data Review:   MRI of the cervical spine and lumbar spine both dated 3/12/2021 are reviewed.  I also compared the cervical study to one from 2016.  There is some diffuse cervical spondylosis and disc disease.  There is right paracentral disc bulging at C5-6.  There are some broad-based mild disc-osteophyte at C6-7.  There is no significant cord or root compromise.  The lumbar study demonstrates some degenerative disc disease and facet arthropathy.  There is moderate stenosis at L4-5.    Diagnosis:   The patient harbors mechanical neck and back pain.  I do not see anything in her spine that would cause sensory alteration in her hands and feet.  While she has some spinal stenosis she does not describe a syndrome of neurogenic claudication.    Treatment Options:   In short, there is no role for surgical intervention in this setting.  Given some of her extremity complaints 1 might consider referral to neurology or at least electrodiagnostic studies.  She will follow-up in our clinic on an as-needed basis.       Diagnosis Plan   1. Mechanical back pain     2. DDD (degenerative disc disease), lumbar     3. Neck pain     4. DDD (degenerative disc disease), cervical         Scribed for Jose Veras MD by Alisia Bhagat CASS 3/16/2021 13:05 EDT      I, Dr. Veras, personally performed the services described in the documentation, as scribed in my presence, and it is both accurate and complete.

## 2021-03-19 DIAGNOSIS — Z01.812 BLOOD TESTS PRIOR TO TREATMENT OR PROCEDURE: Primary | ICD-10-CM

## 2021-04-03 ENCOUNTER — APPOINTMENT (OUTPATIENT)
Dept: VACCINE CLINIC | Facility: HOSPITAL | Age: 61
End: 2021-04-03

## 2021-04-08 RX ORDER — MONTELUKAST SODIUM 10 MG/1
10 TABLET ORAL
Qty: 30 TABLET | Refills: 11 | Status: SHIPPED | OUTPATIENT
Start: 2021-04-08 | End: 2022-05-02 | Stop reason: SDUPTHER

## 2021-04-09 RX ORDER — DUPILUMAB 300 MG/2ML
INJECTION, SOLUTION SUBCUTANEOUS
Qty: 4 ML | Refills: 11 | Status: SHIPPED | OUTPATIENT
Start: 2021-04-09 | End: 2021-07-19

## 2021-04-19 ENCOUNTER — IMMUNIZATION (OUTPATIENT)
Dept: VACCINE CLINIC | Facility: HOSPITAL | Age: 61
End: 2021-04-19

## 2021-04-19 PROCEDURE — 91300 HC SARSCOV02 VAC 30MCG/0.3ML IM: CPT | Performed by: INTERNAL MEDICINE

## 2021-04-19 PROCEDURE — 0001A: CPT | Performed by: INTERNAL MEDICINE

## 2021-04-28 RX ORDER — ESTRADIOL 0.07 MG/D
1 FILM, EXTENDED RELEASE TRANSDERMAL 2 TIMES WEEKLY
Qty: 8 EACH | Refills: 3 | Status: SHIPPED | OUTPATIENT
Start: 2021-04-29 | End: 2021-08-23 | Stop reason: SDUPTHER

## 2021-05-05 RX ORDER — LEVOTHYROXINE SODIUM 0.07 MG/1
75 TABLET ORAL DAILY
Qty: 30 TABLET | Refills: 2 | Status: SHIPPED | OUTPATIENT
Start: 2021-05-05 | End: 2021-08-20 | Stop reason: SDUPTHER

## 2021-05-10 ENCOUNTER — IMMUNIZATION (OUTPATIENT)
Dept: VACCINE CLINIC | Facility: HOSPITAL | Age: 61
End: 2021-05-10

## 2021-05-10 PROCEDURE — 0002A: CPT | Performed by: INTERNAL MEDICINE

## 2021-05-10 PROCEDURE — 91300 HC SARSCOV02 VAC 30MCG/0.3ML IM: CPT | Performed by: INTERNAL MEDICINE

## 2021-05-28 ENCOUNTER — TELEPHONE (OUTPATIENT)
Dept: PULMONOLOGY | Facility: CLINIC | Age: 61
End: 2021-05-28

## 2021-06-01 ENCOUNTER — OFFICE VISIT (OUTPATIENT)
Dept: INTERNAL MEDICINE | Facility: CLINIC | Age: 61
End: 2021-06-01

## 2021-06-01 ENCOUNTER — LAB (OUTPATIENT)
Dept: LAB | Facility: HOSPITAL | Age: 61
End: 2021-06-01

## 2021-06-01 VITALS
OXYGEN SATURATION: 99 % | TEMPERATURE: 96.8 F | HEART RATE: 90 BPM | SYSTOLIC BLOOD PRESSURE: 102 MMHG | DIASTOLIC BLOOD PRESSURE: 64 MMHG | WEIGHT: 223 LBS | RESPIRATION RATE: 18 BRPM | HEIGHT: 67 IN | BODY MASS INDEX: 35 KG/M2

## 2021-06-01 DIAGNOSIS — M54.50 CHRONIC MIDLINE LOW BACK PAIN WITHOUT SCIATICA: ICD-10-CM

## 2021-06-01 DIAGNOSIS — E03.9 ACQUIRED HYPOTHYROIDISM: Primary | ICD-10-CM

## 2021-06-01 DIAGNOSIS — G89.29 CHRONIC MIDLINE LOW BACK PAIN WITHOUT SCIATICA: ICD-10-CM

## 2021-06-01 DIAGNOSIS — E66.09 CLASS 1 OBESITY DUE TO EXCESS CALORIES WITH SERIOUS COMORBIDITY AND BODY MASS INDEX (BMI) OF 34.0 TO 34.9 IN ADULT: ICD-10-CM

## 2021-06-01 DIAGNOSIS — G62.9 NEUROPATHY: ICD-10-CM

## 2021-06-01 LAB — TSH SERPL DL<=0.05 MIU/L-ACNC: 2.48 UIU/ML (ref 0.27–4.2)

## 2021-06-01 PROCEDURE — 84443 ASSAY THYROID STIM HORMONE: CPT | Performed by: INTERNAL MEDICINE

## 2021-06-01 PROCEDURE — 83036 HEMOGLOBIN GLYCOSYLATED A1C: CPT | Performed by: INTERNAL MEDICINE

## 2021-06-01 PROCEDURE — 99214 OFFICE O/P EST MOD 30 MIN: CPT | Performed by: INTERNAL MEDICINE

## 2021-06-01 NOTE — PROGRESS NOTES
Office Note      Date: 2021  Patient Name: Brittny Han  MRN: [id number]  : 1960    Chief Complaint   Patient presents with   • Back Pain   • Hypothyroidism       History of Present Illness: Brittny Han is a 61 y.o. female who presents for Back Pain and Hypothyroidism.     Obesity- unable to lose weight. TSH elevated and we are adjusting synthroid. Has bess and compliant w/ cpap.   Seen by neurosx and no surgery indicated, advised to follow up with neurology. Still having numbness in her hands. Hx of normal EMG/NCS in 2016.   Subjective      Review of Systems:   Pertinent review of systems per HPI.    Review of Systems   Constitutional: Negative for activity change, appetite change, chills, diaphoresis, fatigue, fever and unexpected weight change.   HENT: Negative for congestion, dental problem, drooling, ear discharge, ear pain, facial swelling, hearing loss and mouth sores.    Eyes: Negative for pain, discharge and itching.   Respiratory: Negative for apnea, cough, choking, chest tightness and shortness of breath.    Cardiovascular: Negative for chest pain, palpitations and leg swelling.   Gastrointestinal: Negative for abdominal distention, abdominal pain, blood in stool, constipation and diarrhea.   Endocrine: Negative for cold intolerance, heat intolerance, polydipsia and polyuria.   Genitourinary: Negative for difficulty urinating, dysuria, frequency and hematuria.   Skin: Negative for color change, pallor, rash and wound.   Allergic/Immunologic: Negative for environmental allergies, food allergies and immunocompromised state.   Neurological: Negative for dizziness, weakness and light-headedness.   Psychiatric/Behavioral: Negative for agitation, behavioral problems, confusion, decreased concentration and self-injury. The patient is not nervous/anxious.    All other systems reviewed and are negative.    Allergies   Allergen Reactions   • Percocet [Oxycodone-Acetaminophen]  "Anaphylaxis     Breathing difficulty, tongue swelling, severe    • Brevital Sodium [Methohexital] Paresthesia     CHILLS, SHAKING       Objective     Physical Exam:  Vital Signs:   Vitals:    06/01/21 1538   BP: 102/64   Pulse: 90   Resp: 18   Temp: 96.8 °F (36 °C)   TempSrc: Temporal   SpO2: 99%   Weight: 101 kg (223 lb)   Height: 170.2 cm (67\")      Body mass index is 34.93 kg/m².    Physical Exam  Vitals and nursing note reviewed.   Constitutional:       General: She is not in acute distress.     Appearance: She is well-developed. She is obese.   HENT:      Head: Normocephalic and atraumatic.      Right Ear: External ear normal.      Left Ear: External ear normal.   Eyes:      General:         Right eye: No discharge.         Left eye: No discharge.      Extraocular Movements: Extraocular movements intact.      Conjunctiva/sclera: Conjunctivae normal.   Cardiovascular:      Rate and Rhythm: Normal rate and regular rhythm.      Heart sounds: Normal heart sounds. No murmur heard.   No friction rub. No gallop.    Pulmonary:      Effort: Pulmonary effort is normal. No respiratory distress.      Breath sounds: Normal breath sounds. No wheezing or rales.   Skin:     General: Skin is warm and dry.      Coloration: Skin is not pale.         Assessment / Plan      Assessment & Plan:    1. Acquired hypothyroidism  TSH today normal, continue synthroid 75mcg  - TSH Rfx On Abnormal To Free T4    2. Class 1 obesity due to excess calories with serious comorbidity and body mass index (BMI) of 34.0 to 34.9 in adult  Discussed weight loss and diet.   - Ambulatory Referral to Nutrition Services  - Hemoglobin A1c    3. Neuropathy    - Ambulatory Referral to Neurology    4. Chronic midline low back pain without sciatica  - Ambulatory Referral to Physical Therapy      Mariaelena Banks MD  06/01/2021     Please note that portions of this note may have been completed with a voice recognition program. Efforts were made to edit the " dictations, but occasionally words are mistranscribed.

## 2021-06-02 LAB — HBA1C MFR BLD: 5.5 % (ref 4.8–5.6)

## 2021-06-03 ENCOUNTER — TELEPHONE (OUTPATIENT)
Dept: PULMONOLOGY | Facility: CLINIC | Age: 61
End: 2021-06-03

## 2021-06-03 DIAGNOSIS — J45.50 SEVERE PERSISTENT ASTHMA WITHOUT COMPLICATION: Primary | ICD-10-CM

## 2021-06-14 ENCOUNTER — TREATMENT (OUTPATIENT)
Dept: PHYSICAL THERAPY | Facility: CLINIC | Age: 61
End: 2021-06-14

## 2021-06-14 DIAGNOSIS — M51.36 DDD (DEGENERATIVE DISC DISEASE), LUMBAR: ICD-10-CM

## 2021-06-14 DIAGNOSIS — M54.6 THORACOLUMBAR BACK PAIN: Primary | ICD-10-CM

## 2021-06-14 DIAGNOSIS — M54.50 THORACOLUMBAR BACK PAIN: Primary | ICD-10-CM

## 2021-06-14 DIAGNOSIS — M48.061 SPINAL STENOSIS OF LUMBAR REGION WITHOUT NEUROGENIC CLAUDICATION: ICD-10-CM

## 2021-06-14 PROCEDURE — 97110 THERAPEUTIC EXERCISES: CPT | Performed by: PHYSICAL THERAPIST

## 2021-06-14 PROCEDURE — 97162 PT EVAL MOD COMPLEX 30 MIN: CPT | Performed by: PHYSICAL THERAPIST

## 2021-06-14 NOTE — PROGRESS NOTES
Physical Therapy Initial Evaluation and Plan of Care        Patient: Brittny Han   : 1960  Diagnosis/ICD-10 Code:  Thoracolumbar back pain [M54.5, M54.6]  Referring practitioner: Mariaelena Banks MD  Date of Initial Visit: 2021  Today's Date: 2021  Patient seen for 16 sessions           Subjective Evaluation    History of Present Illness  Mechanism of injury: Lumbar pain started in the past 2 years, no known injuries. Is worsening with time.  Her job does require constant sitting, but does have a stand up desk as well.    CURRENT COMPLAINTS  Constant mid line lower thoracic to sacrum pain with varying intensity. Bilateral LE numbness from knees to feet intermittently.  No bowel/bladder dysfunction.    WORSE: at night sleeping on back or side, prolong sitting (has to have back support), prolong standing, bending, lifting, sit to stand after prolong sitting.  BETTER:  Change of position, moving.        Patient Occupation: Billing Collection for [employer]  Current pain ratin  At best pain ratin  At worst pain ratin             Objective          Postural Observations  Seated posture: good  Standing posture: good    Additional Postural Observation Details  No Shifts  Slight increase in lumbar lordosis    Palpation   Left   Hypertonic in the lumbar paraspinals and quadratus lumborum.     Right   Hypertonic in the lumbar paraspinals and quadratus lumborum.     Tenderness     Lumbar Spine  Tenderness in the spinous process (T10 to S1) and facet joint.     Neurological Testing     Sensation     Lumbar   Left   Intact: light touch and sharp/dull discrimination    Right   Intact: light touch and sharp/dull discrimination    Reflexes   Left   Patellar (L4): normal (2+)  Achilles (S1): normal (2+)    Right   Patellar (L4): normal (2+)  Achilles (S1): normal (2+)    Active Range of Motion     Lumbar   Flexion: 82 degrees with pain  Extension: 32 degrees   Left lateral flexion:  30 degrees   Right lateral flexion: 30 degrees     Strength/Myotome Testing     Lumbar   Left   Normal strength    Right   Normal strength    Tests     Lumbar     Left   Negative passive SLR and quadrant (LBP increased).     Right   Negative passive SLR and quadrant (LBP increased).     Additional Tests Details  Sitting SLR: Full but causes increase in LBP.  RFIS 10x=ROM decreased and pain increased  HALEY 10x=NE  RFIL 10x=decreases LBP  REIL 10x=decreases LBP  Prone Bilateral LE traction: No effect          Assessment & Plan     Assessment  Impairments: activity intolerance and pain with function  Other impairment: Modified Oswestry: 30%  Assessment details: 61 year old female with chronic lower thoracic to sacral pain. She has MRI evidence of DDD with spurring throughout lumbar spine.  Moderate lumbar stenosis at L4/L5.  She does not have radiating symptoms. But lumbar pain can be debilitating at times.  She does not have directional preference.  Unloaded activities tolerated well, but weight bearing activities will reproduce symptoms. She needs ROM in all directions and lumbar stabilization.  Prognosis: fair  Functional Limitations: carrying objects, lifting, sleeping, walking, uncomfortable because of pain, moving in bed, sitting, standing and stooping  Goals  Plan Goals: STG to be met in 4 weeks  1.  Improved lumbar pain so that worst pain is 6/10.  2.  Improved sleeping so that she only wakes up 2x/night with lumbar pain.  3.  Pt is consistent with changing position frequently at work.  4.  Improved function so that Oswestry score improves to 25%.  LTG to be met in 8 weeks  1.  Pt is independent with HEP.  2.  Pt is able to stay on feet up to 2 hours at time.  3.  She can sleep through the night without being awakened by lumbar pain.  4.  Improved function so that Oswestry score improves to 20% or less.      Plan  Therapy options: will be seen for skilled physical therapy services  Planned modality  interventions: ultrasound, traction, high voltage pulsed current (pain management), dry needling and thermotherapy (hydrocollator packs)  Planned therapy interventions: abdominal trunk stabilization, body mechanics training, flexibility, functional ROM exercises, home exercise program, therapeutic activities, stretching, strengthening, spinal/joint mobilization, soft tissue mobilization, postural training, neuromuscular re-education and manual therapy  Frequency: 2x week  Duration in weeks: 8          Therapeutic Exercise:    38     mins  [id number];       Timed Treatment:   38   mins   Total Treatment:     70   mins    PT SIGNATURE: Isiah Roe, PT   DATE TREATMENT INITIATED: 6/14/2021    Initial Certification  Certification Period: 9/12/2021  I certify that the therapy services are furnished while this patient is under my care.  The services outlined above are required by this patient, and will be reviewed every 90 days.     PHYSICIAN: Mariaelena Banks MD      DATE:     Please sign and return via fax to [phone].. Thank you, [facility].

## 2021-06-18 ENCOUNTER — TREATMENT (OUTPATIENT)
Dept: PHYSICAL THERAPY | Facility: CLINIC | Age: 61
End: 2021-06-18

## 2021-06-18 DIAGNOSIS — M54.50 THORACOLUMBAR BACK PAIN: Primary | ICD-10-CM

## 2021-06-18 DIAGNOSIS — M54.6 THORACOLUMBAR BACK PAIN: Primary | ICD-10-CM

## 2021-06-18 PROCEDURE — 97112 NEUROMUSCULAR REEDUCATION: CPT | Performed by: PHYSICAL THERAPIST

## 2021-06-18 PROCEDURE — 97140 MANUAL THERAPY 1/> REGIONS: CPT | Performed by: PHYSICAL THERAPIST

## 2021-06-18 PROCEDURE — 97110 THERAPEUTIC EXERCISES: CPT | Performed by: PHYSICAL THERAPIST

## 2021-06-18 NOTE — PROGRESS NOTES
Physical Therapy Daily Progress Note        Patient: Brittny Han   : 1960  Diagnosis/ICD-10 Code:  Thoracolumbar back pain [M54.5, M54.6]  Referring practitioner: Mariaelena Banks MD  Date of Initial Visit: Type: THERAPY  Noted: 2021  Today's Date: 2021  Patient seen for 2 sessions             Subjective   Brittny Han reports: been having soreness throughout the back from the exercises that lasted about 2 days.     Objective   Hypomobile lower lumbar and middle thoracic  See Exercise, Manual, and Modality Logs for complete treatment.       Assessment/Plan  Soreness improved with manual mobilizations. Pt is hamstring dominant and needed cues to improve gluteus activation.   Progress per Plan of Care and Progress strengthening /stabilization /functional activity           Timed:  Manual Therapy:    10     mins  50480;  Therapeutic Exercise:    25     mins  73096;     Neuromuscular Charley:    10    mins  27875;    Therapeutic Activity:          mins  26471;     Gait Training:           mins  75902;     Ultrasound:          mins  38722;    Electrical Stimulation:         mins  02178;  Iontophoresis          mins  34855    Untimed:  Electrical Stimulation:         mins  28029 ( );  Mechanical Traction:         mins  88202;   Fluidotherapy          mins  76616    Timed Treatment:   45   mins   Total Treatment:     45   mins        Saba Malloy PTA  Physical Therapist Assistant

## 2021-06-22 ENCOUNTER — TREATMENT (OUTPATIENT)
Dept: PHYSICAL THERAPY | Facility: CLINIC | Age: 61
End: 2021-06-22

## 2021-06-22 DIAGNOSIS — M51.36 DDD (DEGENERATIVE DISC DISEASE), LUMBAR: ICD-10-CM

## 2021-06-22 DIAGNOSIS — M54.6 THORACOLUMBAR BACK PAIN: Primary | ICD-10-CM

## 2021-06-22 DIAGNOSIS — M54.50 THORACOLUMBAR BACK PAIN: Primary | ICD-10-CM

## 2021-06-22 DIAGNOSIS — M48.061 SPINAL STENOSIS OF LUMBAR REGION WITHOUT NEUROGENIC CLAUDICATION: ICD-10-CM

## 2021-06-22 PROCEDURE — 97110 THERAPEUTIC EXERCISES: CPT | Performed by: PHYSICAL THERAPIST

## 2021-06-22 PROCEDURE — 97014 ELECTRIC STIMULATION THERAPY: CPT | Performed by: PHYSICAL THERAPIST

## 2021-06-22 PROCEDURE — 97012 MECHANICAL TRACTION THERAPY: CPT | Performed by: PHYSICAL THERAPIST

## 2021-06-22 NOTE — PROGRESS NOTES
Physical Therapy Daily Progress Note        Patient: Brittny Han   : 1960  Diagnosis/ICD-10 Code:  Thoracolumbar back pain [M54.5, M54.6]  Referring practitioner: Mariaelena Banks MD  Date of Initial Visit: Type: THERAPY  Noted: 2021  Today's Date: 2021  Patient seen for 3 sessions           Subjective   Brittny Han reports: bilateral lumbar pain, no LE symptoms, feeling some better with PT. Still a tight pressure in lumbar spine.    Objective   PALPATION: tender mid line L4/L5/S1 tender.  OTHER: mild paraspinal guarding  GAIT: limited trunk rotation  See Exercise, Manual, and Modality Logs for complete treatment.       Assessment/Plan  Seem to tolerate traction well, will have to see it effectiveness as next treatment.   Progress per Plan of Care and Progress strengthening /stabilization /functional activity           Manual Therapy:         mins  02757;  Therapeutic Exercise:    35     mins  18439;     Neuromuscular Charley:        mins  62417;    Therapeutic Activity:          mins  39094;     Gait Training:           mins  33020;     Ultrasound:          mins  91316;    Electrical Stimulation:    20     mins  93334 ( );  Iontophoresis          mins 77400   Traction     20     mins  35134  Fluidotherapy          mins  13009  Dry Needling          mins self-pay  Paraffin          mins  08904    Timed Treatment:   35   mins   Total Treatment:     75   mins    Isiah Roe PT  Physical Therapist

## 2021-06-24 ENCOUNTER — TREATMENT (OUTPATIENT)
Dept: PHYSICAL THERAPY | Facility: CLINIC | Age: 61
End: 2021-06-24

## 2021-06-24 DIAGNOSIS — M54.6 THORACOLUMBAR BACK PAIN: Primary | ICD-10-CM

## 2021-06-24 DIAGNOSIS — M48.061 SPINAL STENOSIS OF LUMBAR REGION WITHOUT NEUROGENIC CLAUDICATION: ICD-10-CM

## 2021-06-24 DIAGNOSIS — M51.36 DDD (DEGENERATIVE DISC DISEASE), LUMBAR: ICD-10-CM

## 2021-06-24 DIAGNOSIS — M54.50 THORACOLUMBAR BACK PAIN: Primary | ICD-10-CM

## 2021-06-24 PROCEDURE — 97014 ELECTRIC STIMULATION THERAPY: CPT | Performed by: PHYSICAL THERAPIST

## 2021-06-24 PROCEDURE — 97012 MECHANICAL TRACTION THERAPY: CPT | Performed by: PHYSICAL THERAPIST

## 2021-06-24 PROCEDURE — 97110 THERAPEUTIC EXERCISES: CPT | Performed by: PHYSICAL THERAPIST

## 2021-06-28 NOTE — PROGRESS NOTES
Physical Therapy Daily Progress Note        Patient: Brittny Han   : 1960  Diagnosis/ICD-10 Code:  Thoracolumbar back pain [M54.5, M54.6]  Referring practitioner: Mariaelena Banks MD  Date of Initial Visit: Type: THERAPY  Noted: 2021  Today's Date: 2021  Patient seen for 4 sessions           Subjective   Brittny Han reports: the traction very helpful, significantly reduced the LBP and feels it helped her to move  Better.    Objective   PALPATION: Tender along spine lower thoracic to lumbar/sacral area.  OTHER: spinal distraction decreases discomfort.  See Exercise, Manual, and Modality Logs for complete treatment.       Assessment/Plan  Pt responded very well to lumbar traction, needs to be continued. Improving with rehab.  Progress per Plan of Care and Progress strengthening /stabilization /functional activity           Manual Therapy:         mins  36449;  Therapeutic Exercise:    35     mins  92321;     Neuromuscular Charley:        mins  68095;    Therapeutic Activity:          mins  63029;     Gait Training:           mins  28893;     Ultrasound:          mins  31196;    Electrical Stimulation:    20     mins  91069 ( );  Iontophoresis          mins 02269   Traction     20     mins  24261  Fluidotherapy          mins  32129  Dry Needling          mins self-pay  Paraffin          mins  62024    Timed Treatment:   35   mins   Total Treatment:     75   mins    Isiah Roe PT  Physical Therapist

## 2021-06-30 ENCOUNTER — HOSPITAL ENCOUNTER (OUTPATIENT)
Dept: NUTRITION | Facility: HOSPITAL | Age: 61
Setting detail: RECURRING SERIES
Discharge: HOME | End: 2021-06-30

## 2021-06-30 VITALS — BODY MASS INDEX: 35 KG/M2 | HEIGHT: 67 IN | WEIGHT: 223 LBS

## 2021-06-30 PROCEDURE — 97804 MEDICAL NUTRITION GROUP: CPT

## 2021-07-01 ENCOUNTER — TREATMENT (OUTPATIENT)
Dept: PHYSICAL THERAPY | Facility: CLINIC | Age: 61
End: 2021-07-01

## 2021-07-01 DIAGNOSIS — M54.6 THORACOLUMBAR BACK PAIN: Primary | ICD-10-CM

## 2021-07-01 DIAGNOSIS — M54.50 THORACOLUMBAR BACK PAIN: Primary | ICD-10-CM

## 2021-07-01 PROCEDURE — 97012 MECHANICAL TRACTION THERAPY: CPT | Performed by: PHYSICAL THERAPIST

## 2021-07-01 PROCEDURE — 97140 MANUAL THERAPY 1/> REGIONS: CPT | Performed by: PHYSICAL THERAPIST

## 2021-07-01 PROCEDURE — 97110 THERAPEUTIC EXERCISES: CPT | Performed by: PHYSICAL THERAPIST

## 2021-07-01 PROCEDURE — 97014 ELECTRIC STIMULATION THERAPY: CPT | Performed by: PHYSICAL THERAPIST

## 2021-07-01 RX ORDER — VENLAFAXINE HYDROCHLORIDE 75 MG/1
75 CAPSULE, EXTENDED RELEASE ORAL DAILY
Qty: 90 CAPSULE | Refills: 1 | Status: CANCELLED | OUTPATIENT
Start: 2021-07-01

## 2021-07-01 NOTE — PROGRESS NOTES
Physical Therapy Daily Progress Note        Patient: Brittny Han   : 1960  Diagnosis/ICD-10 Code:  Thoracolumbar back pain [M54.5, M54.6]  Referring practitioner: Mariaelena Banks MD  Date of Initial Visit: Type: THERAPY  Noted: 2021  Today's Date: 2021  Patient seen for 5 sessions             Subjective   Brittny Han reports: still dealing with the pressure pain at the bra line but doesn't feel like deep rubbing anymore.     Objective   Hypomobile upper to mid thoracic  See Exercise, Manual, and Modality Logs for complete treatment.       Assessment/Plan  Spine mobility is still lacking possibly contributing to lingering pain. Traction continues to help symptoms combine with manual PA mobilizations.   Progress per Plan of Care and Progress strengthening /stabilization /functional activity           Timed:  Manual Therapy:    10     mins  22191;  Therapeutic Exercise:    17     mins  12474;     Neuromuscular Charley:        mins  79707;    Therapeutic Activity:          mins  52060;     Gait Training:           mins  22410;     Ultrasound:          mins  44276;    Electrical Stimulation:         mins  64610;  Iontophoresis          mins  78182    Untimed:  Electrical Stimulation:    20     mins  74742 ( );  Mechanical Traction:    20     mins  82326;   Fluidotherapy          mins  42757    Timed Treatment:   27   mins   Total Treatment:     67   mins        Saba Malloy PTA  Physical Therapist Assistant

## 2021-07-08 ENCOUNTER — TREATMENT (OUTPATIENT)
Dept: PHYSICAL THERAPY | Facility: CLINIC | Age: 61
End: 2021-07-08

## 2021-07-08 DIAGNOSIS — M54.50 THORACOLUMBAR BACK PAIN: Primary | ICD-10-CM

## 2021-07-08 DIAGNOSIS — M54.6 THORACOLUMBAR BACK PAIN: Primary | ICD-10-CM

## 2021-07-08 PROCEDURE — 97014 ELECTRIC STIMULATION THERAPY: CPT | Performed by: PHYSICAL THERAPIST

## 2021-07-08 PROCEDURE — 97012 MECHANICAL TRACTION THERAPY: CPT | Performed by: PHYSICAL THERAPIST

## 2021-07-08 PROCEDURE — 97110 THERAPEUTIC EXERCISES: CPT | Performed by: PHYSICAL THERAPIST

## 2021-07-08 RX ORDER — HYDROCHLOROTHIAZIDE 25 MG/1
25 TABLET ORAL DAILY
Qty: 90 TABLET | Refills: 1 | Status: SHIPPED | OUTPATIENT
Start: 2021-07-08 | End: 2021-12-27 | Stop reason: SDUPTHER

## 2021-07-08 RX ORDER — VENLAFAXINE HYDROCHLORIDE 75 MG/1
75 CAPSULE, EXTENDED RELEASE ORAL DAILY
Qty: 90 CAPSULE | Refills: 1 | Status: SHIPPED | OUTPATIENT
Start: 2021-07-08 | End: 2021-12-27 | Stop reason: SDUPTHER

## 2021-07-08 RX ORDER — HYDROCHLOROTHIAZIDE 25 MG/1
25 TABLET ORAL DAILY
Qty: 90 TABLET | Refills: 1 | OUTPATIENT
Start: 2021-07-08

## 2021-07-08 NOTE — PROGRESS NOTES
Physical Therapy Daily Progress Note        Patient: Brittny Han   : 1960  Diagnosis/ICD-10 Code:  Thoracolumbar back pain [M54.5, M54.6]  Referring practitioner: Mariaelena Banks MD  Date of Initial Visit: Type: THERAPY  Noted: 2021  Today's Date: 2021  Patient seen for 6 sessions             Subjective   Brittny Han reports: seems to be managing the pain a little better but still noticeable at 3/10 pain in mid back. Trying to stay on top of the exercises but needs a refresher.     Objective   Poor inner core stabilization.   See Exercise, Manual, and Modality Logs for complete treatment.       Assessment/Plan  Pt fatigues quickly with core stabilization activities. Continuing to provide traction post exercise to reduce inflammation. Went back over exercises for home with cues needed on form.   Progress per Plan of Care and Progress strengthening /stabilization /functional activity           Timed:  Manual Therapy:         mins  63143;  Therapeutic Exercise:    25     mins  85674;     Neuromuscular Charley:        mins  50301;    Therapeutic Activity:          mins  28640;     Gait Training:           mins  41363;     Ultrasound:          mins  61759;    Electrical Stimulation:         mins  84029;  Iontophoresis          mins  57246    Untimed:  Electrical Stimulation:    20     mins  46140 ( );  Mechanical Traction:   20      mins  81426;   Fluidotherapy          mins  09181    Timed Treatment:   25   mins   Total Treatment:     65   mins        Saba Malloy PTA  Physical Therapist Assistant

## 2021-07-13 ENCOUNTER — TREATMENT (OUTPATIENT)
Dept: PHYSICAL THERAPY | Facility: CLINIC | Age: 61
End: 2021-07-13

## 2021-07-13 DIAGNOSIS — M54.6 THORACOLUMBAR BACK PAIN: Primary | ICD-10-CM

## 2021-07-13 DIAGNOSIS — M54.50 THORACOLUMBAR BACK PAIN: Primary | ICD-10-CM

## 2021-07-13 PROCEDURE — 97012 MECHANICAL TRACTION THERAPY: CPT | Performed by: PHYSICAL THERAPIST

## 2021-07-13 PROCEDURE — 97110 THERAPEUTIC EXERCISES: CPT | Performed by: PHYSICAL THERAPIST

## 2021-07-13 PROCEDURE — 97530 THERAPEUTIC ACTIVITIES: CPT | Performed by: PHYSICAL THERAPIST

## 2021-07-13 NOTE — PROGRESS NOTES
Physical Therapy Daily Progress Note        Patient: Brittny Han   : 1960  Diagnosis/ICD-10 Code:  Thoracolumbar back pain [M54.5, M54.6]  Referring practitioner: Mariaelena Banks MD  Date of Initial Visit: Type: THERAPY  Noted: 2021  Today's Date: 2021  Patient seen for 7 sessions             Subjective   Brittny Han reports: seems like she is rounding the corner and the back pain is at a minimal now.    Objective   Thoracolumbar mobility is normal  See Exercise, Manual, and Modality Logs for complete treatment.       Assessment/Plan  Pt was progressed in strengthening as the spine is returning to normal mobility and pain is decreasing with treatment.   Progress per Plan of Care and Progress strengthening /stabilization /functional activity           Timed:  Manual Therapy:         mins  71059;  Therapeutic Exercise:    33    mins  67817;     Neuromuscular Charley:        mins  32756;    Therapeutic Activity:     8     mins  58667;     Gait Training:           mins  23981;     Ultrasound:          mins  89245;    Electrical Stimulation:         mins  19546;  Iontophoresis          mins  94459    Untimed:  Electrical Stimulation:         mins  54706 ( );  Mechanical Traction:    20     mins  01130;   Fluidotherapy          mins  53890    Timed Treatment:   41   mins   Total Treatment:     61   mins        Saba Malloy PTA  Physical Therapist Assistant

## 2021-07-15 ENCOUNTER — TREATMENT (OUTPATIENT)
Dept: PHYSICAL THERAPY | Facility: CLINIC | Age: 61
End: 2021-07-15

## 2021-07-15 DIAGNOSIS — M54.50 THORACOLUMBAR BACK PAIN: Primary | ICD-10-CM

## 2021-07-15 DIAGNOSIS — M54.6 THORACOLUMBAR BACK PAIN: Primary | ICD-10-CM

## 2021-07-15 PROCEDURE — 97012 MECHANICAL TRACTION THERAPY: CPT | Performed by: PHYSICAL THERAPIST

## 2021-07-15 PROCEDURE — 97110 THERAPEUTIC EXERCISES: CPT | Performed by: PHYSICAL THERAPIST

## 2021-07-15 NOTE — PROGRESS NOTES
Physical Therapy Daily Progress Note        Patient: Brittny Han   : 1960  Diagnosis/ICD-10 Code:  Thoracolumbar back pain [M54.5, M54.6]  Referring practitioner: Mariaelena Banks MD  Date of Initial Visit: Type: THERAPY  Noted: 2021  Today's Date: 7/15/2021  Patient seen for 8 sessions             Subjective   Brittny Han reports: had some soreness after last visit but went away. Pain is 7/10 today after work day.    Objective   See Exercise, Manual, and Modality Logs for complete treatment.       Assessment/Plan  Pt continues to decrease in pain to 1/10 with treatment but have not been able to maintain low levels of pain consistently during work hours yet. Hoping to continue to increase core stability exercises.  Progress per Plan of Care and Progress strengthening /stabilization /functional activity           Timed:  Manual Therapy:         mins  80651;  Therapeutic Exercise:    32     mins  17915;     Neuromuscular Charley:        mins  80515;    Therapeutic Activity:          mins  83491;     Gait Training:           mins  63255;     Ultrasound:          mins  99071;    Electrical Stimulation:         mins  33188;  Iontophoresis          mins  00172    Untimed:  Electrical Stimulation:         mins  72950 (MC );  Mechanical Traction:    30     mins  91785;   Fluidotherapy          mins  18823    Timed Treatment:   32   mins   Total Treatment:     62   mins        Saba Malloy PTA  Physical Therapist Assistant

## 2021-07-19 ENCOUNTER — LAB (OUTPATIENT)
Dept: LAB | Facility: HOSPITAL | Age: 61
End: 2021-07-19

## 2021-07-19 ENCOUNTER — OFFICE VISIT (OUTPATIENT)
Dept: NEUROLOGY | Facility: CLINIC | Age: 61
End: 2021-07-19

## 2021-07-19 VITALS
SYSTOLIC BLOOD PRESSURE: 122 MMHG | BODY MASS INDEX: 35.63 KG/M2 | DIASTOLIC BLOOD PRESSURE: 80 MMHG | HEIGHT: 67 IN | HEART RATE: 82 BPM | WEIGHT: 227 LBS | OXYGEN SATURATION: 92 %

## 2021-07-19 DIAGNOSIS — R20.0 NUMBNESS AND TINGLING OF BOTH FEET: Primary | ICD-10-CM

## 2021-07-19 DIAGNOSIS — R20.2 NUMBNESS AND TINGLING IN BOTH HANDS: ICD-10-CM

## 2021-07-19 DIAGNOSIS — R20.2 NUMBNESS AND TINGLING OF BOTH FEET: Primary | ICD-10-CM

## 2021-07-19 DIAGNOSIS — M54.2 CHRONIC NECK AND BACK PAIN: ICD-10-CM

## 2021-07-19 DIAGNOSIS — R20.0 NUMBNESS AND TINGLING IN BOTH HANDS: ICD-10-CM

## 2021-07-19 DIAGNOSIS — M54.9 CHRONIC NECK AND BACK PAIN: ICD-10-CM

## 2021-07-19 DIAGNOSIS — G89.29 CHRONIC NECK AND BACK PAIN: ICD-10-CM

## 2021-07-19 DIAGNOSIS — R20.2 NUMBNESS AND TINGLING OF BOTH FEET: ICD-10-CM

## 2021-07-19 DIAGNOSIS — R20.0 NUMBNESS AND TINGLING OF BOTH FEET: ICD-10-CM

## 2021-07-19 LAB
ERYTHROCYTE [SEDIMENTATION RATE] IN BLOOD: 16 MM/HR (ref 0–30)
FOLATE SERPL-MCNC: 17.7 NG/ML (ref 4.78–24.2)
VIT B12 BLD-MCNC: 655 PG/ML (ref 211–946)

## 2021-07-19 PROCEDURE — 84425 ASSAY OF VITAMIN B-1: CPT

## 2021-07-19 PROCEDURE — 82607 VITAMIN B-12: CPT

## 2021-07-19 PROCEDURE — 82553 CREATINE MB FRACTION: CPT

## 2021-07-19 PROCEDURE — 82550 ASSAY OF CK (CPK): CPT

## 2021-07-19 PROCEDURE — 36415 COLL VENOUS BLD VENIPUNCTURE: CPT

## 2021-07-19 PROCEDURE — 99215 OFFICE O/P EST HI 40 MIN: CPT | Performed by: NURSE PRACTITIONER

## 2021-07-19 PROCEDURE — 84207 ASSAY OF VITAMIN B-6: CPT

## 2021-07-19 PROCEDURE — 83520 IMMUNOASSAY QUANT NOS NONAB: CPT

## 2021-07-19 PROCEDURE — 82784 ASSAY IGA/IGD/IGG/IGM EACH: CPT

## 2021-07-19 PROCEDURE — 84155 ASSAY OF PROTEIN SERUM: CPT

## 2021-07-19 PROCEDURE — 86256 FLUORESCENT ANTIBODY TITER: CPT

## 2021-07-19 PROCEDURE — 86431 RHEUMATOID FACTOR QUANT: CPT

## 2021-07-19 PROCEDURE — 86038 ANTINUCLEAR ANTIBODIES: CPT

## 2021-07-19 PROCEDURE — 86140 C-REACTIVE PROTEIN: CPT

## 2021-07-19 PROCEDURE — 86334 IMMUNOFIX E-PHORESIS SERUM: CPT

## 2021-07-19 PROCEDURE — 82746 ASSAY OF FOLIC ACID SERUM: CPT

## 2021-07-19 PROCEDURE — 86160 COMPLEMENT ANTIGEN: CPT

## 2021-07-19 PROCEDURE — 83921 ORGANIC ACID SINGLE QUANT: CPT

## 2021-07-19 PROCEDURE — 84165 PROTEIN E-PHORESIS SERUM: CPT

## 2021-07-19 PROCEDURE — 85652 RBC SED RATE AUTOMATED: CPT

## 2021-07-19 NOTE — PROGRESS NOTES
"Subjective:     Patient ID: Brittny Han is a 61 y.o. female.    CC:   Chief Complaint   Patient presents with   • neuropathy       HPI:   History of Present Illness   This is a 61-year-old female who presents to establish care with neurology for numbness and tingling in her hands and feet which has been present since around 2017.  She did have initial neurological work-up with Dr. Zachary Yoo in 2016 with a EMG and NCVS of lower extremities which were normal.  She was then evaluated by him in May 2017 and had an MRI of the brain and cervical spine with and without contrast which he read as normal.  Labs for inflammatory and hypercoagulable state were normal.  He placed her on gabapentin and she tells me \"this made me feel strange\".  She tells me she did not continue the medication.  She also had a another EMG and NCVS on 1/30/2019 referred for neuropathy in the balls of her feet by her podiatrist and this was again found to be normal.  She tells me she has been evaluated in March 2021 for neck and low back pain by Dr. Jose Veras neurosurgery with [facility], [place].  MRI of the cervical spine did show degenerative disc changes with moderate spinal canal stenosis without radicular or myelopathic symptoms.  MRI of the lumbar spine was also completed in March 2021 and showed minimal degenerative disc changes.  She has actually been going to physical therapy and has seen significant improvement in her symptoms.  No surgical intervention was recommended.  She does tell me that she continues to get some numbness and tingling in her hands and feet but the hands have improved considerably.  She has been going to physical therapy and they are doing traction, TENS unit and massage and this has been great for her.  She does have some mild to moderate discomfort.  She would be willing to try a compounded neuro cream before trying oral medication.  She was prediabetic initially but recent hemoglobin A1c was 5.5%.  She tells me " that her symptoms are worse with walking, sitting and standing.  This does get worse in the evenings and when she wakes up in the mornings.  She does feel that stretches have been helpful for her hands and has been diagnosed with carpal tunnel syndrome and occasionally wears wrist splints.  She denies any weakness.  Vitamin D, vitamin B12 and thyroid levels have recently been normal.  She does not consume regular alcohol.  No one else in her family has neuropathy that she is aware of.  She feels that maybe her numbness and tingling in her hands and feet could be possibly from a blood flow issue or from her neck and feet.  She was referred by her primary care provider for further evaluation. She tells me her podiatrist has treated her for plantar's fascitis.    The following portions of the patient's history were reviewed and updated as appropriate: allergies, current medications, past family history, past medical history, past social history, past surgical history and problem list.    Past Medical History:   Diagnosis Date   • Allergic rhinitis    • Anemia    • Anxiety    • Asthma     MODERATE, PERSISTANT   • BCC (basal cell carcinoma) 2000    RIGHT EYELID   • Bowel habit changes    • BPPV (benign paroxysmal positional vertigo) 01/2020   • Bronchitis    • Cat bite of left hand 05/16/2020    SEEN AT [facility]   • Chronic bilateral low back pain with bilateral sciatica    • Chronic idiopathic constipation    • Colon polyps     FOLLOWED BY DR. LORENA RICO   • COPD (chronic obstructive pulmonary disease) (CMS/HCC)    • CTS (carpal tunnel syndrome) 2010    BILATERAL, Wear wrist braces at night   • Demyelinating disease (CMS/[facility])    • Depression    • Disease of thyroid gland     HYPOTHYROIDISM   • DINERO (dyspnea on exertion) 06/2019   • Essential hypertension    • Fatigue    • Gastroenteritis 02/2020   • GERD (gastroesophageal reflux disease)    • Head injury when I was 10    concussion   • Hemorrhoids    • Hepatomegaly  11/2018   • History of echocardiogram 05/31/2011    mild ascending aortic root dilatation; mod RVC enlargement; global left EF 0.72 (normal 0.55-0.75) mild concentric LV hypertrophy; trace MR; mild TR    • History of PFTs 02/02/2016    good effort; normal lung volumes   • Hyperlipidemia    • Hypertrophy, nasal, turbinate    • Midline cystocele    • Neuropathy of both feet 01/2019    FOLLOWED BY DR. VARSHA IRELAND   • ALANNA on CPAP 2007    Followed by Jin Cat @ [facility]   • Peptic ulceration     PUD   • Periodic headache syndrome, not intractable    • Plantar fasciitis, left 01/2019    FOLLOWED BY DR. VARSHA IRELAND   • Rectal bleeding 08/2020   • Rectal bleeding    • Rectal mass 08/2020   • RLS (restless legs syndrome) 06/2017   • Shortness of breath    • Strain of thoracic region 04/2017   • KALYAN (stress urinary incontinence, female)    • Uterine fibroid    • Vision loss 2008    wear glasses   • Vitamin B 12 deficiency    • Vitamin D deficiency        Past Surgical History:   Procedure Laterality Date   • BLADDER SLING MODIFIED, ANTERIOR AND POSTERIOR VAGINAL REPAIR  2011   • BREAST EXCISIONAL BIOPSY Left 1981   • BREAST LUMPECTOMY Left    • COLONOSCOPY N/A 2019    Dr. Sammy Lockwood [facility].   • COLONOSCOPY N/A 9/23/2020    2 TUBULAR ADENOMA POLYPS IN TRANSVERSE, 6 MM TUBULAR ADENOMA POLYP IN DESCENDING, 3 TUBULAR ADENOMA POLYPS IN SIGMOID, ANAL FISSURE, RESCOPE IN 3 YRS, DR. LORENA RICO AT [facility]   • COLONOSCOPY W/ POLYPECTOMY N/A 09/14/2012    HEMORRHOIDS, 2 TUBULAR ADENOMA POLYPS IN SIGMOID, 5 TUBULAR ADENOMA POLYPS IN RECTUM, SPASTIC COLON IN DESCENDING AND SIGMOID, RESCOPE IN 5 YRS, DR. SAMMY REICH AT [place]   • COLONOSCOPY W/ POLYPECTOMY N/A 10/16/2017    TUBULAR ADENOMA POLYP IN LEFT COLON, DR. RACHANA GAYLE AT [place]   • HYSTERECTOMY N/A 10/24/2011    LAPAROSCOPIC, WITH BILATERAL SALPINGECTOMY, DR. ROSITA MATA AT [place]   • SKIN CANCER EXCISION Right 2000    BCC ON RIGHT EYELID   • SKIN CANCER EXCISION  Right 2002    BCC OF RIGHT EYE   • TRANSVAGINAL TAPING SUSPENSION N/A 10/24/2011    WITH CYSTOSCOPY, DR. ROSITA MATA AT [facility]       Social History     Socioeconomic History   • Marital status: Single     Spouse name: Not on file   • Number of children: Not on file   • Years of education: Not on file   • Highest education level: Not on file   Tobacco Use   • Smoking status: Never Smoker   • Smokeless tobacco: Never Used   Vaping Use   • Vaping Use: Never used   Substance and Sexual Activity   • Alcohol use: Yes     Alcohol/week: 2.0 standard drinks     Types: 2 Glasses of wine per week     Comment: 2 glasses of wine about 4 x's per year   • Drug use: No   • Sexual activity: Not Currently     Partners: Male     Birth control/protection: Abstinence     Comment: single       Family History   Problem Relation Age of Onset   • Heart disease Mother    • Hypertension Mother    • Sleep apnea Mother    • Hyperlipidemia Mother    • Heart attack Mother    • COPD Father         Father is alive at age 80 with COPD and asthma   • Asthma Father    • Basal cell carcinoma Father    • Cancer Father    • Hyperlipidemia Father    • Ulcerative colitis Other    • Crohn's disease Other    • Cancer Niece    • Melanoma Niece    • Narcolepsy Sister    • Sleep apnea Sister    • Diabetes Brother    • Lung cancer Paternal Grandmother    • Heart disease Paternal Grandfather    • Breast cancer Neg Hx    • Ovarian cancer Neg Hx         Review of Systems   Constitutional: Negative for chills, fatigue, fever and unexpected weight change.   HENT: Negative for ear pain, hearing loss, nosebleeds, rhinorrhea and sore throat.    Eyes: Negative for photophobia, pain, discharge, itching and visual disturbance.   Respiratory: Negative for cough, chest tightness, shortness of breath and wheezing.    Cardiovascular: Negative for chest pain, palpitations and leg swelling.   Gastrointestinal: Negative for abdominal pain, blood in stool, constipation, diarrhea, nausea  "and vomiting.   Genitourinary: Negative for dysuria, frequency, hematuria and urgency.   Musculoskeletal: Positive for back pain and neck pain. Negative for arthralgias, gait problem, joint swelling, myalgias and neck stiffness.   Skin: Negative for rash and wound.   Allergic/Immunologic: Negative for environmental allergies and food allergies.   Neurological: Positive for numbness. Negative for dizziness, tremors, seizures, syncope, speech difficulty, weakness, light-headedness and headaches.   Hematological: Negative for adenopathy. Does not bruise/bleed easily.   Psychiatric/Behavioral: Negative for agitation, confusion, decreased concentration, hallucinations, sleep disturbance and suicidal ideas. The patient is not nervous/anxious.    All other systems reviewed and are negative.       Objective:  /80   Pulse 82   Ht 170.2 cm (67\")   Wt 103 kg (227 lb)   LMP  (LMP Unknown)   SpO2 92%   BMI 35.55 kg/m²     Neurologic Exam     Mental Status   Oriented to person, place, and time.   Registration: recalls 3 of 3 objects. Recall at 5 minutes: recalls 3 of 3 objects. Follows 3 step commands.   Attention: normal. Concentration: normal.   Speech: speech is normal   Level of consciousness: alert  Knowledge: good and consistent with education. Able to perform simple calculations.   Able to name object. Able to read. Able to repeat. Able to write. Normal comprehension.     Cranial Nerves   Cranial nerves II through XII intact.     Motor Exam   Muscle bulk: normal  Overall muscle tone: normal    Strength   Strength 5/5 throughout.     Sensory Exam   Light touch normal.   Vibration normal.   Proprioception normal.   Pinprick normal.     Mild tinel's bilateral wrists/negative phalen's     Gait, Coordination, and Reflexes     Gait  Gait: (antalgia/wide based)    Coordination   Romberg: negative  Finger to nose coordination: normal  Heel to shin coordination: normal  Tandem walking coordination: normal    Tremor "   Resting tremor: absent  Intention tremor: absent  Action tremor: absent    Reflexes   Right brachioradialis: 2+  Left brachioradialis: 2+  Right biceps: 2+  Left biceps: 2+  Right patellar: 1+  Left patellar: 1+  Right achilles: 1+  Left achilles: 1+  Right : 2+  Left : 2+  Right plantar: normal  Left plantar: normal  Right Castaneda: absent  Left Castaneda: absent  Right ankle clonus: absent  Left ankle clonus: absent      Physical Exam  Constitutional:       Appearance: Normal appearance.   Cardiovascular:      Rate and Rhythm: Normal rate and regular rhythm.      Pulses:           Radial pulses are 2+ on the right side and 2+ on the left side.        Dorsalis pedis pulses are 2+ on the right side and 2+ on the left side.        Posterior tibial pulses are 2+ on the right side and 2+ on the left side.      Heart sounds: Normal heart sounds, S1 normal and S2 normal.   Pulmonary:      Effort: Pulmonary effort is normal.      Breath sounds: Normal breath sounds.   Musculoskeletal:      Cervical back: No edema, erythema, signs of trauma, rigidity, torticollis or crepitus. Pain with movement and muscular tenderness present. No spinous process tenderness. Decreased range of motion.      Lumbar back: Tenderness present. No swelling, edema, deformity, signs of trauma, lacerations, spasms or bony tenderness. Decreased range of motion. Negative right straight leg raise test and negative left straight leg raise test. No scoliosis.      Right lower le+ Edema present.      Left lower le+ Edema present.   Feet:      Right foot:      Protective Sensation: 6 sites tested. 6 sites sensed.      Skin integrity: Skin integrity normal.      Left foot:      Protective Sensation: 6 sites tested. 6 sites sensed.      Skin integrity: Skin integrity normal.   Neurological:      Mental Status: She is alert and oriented to person, place, and time.      Coordination: Finger-Nose-Finger Test, Heel to Shin Test and Romberg Test  normal.      Gait: Tandem walk normal.      Deep Tendon Reflexes: Strength normal.      Reflex Scores:       Bicep reflexes are 2+ on the right side and 2+ on the left side.       Brachioradialis reflexes are 2+ on the right side and 2+ on the left side.       Patellar reflexes are 1+ on the right side and 1+ on the left side.       Achilles reflexes are 1+ on the right side and 1+ on the left side.  Psychiatric:         Mood and Affect: Mood and affect normal.         Speech: Speech normal.         Behavior: Behavior normal.         Thought Content: Thought content normal.         Cognition and Memory: Cognition and memory normal.         Judgment: Judgment normal.         Assessment/Plan:       Diagnoses and all orders for this visit:    1. Numbness and tingling of both feet (Primary)  -     ALEJANDRO + PE; Future  -     Methylmalonic Acid, Serum; Future  -     Vitamin B12 & Folate; Future  -     C-reactive Protein; Future  -     Sedimentation Rate; Future  -     CK Total & CKMB; Future  -     C3+C4+BETH+RA; Future  -     ANCA Panel; Future  -     Vitamin B6; Future  -     Vitamin B1, Whole Blood; Future  -     EMG & Nerve Conduction Test; Future    2. Numbness and tingling in both hands  -     ALEJANDRO + PE; Future  -     Methylmalonic Acid, Serum; Future  -     Vitamin B12 & Folate; Future  -     C-reactive Protein; Future  -     Sedimentation Rate; Future  -     CK Total & CKMB; Future  -     C3+C4+BETH+RA; Future  -     ANCA Panel; Future  -     Vitamin B6; Future  -     Vitamin B1, Whole Blood; Future  -     EMG & Nerve Conduction Test; Future    3. Chronic neck and back pain  Comments:  continue with PT-she is improving         Total time of visit today 40 minutes spent reviewing prior neuro and neurosurgery notes, PCP notes, EMG/NCVS, obtaining history from patient, completing exam, discussion plan of care moving forward and documentation of visit. We will complete total neuropathy panel in full for thoroughness. We  will repeat EMG/NCVS Upper/Lower. She is improving with PT so this should continue. She also has some CTS-recommend wearing wrist splints at bedtime. I am unsure the cause of her neuropathy symptoms but may very well be idiopathic. She will try compounded neuro plus cream through [facility] first. If not effective at follow up in 3 months, consider low dose amitriptyline or pregabalin. F/U in 3 months or sooner if needed. Reviewed neurosurgery and prior neurology notes, PT/PTA notes and PCP notes.  Reviewed medications, potential side effects and signs and symptoms to report. Discussed risk versus benefits of treatment plan with patient and/or family-including medications, labs and radiology that may be ordered. Addressed questions and concerns during visit. Patient and/or family verbalized understanding and agree with plan.    AS THE PROVIDER, I PERSONALLY WORE PPE DURING ENTIRE FACE TO FACE ENCOUNTER IN CLINIC WITH THE PATIENT. PATIENT ALSO WORE PPE DURING ENTIRE FACE TO FACE ENCOUNTER EXCEPT FOR A MAX OF 30 SECONDS DURING NEUROLOGICAL EVALUATION OF CRANIAL NERVES AND THEN MASK WAS PLACED BACK OVER PATIENT FACE FOR REMAINDER OF VISIT. I WASHED MY HANDS BEFORE AND AFTER VISIT.    During this visit the following were done:  Labs Reviewed [x]    Labs Ordered [x]    Radiology Reports Reviewed [x]    Radiology Ordered [x]    PCP Records Reviewed [x]    Referring Provider Records Reviewed [x]    ER Records Reviewed []    Hospital Records Reviewed []    History Obtained From Family []    Radiology Images Reviewed [x]    Other Reviewed [x]    Records Requested []      Shani [place], BLANCA  7/19/2021

## 2021-07-20 ENCOUNTER — TREATMENT (OUTPATIENT)
Dept: PHYSICAL THERAPY | Facility: CLINIC | Age: 61
End: 2021-07-20

## 2021-07-20 DIAGNOSIS — M54.6 THORACOLUMBAR BACK PAIN: Primary | ICD-10-CM

## 2021-07-20 DIAGNOSIS — M54.50 THORACOLUMBAR BACK PAIN: Primary | ICD-10-CM

## 2021-07-20 LAB
CK MB SERPL-CCNC: 1.53 NG/ML
CK SERPL-CCNC: 157 U/L (ref 20–180)
CRP SERPL-MCNC: <0.3 MG/DL (ref 0–0.5)

## 2021-07-20 PROCEDURE — 97112 NEUROMUSCULAR REEDUCATION: CPT | Performed by: PHYSICAL THERAPIST

## 2021-07-20 PROCEDURE — 97140 MANUAL THERAPY 1/> REGIONS: CPT | Performed by: PHYSICAL THERAPIST

## 2021-07-20 PROCEDURE — 97110 THERAPEUTIC EXERCISES: CPT | Performed by: PHYSICAL THERAPIST

## 2021-07-20 NOTE — PROGRESS NOTES
Physical Therapy Daily Progress Note        Patient: Brittny Han   : 1960  Diagnosis/ICD-10 Code:  Thoracolumbar back pain [M54.5, M54.6]  Referring practitioner: Mariaelena Banks MD  Date of Initial Visit: Type: THERAPY  Noted: 2021  Today's Date: 2021  Patient seen for 9 sessions             Subjective   Brittny Han reports: had more pain than usual since starting PT this past weekend. The exercises didn't seem to help as much.     Objective   Thoracic Rotation:  R- 25%  L- 50%  See Exercise, Manual, and Modality Logs for complete treatment.       Assessment/Plan  Pt is limited in mobility in the transverse plane but improved with ROM and strengthening in the transverse plane. Pt left with no pain with no traction. Updated HEP.  Progress per Plan of Care and Progress strengthening /stabilization /functional activity           Timed:  Manual Therapy:    10     mins  98732;  Therapeutic Exercise:    32     mins  51967;     Neuromuscular Charley:    15    mins  41915;    Therapeutic Activity:          mins  11390;     Gait Training:           mins  72925;     Ultrasound:          mins  79561;    Electrical Stimulation:         mins  20678;  Iontophoresis          mins  32723    Untimed:  Electrical Stimulation:         mins  30174 ( );  Mechanical Traction:         mins  59336;   Fluidotherapy          mins  27834    Timed Treatment:   57   mins   Total Treatment:     57   mins        Saba Malloy PTA  Physical Therapist Assistant

## 2021-07-21 ENCOUNTER — TELEPHONE (OUTPATIENT)
Dept: NEUROLOGY | Facility: CLINIC | Age: 61
End: 2021-07-21

## 2021-07-21 LAB
ALBUMIN SERPL ELPH-MCNC: 4.2 G/DL (ref 2.9–4.4)
ALBUMIN/GLOB SERPL: 1.4 {RATIO} (ref 0.7–1.7)
ALPHA1 GLOB SERPL ELPH-MCNC: 0.2 G/DL (ref 0–0.4)
ALPHA2 GLOB SERPL ELPH-MCNC: 0.6 G/DL (ref 0.4–1)
ANA SER QL: NEGATIVE
B-GLOBULIN SERPL ELPH-MCNC: 1.1 G/DL (ref 0.7–1.3)
C3 SERPL-MCNC: 150 MG/DL (ref 82–167)
C4 SERPL-MCNC: 20 MG/DL (ref 12–38)
GAMMA GLOB SERPL ELPH-MCNC: 1.4 G/DL (ref 0.4–1.8)
GLOBULIN SER-MCNC: 3.2 G/DL (ref 2.2–3.9)
IGA SERPL-MCNC: 199 MG/DL (ref 87–352)
IGG SERPL-MCNC: 1359 MG/DL (ref 586–1602)
IGM SERPL-MCNC: 88 MG/DL (ref 26–217)
INTERPRETATION SERPL IEP-IMP: NORMAL
LABORATORY COMMENT REPORT: NORMAL
M PROTEIN SERPL ELPH-MCNC: NORMAL G/DL
PROT SERPL-MCNC: 7.4 G/DL (ref 6–8.5)
RHEUMATOID FACT SERPL-ACNC: <10 IU/ML (ref 0–13.9)

## 2021-07-21 NOTE — PROGRESS NOTES
Please notify the patient that additional neuropathy labs thus far are completely normal.  There are additional labs pending and once we receive those results we will notify her by phone.  Thanks, BLANCA Ford.    TERRANCE PCP

## 2021-07-21 NOTE — TELEPHONE ENCOUNTER
----- Message from BLANCA Degroot sent at 7/21/2021  2:15 PM EDT -----  Please notify the patient that additional neuropathy labs thus far are completely normal.  There are additional labs pending and once we receive those results we will notify her by phone.  Thanks, BLANCA Ford.    TERRANCE PCP

## 2021-07-22 ENCOUNTER — TREATMENT (OUTPATIENT)
Dept: PHYSICAL THERAPY | Facility: CLINIC | Age: 61
End: 2021-07-22

## 2021-07-22 DIAGNOSIS — M54.50 THORACOLUMBAR BACK PAIN: Primary | ICD-10-CM

## 2021-07-22 DIAGNOSIS — M54.6 THORACOLUMBAR BACK PAIN: Primary | ICD-10-CM

## 2021-07-22 LAB
C-ANCA TITR SER IF: ABNORMAL TITER
MYELOPEROXIDASE AB SER IA-ACNC: <9 U/ML (ref 0–9)
P-ANCA ATYPICAL TITR SER IF: ABNORMAL TITER
P-ANCA TITR SER IF: ABNORMAL TITER
PROTEINASE3 AB SER IA-ACNC: 3.6 U/ML (ref 0–3.5)
VIT B1 BLD-SCNC: 120.1 NMOL/L (ref 66.5–200)

## 2021-07-22 PROCEDURE — 97110 THERAPEUTIC EXERCISES: CPT | Performed by: PHYSICAL THERAPIST

## 2021-07-22 PROCEDURE — 97530 THERAPEUTIC ACTIVITIES: CPT | Performed by: PHYSICAL THERAPIST

## 2021-07-22 NOTE — PROGRESS NOTES
Physical Therapy Daily Progress Note        Patient: Brittny Han   : 1960  Diagnosis/ICD-10 Code:  Thoracolumbar back pain [M54.5, M54.6]  Referring practitioner: Mariaelena Banks MD  Date of Initial Visit: Type: THERAPY  Noted: 2021  Today's Date: 2021  Patient seen for 10 sessions             Subjective   Brittny Han reports: The upper back is feeling a lot better with the addition of rotation stretching. She has noticed her tailbone getting achy after sitting all day for work.     Objective   Thoracic rotation  R- 50%  L- 60%  See Exercise, Manual, and Modality Logs for complete treatment.       Assessment/Plan  Mobility of the spine is improving and the pain is decreasing. Will continue updated treatment activities.   Progress per Plan of Care and Progress strengthening /stabilization /functional activity           Timed:  Manual Therapy:         mins  36509;  Therapeutic Exercise:    48     mins  13090;     Neuromuscular Charley:        mins  59665;    Therapeutic Activity:    12      mins  31149;     Gait Training:           mins  72883;     Ultrasound:          mins  75106;    Electrical Stimulation:         mins  92096;  Iontophoresis          mins  11723    Untimed:  Electrical Stimulation:         mins  20029 ( );  Mechanical Traction:         mins  07060;   Fluidotherapy          mins  86259    Timed Treatment:   60   mins   Total Treatment:     60   mins        Saba Malloy PTA  Physical Therapist Assistant

## 2021-07-23 ENCOUNTER — TELEPHONE (OUTPATIENT)
Dept: NEUROLOGY | Facility: CLINIC | Age: 61
End: 2021-07-23

## 2021-07-23 LAB
Lab: NORMAL
METHYLMALONATE SERPL-SCNC: 128 NMOL/L (ref 0–378)

## 2021-07-23 NOTE — TELEPHONE ENCOUNTER
----- Message from BLANCA Degroot sent at 7/22/2021  3:14 PM EDT -----  Continued neuropathy labs thus far are normal.  Thanks, BLANCA Ford.

## 2021-07-25 LAB — VIT B6 SERPL-MCNC: 20.6 UG/L (ref 2–32.8)

## 2021-07-26 ENCOUNTER — TELEPHONE (OUTPATIENT)
Dept: NEUROLOGY | Facility: CLINIC | Age: 61
End: 2021-07-26

## 2021-07-26 NOTE — TELEPHONE ENCOUNTER
----- Message from BLANCA Degroot sent at 7/23/2021  4:31 PM EDT -----  LABS CONTINUE TO LOOK GOOD FOR NEUROPATHY WORKUP. F/U AS SCHEDULED. THANKS, BLANCA NATHAN

## 2021-07-29 ENCOUNTER — TREATMENT (OUTPATIENT)
Dept: PHYSICAL THERAPY | Facility: CLINIC | Age: 61
End: 2021-07-29

## 2021-07-29 DIAGNOSIS — M54.50 THORACOLUMBAR BACK PAIN: Primary | ICD-10-CM

## 2021-07-29 DIAGNOSIS — M54.6 THORACOLUMBAR BACK PAIN: Primary | ICD-10-CM

## 2021-07-29 PROCEDURE — 97140 MANUAL THERAPY 1/> REGIONS: CPT | Performed by: PHYSICAL THERAPIST

## 2021-07-29 PROCEDURE — 97110 THERAPEUTIC EXERCISES: CPT | Performed by: PHYSICAL THERAPIST

## 2021-07-29 NOTE — PROGRESS NOTES
Physical Therapy Daily Progress Note        Patient: Brittny Han   : 1960  Diagnosis/ICD-10 Code:  Thoracolumbar back pain [M54.5, M54.6]  Referring practitioner: Mariaelena Banks MD  Date of Initial Visit: Type: THERAPY  Noted: 2021  Today's Date: 2021  Patient seen for 11 sessions             Subjective   Brittny Han reports: feeling a lot better but still have one spot in the mid back that bothers her through out the day.     Objective   Left rotation at T8 and tender left side when palpating.   See Exercise, Manual, and Modality Logs for complete treatment.       Assessment/Plan  Slight rotation at t-spine where source of lingering pain is. Able to manipulate by Isiah KARIMI PT that eliminated pain. Pt had been limited in rotation to the R but this also improved by 10% post manual manipulation.   Progress per Plan of Care and Progress strengthening /stabilization /functional activity           Timed:  Manual Therapy:    8     mins  26510;  Therapeutic Exercise:    45     mins  47947;     Neuromuscular Charley:        mins  40673;    Therapeutic Activity:          mins  10224;     Gait Training:           mins  57117;     Ultrasound:          mins  93004;    Electrical Stimulation:         mins  79865;  Iontophoresis          mins  22584    Untimed:  Electrical Stimulation:         mins  78306 ( );  Mechanical Traction:         mins  10536;   Fluidotherapy          mins  91318    Timed Treatment:   53   mins   Total Treatment:     53   mins        Saba Malloy PTA  Physical Therapist Assistant

## 2021-08-02 ENCOUNTER — OFFICE VISIT (OUTPATIENT)
Dept: PULMONOLOGY | Facility: CLINIC | Age: 61
End: 2021-08-02

## 2021-08-02 ENCOUNTER — TREATMENT (OUTPATIENT)
Dept: PHYSICAL THERAPY | Facility: CLINIC | Age: 61
End: 2021-08-02

## 2021-08-02 VITALS
HEART RATE: 78 BPM | HEIGHT: 67 IN | DIASTOLIC BLOOD PRESSURE: 78 MMHG | SYSTOLIC BLOOD PRESSURE: 126 MMHG | OXYGEN SATURATION: 97 % | WEIGHT: 223 LBS | TEMPERATURE: 98 F | BODY MASS INDEX: 35 KG/M2

## 2021-08-02 DIAGNOSIS — J30.89 SEASONAL AND PERENNIAL ALLERGIC RHINITIS: ICD-10-CM

## 2021-08-02 DIAGNOSIS — J30.2 SEASONAL AND PERENNIAL ALLERGIC RHINITIS: ICD-10-CM

## 2021-08-02 DIAGNOSIS — J45.50 SEVERE PERSISTENT ASTHMA, UNSPECIFIED WHETHER COMPLICATED: Primary | ICD-10-CM

## 2021-08-02 DIAGNOSIS — M48.061 SPINAL STENOSIS OF LUMBAR REGION WITHOUT NEUROGENIC CLAUDICATION: ICD-10-CM

## 2021-08-02 DIAGNOSIS — M54.6 THORACOLUMBAR BACK PAIN: Primary | ICD-10-CM

## 2021-08-02 DIAGNOSIS — M54.50 THORACOLUMBAR BACK PAIN: Primary | ICD-10-CM

## 2021-08-02 DIAGNOSIS — G47.33 OBSTRUCTIVE SLEEP APNEA SYNDROME: ICD-10-CM

## 2021-08-02 DIAGNOSIS — R05.9 COUGH: ICD-10-CM

## 2021-08-02 DIAGNOSIS — M51.36 DDD (DEGENERATIVE DISC DISEASE), LUMBAR: ICD-10-CM

## 2021-08-02 PROCEDURE — 97140 MANUAL THERAPY 1/> REGIONS: CPT | Performed by: PHYSICAL THERAPIST

## 2021-08-02 PROCEDURE — 82785 ASSAY OF IGE: CPT | Performed by: NURSE PRACTITIONER

## 2021-08-02 PROCEDURE — 99214 OFFICE O/P EST MOD 30 MIN: CPT | Performed by: NURSE PRACTITIONER

## 2021-08-02 PROCEDURE — 97530 THERAPEUTIC ACTIVITIES: CPT | Performed by: PHYSICAL THERAPIST

## 2021-08-02 PROCEDURE — 97014 ELECTRIC STIMULATION THERAPY: CPT | Performed by: PHYSICAL THERAPIST

## 2021-08-02 RX ORDER — FLUTICASONE PROPIONATE AND SALMETEROL XINAFOATE 230; 21 UG/1; UG/1
2 AEROSOL, METERED RESPIRATORY (INHALATION)
Qty: 12 G | Refills: 11 | Status: SHIPPED | OUTPATIENT
Start: 2021-08-02 | End: 2021-08-05

## 2021-08-02 NOTE — PROGRESS NOTES
"[facility] Pulmonary Follow up      Chief Complaint  Moderate persistent asthma (f/u)    Subjective          Brittny Han presents to [facility] for follow-up on her asthma.    She does have fairly persistent asthma with continued shortness of breath with activity as well as a chronic dry cough.    She has been followed in the office here for years and then moved to [place] for a little while and saw a pulmonologist and allergist there.  She is now back in the area and reestablished care here September of last year.    In 2019 she was on Xolair and Advair.  When she moved to [place] she got switched to Dupixent as well as to Breo.  She really had quite a bit of issues during her move with worsening asthma symptoms.    She still not doing quite as well as she was a couple years ago.  She has been on the Dupixent for about a year now.  Unfortunately she has not really been compliant with the every 2-week dosing.  She does forget it quite frequently.  She has been using Symbicort recently twice daily as per review asthma dose.    She also has obstructive sleep apnea.  Her CPAP is on the recall list.  We actually been trying to get her a new CPAP machine hers is quite old.  She likely needs a new sleep study.  May try to see if I can get her set up for a home sleep study.      She did recently resume her allergy injections.      Objective     Vital Signs:   /78 (BP Location: Left arm, Patient Position: Sitting, Cuff Size: Adult)   Pulse 78   Temp 98 °F (36.7 °C) (Temporal)   Ht 170.2 cm (67\")   Wt 101 kg (223 lb)   SpO2 97% Comment: room air, resting  BMI 34.93 kg/m²       Immunization History   Administered Date(s) Administered   • COVID-19 (PFIZER) 04/19/2021, 05/10/2021   • Flu Vaccine Intradermal Quad 18-64YR 10/06/2014, 10/01/2015   • Flu Vaccine Quad PF >36MO 10/10/2018   • Flu Vaccine Split Quad 10/10/2018   • Influenza TIV (IM) " 10/06/2014, 10/01/2015   • Influenza, Unspecified 10/21/2019, 09/23/2020   • Pneumococcal Conjugate 13-Valent (PCV13) 04/04/2016   • Pneumococcal Polysaccharide (PPSV23) 10/06/2009, 10/10/2014, 12/23/2020   • TD Preservative Free 05/16/2020   • Tdap 02/12/2010       Physical Exam  Vitals reviewed.   Constitutional:       General: She is not in acute distress.     Appearance: She is well-developed. She is obese. She is not ill-appearing.   HENT:      Head: Normocephalic and atraumatic.   Eyes:      Pupils: Pupils are equal, round, and reactive to light.   Cardiovascular:      Rate and Rhythm: Normal rate and regular rhythm.      Heart sounds: No murmur heard.     Pulmonary:      Effort: Pulmonary effort is normal. No respiratory distress.      Breath sounds: Normal breath sounds. No wheezing or rales.   Abdominal:      General: Bowel sounds are normal. There is no distension.      Palpations: Abdomen is soft.   Musculoskeletal:         General: Normal range of motion.      Cervical back: Normal range of motion and neck supple.   Skin:     General: Skin is warm and dry.      Findings: No erythema.   Neurological:      Mental Status: She is alert and oriented to person, place, and time.   Psychiatric:         Behavior: Behavior normal.          Result Review :                         Assessment and Plan    Problem List Items Addressed This Visit        Allergies and Adverse Reactions    Allergic rhinitis       Pulmonary and Pneumonias    Severe persistent asthma - Primary    Relevant Medications    fluticasone-salmeterol (Advair HFA) 230-21 MCG/ACT inhaler    Other Relevant Orders    IgE Level       Sleep    Obstructive sleep apnea syndrome      Other Visit Diagnoses     Cough        Relevant Orders    CT Chest Without Contrast          We discussed 2 major issues today.     What is her obstructive sleep apnea and the need of a new CPAP machine.  She likely does need a new sleep study.  We will try to work with one of  the DME's to get her a home sleep study to get her approved for a new CPAP machine.      Her asthma does not seem well controlled at this time.  She would like to switch her ICS/LABA back to the Advair HFA.  She feels like that has done the best for her in the past.  I will send that order over to her pharmacy.  Go ahead and hold the Spiriva Respimat for now to see if it makes any difference.  We did discuss again trying the CPX to see if she does have exercise-induced asthma or any other cause of her worsening shortness of breath with activity.  She did have a negative methacholine challenge in 2016.    She is wondering if she needs to switch off of the Dupixent.  She was on Xolair before and did seem it did quite a bit better for her.  I will check her IgE today.  Its been over 2 weeks since she had her Dupixent injection, she does forget to do it at home frequently.  We did also discuss changing her Dupixent to injections here at the office to help with compliance.          Follow Up     Return in about 3 months (around 11/2/2021).  Patient was given instructions and counseling regarding her condition or for health maintenance advice. Please see specific information pulled into the AVS if appropriate.           BLANCA Jackson, ACNP  [facility]  Electronically signed

## 2021-08-02 NOTE — PROGRESS NOTES
Physical Therapy Daily Progress Note        Patient: Brittny Han   : 1960  Diagnosis/ICD-10 Code:  Thoracolumbar back pain [M54.5, M54.6]  Referring practitioner: Mariaelena Banks MD  Date of Initial Visit: Type: THERAPY  Noted: 2021  Today's Date: 2021  Patient seen for 12 sessions           Subjective   Brittny Han reports: last treatment did help the thoracic spine, intensity of pain is not as severe.  Lumbar pain when working, will sit there for hours at a time.     Objective   PALPATION: tender T7 area with possible rotation.  Guarding of thoracic and lumbar paraspinals bilaterally.  EDUCATION: pt needs to fix current chair or purchase a new one, the height adjustment has malfunctioned, get of chair every 30-45 minutes.  Use stand up desk. Look for glasses for computer use  See Exercise, Manual, and Modality Logs for complete treatment.       Assessment/Plan  Tried IDN today.  Too early to know if effective. She needs to adjust her work station, which should help her lumbar pain at work some.  Progress per Plan of Care and Progress strengthening /stabilization /functional activity           Manual Therapy:    20     mins  29009;  Therapeutic Exercise:         mins  67294;     Neuromuscular Charley:        mins  50853;    Therapeutic Activity:     10     mins  59139;     Gait Training:           mins  58436;     Ultrasound:          mins  60479;    Electrical Stimulation:    15     mins  17397 ( );  Iontophoresis          mins 54563   Traction          mins  78977  Fluidotherapy          mins  54087  Dry Needling          mins self-pay  Paraffin          mins  07012    Timed Treatment:   30   mins   Total Treatment:     45   mins    Isiah Roe, PT  Physical Therapist

## 2021-08-03 ENCOUNTER — HOSPITAL ENCOUNTER (OUTPATIENT)
Dept: NEUROLOGY | Facility: HOSPITAL | Age: 61
Discharge: HOME | End: 2021-08-03
Admitting: NURSE PRACTITIONER

## 2021-08-03 DIAGNOSIS — R20.2 NUMBNESS AND TINGLING IN BOTH HANDS: ICD-10-CM

## 2021-08-03 DIAGNOSIS — R20.0 NUMBNESS AND TINGLING OF BOTH FEET: ICD-10-CM

## 2021-08-03 DIAGNOSIS — R20.2 NUMBNESS AND TINGLING OF BOTH FEET: ICD-10-CM

## 2021-08-03 DIAGNOSIS — R20.0 NUMBNESS AND TINGLING IN BOTH HANDS: ICD-10-CM

## 2021-08-03 PROCEDURE — 95913 NRV CNDJ TEST 13/> STUDIES: CPT

## 2021-08-03 PROCEDURE — 95886 MUSC TEST DONE W/N TEST COMP: CPT

## 2021-08-04 ENCOUNTER — TELEPHONE (OUTPATIENT)
Dept: NEUROLOGY | Facility: CLINIC | Age: 61
End: 2021-08-04

## 2021-08-04 NOTE — TELEPHONE ENCOUNTER
----- Message from BLANCA Degroot sent at 8/4/2021  1:03 PM EDT -----  Please notify the patient muscle study of her arms and legs shows very mild neuropathy overall.  She has very mild neuropathy of the right wrist and very mild neuropathy in the right elbow.  No other abnormalities noted.  I have already ordered a neuro cream through [facility] pharmacy.  We will follow-up as scheduled in clinic.  Thanks, BLANCA Ford.

## 2021-08-04 NOTE — TELEPHONE ENCOUNTER
PT AWARE OF THE RESULTS OF NERVE/MUSCLE STUDY AND HAS ALREADY STARTED USING THE CREAM FROM [facility]

## 2021-08-04 NOTE — PROGRESS NOTES
Please notify the patient muscle study of her arms and legs shows very mild neuropathy overall.  She has very mild neuropathy of the right wrist and very mild neuropathy in the right elbow.  No other abnormalities noted.  I have already ordered a neuro cream through [facility] pharmacy.  We will follow-up as scheduled in clinic.  Thanks, BLANCA Ford.

## 2021-08-05 ENCOUNTER — TELEPHONE (OUTPATIENT)
Dept: PHYSICAL THERAPY | Facility: CLINIC | Age: 61
End: 2021-08-05

## 2021-08-05 ENCOUNTER — HOSPITAL ENCOUNTER (OUTPATIENT)
Dept: NUTRITION | Facility: HOSPITAL | Age: 61
Setting detail: RECURRING SERIES
Discharge: HOME | End: 2021-08-05

## 2021-08-05 ENCOUNTER — TELEPHONE (OUTPATIENT)
Dept: PULMONOLOGY | Facility: CLINIC | Age: 61
End: 2021-08-05

## 2021-08-05 NOTE — TELEPHONE ENCOUNTER
"You can let her know I switched it, but in the \"epic\" it said it was not covered.  Hopefully that will work.  "

## 2021-08-05 NOTE — TELEPHONE ENCOUNTER
Patient wanting to change Advair from HFA to Diskus d/t insurance. Currently on 230-21mcg with the HFA.

## 2021-08-05 NOTE — PROGRESS NOTES
Nutrition Services    Patient Name:  Brittny Han  YOB: 1960  MRN: [id number]  Admit Date:  8/5/2021     This medical referred consult was provided via telehealth as patient was unable to attend an in-office appointment today due to the COVID-19 crisis. Consent for treatment was given verbally. RD spent a total of 30 minutes with patient today.     Pt reports today for her wt loss follow up via telephone. She reports she has not made much progress over the past 1 month. She reports that her biggest challenge is that she lives with her parents and her mom does the 'cooking' for dinner. She orders [employer] preprepared frozen meals. A lot of pasta with cheese or cream sauce. She reports she probably needs to figure something else out but does not want much prep/cooking. RD recommended quick, lower in fat/calorie options such as rotisserie chicken, frozen vegetables, and instant rice as an option. She reports she has been trying to make healthier choices at breakfast and lunch a well as healthy snack choices such as single serve hummus or guacamole containers with vegetables. She states she gets plenty of water throughout the day, drinks ~3 cups of coffee w 2 Tbsp creamer every morning (plans to try substituting with premier protein), and tea at night. She has maintained her weight at 223 lb. Patient admits biggest barrier at this time is addressing the dinner meal and how to best re-purpose this to be the best choice and harbor more nutritional value. She states she will call if any questions arise or if she wishes to schedule another follow up.        Goals:  1. Follow My Plate Method daily at lunch and dinner meals-25%   2. 0.5-1.0 lb weight loss weekly-0%; maintained weight at 223 lb.       Electronically signed by:  Cecilia Wagner RD  08/05/21 15:17 EDT

## 2021-08-06 LAB — IGE SERPL-ACNC: 252 IU/ML (ref 6–495)

## 2021-08-16 ENCOUNTER — HOSPITAL ENCOUNTER (OUTPATIENT)
Dept: CT IMAGING | Facility: HOSPITAL | Age: 61
Discharge: HOME | End: 2021-08-16
Admitting: NURSE PRACTITIONER

## 2021-08-16 DIAGNOSIS — R05.9 COUGH: ICD-10-CM

## 2021-08-16 PROCEDURE — 71250 CT THORAX DX C-: CPT

## 2021-08-20 RX ORDER — LEVOTHYROXINE SODIUM 0.07 MG/1
75 TABLET ORAL DAILY
Qty: 30 TABLET | Refills: 2 | Status: SHIPPED | OUTPATIENT
Start: 2021-08-20 | End: 2021-11-14 | Stop reason: SDUPTHER

## 2021-08-23 RX ORDER — ESTRADIOL 0.07 MG/D
1 FILM, EXTENDED RELEASE TRANSDERMAL 2 TIMES WEEKLY
Qty: 8 EACH | Refills: 3 | Status: SHIPPED | OUTPATIENT
Start: 2021-08-23 | End: 2021-11-24 | Stop reason: SDUPTHER

## 2021-08-24 ENCOUNTER — TELEPHONE (OUTPATIENT)
Dept: INTERNAL MEDICINE | Facility: CLINIC | Age: 61
End: 2021-08-24

## 2021-08-24 DIAGNOSIS — R16.0 LIVER MASS: Primary | ICD-10-CM

## 2021-08-24 NOTE — TELEPHONE ENCOUNTER
Caller: Brittny Han    Relationship: Self    Best call back number: [phone]    Caller requesting test results: PATIENT    What test was performed: CT SCAN    When was the test performed: LAST WEEK    Where was it performed:  [facility]    Additional notes: PATIENT STATES THAT THEY FOUND A MASS ON HER LIVER AND WONDERED WHAT FURTHER TEST COULD BE ORDERED.

## 2021-08-30 RX ORDER — POTASSIUM CHLORIDE 750 MG/1
10 TABLET, FILM COATED, EXTENDED RELEASE ORAL DAILY
Qty: 90 TABLET | Refills: 1 | Status: SHIPPED | OUTPATIENT
Start: 2021-08-30 | End: 2022-02-28 | Stop reason: SDUPTHER

## 2021-09-01 ENCOUNTER — OFFICE VISIT (OUTPATIENT)
Dept: INTERNAL MEDICINE | Facility: CLINIC | Age: 61
End: 2021-09-01

## 2021-09-01 ENCOUNTER — LAB (OUTPATIENT)
Dept: LAB | Facility: HOSPITAL | Age: 61
End: 2021-09-01

## 2021-09-01 VITALS
BODY MASS INDEX: 34.53 KG/M2 | OXYGEN SATURATION: 98 % | HEIGHT: 67 IN | HEART RATE: 78 BPM | WEIGHT: 220 LBS | TEMPERATURE: 96.9 F | RESPIRATION RATE: 16 BRPM | DIASTOLIC BLOOD PRESSURE: 80 MMHG | SYSTOLIC BLOOD PRESSURE: 132 MMHG

## 2021-09-01 DIAGNOSIS — E03.9 ACQUIRED HYPOTHYROIDISM: Primary | ICD-10-CM

## 2021-09-01 DIAGNOSIS — T78.40XD ALLERGY, SUBSEQUENT ENCOUNTER: ICD-10-CM

## 2021-09-01 DIAGNOSIS — K76.9 LIVER LESION: ICD-10-CM

## 2021-09-01 LAB — TSH SERPL DL<=0.05 MIU/L-ACNC: 3.01 UIU/ML (ref 0.27–4.2)

## 2021-09-01 PROCEDURE — 99214 OFFICE O/P EST MOD 30 MIN: CPT | Performed by: INTERNAL MEDICINE

## 2021-09-01 PROCEDURE — 84443 ASSAY THYROID STIM HORMONE: CPT | Performed by: INTERNAL MEDICINE

## 2021-09-01 PROCEDURE — 80053 COMPREHEN METABOLIC PANEL: CPT | Performed by: INTERNAL MEDICINE

## 2021-09-01 PROCEDURE — 36415 COLL VENOUS BLD VENIPUNCTURE: CPT | Performed by: INTERNAL MEDICINE

## 2021-09-01 PROCEDURE — 86803 HEPATITIS C AB TEST: CPT | Performed by: INTERNAL MEDICINE

## 2021-09-01 NOTE — PROGRESS NOTES
Answers for HPI/ROS submitted by the patient on 2021  Please describe your symptoms.: Follow-up to neuro and PT and lesion on back of liver per CT of lungs ordered by [facility].  Have you had these symptoms before?: Yes  How long have you been having these symptoms?: Greater than 2 weeks  Please list any medications you are currently taking for this condition.: PT for back pain and degenerative disc disease.  Also, compound cream for neuropathy in hands and fee.  Please describe any probable cause for these symptoms. : Unsure  What is the primary reason for your visit?: Other         Office Note      Date: 2021  Patient Name: Brittny Han  MRN: [id number]  : 1960    Chief Complaint   Patient presents with   • Hypothyroidism     f/u       History of Present Illness: Brittny Han is a 61 y.o. female who presents for Hypothyroidism (f/u).  Here for 3-month follow-up of hypothyroidism.  Last TSH normal however prior to that it was elevated.  Compliant with Synthroid 75 mcg once daily.  Has other concerns she would like addressed today:    Had CT chest with liver changes, referral to GI has been placed .     Has allergies and follows with an allergist.  Gets monthly injections.  Concerned about histamine and inflammation.  Her daughter recently started a diet that has helped with her abdominal bloating.  Patient denies any abdominal bloating.            Subjective      Review of Systems:   Pertinent review of systems per HPI.    Review of Systems   Constitutional: Negative for activity change, appetite change, chills, diaphoresis, fatigue, fever and unexpected weight change.   HENT: Negative for congestion, dental problem, drooling, ear discharge, ear pain, facial swelling, hearing loss and mouth sores.    Eyes: Negative for pain, discharge and itching.   Respiratory: Negative for apnea, cough, choking, chest tightness and shortness of breath.    Cardiovascular:  "Negative for chest pain, palpitations and leg swelling.   Gastrointestinal: Negative for abdominal distention, abdominal pain, blood in stool, constipation and diarrhea.   Endocrine: Negative for cold intolerance, heat intolerance, polydipsia and polyuria.   Genitourinary: Negative for difficulty urinating, dysuria, frequency and hematuria.   Skin: Negative for color change, pallor, rash and wound.   Allergic/Immunologic: Negative for environmental allergies, food allergies and immunocompromised state.   Neurological: Negative for dizziness, weakness and light-headedness.   Psychiatric/Behavioral: Negative for agitation, behavioral problems, confusion, decreased concentration and self-injury. The patient is not nervous/anxious.    All other systems reviewed and are negative.    Allergies   Allergen Reactions   • Percocet [Oxycodone-Acetaminophen] Anaphylaxis     Breathing difficulty, tongue swelling, severe    • Brevital Sodium [Methohexital] Paresthesia     CHILLS, SHAKING       Objective     Physical Exam:  Vital Signs:   Vitals:    09/01/21 1050   BP: 132/80   Pulse: 78   Resp: 16   Temp: 96.9 °F (36.1 °C)   TempSrc: Temporal   SpO2: 98%   Weight: 99.8 kg (220 lb)   Height: 170.2 cm (67\")      Body mass index is 34.46 kg/m².    Physical Exam  Vitals and nursing note reviewed.   Constitutional:       General: She is not in acute distress.     Appearance: She is well-developed.   HENT:      Head: Normocephalic and atraumatic.      Right Ear: External ear normal.      Left Ear: External ear normal.   Eyes:      General: No scleral icterus.        Right eye: No discharge.         Left eye: No discharge.      Conjunctiva/sclera: Conjunctivae normal.   Cardiovascular:      Rate and Rhythm: Normal rate and regular rhythm.      Heart sounds: Normal heart sounds. No murmur heard.   No friction rub. No gallop.    Pulmonary:      Effort: Pulmonary effort is normal. No respiratory distress.      Breath sounds: Normal breath " sounds. No wheezing or rales.   Skin:     General: Skin is warm and dry.      Coloration: Skin is not pale.         Assessment / Plan      Assessment & Plan:    1. Acquired hypothyroidism  Repeat TSH level today.  - TSH Rfx On Abnormal To Free T4    2. Allergy, subsequent encounter  Advised following up with allergy.  Discussed trial of gluten-free FODMAP diet to see how she is doing if she feels better on this she can stay on this.  Has had celiac testing in the past is negative.    3. Liver lesion  Has been referred to gastroenterology.  Will check LFTs and hepatitis C antibody.    - Comprehensive Metabolic Panel  - Hepatitis C Antibody      Mariaelena Banks MD  09/01/2021     Please note that portions of this note may have been completed with a voice recognition program. Efforts were made to edit the dictations, but occasionally words are mistranscribed.

## 2021-09-02 ENCOUNTER — TELEPHONE (OUTPATIENT)
Dept: GASTROENTEROLOGY | Facility: CLINIC | Age: 61
End: 2021-09-02

## 2021-09-02 DIAGNOSIS — R93.2 ABNORMAL CT OF LIVER: Primary | ICD-10-CM

## 2021-09-02 LAB
ALBUMIN SERPL-MCNC: 4.8 G/DL (ref 3.5–5.2)
ALBUMIN/GLOB SERPL: 1.9 G/DL
ALP SERPL-CCNC: 74 U/L (ref 39–117)
ALT SERPL W P-5'-P-CCNC: 37 U/L (ref 1–33)
ANION GAP SERPL CALCULATED.3IONS-SCNC: 16.2 MMOL/L (ref 5–15)
AST SERPL-CCNC: 34 U/L (ref 1–32)
BILIRUB SERPL-MCNC: 0.3 MG/DL (ref 0–1.2)
BUN SERPL-MCNC: 13 MG/DL (ref 6–20)
BUN/CREAT SERPL: 13.1 (ref 7–25)
CALCIUM SPEC-SCNC: 9.7 MG/DL (ref 8.6–10.5)
CHLORIDE SERPL-SCNC: 100 MMOL/L (ref 98–107)
CO2 SERPL-SCNC: 22.8 MMOL/L (ref 22–29)
CREAT SERPL-MCNC: 0.99 MG/DL (ref 0.57–1)
GFR SERPL CREATININE-BSD FRML MDRD: 57 ML/MIN/1.73
GLOBULIN UR ELPH-MCNC: 2.5 GM/DL
GLUCOSE SERPL-MCNC: 85 MG/DL (ref 65–99)
HCV AB SER DONR QL: NORMAL
POTASSIUM SERPL-SCNC: 4 MMOL/L (ref 3.5–5.2)
PROT SERPL-MCNC: 7.3 G/DL (ref 6–8.5)
SODIUM SERPL-SCNC: 139 MMOL/L (ref 136–145)

## 2021-09-02 NOTE — TELEPHONE ENCOUNTER
Per Dr. Christianson: pcp will need to order a ct of the abd and pelvis with and without contrast with liver protocol prior to seeing patient in the office.

## 2021-09-03 ENCOUNTER — TELEPHONE (OUTPATIENT)
Dept: INTERNAL MEDICINE | Facility: CLINIC | Age: 61
End: 2021-09-03

## 2021-09-03 NOTE — TELEPHONE ENCOUNTER
Attempted to contact patient to inform of result notes as ordered by provider and as listed below. No answer; Left voicemail for patient to return call with office number given.     ----- Message from Mariaelena Banks MD sent at 9/3/2021 12:23 PM EDT -----  LFTs slightly elevated but very close to normal.  Work on weight loss and diet help lower this value.  Repeat in 6 to 12 months.

## 2021-09-09 ENCOUNTER — TELEPHONE (OUTPATIENT)
Dept: INTERNAL MEDICINE | Facility: CLINIC | Age: 61
End: 2021-09-09

## 2021-09-09 NOTE — TELEPHONE ENCOUNTER
Pt has missed a couple of PT appts and needs a [facility], please advise.

## 2021-09-14 ENCOUNTER — HOSPITAL ENCOUNTER (OUTPATIENT)
Dept: CT IMAGING | Facility: HOSPITAL | Age: 61
Discharge: HOME | End: 2021-09-14
Admitting: INTERNAL MEDICINE

## 2021-09-14 DIAGNOSIS — R93.2 ABNORMAL CT OF LIVER: ICD-10-CM

## 2021-09-14 PROCEDURE — 74178 CT ABD&PLV WO CNTR FLWD CNTR: CPT

## 2021-09-14 PROCEDURE — 0 IOPAMIDOL PER 1 ML: Performed by: INTERNAL MEDICINE

## 2021-09-14 RX ADMIN — IOPAMIDOL 95 ML: 755 INJECTION, SOLUTION INTRAVENOUS at 09:55

## 2021-09-16 DIAGNOSIS — E03.9 HYPOTHYROIDISM, UNSPECIFIED TYPE: Primary | ICD-10-CM

## 2021-09-21 ENCOUNTER — TREATMENT (OUTPATIENT)
Dept: PHYSICAL THERAPY | Facility: CLINIC | Age: 61
End: 2021-09-21

## 2021-09-21 DIAGNOSIS — M51.36 DDD (DEGENERATIVE DISC DISEASE), LUMBAR: ICD-10-CM

## 2021-09-21 DIAGNOSIS — M54.6 THORACOLUMBAR BACK PAIN: Primary | ICD-10-CM

## 2021-09-21 DIAGNOSIS — M48.061 SPINAL STENOSIS OF LUMBAR REGION WITHOUT NEUROGENIC CLAUDICATION: ICD-10-CM

## 2021-09-21 DIAGNOSIS — M54.50 THORACOLUMBAR BACK PAIN: Primary | ICD-10-CM

## 2021-09-21 PROCEDURE — 97110 THERAPEUTIC EXERCISES: CPT | Performed by: PHYSICAL THERAPIST

## 2021-09-21 PROCEDURE — 97012 MECHANICAL TRACTION THERAPY: CPT | Performed by: PHYSICAL THERAPIST

## 2021-09-21 PROCEDURE — 97164 PT RE-EVAL EST PLAN CARE: CPT | Performed by: PHYSICAL THERAPIST

## 2021-09-21 NOTE — PROGRESS NOTES
Physical Therapy Re-assessment/Daily Progress Note        Patient: Brittny Han   : 1960  Diagnosis/ICD-10 Code:  Thoracolumbar back pain [M54.5, M54.6]  Referring practitioner: Mariaelena Banks MD  Date of Initial Visit: Type: THERAPY  Noted: 2021  Today's Date: 2021  Patient seen for 13 sessions           Subjective Evaluation    History of Present Illness  Mechanism of injury: Lumbar pain started in the past 2 years, no known injuries. Is worsening with time.  Her job does require constant sitting, but does have a stand up desk as well.  She went through PT, did improve some. Work station working better now. IDN did help the mid thoracic spine.  Lower back seems to be worsening with time.    CURRENT COMPLAINT  4-5 hours after going to bed she develops bilateral lumbar pain. LE paraesthesia after prolong standing/sitting/walking.      Patient Occupation: Billing Collection for [employer] Quality of life: good    Pain  Current pain ratin  At best pain ratin  At worst pain ratin    Hand dominance: right             Objective          Postural Observations  Seated posture: good  Standing posture: good    Additional Postural Observation Details  Pelvis level.  Slight decrease in lumbar lordosis.    Palpation   Left   Hypertonic in the iliopsoas, lumbar paraspinals and quadratus lumborum.   Tenderness of the lumbar paraspinals.     Right   Hypertonic in the iliopsoas, lumbar paraspinals and quadratus lumborum. Tenderness of the lumbar paraspinals.     Tenderness     Lumbar Spine  Tenderness in the spinous process (Lower lumbar) and facet joint (Lower lumbar).     Neurological Testing     Sensation     Lumbar   Left   Intact: light touch and pin prick    Right   Intact: light touch and pin prick    Reflexes   Left   Patellar (L4): normal (2+)  Achilles (S1): normal (2+)    Right   Patellar (L4): normal (2+)  Achilles (S1): normal (2+)    Active Range of Motion     Lumbar    Flexion: 85 degrees   Extension: 20 degrees   Left lateral flexion: 30 degrees   Right lateral flexion: 30 degrees   Left rotation: 60 degrees   Right rotation: 45 degrees     Strength/Myotome Testing     Lumbar   Left   Normal strength    Right   Normal strength    Muscle Activation   Patient able to activate left transverse abdominals (Weak) and right transverse abdominals (Weak).   Patient unable to activate left internal obliques, left multifidus, right internal obliques and right multifidus.     Tests     Lumbar     Left   Positive femoral stretch.   Negative passive SLR and quadrant.     Right   Positive femoral stretch.   Negative passive SLR and quadrant.     Left Pelvic Girdle/Sacrum   Negative: sacral spring and thigh thrust.     Right Pelvic Girdle/Sacrum   Negative: sacral spring and thigh thrust.     Additional Tests Details  Prone LE distraction: Decreases lumbar pain        See Exercise, Manual, and Modality Logs for complete treatment.       Assessment & Plan     Assessment  Impairments: abnormal muscle firing, abnormal or restricted ROM, activity intolerance and pain with function  Assessment details: 61 year old female with chronic lower thoracic to sacral pain. She has MRI evidence of DDD with spurring throughout lumbar spine.  Moderate lumbar stenosis at L4/L5.  She does not have radiating symptoms. But lumbar pain can be debilitating at times.  She does not have directional preference.  Unloaded activities tolerated well, but weight bearing activities will reproduce symptoms. She needs ROM in all directions and lumbar stabilization needs to become very strong.  Prognosis: good  Prognosis details: Progress will be slow, she DDD, sedentary job duties. So it will take a while to improve functional stability.   Functional Limitations: walking, sitting, standing and stooping  Goals  Plan Goals: STG to be met in 4 weeks  1.  Improved lumbar pain so that worst pain is 4/10.  2.  Improved sleeping so  that she only wakes up 2x/night with lumbar pain.  3.  Pt is consistent with changing position frequently at work.  4.  Improved function so that Oswestry score improves to 20%.  LTG to be met in 8 weeks  1.  Pt is independent with HEP.  2.  Pt is able to stay on feet up to 2 hours at time.  3.  She can sleep through the night without being awakened by lumbar pain.  4.  Improved function so that Oswestry score improves to 15% or less.    Plan  Therapy options: will be seen for skilled physical therapy services  Planned modality interventions: ultrasound, high voltage pulsed current (pain management), traction, dry needling and cryotherapy  Planned therapy interventions: abdominal trunk stabilization, body mechanics training, flexibility, functional ROM exercises, home exercise program, joint mobilization, therapeutic activities, stretching, strengthening, spinal/joint mobilization, soft tissue mobilization, postural training, neuromuscular re-education and manual therapy  Frequency: 1-2 x a week.  Duration in weeks: 8                 Manual Therapy:         mins  46525;  Therapeutic Exercise:    40     mins  61254;     Neuromuscular Charley:        mins  24635;    Therapeutic Activity:          mins  83544;     Gait Training:           mins  16891;     Ultrasound:          mins  91495;    Electrical Stimulation:         mins  09596 ( );  Iontophoresis          mins 53935   Traction     20     mins  39107  Fluidotherapy          mins  03038  Dry Needling          mins self-pay  Paraffin          mins  48269    Timed Treatment:   40   mins   Total Treatment:     85   mins    Isiah Roe, PT  Physical Therapist

## 2021-09-27 ENCOUNTER — TREATMENT (OUTPATIENT)
Dept: PHYSICAL THERAPY | Facility: CLINIC | Age: 61
End: 2021-09-27

## 2021-09-27 DIAGNOSIS — M51.36 DDD (DEGENERATIVE DISC DISEASE), LUMBAR: ICD-10-CM

## 2021-09-27 DIAGNOSIS — M54.50 THORACOLUMBAR BACK PAIN: Primary | ICD-10-CM

## 2021-09-27 DIAGNOSIS — M54.6 THORACOLUMBAR BACK PAIN: Primary | ICD-10-CM

## 2021-09-27 PROCEDURE — 97110 THERAPEUTIC EXERCISES: CPT | Performed by: PHYSICAL THERAPIST

## 2021-09-27 NOTE — PROGRESS NOTES
Physical Therapy Daily Progress Note        Patient: Brittny Han   : 1960  Diagnosis/ICD-10 Code:  Thoracolumbar back pain [M54.5, M54.6]  Referring practitioner: Mariaelena Banks MD  Date of Initial Visit: Type: THERAPY  Noted: 2021  Today's Date: 2021  Patient seen for 14 sessions             Subjective   Brittny Han reports: been feeling a lot better this week. She notes no pain coming to therapy today. Pt has been taking her health more serious to lose weight and help her internal organs heal as she suffers for high cholesterol and fatty liver.     Objective   See Exercise, Manual, and Modality Logs for complete treatment.       Assessment/Plan  Pt was able to complete exercise program with no complaints. No traction needed today.   Progress per Plan of Care and Progress strengthening /stabilization /functional activity           Timed:  Manual Therapy:         mins  20245;  Therapeutic Exercise:    40     mins  37791;     Neuromuscular Charley:        mins  28864;    Therapeutic Activity:          mins  99114;     Gait Training:           mins  76243;     Ultrasound:          mins  49262;    Electrical Stimulation:         mins  98547;  Iontophoresis          mins  62764    Untimed:  Electrical Stimulation:         mins  22413 ( );  Mechanical Traction:         mins  92670;   Fluidotherapy          mins  95620    Timed Treatment:   40   mins   Total Treatment:     40   mins        Saba Malloy PTA  Physical Therapist Assistant

## 2021-10-05 ENCOUNTER — OFFICE VISIT (OUTPATIENT)
Dept: GASTROENTEROLOGY | Facility: CLINIC | Age: 61
End: 2021-10-05

## 2021-10-05 VITALS
SYSTOLIC BLOOD PRESSURE: 148 MMHG | HEIGHT: 67 IN | TEMPERATURE: 97.3 F | WEIGHT: 221.2 LBS | OXYGEN SATURATION: 98 % | DIASTOLIC BLOOD PRESSURE: 98 MMHG | BODY MASS INDEX: 34.72 KG/M2 | HEART RATE: 76 BPM

## 2021-10-05 DIAGNOSIS — R93.2 ABNORMAL CT OF LIVER: Primary | ICD-10-CM

## 2021-10-05 DIAGNOSIS — R79.89 ABNORMAL LIVER FUNCTION TESTS: ICD-10-CM

## 2021-10-05 DIAGNOSIS — K76.0 FATTY LIVER: ICD-10-CM

## 2021-10-05 PROCEDURE — 99214 OFFICE O/P EST MOD 30 MIN: CPT | Performed by: INTERNAL MEDICINE

## 2021-10-05 NOTE — PROGRESS NOTES
Patient Name: Brittny Han  YOB: 1960   Medical Record number: [id number]     Chief Complaint: Abnormal CT scan      HPI Padmini is here today because of an abnormal CT scan.  She was found to have marked steatosis and fatty liver.  She is a very bright lady.  She does not abuse alcohol she is not a drug user.  She does have an elevated BMI and is plagued by hypothyroidism.  Because of the abnormal CT scan she was referred for evaluation.  There was a question of a liver mass earlier on a CT but when a abdominal CT with liver protocol was done this was found to just be fatty infiltration.  She did have a mild elevation in her AST and ALT on her recent CMP.    Past Medical History:   Past Medical History:   Diagnosis Date   • Allergic rhinitis    • Anemia    • Anxiety    • Asthma     MODERATE, PERSISTANT   • BCC (basal cell carcinoma) 2000    RIGHT EYELID   • Bowel habit changes    • BPPV (benign paroxysmal positional vertigo) 01/2020   • Bronchitis    • Cat bite of left hand 05/16/2020    SEEN AT [facility]   • Chronic bilateral low back pain with bilateral sciatica    • Chronic idiopathic constipation    • Colon polyps     FOLLOWED BY DR. LORENA RICO   • COPD (chronic obstructive pulmonary disease) (HCC)    • CTS (carpal tunnel syndrome) 2010    BILATERAL, Wear wrist braces at night   • Demyelinating disease (HCC)    • Depression    • Disease of thyroid gland     HYPOTHYROIDISM   • DINERO (dyspnea on exertion) 06/2019   • Essential hypertension    • Fatigue    • Gastroenteritis 02/2020   • GERD (gastroesophageal reflux disease)    • Head injury when I was 10    concussion   • Hemorrhoids    • Hepatomegaly 11/2018   • History of echocardiogram 05/31/2011    mild ascending aortic root dilatation; mod RVC enlargement; global left EF 0.72 (normal 0.55-0.75) mild concentric LV hypertrophy; trace MR; mild TR    • History of PFTs 02/02/2016    good effort; normal lung volumes   • Hyperlipidemia    •  Hypertrophy, nasal, turbinate    • Midline cystocele    • Neuropathy of both feet 01/2019    FOLLOWED BY DR. VARSHA IRELAND   • ALANNA on CPAP 2007    Followed by Jin Cat @ [facility]   • Peptic ulceration     PUD   • Periodic headache syndrome, not intractable    • Plantar fasciitis, left 01/2019    FOLLOWED BY DR. VARSHA IRELAND   • Rectal bleeding 08/2020   • Rectal bleeding    • Rectal mass 08/2020   • RLS (restless legs syndrome) 06/2017   • Shortness of breath    • Strain of thoracic region 04/2017   • KALYAN (stress urinary incontinence, female)    • Uterine fibroid    • Vision loss 2008    wear glasses   • Vitamin B 12 deficiency    • Vitamin D deficiency        Family History:  Family History   Problem Relation Age of Onset   • Heart disease Mother    • Hypertension Mother    • Sleep apnea Mother    • Hyperlipidemia Mother    • Heart attack Mother    • COPD Father         Father is alive at age 80 with COPD and asthma   • Asthma Father    • Basal cell carcinoma Father    • Cancer Father    • Hyperlipidemia Father    • Ulcerative colitis Cousin    • Crohn's disease Cousin    • Cancer Niece    • Melanoma Niece    • Narcolepsy Sister    • Sleep apnea Sister    • Diabetes Brother    • Lung cancer Paternal Grandmother    • Heart disease Paternal Grandfather    • Breast cancer Neg Hx    • Ovarian cancer Neg Hx        Social History:   reports that she has never smoked. She has never used smokeless tobacco. She reports current alcohol use of about 2.0 standard drinks of alcohol per week. She reports that she does not use drugs.    Medications:     Current Outpatient Medications:   •  albuterol (ACCUNEB) 1.25 MG/3ML nebulizer solution, Take 3 mL by nebulization Every 6 (Six) Hours As Needed for Wheezing. (Patient taking differently: Take 1 ampule by nebulization As Needed for Wheezing.), Disp: 375 mL, Rfl: 6  •  albuterol sulfate  (90 Base) MCG/ACT inhaler, Inhale 2 puffs Every 4 (Four) Hours. (Patient  taking differently: Inhale 2 puffs As Needed.), Disp: 8.5 g, Rfl: 1  •  azelastine (ASTELIN) 0.1 % nasal spray, Use 2 sprays into the nostril(s) as directed by provider 2 (Two) Times a Day. (Patient taking differently: 2 sprays into the nostril(s) as directed by provider 2 (Two) Times a Day As Needed.), Disp: 30 mL, Rfl: 12  •  clindamycin (CLEOCIN) 150 MG capsule, Take 2 capsules by mouth now, then take 1 capsule by mouth 3 (Three) Times a Day until gone, Disp: 23 capsule, Rfl: 0  •  Dupilumab 300 MG/2ML solution prefilled syringe, Inject 300 mg under the skin into the appropriate area as directed Every 14 (Fourteen) Days., Disp: 4 mL, Rfl: 11  •  EPINEPHrine (EPIPEN) 0.3 MG/0.3ML solution auto-injector injection, Use as directed for acute allergic reaction., Disp: 2 each, Rfl: 3  •  estradiol (Anisa) 0.075 MG/24HR patch, Place 1 patch on the skin as directed by provider 2 (Two) Times a Week., Disp: 8 each, Rfl: 3  •  fluticasone-salmeterol (ADVAIR) 500-50 MCG/DOSE DISKUS, Inhale 1 puff 2 (two) times a day., Disp: 180 each, Rfl: 1  •  Glucosamine-Chondroitin (MOVE FREE PO), Take 1 capsule by mouth Daily., Disp: , Rfl:   •  hydroCHLOROthiazide (HYDRODIURIL) 25 MG tablet, Take 1 tablet by mouth Daily., Disp: 90 tablet, Rfl: 1  •  HYDROcodone-acetaminophen (NORCO) 5-325 MG per tablet, Take 1 tablet by mouth Every 6 (Six) Hours As Needed for pain, Disp: 12 tablet, Rfl: 0  •  levocetirizine (XYZAL) 5 MG tablet, Take 1 tablet by mouth once daily, Disp: 30 tablet, Rfl: 5  •  levothyroxine (Synthroid) 75 MCG tablet, Take 1 tablet by mouth Daily., Disp: 30 tablet, Rfl: 2  •  Magnesium Gluconate (MAGNESIUM 27 PO), Take 1 capsule by mouth Daily., Disp: , Rfl:   •  montelukast (SINGULAIR) 10 MG tablet, Take one tablet by mouth at bedtime (Patient taking differently: Take 10 mg by mouth Daily.), Disp: 30 tablet, Rfl: 11  •  Multiple Vitamins-Minerals (CENTRUM SILVER PO), Centrum Silver, Disp: , Rfl:   •  potassium chloride 10  "MEQ CR tablet, Take 1 tablet by mouth Daily., Disp: 90 tablet, Rfl: 1  •  Tiotropium Bromide Monohydrate (SPIRIVA RESPIMAT) 1.25 MCG/ACT aerosol solution inhaler, Inhale 2 puffs Daily., Disp: 4 g, Rfl: 11  •  venlafaxine XR (EFFEXOR-XR) 75 MG 24 hr capsule, Take 1 capsule by mouth Daily., Disp: 90 capsule, Rfl: 1  •  vitamin D3 (Vitamin D) 125 MCG (5000 UT) capsule capsule, Take 5,000 Units by mouth Daily., Disp: , Rfl:     Allergies:  Percocet [oxycodone-acetaminophen] and Brevital sodium [methohexital]    Review of Systems   Constitutional: Negative for activity change, appetite change, fatigue, fever and unexpected weight change.   HENT: Negative for hearing loss, trouble swallowing and voice change.    Eyes: Negative for visual disturbance.   Respiratory: Negative for cough, choking, chest tightness and shortness of breath.    Cardiovascular: Negative for chest pain.   Gastrointestinal: Positive for abdominal distention (bloating) and blood in stool. Negative for abdominal pain, anal bleeding, constipation, diarrhea, nausea, rectal pain and vomiting.        Frequent bowel movements   Endocrine: Negative for polydipsia and polyphagia.   Genitourinary: Negative.    Musculoskeletal: Positive for back pain. Negative for gait problem and joint swelling.   Skin: Negative for color change and rash.   Allergic/Immunologic: Negative for food allergies.   Neurological: Negative for dizziness, seizures and speech difficulty.   Hematological: Negative for adenopathy.   Psychiatric/Behavioral: Negative for confusion.           Vitals:   /98 (BP Location: Right arm, Patient Position: Sitting, Cuff Size: Adult)   Pulse 76   Temp 97.3 °F (36.3 °C) (Temporal)   Ht 170.2 cm (67\")   Wt 100 kg (221 lb 3.2 oz)   LMP  (LMP Unknown)   SpO2 98%   BMI 34.64 kg/m²      Physical Exam:   Constitutional: Pt is oriented to person, place, and time and well-developed, well-nourished, and in no distress.                 Assessment " and Plan Padmini and I had a lengthy discussion about fatty liver.  We reviewed her mild elevation in her AST and ALT.  She was concerned about her renal function which previously been normal and is only mildly elevated at this time.  I reassured her that this can fluctuate like this.  Her lipids were marginally elevated with a cholesterol of 211 her LDL was elevated.  I told her and we talked for a long time the most important thing for her to do now is to exercise.  She has become euthyroid which will also hopefully get easier for her to lose weight.  I have asked her to try either weight watchers or seek a consult from a nutritional list to help her with meal planning if necessary.  I stressed to her although this was reversible.  She mentioned that she has had a change in her bowel habits and I recommended that she go on some align for this.  She had a recent colonoscopy in [place] and she did have some adenomatous polyps removed.  All of her questions were answered in detail.  At least 30 minutes of time was spent before during and after this visit and the appropriate 10673 was billed        ICD-10-CM ICD-9-CM   1. Abnormal CT of liver  R93.2 793.3   2. Fatty liver  K76.0 571.8   3. Abnormal liver function tests  R94.5 790.6       Shaheed Christianson M.D.  [facility]  EMR Dragon/Transcription disclaimer:   Much of this encounter note is an electronic transcription/translation of spoken language to printed text. The electronic translation of spoken language may permit erroneous, or at times, nonsensical words or phrases to be inadvertently transcribed; Although I have reviewed the note for such errors, some may still exist.

## 2021-10-21 ENCOUNTER — OFFICE VISIT (OUTPATIENT)
Dept: NEUROLOGY | Facility: CLINIC | Age: 61
End: 2021-10-21

## 2021-10-21 VITALS
SYSTOLIC BLOOD PRESSURE: 130 MMHG | WEIGHT: 217 LBS | HEIGHT: 67 IN | TEMPERATURE: 97.2 F | OXYGEN SATURATION: 99 % | HEART RATE: 90 BPM | DIASTOLIC BLOOD PRESSURE: 70 MMHG | BODY MASS INDEX: 34.06 KG/M2

## 2021-10-21 DIAGNOSIS — G56.11 MEDIAN NEUROPATHY AT ELBOW, RIGHT: ICD-10-CM

## 2021-10-21 DIAGNOSIS — G60.9 IDIOPATHIC PERIPHERAL NEUROPATHY: Primary | ICD-10-CM

## 2021-10-21 DIAGNOSIS — G56.21 ULNAR NEUROPATHY AT ELBOW OF RIGHT UPPER EXTREMITY: ICD-10-CM

## 2021-10-21 PROBLEM — R20.0 NUMBNESS AND TINGLING IN BOTH HANDS: Status: RESOLVED | Noted: 2021-07-19 | Resolved: 2021-10-21

## 2021-10-21 PROBLEM — R20.2 NUMBNESS AND TINGLING IN BOTH HANDS: Status: RESOLVED | Noted: 2021-07-19 | Resolved: 2021-10-21

## 2021-10-21 PROBLEM — R20.0 NUMBNESS AND TINGLING OF BOTH FEET: Status: RESOLVED | Noted: 2019-01-30 | Resolved: 2021-10-21

## 2021-10-21 PROBLEM — R20.2 NUMBNESS AND TINGLING OF BOTH FEET: Status: RESOLVED | Noted: 2019-01-30 | Resolved: 2021-10-21

## 2021-10-21 PROCEDURE — 99213 OFFICE O/P EST LOW 20 MIN: CPT | Performed by: NURSE PRACTITIONER

## 2021-10-21 NOTE — PROGRESS NOTES
Subjective:     Patient ID: Brittny Han is a 61 y.o. female.    CC:   Chief Complaint   Patient presents with   • Peripheral Neuropathy       HPI:   History of Present Illness   This is a very pleasant 61-year-old female who presents for 3-month follow-up on her neuropathy symptoms present since 2017 in both hands and feet. MRI of the brain and cervical spine in May 2017 were normal. She tried gabapentin for her symptoms but she did not like the side effects. She ended up having another EMG and NCVS in 2019 and this was found to be normal. She was evaluated for her neck and low back pain by Dr. Veras with neurosurgery at [facility] and was found to have cervical degenerative disc changes with moderate spinal canal stenosis without radicular or myelopathic symptoms. MRI of the lumbar spine showed minimal degenerative disc changes. She has been going to physical therapy and has found this very helpful.    Since last visit she has undergone additional neuropathy lab work-up including immunofixation with protein electrophoresis normal, methylmalonic acid normal at 128, vitamin B12 normal at 655 with folate 17.7, CRP and sed rate normal,  normal, C3, C4 and BETH with RA all negative, ANCA panel normal, vitamin B6 20.6 normal, vitamin B1 120.1 normal. She did undergo EMG and NCVS of lower extremities with right arm and she was found to have an exceedingly mild peripheral neuropathy along with a mild right median neuropathy at the wrist and an exceedingly mild ulnar neuropathy at the elbow on the right side. She has been using compounded neuro cream from [facility] and feels that this has been very helpful. She wishes to continue this treatment for now. Her grandfather also had neuropathy. Her numbness and tingling are typically constant in her legs and come and go in her arms and hands. She is not interested in trying occupational therapy at this time or seeing orthopedics or having  injections.    The following portions of the patient's history were reviewed and updated as appropriate: allergies, current medications, past family history, past medical history, past social history, past surgical history and problem list.    Past Medical History:   Diagnosis Date   • Allergic rhinitis    • Anemia    • Anxiety    • Asthma     MODERATE, PERSISTANT   • BCC (basal cell carcinoma) 2000    RIGHT EYELID   • Bowel habit changes    • BPPV (benign paroxysmal positional vertigo) 01/2020   • Bronchitis    • Cat bite of left hand 05/16/2020    SEEN AT [facility]   • Chronic bilateral low back pain with bilateral sciatica    • Chronic idiopathic constipation    • Colon polyps     FOLLOWED BY DR. LORENA RICO   • COPD (chronic obstructive pulmonary disease) ([facility])    • CTS (carpal tunnel syndrome) 2010    BILATERAL, Wear wrist braces at night   • Demyelinating disease ([facility])    • Depression    • Disease of thyroid gland     HYPOTHYROIDISM   • DINERO (dyspnea on exertion) 06/2019   • Essential hypertension    • Fatigue    • Gastroenteritis 02/2020   • GERD (gastroesophageal reflux disease)    • Head injury when I was 10    concussion   • Hemorrhoids    • Hepatomegaly 11/2018   • History of echocardiogram 05/31/2011    mild ascending aortic root dilatation; mod RVC enlargement; global left EF 0.72 (normal 0.55-0.75) mild concentric LV hypertrophy; trace MR; mild TR    • History of PFTs 02/02/2016    good effort; normal lung volumes   • Hyperlipidemia    • Hypertrophy, nasal, turbinate    • Midline cystocele    • Neuropathy of both feet 01/2019    FOLLOWED BY DR. VARSHA IRELAND   • Numbness and tingling in both hands 7/19/2021   • Numbness and tingling of both feet 1/30/2019   • ALANNA on CPAP 2007    Followed by Jin Cat @ [facility]   • Peptic ulceration     PUD   • Periodic headache syndrome, not intractable    • Plantar fasciitis, left 01/2019    FOLLOWED BY DR. VARSHA IRELAND   • Rectal bleeding 08/2020   •  Rectal bleeding    • Rectal mass 08/2020   • RLS (restless legs syndrome) 06/2017   • Shortness of breath    • Strain of thoracic region 04/2017   • KALYAN (stress urinary incontinence, female)    • Uterine fibroid    • Vision loss 2008    wear glasses   • Vitamin B 12 deficiency    • Vitamin D deficiency        Past Surgical History:   Procedure Laterality Date   • BLADDER SLING MODIFIED, ANTERIOR AND POSTERIOR VAGINAL REPAIR  2011   • BREAST EXCISIONAL BIOPSY Left 1981   • BREAST LUMPECTOMY Left    • COLONOSCOPY N/A 2019    Dr. Sammy Lockwood [facility].   • COLONOSCOPY N/A 9/23/2020    2 TUBULAR ADENOMA POLYPS IN TRANSVERSE, 6 MM TUBULAR ADENOMA POLYP IN DESCENDING, 3 TUBULAR ADENOMA POLYPS IN SIGMOID, ANAL FISSURE, RESCOPE IN 3 YRS, DR. LORENA RICO AT [facility]   • COLONOSCOPY W/ POLYPECTOMY N/A 09/14/2012    HEMORRHOIDS, 2 TUBULAR ADENOMA POLYPS IN SIGMOID, 5 TUBULAR ADENOMA POLYPS IN RECTUM, SPASTIC COLON IN DESCENDING AND SIGMOID, RESCOPE IN 5 YRS, DR. SAMMY REICH AT [facility]   • COLONOSCOPY W/ POLYPECTOMY N/A 10/16/2017    TUBULAR ADENOMA POLYP IN LEFT COLON, DR. RACHANA GAYLE AT [facility]   • HYSTERECTOMY N/A 10/24/2011    LAPAROSCOPIC, WITH BILATERAL SALPINGECTOMY, DR. ROSITA MATA AT [facility]   • SKIN CANCER EXCISION Right 2000    BCC ON RIGHT EYELID   • SKIN CANCER EXCISION Right 2002    BCC OF RIGHT EYE   • TRANSVAGINAL TAPING SUSPENSION N/A 10/24/2011    WITH CYSTOSCOPY, DR. ROSITA MATA AT [facility]       Social History     Socioeconomic History   • Marital status: Single   Tobacco Use   • Smoking status: Never Smoker   • Smokeless tobacco: Never Used   Vaping Use   • Vaping Use: Never used   Substance and Sexual Activity   • Alcohol use: Yes     Alcohol/week: 2.0 standard drinks     Types: 2 Glasses of wine per week     Comment: 2 glasses of wine about 4 x's per year   • Drug use: No   • Sexual activity: Not Currently     Partners: Male     Birth control/protection: Abstinence     Comment: single       Family History   Problem Relation  Age of Onset   • Heart disease Mother    • Hypertension Mother    • Sleep apnea Mother    • Hyperlipidemia Mother    • Heart attack Mother    • COPD Father         Father is alive at age 80 with COPD and asthma   • Asthma Father    • Basal cell carcinoma Father    • Cancer Father    • Hyperlipidemia Father    • Ulcerative colitis Cousin    • Crohn's disease Cousin    • Cancer Niece    • Melanoma Niece    • Narcolepsy Sister    • Sleep apnea Sister    • Diabetes Brother    • Lung cancer Paternal Grandmother    • Heart disease Paternal Grandfather    • Breast cancer Neg Hx    • Ovarian cancer Neg Hx         Review of Systems   Constitutional: Negative for chills, fatigue, fever and unexpected weight change.   HENT: Negative for ear pain, hearing loss, nosebleeds, rhinorrhea and sore throat.    Eyes: Negative for photophobia, pain, discharge, itching and visual disturbance.   Respiratory: Negative for cough, chest tightness, shortness of breath and wheezing.    Cardiovascular: Negative for chest pain, palpitations and leg swelling.   Gastrointestinal: Negative for abdominal pain, blood in stool, constipation, diarrhea, nausea and vomiting.   Genitourinary: Negative for dysuria, frequency, hematuria and urgency.   Musculoskeletal: Negative for arthralgias, back pain, gait problem, joint swelling, myalgias, neck pain and neck stiffness.   Skin: Negative for rash and wound.   Allergic/Immunologic: Negative for environmental allergies and food allergies.   Neurological: Positive for numbness and headaches. Negative for dizziness, tremors, seizures, syncope, speech difficulty, weakness and light-headedness.        TINGLING   Hematological: Negative for adenopathy. Does not bruise/bleed easily.   Psychiatric/Behavioral: Negative for agitation, confusion, decreased concentration, hallucinations, sleep disturbance and suicidal ideas. The patient is not nervous/anxious.    All other systems reviewed and are negative.    "    Objective:  /70   Pulse 90   Temp 97.2 °F (36.2 °C)   Ht 170.2 cm (67\")   Wt 98.4 kg (217 lb)   LMP  (LMP Unknown)   SpO2 99%   BMI 33.99 kg/m²     Neurologic Exam     Mental Status   Oriented to person, place, and time.   Speech: speech is normal   Level of consciousness: alert    Cranial Nerves   Cranial nerves II through XII intact.     Motor Exam   Muscle bulk: normal  Overall muscle tone: normal    Strength   Strength 5/5 throughout.     Sensory Exam   Light touch normal.   Vibration normal.   Proprioception normal.   Pinprick normal.     Gait, Coordination, and Reflexes     Gait  Gait: wide-based (antalgia)    Coordination   Finger to nose coordination: normal    Tremor   Resting tremor: absent  Intention tremor: absent  Action tremor: absent    Reflexes   Right brachioradialis: 2+  Left brachioradialis: 2+  Right biceps: 2+  Left biceps: 2+  Right patellar: 1+  Left patellar: 1+  Right achilles: 1+  Left achilles: 1+  Right : 2+  Left : 2+      Physical Exam  Constitutional:       Appearance: Normal appearance.   Neurological:      Mental Status: She is alert and oriented to person, place, and time.      Coordination: Finger-Nose-Finger Test normal.      Deep Tendon Reflexes: Strength normal.      Reflex Scores:       Bicep reflexes are 2+ on the right side and 2+ on the left side.       Brachioradialis reflexes are 2+ on the right side and 2+ on the left side.       Patellar reflexes are 1+ on the right side and 1+ on the left side.       Achilles reflexes are 1+ on the right side and 1+ on the left side.  Psychiatric:         Mood and Affect: Mood is anxious.         Speech: Speech normal.         Behavior: Behavior normal.         Thought Content: Thought content normal.         Cognition and Memory: Cognition and memory normal.         Judgment: Judgment normal.         Assessment/Plan:          Diagnoses and all orders for this visit:    1. Idiopathic peripheral neuropathy " (Primary)  Comments:  continue neuro cream    2. 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 Ulnar neuropathy at elbow of right upper extremity  Comments:  consider OT in future, will call if she wants order           Printed off additional information about idiopathic peripheral neuropathy from [facility] for her tor ead. She has no issues with weakness at this time. Wishes to continue conservative therapy for now with neuro cream ketamine 10%, gabapentin 6%, lidocaine 5%, amitriptyline 2%, bupivacaine 2%, meloxicam 0.1% transdermal gel. Follow-up in 6 months or sooner if needed. We did review her EMG with NCVS along with her labs and treatment recommendations.  Reviewed medications, potential side effects and signs and symptoms to report. Discussed risk versus benefits of treatment plan with patient and/or family-including medications, labs and radiology that may be ordered. Addressed questions and concerns during visit. Patient and/or family verbalized understanding and agree with plan.    AS THE PROVIDER, I PERSONALLY WORE PPE DURING ENTIRE FACE TO FACE ENCOUNTER IN CLINIC WITH THE PATIENT. PATIENT ALSO WORE PPE DURING ENTIRE FACE TO FACE ENCOUNTER EXCEPT FOR A MAX OF 30 SECONDS DURING NEUROLOGICAL EVALUATION OF CRANIAL NERVES AND THEN MASK WAS PLACED BACK OVER PATIENT FACE FOR REMAINDER OF VISIT. I WASHED MY HANDS BEFORE AND AFTER VISIT.    As part of this patient's treatment plan I am prescribing controlled substances. The patient has been made aware of appropriate use of such medications, including potential risk of somnolence, limited ability to drive and/or work safely, and potential for dependence or overdose. It has also been made clear that these medications are for use by the patient only, without concomitant use of alcohol or other substances unless prescribed. Keep out of reach of children.  Will report has been reviewed. If this is going  to be prescribed long term, [facility] [place] Contract has also been read and signed by patient and myself.    During this visit the following were done:  Labs Reviewed [x]    Labs Ordered []    Radiology Reports Reviewed []    Radiology Ordered []    PCP Records Reviewed []    Referring Provider Records Reviewed []    ER Records Reviewed []    Hospital Records Reviewed []    History Obtained From Family []    Radiology Images Reviewed []    Other Reviewed [x] EMG and NCVS.  Records Requested []      Shani Colorado, APRN  10/21/2021

## 2021-10-29 DIAGNOSIS — Z01.812 BLOOD TESTS PRIOR TO TREATMENT OR PROCEDURE: Primary | ICD-10-CM

## 2021-11-01 ENCOUNTER — CLINICAL SUPPORT NO REQUIREMENTS (OUTPATIENT)
Dept: PULMONOLOGY | Facility: CLINIC | Age: 61
End: 2021-11-01

## 2021-11-01 DIAGNOSIS — Z01.812 BLOOD TESTS PRIOR TO TREATMENT OR PROCEDURE: ICD-10-CM

## 2021-11-01 PROCEDURE — 99211 OFF/OP EST MAY X REQ PHY/QHP: CPT | Performed by: NURSE PRACTITIONER

## 2021-11-01 PROCEDURE — U0004 COV-19 TEST NON-CDC HGH THRU: HCPCS | Performed by: NURSE PRACTITIONER

## 2021-11-02 LAB — SARS-COV-2 RNA NOSE QL NAA+PROBE: NOT DETECTED

## 2021-11-03 ENCOUNTER — OFFICE VISIT (OUTPATIENT)
Dept: PULMONOLOGY | Facility: CLINIC | Age: 61
End: 2021-11-03

## 2021-11-03 VITALS
WEIGHT: 217 LBS | SYSTOLIC BLOOD PRESSURE: 100 MMHG | HEART RATE: 76 BPM | OXYGEN SATURATION: 96 % | BODY MASS INDEX: 34.06 KG/M2 | DIASTOLIC BLOOD PRESSURE: 70 MMHG | TEMPERATURE: 98.2 F | HEIGHT: 67 IN

## 2021-11-03 DIAGNOSIS — J45.50 SEVERE PERSISTENT ASTHMA, UNSPECIFIED WHETHER COMPLICATED: Primary | ICD-10-CM

## 2021-11-03 DIAGNOSIS — R06.02 SHORTNESS OF BREATH: ICD-10-CM

## 2021-11-03 DIAGNOSIS — G47.33 OBSTRUCTIVE SLEEP APNEA SYNDROME: ICD-10-CM

## 2021-11-03 DIAGNOSIS — Z23 IMMUNIZATION DUE: ICD-10-CM

## 2021-11-03 PROCEDURE — 90471 IMMUNIZATION ADMIN: CPT | Performed by: NURSE PRACTITIONER

## 2021-11-03 PROCEDURE — 90686 IIV4 VACC NO PRSV 0.5 ML IM: CPT | Performed by: NURSE PRACTITIONER

## 2021-11-03 PROCEDURE — 94375 RESPIRATORY FLOW VOLUME LOOP: CPT | Performed by: NURSE PRACTITIONER

## 2021-11-03 PROCEDURE — 99214 OFFICE O/P EST MOD 30 MIN: CPT | Performed by: NURSE PRACTITIONER

## 2021-11-03 PROCEDURE — 94729 DIFFUSING CAPACITY: CPT | Performed by: NURSE PRACTITIONER

## 2021-11-03 PROCEDURE — 94726 PLETHYSMOGRAPHY LUNG VOLUMES: CPT | Performed by: NURSE PRACTITIONER

## 2021-11-03 NOTE — PROGRESS NOTES
"[facility] Pulmonary Follow up      Chief Complaint  Asthma (follow up), Shortness of Breath, Wheezing, and Cough    Subjective          Brittny Han presents to [facility] for routine follow-up on her asthma and shortness of breath.    She is on full treatment of her asthma including Advair discus, Spiriva Respimat daily, and Dupixent.  She has been holding her Dupixent recently.  She does admit in the past she frequently forgets to use it that recently has been holding it to receive her COVID-19 booster.  She is having some issues with Advair Diskus causing some hoarseness.  She has used HFA in the past with better results.  She is going to see if she can find a coupon for the HFA.    She does complain of chronic dyspnea with activity.  She is also been experiencing some chest pressure recently.  She does not use her albuterol during these episodes.  She primarily uses her albuterol with dyspnea with activity.  She does state it does help quite a bit when she is having difficulty breathing.    She has a chronic cough with clear sputum production.  She does have some chronic seasonal allergies with postnasal drainage as well.    She also has a history of obstructive sleep apnea wears a CPAP at night.  She is awaiting a new machine from her current [facility].      Objective     Vital Signs:   /70   Pulse 76   Temp 98.2 °F (36.8 °C)   Ht 170.2 cm (67\")   Wt 98.4 kg (217 lb)   SpO2 96%   BMI 33.99 kg/m²       Immunization History   Administered Date(s) Administered   • COVID-19 (PFIZER) 04/19/2021, 05/10/2021   • Flu Vaccine Intradermal Quad 18-64YR 10/06/2014, 10/01/2015   • Flu Vaccine Quad PF >36MO 10/10/2018   • Flu Vaccine Split Quad 10/10/2018   • FluLaval/Fluarix/Fluzone >6 11/03/2021   • Influenza TIV (IM) 10/06/2014, 10/01/2015   • Influenza, Unspecified 10/21/2019, 09/23/2020   • Pneumococcal Conjugate 13-Valent (PCV13) 04/04/2016   • " Pneumococcal Polysaccharide (PPSV23) 10/06/2009, 10/10/2014, 12/23/2020   • Shingrix 03/01/2021, 06/04/2021   • TD Preservative Free 05/16/2020   • Tdap 02/12/2010       Physical Exam  Vitals reviewed.   Constitutional:       General: She is not in acute distress.     Appearance: She is well-developed. She is not ill-appearing.   HENT:      Head: Normocephalic and atraumatic.   Eyes:      Pupils: Pupils are equal, round, and reactive to light.   Cardiovascular:      Rate and Rhythm: Normal rate and regular rhythm.      Heart sounds: No murmur heard.      Pulmonary:      Effort: Pulmonary effort is normal. No respiratory distress.      Breath sounds: Normal breath sounds. No wheezing or rales.   Abdominal:      General: Bowel sounds are normal. There is no distension.      Palpations: Abdomen is soft.   Musculoskeletal:         General: Normal range of motion.      Cervical back: Normal range of motion and neck supple.   Skin:     General: Skin is warm and dry.      Findings: No erythema.   Neurological:      Mental Status: She is alert and oriented to person, place, and time.   Psychiatric:         Behavior: Behavior normal.          Result Review :            PFTs done in the office today:  No obstruction or restriction normal PFTs.  FVC was 3.81, 103% predicted.  No air trapping normal DLCO.    CT of the chest from August 2021 showed no acute cardiopulmonary abnormality.                 Assessment and Plan    Problem List Items Addressed This Visit        Pulmonary and Pneumonias    Shortness of breath    Severe persistent asthma - Primary    Relevant Orders    Pulmonary Function Test (Completed)       Sleep    Obstructive sleep apnea syndrome      Other Visit Diagnoses     Immunization due        Relevant Orders    FluLaval/Fluarix/Fluzone >6 Months ([id number]) (Completed)        We went over her PFTs today in the office.  She has no obstruction or restriction.  She does continue to have quite a bit of dyspnea  with activity.  I did instruct her to use her albuterol with this chest tightness to see if it is related to any airway restriction.    Continue on her Advair and Spiriva.  Continue on her CPAP at night for her obstructive sleep apnea.  She is awaiting a new machine from her DME.      Follow Up     Return in about 6 months (around 5/3/2022).  Patient was given instructions and counseling regarding her condition or for health maintenance advice. Please see specific information pulled into the AVS if appropriate.     I spent 35 minutes caring for Brittny on this date of service. This time includes time spent by me in the following activities:preparing for the visit, reviewing tests, obtaining and/or reviewing a separately obtained history, performing a medically appropriate examination and/or evaluation , counseling and educating the patient/family/caregiver, ordering medications, tests, or procedures, referring and communicating with other health care professionals , documenting information in the medical record and independently interpreting results and communicating that information with the patient/family/caregiver    Moderate level of Medical Decision Making complexity based on 2 or more chronic stable illnesses and prescription drug management.    BLANCA Jackson, ACNP  [facility]  Electronically signed

## 2021-11-15 RX ORDER — LEVOTHYROXINE SODIUM 0.07 MG/1
75 TABLET ORAL DAILY
Qty: 30 TABLET | Refills: 2 | Status: SHIPPED | OUTPATIENT
Start: 2021-11-15 | End: 2022-02-07 | Stop reason: SDUPTHER

## 2021-11-24 RX ORDER — ESTRADIOL 0.07 MG/D
1 FILM, EXTENDED RELEASE TRANSDERMAL 2 TIMES WEEKLY
Qty: 8 EACH | Refills: 3 | Status: SHIPPED | OUTPATIENT
Start: 2021-11-25 | End: 2022-03-16 | Stop reason: SDUPTHER

## 2021-11-29 RX ORDER — LEVOCETIRIZINE DIHYDROCHLORIDE 5 MG/1
5 TABLET, FILM COATED ORAL DAILY
Qty: 30 TABLET | Refills: 5 | Status: CANCELLED | OUTPATIENT
Start: 2021-11-29

## 2021-12-06 RX ORDER — LEVOCETIRIZINE DIHYDROCHLORIDE 5 MG/1
5 TABLET, FILM COATED ORAL DAILY
Qty: 30 TABLET | Refills: 5 | Status: CANCELLED | OUTPATIENT
Start: 2021-11-29

## 2021-12-17 ENCOUNTER — LAB (OUTPATIENT)
Dept: LAB | Facility: HOSPITAL | Age: 61
End: 2021-12-17

## 2021-12-17 ENCOUNTER — OFFICE VISIT (OUTPATIENT)
Dept: ENDOCRINOLOGY | Facility: CLINIC | Age: 61
End: 2021-12-17

## 2021-12-17 VITALS
HEIGHT: 67 IN | OXYGEN SATURATION: 96 % | DIASTOLIC BLOOD PRESSURE: 72 MMHG | WEIGHT: 217 LBS | BODY MASS INDEX: 34.06 KG/M2 | HEART RATE: 74 BPM | SYSTOLIC BLOOD PRESSURE: 128 MMHG

## 2021-12-17 DIAGNOSIS — R73.03 PREDIABETES: ICD-10-CM

## 2021-12-17 DIAGNOSIS — E03.9 ACQUIRED HYPOTHYROIDISM: ICD-10-CM

## 2021-12-17 DIAGNOSIS — E03.9 ACQUIRED HYPOTHYROIDISM: Primary | ICD-10-CM

## 2021-12-17 LAB
HBA1C MFR BLD: 5.5 % (ref 4.8–5.6)
TSH SERPL DL<=0.05 MIU/L-ACNC: 3.21 UIU/ML (ref 0.27–4.2)

## 2021-12-17 PROCEDURE — 99204 OFFICE O/P NEW MOD 45 MIN: CPT | Performed by: INTERNAL MEDICINE

## 2021-12-17 PROCEDURE — 84443 ASSAY THYROID STIM HORMONE: CPT

## 2021-12-17 PROCEDURE — 83036 HEMOGLOBIN GLYCOSYLATED A1C: CPT

## 2021-12-17 NOTE — PROGRESS NOTES
Office Note      Date: 2021  Patient Name: Brittny Han  MRN: [id number]  : 1960    Chief Complaint   Patient presents with   • Hypothyroidism       History of Present Illness:   Brittny Han is a 61 y.o. female who presents for Hypothyroidism    She reports h/o hypothyroidism for 2 years.  She has been treated with T4.  She is currently on 75mcg qd.  She is taking this correctly.  She isn't taking any interfering meds concurrently.  She denies any h/o goiter.  She hasn't noted any change in the size of her neck.  She denies any compressive sxs.  She c/o inability to lose weight.  She reports h/o prediabetes.  She c/o fatigue.  She c/o heat intolerance.  She notes hair loss.  She denies any other sxs of hypo- or hyperthyroidism at this time.    The TSH was ~8 earlier this year.  The T4 dose was increased.  She had labs done 3 months ago.  The TSH was 3.  She denies any biotin use.    Subjective      Patient was born where: [place].  Facial radiation exposure: No.  High iodine intake: No  Family hx of thyroid disease: No.    Review of Systems:   Review of Systems   Constitutional: Positive for diaphoresis and fatigue.   HENT: Positive for congestion, postnasal drip, rhinorrhea and voice change.    Eyes: Positive for discharge and visual disturbance.   Respiratory: Positive for cough, chest tightness and shortness of breath.    Cardiovascular: Negative.    Gastrointestinal: Positive for anal bleeding, blood in stool and rectal pain.   Endocrine: Positive for heat intolerance.   Genitourinary: Negative.    Musculoskeletal: Positive for arthralgias, back pain, myalgias, neck pain and neck stiffness.   Skin: Negative.         Hair Loss   Allergic/Immunologic: Positive for environmental allergies.   Neurological: Positive for numbness.   Hematological: Negative.    Psychiatric/Behavioral: Negative.        The following portions of the patient's history were reviewed and updated as appropriate:  "allergies, current medications, past family history, past medical history, past social history, past surgical history and problem list.    Objective     Visit Vitals  /72 (BP Location: Left arm, Patient Position: Sitting, Cuff Size: Adult)   Pulse 74   Ht 170.2 cm (67\")   Wt 98.4 kg (217 lb)   LMP  (LMP Unknown)   SpO2 96%   BMI 33.99 kg/m²       Physical Exam:  Physical Exam  Constitutional:       Appearance: Normal appearance.   HENT:      Head: Normocephalic and atraumatic.   Eyes:      Extraocular Movements: Extraocular movements intact.      Conjunctiva/sclera: Conjunctivae normal.      Pupils: Pupils are equal, round, and reactive to light.   Neck:      Thyroid: No thyroid mass, thyromegaly or thyroid tenderness.   Cardiovascular:      Rate and Rhythm: Normal rate and regular rhythm.      Pulses: Normal pulses.      Heart sounds: Normal heart sounds.   Pulmonary:      Effort: Pulmonary effort is normal.      Breath sounds: Normal breath sounds.   Abdominal:      General: Bowel sounds are normal.      Palpations: Abdomen is soft.   Musculoskeletal:         General: Normal range of motion.      Cervical back: Normal range of motion and neck supple.   Lymphadenopathy:      Cervical: No cervical adenopathy.   Skin:     General: Skin is warm and dry.   Neurological:      General: No focal deficit present.      Mental Status: She is alert.   Psychiatric:         Mood and Affect: Mood normal.         Behavior: Behavior normal.         Thought Content: Thought content normal.         Judgment: Judgment normal.         Labs:    TSH  No results found for: TSHBASE     Free T4  Free T4   Date Value Ref Range Status   02/26/2021 0.84 (L) 0.93 - 1.70 ng/dL Final       T3  No results found for: Y6KVTES      TPO  No results found for: THYROIDAB    TG AB  No results found for: THGAB    TG  No results found for: THYROGLB    CBC w/DIFF  Lab Results   Component Value Date    WBC 6.07 03/12/2021    RBC 4.45 03/12/2021    HGB " 13.3 03/12/2021    HCT 39.7 03/12/2021    MCV 89.2 03/12/2021    MCH 29.9 03/12/2021    MCHC 33.5 03/12/2021    RDW 12.6 03/12/2021    RDWSD 40.9 03/12/2021    MPV 10.0 03/12/2021     03/12/2021    NEUTRORELPCT 47.0 03/12/2021    LYMPHORELPCT 36.9 03/12/2021    MONORELPCT 10.2 03/12/2021    EOSRELPCT 4.4 03/12/2021    BASORELPCT 1.2 03/12/2021    AUTOIGPER 0.3 03/12/2021    NEUTROABS 2.85 03/12/2021    LYMPHSABS 2.24 03/12/2021    MONOSABS 0.62 03/12/2021    EOSABS 0.27 03/12/2021    BASOSABS 0.07 03/12/2021    AUTOIGNUM 0.02 03/12/2021    NRBC 0.0 03/12/2021           Assessment / Plan      Assessment & Plan:  Diagnoses and all orders for this visit:    1. Acquired hypothyroidism (Primary)  Assessment & Plan:  We discussed the diagnosis and treatment.  Continue T4 tx.  Check TSH today.    Orders:  -     TSH; Future    2. Prediabetes  Assessment & Plan:  Last A1c had improved.  Check A1c today.  Refer for DM education.    Orders:  -     Ambulatory Referral to Diabetic Education  -     Hemoglobin A1c; Future       Return in about 3 months (around 3/17/2022) for Recheck with TSH, A1c.    Eros Gasca MD   12/17/2021

## 2021-12-27 RX ORDER — HYDROCHLOROTHIAZIDE 25 MG/1
25 TABLET ORAL DAILY
Qty: 90 TABLET | Refills: 1 | Status: SHIPPED | OUTPATIENT
Start: 2021-12-27 | End: 2022-07-14 | Stop reason: SDUPTHER

## 2021-12-27 RX ORDER — VENLAFAXINE HYDROCHLORIDE 75 MG/1
75 CAPSULE, EXTENDED RELEASE ORAL DAILY
Qty: 90 CAPSULE | Refills: 1 | Status: SHIPPED | OUTPATIENT
Start: 2021-12-27 | End: 2022-07-14 | Stop reason: SDUPTHER

## 2022-01-20 ENCOUNTER — APPOINTMENT (OUTPATIENT)
Dept: NUTRITION | Facility: HOSPITAL | Age: 62
End: 2022-01-20

## 2022-01-26 ENCOUNTER — OFFICE VISIT (OUTPATIENT)
Dept: FAMILY MEDICINE CLINIC | Facility: CLINIC | Age: 62
End: 2022-01-26

## 2022-01-26 VITALS
OXYGEN SATURATION: 99 % | BODY MASS INDEX: 34.11 KG/M2 | TEMPERATURE: 97.2 F | SYSTOLIC BLOOD PRESSURE: 128 MMHG | WEIGHT: 217.3 LBS | HEART RATE: 77 BPM | HEIGHT: 67 IN | DIASTOLIC BLOOD PRESSURE: 76 MMHG

## 2022-01-26 DIAGNOSIS — E66.9 OBESITY (BMI 30-39.9): ICD-10-CM

## 2022-01-26 DIAGNOSIS — I10 BENIGN ESSENTIAL HYPERTENSION: ICD-10-CM

## 2022-01-26 DIAGNOSIS — F41.9 ANXIETY: ICD-10-CM

## 2022-01-26 DIAGNOSIS — E03.9 HYPOTHYROIDISM, UNSPECIFIED TYPE: Primary | ICD-10-CM

## 2022-01-26 PROCEDURE — 99203 OFFICE O/P NEW LOW 30 MIN: CPT | Performed by: FAMILY MEDICINE

## 2022-01-26 NOTE — PROGRESS NOTES
"Subjective   Brittny Han is a 62 y.o. female    Chief Complaint    Establish care  Hypothyroidism  Anxiety   Changing medication    History of Present Illness  The patient's previous primary care physician was Dr. Mariaelena Banks. The patient follows with endocrinology, Dr. Eros Gasca, for hypothyroidism. She would like to discuss changing medications due to a medication study that she had done.    The patient reports she had a genetic study done and it showed that venlafaxine, which she has taken for anxiety for forever, is not a good fit for her. She said there is a big \"opioid thing\" in her MyChart in 12/16/2020. The patient said she does not respond well to opioids. She reports she does not do good on pain medications at all. The patient said they make her loopy.    The patient said her thyroid is low. She reports Dr. Gasca told her to stop asking about Biotin. The patient said she is going to start taking biotin to try to get her hair to come back and stop falling out.     She said she is short of breath and can never breathe, and notes she has asthma.     She reports she does not have an OB/GYN because her cervix was removed. The patient asked if there is a screen to test for ovarian cancer.  She also had a  bladder sling placed at the time of her hysterectomy. The patient said she had a rectocele repair done in 03/2021.     The patient said her metabolism is very slow. She reports she used to be able to say she was full of it and would have a bowel movement about once a week, and she got that moving better. The patient said she has a gastroenterologist, but they only go so far with stuff. She reports she has fatty liver. The patient said her mother went into the hospital in 02/2020 and she had C. difficile and norovirus. The patient said her mother had it before. She reports she got the virus, but no test was ever done. Since then she has had loose bowel movements. The patient said she does not want to " eat a lot.    The following portions of the patient's history were reviewed and updated as appropriate: allergies, current medications, past social history and problem list    Review of Systems   Constitutional: Negative for appetite change, chills, fatigue, fever and unexpected weight change.   Eyes: Negative for visual disturbance.   Respiratory: Negative for cough, chest tightness and shortness of breath.    Cardiovascular: Negative for chest pain and palpitations.   Gastrointestinal: Positive for abdominal distention. Negative for abdominal pain, blood in stool, constipation, diarrhea, nausea and vomiting.   Endocrine: Negative for cold intolerance and heat intolerance.   Genitourinary: Negative for difficulty urinating and dysuria.   Musculoskeletal: Negative for back pain.   Skin: Negative for color change and rash.   Allergic/Immunologic: Negative for food allergies.   Neurological: Negative for dizziness, tremors, weakness, light-headedness and headaches.   Psychiatric/Behavioral: Positive for dysphoric mood and sleep disturbance. Negative for agitation, behavioral problems, confusion, decreased concentration and suicidal ideas. The patient is not nervous/anxious.        Objective     Vitals:    01/26/22 1311   BP: 128/76   Pulse: 77   Temp: 97.2 °F (36.2 °C)   SpO2: 99%       Physical Exam  Vitals and nursing note reviewed.   Constitutional:       General: She is not in acute distress.     Appearance: Normal appearance. She is well-developed. She is not ill-appearing, toxic-appearing or diaphoretic.   HENT:      Head: Normocephalic and atraumatic.   Eyes:      Comments: No exopthalmos noted   Neck:      Thyroid: No thyroid mass, thyromegaly or thyroid tenderness.   Cardiovascular:      Rate and Rhythm: Normal rate and regular rhythm.   Pulmonary:      Effort: Pulmonary effort is normal. No respiratory distress.   Lymphadenopathy:      Cervical: No cervical adenopathy.   Skin:     General: Skin is warm and  dry.   Neurological:      Mental Status: She is alert and oriented to person, place, and time.      Coordination: Coordination normal.   Psychiatric:         Mood and Affect: Mood normal.         Behavior: Behavior normal.         Assessment/Plan   Problems Addressed this Visit        Cardiac and Vasculature    Benign essential hypertension       Endocrine and Metabolic    Hypothyroidism - Primary       Mental Health    Anxiety      Other Visit Diagnoses     Obesity (BMI 30-39.9)          Diagnoses       Codes Comments    Hypothyroidism, unspecified type    -  Primary ICD-10-CM: E03.9  ICD-9-CM: 244.9     Benign essential hypertension     ICD-10-CM: I10  ICD-9-CM: 401.1     Anxiety     ICD-10-CM: F41.9  ICD-9-CM: 300.00     Obesity (BMI 30-39.9)     ICD-10-CM: E66.9  ICD-9-CM: 278.00         Continue current medications without changes at present time.  She will be due for her annual exam in September 2022.  Continue follow-up with endocrinology as scheduled.  Encouraged patient to decrease overall calorie intake and increase physical activity.    I spent 35 minutes in patient care: Reviewing records prior to the visit, examining the patient, entering orders and documentation    Part of this note may be an electronic transcription/translation of spoken language to printed text using the Dragon Dictation System.         Transcribed from ambient dictation for RUSTY Alvarez MD by Ana María Mancini.  01/26/22   14:31 EST    Patient verbalized consent to the visit recording.  I have personally performed the services described in this document as transcribed by the above individual, and it is both accurate and complete.  RUSTY Alvarez MD  1/26/2022  16:21 EST

## 2022-01-28 ENCOUNTER — PATIENT ROUNDING (BHMG ONLY) (OUTPATIENT)
Dept: FAMILY MEDICINE CLINIC | Facility: CLINIC | Age: 62
End: 2022-01-28

## 2022-01-28 NOTE — PROGRESS NOTES
A My-Chart message has been sent to the patient [place] [facility] [place]

## 2022-02-07 ENCOUNTER — SPECIALTY PHARMACY (OUTPATIENT)
Dept: PHARMACY | Facility: TELEHEALTH | Age: 62
End: 2022-02-07

## 2022-02-07 RX ORDER — LEVOTHYROXINE SODIUM 0.07 MG/1
75 TABLET ORAL DAILY
Qty: 30 TABLET | Refills: 2 | Status: SHIPPED | OUTPATIENT
Start: 2022-02-07 | End: 2022-05-12 | Stop reason: SDUPTHER

## 2022-02-07 NOTE — PROGRESS NOTES
Specialty Pharmacy Patient Management Program  Reassessment     Brittny Han is a 62 y.o. female with asthma and enrolled in the Patient Management program offered by [facility].  A follow-up outreach was conducted, including assessment of continued therapy appropriateness, medication adherence, and side effect incidence and management for Dupixent 300mg/2ml.     Changes to Insurance Coverage or Financial Support  [employer] plus dupixent copay card     Relevant Past Medical History and Comorbidities  Relevant medical history and concomitant health conditions were discussed with the patient. The patient's chart has been reviewed for relevant past medical history and comorbid health conditions and updated as necessary.   Past Medical History:   Diagnosis Date   • Allergic rhinitis    • Anemia    • Anxiety    • Asthma     MODERATE, PERSISTANT   • BCC (basal cell carcinoma) 2000    RIGHT EYELID   • Bowel habit changes    • BPPV (benign paroxysmal positional vertigo) 01/2020   • Bronchitis    • Cat bite of left hand 05/16/2020    SEEN AT [facility]   • Chronic bilateral low back pain with bilateral sciatica    • Chronic idiopathic constipation    • Colon polyps     FOLLOWED BY DR. LORENA RICO   • COPD (chronic obstructive pulmonary disease) ([facility])    • CTS (carpal tunnel syndrome) 2010    BILATERAL, Wear wrist braces at night   • Demyelinating disease ([facility])    • Depression    • Disease of thyroid gland     HYPOTHYROIDISM   • DINERO (dyspnea on exertion) 06/2019   • Essential hypertension    • Fatigue    • Gastroenteritis 02/2020   • GERD (gastroesophageal reflux disease)    • Head injury when I was 10    concussion   • Hemorrhoids    • Hepatomegaly 11/2018   • History of echocardiogram 05/31/2011    mild ascending aortic root dilatation; mod RVC enlargement; global left EF 0.72 (normal 0.55-0.75) mild concentric LV hypertrophy; trace MR; mild TR    • History of PFTs 02/02/2016    good effort;  normal lung volumes   • Hyperlipidemia    • Hypertrophy, nasal, turbinate    • Midline cystocele    • Neuropathy of both feet 01/2019    FOLLOWED BY DR. VARSHA IRELAND   • Numbness and tingling in both hands 7/19/2021   • Numbness and tingling of both feet 1/30/2019   • ALANNA on CPAP 2007    Followed by Jin Cat @ [facility]   • Peptic ulceration     PUD   • Periodic headache syndrome, not intractable    • Plantar fasciitis, left 01/2019    FOLLOWED BY DR. VARSHA IRELAND   • Rectal bleeding 08/2020   • Rectal bleeding    • Rectal mass 08/2020   • RLS (restless legs syndrome) 06/2017   • Shortness of breath    • Strain of thoracic region 04/2017   • KALYAN (stress urinary incontinence, female)    • Uterine fibroid    • Vision loss 2008    wear glasses   • Vitamin B 12 deficiency    • Vitamin D deficiency      Social History     Socioeconomic History   • Marital status: Single   Tobacco Use   • Smoking status: Never Smoker   • Smokeless tobacco: Never Used   Vaping Use   • Vaping Use: Never used   Substance and Sexual Activity   • Alcohol use: Yes     Alcohol/week: 2.0 standard drinks     Types: 2 Glasses of wine per week     Comment: 2 glasses of wine about 4 x's per year   • Drug use: No   • Sexual activity: Not Currently     Partners: Male     Birth control/protection: Abstinence     Comment: single       Allergies  Known allergies and reactions were discussed with the patient. The patient's chart has been reviewed for allergy information and updated as necessary.   Percocet [oxycodone-acetaminophen], Brevital sodium [methohexital], and Other    Relevant Laboratory Values  No results for input(s): CMP, BMP, CBC in the last 72 hours.    Current Medication List  This medication list has been reviewed with the patient and evaluated for any interactions or necessary modifications/recommendations, and updated to include all prescription medications, OTC medications, and supplements the patient is currently taking.   This list reflects what is contained in the patient's profile, which has also been marked as reviewed to communicate to other providers it is the most up to date version of the patient's current medication therapy.     Current Outpatient Medications:   •  Dupilumab 300 MG/2ML solution prefilled syringe, Inject 300 mg under the skin into the appropriate area as directed Every 14 (Fourteen) Days., Disp: 4 mL, Rfl: 11  •  EPINEPHrine (EPIPEN) 0.3 MG/0.3ML solution auto-injector injection, Use as directed for acute allergic reaction., Disp: 2 each, Rfl: 3  •  estradiol (Anisa) 0.075 MG/24HR patch, Place 1 patch on the skin as directed by provider 2 (Two) Times a Week., Disp: 8 each, Rfl: 3  •  fluticasone-salmeterol (ADVAIR) 500-50 MCG/DOSE DISKUS, Inhale 1 puff 2 (two) times a day., Disp: 180 each, Rfl: 1  •  Glucosamine-Chondroitin (MOVE FREE PO), Take 1 capsule by mouth Daily., Disp: , Rfl:   •  hydroCHLOROthiazide (HYDRODIURIL) 25 MG tablet, Take 1 tablet by mouth Daily., Disp: 90 tablet, Rfl: 1  •  levocetirizine (XYZAL) 5 MG tablet, Take 1 tablet by mouth once daily, Disp: 30 tablet, Rfl: 5  •  levocetirizine (XYZAL) 5 MG tablet, Take 1 tablet by mouth Daily., Disp: 90 tablet, Rfl: 4  •  levothyroxine (Synthroid) 75 MCG tablet, Take 1 tablet by mouth Daily., Disp: 30 tablet, Rfl: 2  •  Magnesium Gluconate (MAGNESIUM 27 PO), Take 1 capsule by mouth Daily., Disp: , Rfl:   •  montelukast (SINGULAIR) 10 MG tablet, Take 1 tablet by mouth every night at bedtime., Disp: 30 tablet, Rfl: 11  •  Multiple Vitamins-Minerals (CENTRUM SILVER PO), Centrum Silver, Disp: , Rfl:   •  potassium chloride 10 MEQ CR tablet, Take 1 tablet by mouth Daily., Disp: 90 tablet, Rfl: 1  •  Tiotropium Bromide Monohydrate (SPIRIVA RESPIMAT) 1.25 MCG/ACT aerosol solution inhaler, Inhale 2 puffs Daily., Disp: 4 g, Rfl: 11  •  venlafaxine XR (EFFEXOR-XR) 75 MG 24 hr capsule, Take 1 capsule by mouth Daily., Disp: 90 capsule, Rfl: 1  •  vitamin D3  (Vitamin D) 125 MCG (5000 UT) capsule capsule, Take 5,000 Units by mouth Daily., Disp: , Rfl:     Drug Interactions  none     Adverse Drug Reactions  • Adverse Reactions Experienced: none   • Plan for ADR Management: not required     Hospitalizations and Urgent Care Since Last Assessment  • Hospitalizations or Admissions: none  • ED Visits: none   • Urgent Office Visits: none     Adherence and Self-Administration  • Approximate Number of Doses Missed Since Last Assessment: 1- delayed dose     • Ongoing or New Barriers to Patient Adherence and/or Self-Administration: patient forgets to refill medication   • Methods for Supporting Patient Adherence and/or Self-Administration: we told her we would help support her if making sure her refill is filled and delivered on time.  Patient is still experiencing wheezing.  Once we get her better adherence, we will revisit possibly changing her medicaiton.      Goals of Therapy  Progress Toward Meeting Patient-Identified Goals of Therapy: On Track  o New Patient-Identified Goals, If Applicable:     • Progress Toward Meeting Clinical Goals or Therapeutic Targets: On Track  o New Clinical Goals or Therapeutic Targets, If Applicable:     Quality of Life Assessment   Quality of Life related to the patient's specialty therapy was discussed with the patient. The QOL segment of this outreach has been reviewed and updated.   • Quality of Life Score: 4    Reassessment Plan & Follow-Up  1. Medication Therapy Changes: none  2. Additional Plans, Therapy Recommendations, or Therapy Problems to Be Addressed: Better compliance with taking injection q 14 days  a. Still having difficulty breathing, hoping to ease that   b. Patient mentioned her voice is hoarse all the time.  Recommended taking a full glass of water with her Advair inhaler to rinse the powder residue from the mouth and throat.  3. Pharmacist to perform regular reassessments no more than (6) months from the previous  assessment.  4. Care Coordinator to set up future refill outreaches, coordinate prescription delivery, and escalate clinical questions to pharmacist.     Attestation  I attest that the specialty medication(s) addressed above are appropriate for the patient based on my reassessment.  If the prescribed therapy is at any point deemed not appropriate based on the current or future assessments, a consultation will be initiated with the patient's specialty care provider to determine the best course of action. The revised plan of therapy will be documented along with any additional patient education provided.     Electronically signed by Luciano Smith RPH, 02/07/22, 1:24 PM EST.

## 2022-02-07 NOTE — PROGRESS NOTES
Specialty Pharmacy Refill Coordination Note     Brittny is a 62 y.o. female contacted today regarding refills of  Dupixent specialty medication(s).    Reviewed and verified with patient:  Allergies  Meds       Specialty medication(s) and dose(s) confirmed: yes    Refill Questions      Most Recent Value   Changes to allergies? No   Changes to medications? Yes  [added Biotin]   New conditions since last clinic visit No   Unplanned office visit, urgent care, ED, or hospital admission in the last 4 weeks  No   How does patient/caregiver feel medication is working? Fair  [still having trouble some. Luciano was notified and did her assessment]   Financial problems or insurance changes  No   If yes, describe changes in insurance or financial issues. N/A   How many doses of your specialty medications were missed in the last 4 weeks? 0   Why were doses missed? N/A   Does this patient require a clinical escalation to a pharmacist? No                     Follow-up: 21day(s)     Lacey Nguyen, Pharmacy Technician  Specialty Pharmacy Technician

## 2022-02-25 ENCOUNTER — APPOINTMENT (OUTPATIENT)
Dept: NUTRITION | Facility: HOSPITAL | Age: 62
End: 2022-02-25

## 2022-02-28 RX ORDER — POTASSIUM CHLORIDE 750 MG/1
10 TABLET, FILM COATED, EXTENDED RELEASE ORAL DAILY
Qty: 90 TABLET | Refills: 1 | Status: SHIPPED | OUTPATIENT
Start: 2022-02-28 | End: 2022-09-15 | Stop reason: SDUPTHER

## 2022-03-03 ENCOUNTER — SPECIALTY PHARMACY (OUTPATIENT)
Dept: PHARMACY | Facility: TELEHEALTH | Age: 62
End: 2022-03-03

## 2022-03-03 NOTE — PROGRESS NOTES
Specialty Pharmacy Refill Coordination Note     Brittny is a 62 y.o. female contacted today regarding refills of her specialty medication(s).    Specialty medication(s) and dose(s) confirmed: Dupixent 300mg/2ml  Changes to medications: no  Changes to insurance: no  Reviewed and verified with patient: Yes    Refill Questions      Most Recent Value   Changes to allergies? No   Changes to medications? No   New conditions since last clinic visit No   Unplanned office visit, urgent care, ED, or hospital admission in the last 4 weeks  No   How does patient/caregiver feel medication is working? Good   Financial problems or insurance changes  No   If yes, describe changes in insurance or financial issues. n/a   How many doses of your specialty medications were missed in the last 4 weeks? 1   Why were doses missed? forgot   Does this patient require a clinical escalation to a pharmacist? No          Discussed with patient a schedule to try and remember to take injection.  She is going to take on the weekends we get paid.  She will get back on track this weekend.           Follow-up: 3 week(s)     Luciano Smith RPH  3/3/2022   10:44 EST

## 2022-03-16 RX ORDER — ESTRADIOL 0.07 MG/D
1 FILM, EXTENDED RELEASE TRANSDERMAL 2 TIMES WEEKLY
Qty: 8 EACH | Refills: 3 | Status: SHIPPED | OUTPATIENT
Start: 2022-03-17 | End: 2022-07-25 | Stop reason: SDUPTHER

## 2022-03-16 NOTE — TELEPHONE ENCOUNTER
Rx Refill Note  Requested Prescriptions     Pending Prescriptions Disp Refills   • estradiol (Anisa) 0.075 MG/24HR patch 8 each 3     Sig: Place 1 patch on the skin as directed by provider 2 (Two) Times a Week.      Last office visit with prescribing clinician: 9/1/2021      Next office visit with prescribing clinician: Visit date not found            Angelito Noel MA  03/16/22, 08:40 EDT

## 2022-03-28 ENCOUNTER — SPECIALTY PHARMACY (OUTPATIENT)
Dept: PHARMACY | Facility: TELEHEALTH | Age: 62
End: 2022-03-28

## 2022-03-28 NOTE — PROGRESS NOTES
Specialty Pharmacy Refill Coordination Note     Brittny is a 62 y.o. female contacted today regarding refills of  Dupixent  300mg/2mlspecialty medication(s).    Reviewed and verified with patient:         Specialty medication(s) and dose(s) confirmed: yes    Refill Questions    Flowsheet Row Most Recent Value   Changes to allergies? No   Changes to medications? No   New conditions since last clinic visit No   Unplanned office visit, urgent care, ED, or hospital admission in the last 4 weeks  No   How does patient/caregiver feel medication is working? Good   Financial problems or insurance changes  No   If yes, describe changes in insurance or financial issues. N/A   How many doses of your specialty medications were missed in the last 4 weeks? 0   Why were doses missed? N/A - remembering better   Does this patient require a clinical escalation to a pharmacist? No                     Follow-up: 21 day(s)     Lacey Nguyen, Pharmacy Technician  Specialty Pharmacy Technician

## 2022-04-21 ENCOUNTER — DOCUMENTATION (OUTPATIENT)
Dept: PHARMACY | Facility: TELEHEALTH | Age: 62
End: 2022-04-21

## 2022-04-21 ENCOUNTER — SPECIALTY PHARMACY (OUTPATIENT)
Dept: PHARMACY | Facility: TELEHEALTH | Age: 62
End: 2022-04-21

## 2022-04-21 ENCOUNTER — HOSPITAL ENCOUNTER (OUTPATIENT)
Dept: DIABETES SERVICES | Facility: HOSPITAL | Age: 62
Setting detail: RECURRING SERIES
Discharge: HOME | End: 2022-04-21

## 2022-04-21 NOTE — PROGRESS NOTES
Specialty Pharmacy Refill Coordination Note     Brittny is a 62 y.o. female contacted today regarding refills of her specialty medication(s).    Specialty medication(s) and dose(s) confirmed: Dupixent  Changes to medications: no  Changes to insurance: no  Reviewed and verified with patient:  Allergies  Meds           Refill Questions    Flowsheet Row Most Recent Value   Changes to allergies? No   Changes to medications? No   New conditions since last clinic visit No   Unplanned office visit, urgent care, ED, or hospital admission in the last 4 weeks  No   How does patient/caregiver feel medication is working? Very good   Financial problems or insurance changes  No   If yes, describe changes in insurance or financial issues. n/a   How many doses of your specialty medications were missed in the last 4 weeks? 0, dose due today has one more pen at home   Why were doses missed? n/a   Does this patient require a clinical escalation to a pharmacist? No                   Follow-up: 23 day(s)     Shania Romero, PharmD  4/21/2022   16:41 EDT

## 2022-04-21 NOTE — PLAN OF CARE
Patient attended a one hour Diabetes Prevention Program via zoom. Patient has referral from Dr. Gasca regarding diagnosis of pre-diabetes. Patient requested to bill insurance for program.

## 2022-04-28 ENCOUNTER — HOSPITAL ENCOUNTER (OUTPATIENT)
Dept: DIABETES SERVICES | Facility: HOSPITAL | Age: 62
Setting detail: RECURRING SERIES
Discharge: HOME | End: 2022-04-28

## 2022-05-02 RX ORDER — MONTELUKAST SODIUM 10 MG/1
10 TABLET ORAL
Qty: 30 TABLET | Refills: 0 | Status: SHIPPED | OUTPATIENT
Start: 2022-05-02 | End: 2022-05-04 | Stop reason: SDUPTHER

## 2022-05-04 ENCOUNTER — OFFICE VISIT (OUTPATIENT)
Dept: PULMONOLOGY | Facility: CLINIC | Age: 62
End: 2022-05-04

## 2022-05-04 VITALS
SYSTOLIC BLOOD PRESSURE: 132 MMHG | HEIGHT: 67 IN | DIASTOLIC BLOOD PRESSURE: 80 MMHG | HEART RATE: 84 BPM | TEMPERATURE: 98.2 F | OXYGEN SATURATION: 94 % | BODY MASS INDEX: 34.18 KG/M2 | WEIGHT: 217.8 LBS

## 2022-05-04 DIAGNOSIS — G47.33 OBSTRUCTIVE SLEEP APNEA SYNDROME: ICD-10-CM

## 2022-05-04 DIAGNOSIS — J45.50 SEVERE PERSISTENT ASTHMA, UNSPECIFIED WHETHER COMPLICATED: Primary | ICD-10-CM

## 2022-05-04 DIAGNOSIS — R06.02 SHORTNESS OF BREATH: ICD-10-CM

## 2022-05-04 DIAGNOSIS — J30.9 ALLERGIC RHINITIS, UNSPECIFIED: ICD-10-CM

## 2022-05-04 PROCEDURE — 99214 OFFICE O/P EST MOD 30 MIN: CPT | Performed by: NURSE PRACTITIONER

## 2022-05-04 RX ORDER — LEVOCETIRIZINE DIHYDROCHLORIDE 5 MG/1
5 TABLET, FILM COATED ORAL DAILY
Qty: 90 TABLET | Refills: 3 | Status: SHIPPED | OUTPATIENT
Start: 2022-05-04

## 2022-05-04 RX ORDER — TIOTROPIUM BROMIDE INHALATION SPRAY 1.56 UG/1
SPRAY, METERED RESPIRATORY (INHALATION)
Qty: 4 G | Refills: 11 | COMMUNITY
Start: 2022-05-04 | End: 2022-05-04 | Stop reason: SDUPTHER

## 2022-05-04 RX ORDER — MONTELUKAST SODIUM 10 MG/1
10 TABLET ORAL
Qty: 90 TABLET | Refills: 3 | Status: SHIPPED | OUTPATIENT
Start: 2022-05-04

## 2022-05-04 NOTE — PROGRESS NOTES
"[facility] Follow up      Chief Complaint  Severe persistent asthma, unspecified whether complicated (F/U)    Subjective          Brittny Han presents to [facility] for follow-up on her asthma and shortness of breath with activity.  Overall her dyspnea with activity has improved but she still has some trouble such as going up steps or walking long distances.    She does continue on her Dupixent.  She is getting better taking those on a more timely schedule.  We have discussed making sure those are every 2 weeks to help with the anti-inflammatory coverage.    She also continues on her Advair 500/50 and her Spiriva daily.  She does have an occasional rescue inhaler use.  No recent acute exacerbations or illnesses.    She does continue to use her CPAP nightly for her obstructive sleep apnea.  She tolerates it well and wears it nightly.          Objective     Vital Signs:   /80 (BP Location: Right arm, Patient Position: Sitting, Cuff Size: Adult)   Pulse 84   Temp 98.2 °F (36.8 °C) (Infrared)   Ht 170.2 cm (67\")   Wt 98.8 kg (217 lb 12.8 oz)   SpO2 94% Comment: resting, room air  BMI 34.11 kg/m²       Immunization History   Administered Date(s) Administered   • COVID-19 (PFIZER) PURPLE CAP 04/19/2021, 05/10/2021, 12/27/2021   • Flu Vaccine Intradermal Quad 18-64YR 10/06/2014, 10/01/2015   • Flu Vaccine Quad PF >36MO 10/10/2018   • Flu Vaccine Split Quad 10/10/2018   • FluLaval/Fluarix/Fluzone >6 11/03/2021   • Influenza TIV (IM) 10/06/2014, 10/01/2015   • Influenza, Unspecified 10/21/2019, 09/23/2020   • Pneumococcal Conjugate 13-Valent (PCV13) 04/04/2016   • Pneumococcal Polysaccharide (PPSV23) 10/06/2009, 10/10/2014, 12/23/2020   • Shingrix 03/01/2021, 06/04/2021   • TD Preservative Free 05/16/2020   • Tdap 02/12/2010       Physical Exam  Vitals reviewed.   Constitutional:       General: She is not in acute distress.     Appearance: She is " well-developed. She is not ill-appearing.   HENT:      Head: Normocephalic and atraumatic.   Eyes:      Pupils: Pupils are equal, round, and reactive to light.   Cardiovascular:      Rate and Rhythm: Normal rate and regular rhythm.      Heart sounds: No murmur heard.  Pulmonary:      Effort: Pulmonary effort is normal. No respiratory distress.      Breath sounds: Normal breath sounds. No wheezing or rales.   Abdominal:      General: Bowel sounds are normal. There is no distension.      Palpations: Abdomen is soft.   Musculoskeletal:         General: Normal range of motion.      Cervical back: Normal range of motion and neck supple.   Skin:     General: Skin is warm and dry.      Findings: No erythema.   Neurological:      Mental Status: She is alert and oriented to person, place, and time.   Psychiatric:         Behavior: Behavior normal.          Result Review :                           Assessment and Plan    Problem List Items Addressed This Visit        Pulmonary and Pneumonias    Shortness of breath    Severe persistent asthma - Primary    Relevant Medications    Tiotropium Bromide Monohydrate (SPIRIVA RESPIMAT) 1.25 MCG/ACT aerosol solution inhaler    montelukast (SINGULAIR) 10 MG tablet       Sleep    Obstructive sleep apnea syndrome      Other Visit Diagnoses     Allergic rhinitis, unspecified        Relevant Medications    levocetirizine (XYZAL) 5 MG tablet        Today in the office she is quite clear without bronchospasms.  She will continue on her current regimen.  Overall she has had some improvement in her shortness of breath with activity.  We did discuss the importance of making sure she takes her Dupixent every 2 weeks.  We discussed setting up timers to remind her.  I will refill her maintenance medications today as well.    Continue on her CPAP at night.    Follow-up in 6 months with full PFTs and a chest x-ray    Follow Up     No follow-ups on file.  Patient was given instructions and counseling  regarding her condition or for health maintenance advice. Please see specific information pulled into the AVS if appropriate.       Moderate level of Medical Decision Making complexity based on 2 or more chronic stable illnesses and prescription drug management.    BLANCA Jackson, ACNP  [facility] Critical Care Medicine  Electronically signed

## 2022-05-05 ENCOUNTER — HOSPITAL ENCOUNTER (OUTPATIENT)
Dept: DIABETES SERVICES | Facility: HOSPITAL | Age: 62
Setting detail: RECURRING SERIES
Discharge: HOME | End: 2022-05-05

## 2022-05-12 ENCOUNTER — HOSPITAL ENCOUNTER (OUTPATIENT)
Dept: DIABETES SERVICES | Facility: HOSPITAL | Age: 62
Setting detail: RECURRING SERIES
Discharge: HOME | End: 2022-05-12

## 2022-05-12 RX ORDER — LEVOTHYROXINE SODIUM 0.07 MG/1
75 TABLET ORAL DAILY
Qty: 30 TABLET | Refills: 2 | Status: SHIPPED | OUTPATIENT
Start: 2022-05-12 | End: 2022-08-16

## 2022-05-13 NOTE — ADDENDUM NOTE
Encounter addended by: Michelle Victor RN on: 5/13/2022 12:13 PM   Actions taken: Clinical Note Signed

## 2022-05-18 ENCOUNTER — SPECIALTY PHARMACY (OUTPATIENT)
Dept: PHARMACY | Facility: TELEHEALTH | Age: 62
End: 2022-05-18

## 2022-05-18 NOTE — PROGRESS NOTES
Specialty Pharmacy Refill Coordination Note     Brittny is a 62 y.o. female contacted today regarding refills of  Dupixent 300mg/2ml specialty medication(s).    Reviewed and verified with patient:         Specialty medication(s) and dose(s) confirmed: yes    Refill Questions    Flowsheet Row Most Recent Value   Changes to allergies? No   Changes to medications? No   New conditions since last clinic visit No   Unplanned office visit, urgent care, ED, or hospital admission in the last 4 weeks  No   How does patient/caregiver feel medication is working? Very good   Financial problems or insurance changes  No   If yes, describe changes in insurance or financial issues. N/A   Since the previous refill, were any specialty medication doses or scheduled injections missed or delayed?  No   If yes, please provide the amount 0   Why were doses missed? N/A   Does this patient require a clinical escalation to a pharmacist? No                     Follow-up: 21 day(s)     Lacey Nguyen, Pharmacy Technician  Specialty Pharmacy Technician

## 2022-05-19 ENCOUNTER — HOSPITAL ENCOUNTER (OUTPATIENT)
Dept: DIABETES SERVICES | Facility: HOSPITAL | Age: 62
Setting detail: RECURRING SERIES
Discharge: HOME | End: 2022-05-19

## 2022-05-26 ENCOUNTER — HOSPITAL ENCOUNTER (OUTPATIENT)
Dept: DIABETES SERVICES | Facility: HOSPITAL | Age: 62
Setting detail: RECURRING SERIES
Discharge: HOME | End: 2022-05-26

## 2022-06-02 ENCOUNTER — HOSPITAL ENCOUNTER (OUTPATIENT)
Dept: DIABETES SERVICES | Facility: HOSPITAL | Age: 62
Setting detail: RECURRING SERIES
Discharge: HOME | End: 2022-06-02

## 2022-06-08 ENCOUNTER — OFFICE VISIT (OUTPATIENT)
Dept: ENDOCRINOLOGY | Facility: CLINIC | Age: 62
End: 2022-06-08

## 2022-06-08 ENCOUNTER — LAB (OUTPATIENT)
Dept: LAB | Facility: HOSPITAL | Age: 62
End: 2022-06-08

## 2022-06-08 VITALS
SYSTOLIC BLOOD PRESSURE: 132 MMHG | HEIGHT: 67 IN | HEART RATE: 75 BPM | WEIGHT: 219 LBS | DIASTOLIC BLOOD PRESSURE: 78 MMHG | RESPIRATION RATE: 12 BRPM | OXYGEN SATURATION: 98 % | BODY MASS INDEX: 34.37 KG/M2

## 2022-06-08 DIAGNOSIS — R73.03 PREDIABETES: ICD-10-CM

## 2022-06-08 DIAGNOSIS — E03.9 ACQUIRED HYPOTHYROIDISM: Primary | ICD-10-CM

## 2022-06-08 LAB
HBA1C MFR BLD: 5.6 % (ref 4.8–5.6)
TSH SERPL DL<=0.05 MIU/L-ACNC: 3.44 UIU/ML (ref 0.27–4.2)

## 2022-06-08 PROCEDURE — 83036 HEMOGLOBIN GLYCOSYLATED A1C: CPT | Performed by: INTERNAL MEDICINE

## 2022-06-08 PROCEDURE — 84443 ASSAY THYROID STIM HORMONE: CPT | Performed by: INTERNAL MEDICINE

## 2022-06-08 PROCEDURE — 99213 OFFICE O/P EST LOW 20 MIN: CPT | Performed by: INTERNAL MEDICINE

## 2022-06-08 NOTE — PROGRESS NOTES
"     Office Note      Date: 2022  Patient Name: Brittny Han  MRN: [id number]  : 1960    Chief Complaint   Patient presents with   • Hypothyroidism       History of Present Illness:   Brittny Han is a 62 y.o. female who presents for Hypothyroidism    She is currently on T4 75mcg qd.  She is taking this correctly.  She isn't taking any interfering meds concurrently.  She hasn't noted any change in the size of her neck.  She denies any compressive sxs.  She c/o inability to lose weight.  She c/o fatigue.  She c/o heat intolerance.  She notes hair loss.  She denies any other sxs of hypo- or hyperthyroidism at this time.    She has h/o prediabetes.  She is doing prediabetes classes for a year.  She isn't doing FSBS.      Subjective      Review of Systems:   Review of Systems   Constitutional: Positive for fatigue.   Cardiovascular: Negative.    Gastrointestinal: Negative.    Endocrine: Positive for heat intolerance.       The following portions of the patient's history were reviewed and updated as appropriate: allergies, current medications, past family history, past medical history, past social history, past surgical history and problem list.    Objective     Visit Vitals  /78   Pulse 75   Resp 12   Ht 170.2 cm (67\")   Wt 99.3 kg (219 lb)   LMP  (LMP Unknown)   SpO2 98%   BMI 34.30 kg/m²       Physical Exam:  Physical Exam  Constitutional:       Appearance: Normal appearance.   Neck:      Thyroid: No thyroid mass, thyromegaly or thyroid tenderness.   Lymphadenopathy:      Cervical: No cervical adenopathy.   Neurological:      Mental Status: She is alert.         Labs:    TSH  No results found for: TSHBASE     Free T4  Free T4   Date Value Ref Range Status   2021 0.84 (L) 0.93 - 1.70 ng/dL Final       T3  No results found for: I3ZVNFB      TPO  No results found for: THYROIDAB    TG AB  No results found for: THGAB    TG  No results found for: THYROGLB    CBC w/DIFF  Lab Results "   Component Value Date    WBC 6.07 03/12/2021    RBC 4.45 03/12/2021    HGB 13.3 03/12/2021    HCT 39.7 03/12/2021    MCV 89.2 03/12/2021    MCH 29.9 03/12/2021    MCHC 33.5 03/12/2021    RDW 12.6 03/12/2021    RDWSD 40.9 03/12/2021    MPV 10.0 03/12/2021     03/12/2021    NEUTRORELPCT 47.0 03/12/2021    LYMPHORELPCT 36.9 03/12/2021    MONORELPCT 10.2 03/12/2021    EOSRELPCT 4.4 03/12/2021    BASORELPCT 1.2 03/12/2021    AUTOIGPER 0.3 03/12/2021    NEUTROABS 2.85 03/12/2021    LYMPHSABS 2.24 03/12/2021    MONOSABS 0.62 03/12/2021    EOSABS 0.27 03/12/2021    BASOSABS 0.07 03/12/2021    AUTOIGNUM 0.02 03/12/2021    NRBC 0.0 03/12/2021           Assessment / Plan      Assessment & Plan:  Diagnoses and all orders for this visit:    1. Acquired hypothyroidism (Primary)  Assessment & Plan:  Continue T4.  Check TSH.    Orders:  -     TSH; Future    2. Prediabetes  Assessment & Plan:  Check A1c.  Had DM education last visit.    Orders:  -     Hemoglobin A1c; Future      Return in about 6 months (around 12/8/2022) for Recheck with TSH, A1c, CMP.    Eros Gasca MD   06/08/2022

## 2022-06-09 ENCOUNTER — HOSPITAL ENCOUNTER (OUTPATIENT)
Dept: DIABETES SERVICES | Facility: HOSPITAL | Age: 62
Setting detail: RECURRING SERIES
Discharge: HOME | End: 2022-06-09

## 2022-06-10 ENCOUNTER — SPECIALTY PHARMACY (OUTPATIENT)
Dept: PHARMACY | Facility: TELEHEALTH | Age: 62
End: 2022-06-10

## 2022-06-10 ENCOUNTER — DOCUMENTATION (OUTPATIENT)
Dept: PHARMACY | Facility: TELEHEALTH | Age: 62
End: 2022-06-10

## 2022-06-10 DIAGNOSIS — J45.50 SEVERE PERSISTENT ASTHMA WITHOUT COMPLICATION: ICD-10-CM

## 2022-06-10 RX ORDER — DUPILUMAB 300 MG/2ML
300 INJECTION, SOLUTION SUBCUTANEOUS
Qty: 4 ML | Refills: 11 | Status: SHIPPED | OUTPATIENT
Start: 2022-06-10 | End: 2022-09-14 | Stop reason: CLARIF

## 2022-06-10 NOTE — PROGRESS NOTES
Specialty Pharmacy Refill Coordination Note     Brittny is a 62 y.o. female contacted today regarding refills of  Dupixent specialty medication(s).    Reviewed and verified with patient:         Specialty medication(s) and dose(s) confirmed: yes    Refill Questions    Flowsheet Row Most Recent Value   Changes to allergies? No   Changes to medications? No   New conditions since last clinic visit No   Unplanned office visit, urgent care, ED, or hospital admission in the last 4 weeks  No   How does patient/caregiver feel medication is working? Very good   Financial problems or insurance changes  No   If yes, describe changes in insurance or financial issues. N/A   Since the previous refill, were any specialty medication doses or scheduled injections missed or delayed?  No   If yes, please provide the amount 0   Why were doses missed? N/A   Does this patient require a clinical escalation to a pharmacist? No                     Follow-up: 21 day(s)     Lacey Nguyen, Pharmacy Technician  Specialty Pharmacy Technician

## 2022-06-16 ENCOUNTER — HOSPITAL ENCOUNTER (OUTPATIENT)
Dept: DIABETES SERVICES | Facility: HOSPITAL | Age: 62
Setting detail: RECURRING SERIES
Discharge: HOME | End: 2022-06-16

## 2022-06-23 ENCOUNTER — HOSPITAL ENCOUNTER (OUTPATIENT)
Dept: DIABETES SERVICES | Facility: HOSPITAL | Age: 62
Setting detail: RECURRING SERIES
Discharge: HOME | End: 2022-06-23

## 2022-06-30 ENCOUNTER — HOSPITAL ENCOUNTER (OUTPATIENT)
Dept: DIABETES SERVICES | Facility: HOSPITAL | Age: 62
Setting detail: RECURRING SERIES
Discharge: HOME | End: 2022-06-30

## 2022-07-05 ENCOUNTER — SPECIALTY PHARMACY (OUTPATIENT)
Dept: PHARMACY | Facility: TELEHEALTH | Age: 62
End: 2022-07-05

## 2022-07-05 NOTE — PROGRESS NOTES
Specialty Pharmacy Refill Coordination Note     Brittny is a 62 y.o. female contacted today regarding refills of  Dupixent 300mg specialty medication(s).    Reviewed and verified with patient:         Specialty medication(s) and dose(s) confirmed: yes    Refill Questions    Flowsheet Row Most Recent Value   Changes to allergies? No   Changes to medications? No   New conditions since last clinic visit No   Unplanned office visit, urgent care, ED, or hospital admission in the last 4 weeks  No   How does patient/caregiver feel medication is working? Very good   Financial problems or insurance changes  No   If yes, describe changes in insurance or financial issues. N/A   Since the previous refill, were any specialty medication doses or scheduled injections missed or delayed?  No   If yes, please provide the amount 0   Why were doses missed? N/A   Does this patient require a clinical escalation to a pharmacist? No                     Follow-up: 21 day(s)     Lacey Nguyen, Pharmacy Technician  Specialty Pharmacy Technician

## 2022-07-07 ENCOUNTER — HOSPITAL ENCOUNTER (OUTPATIENT)
Dept: DIABETES SERVICES | Facility: HOSPITAL | Age: 62
Setting detail: RECURRING SERIES
Discharge: HOME | End: 2022-07-07

## 2022-07-08 RX ORDER — ESTRADIOL 0.07 MG/D
1 FILM, EXTENDED RELEASE TRANSDERMAL 2 TIMES WEEKLY
Qty: 8 EACH | Refills: 3 | OUTPATIENT
Start: 2022-07-11

## 2022-07-12 ENCOUNTER — PATIENT OUTREACH (OUTPATIENT)
Dept: CASE MANAGEMENT | Facility: OTHER | Age: 62
End: 2022-07-12

## 2022-07-12 NOTE — OUTREACH NOTE
AMBULATORY CASE MANAGEMENT NOTE    Patient Outreach    Patient is currently in prediabetes education classes. Discussed lifestyle changes and ways to add activity to day/week without formal exercise plan and busy scheduled. Sending patient information on exercise therapist in area and will f/u for any questions or concerns.    BRYANT WILSON  Ambulatory Case Management    7/12/2022, 15:50 EDT

## 2022-07-14 ENCOUNTER — HOSPITAL ENCOUNTER (OUTPATIENT)
Dept: DIABETES SERVICES | Facility: HOSPITAL | Age: 62
Setting detail: RECURRING SERIES
Discharge: HOME | End: 2022-07-14

## 2022-07-14 RX ORDER — HYDROCHLOROTHIAZIDE 25 MG/1
25 TABLET ORAL DAILY
Qty: 90 TABLET | Refills: 0 | Status: SHIPPED | OUTPATIENT
Start: 2022-07-14 | End: 2022-10-03 | Stop reason: SDUPTHER

## 2022-07-14 RX ORDER — VENLAFAXINE HYDROCHLORIDE 75 MG/1
75 CAPSULE, EXTENDED RELEASE ORAL DAILY
Qty: 90 CAPSULE | Refills: 0 | Status: SHIPPED | OUTPATIENT
Start: 2022-07-14 | End: 2022-10-03 | Stop reason: SDUPTHER

## 2022-07-21 ENCOUNTER — HOSPITAL ENCOUNTER (OUTPATIENT)
Dept: DIABETES SERVICES | Facility: HOSPITAL | Age: 62
Setting detail: RECURRING SERIES
Discharge: HOME | End: 2022-07-21

## 2022-07-25 RX ORDER — ESTRADIOL 0.07 MG/D
1 FILM, EXTENDED RELEASE TRANSDERMAL 2 TIMES WEEKLY
Qty: 8 EACH | Refills: 3 | Status: SHIPPED | OUTPATIENT
Start: 2022-07-25 | End: 2023-01-13 | Stop reason: SDUPTHER

## 2022-07-25 NOTE — TELEPHONE ENCOUNTER
Rx Refill Note  Requested Prescriptions     Pending Prescriptions Disp Refills   • estradiol (Anisa) 0.075 MG/24HR patch 8 each 3     Sig: Place 1 patch on the skin as directed by provider 2 (Two) Times a Week.      Last office visit with prescribing clinician: 9/1/2021      Next office visit with prescribing clinician: Visit date not found            Minoo Greenberg MA  07/25/22, 11:56 EDT

## 2022-07-28 ENCOUNTER — HOSPITAL ENCOUNTER (OUTPATIENT)
Dept: DIABETES SERVICES | Facility: HOSPITAL | Age: 62
Setting detail: RECURRING SERIES
Discharge: HOME | End: 2022-07-28

## 2022-08-01 ENCOUNTER — SPECIALTY PHARMACY (OUTPATIENT)
Dept: PHARMACY | Facility: TELEHEALTH | Age: 62
End: 2022-08-01

## 2022-08-01 NOTE — PROGRESS NOTES
Specialty Pharmacy Patient Management Program  Reassessment     Brittny Han is a 62 y.o. female with asthma and enrolled in the Patient Management program offered by [facility].  A follow-up outreach was conducted, including assessment of continued therapy appropriateness, medication adherence, and side effect incidence and management for Dupixent 300mg/2ml.     Changes to Insurance Coverage or Financial Support  none    Relevant Past Medical History and Comorbidities  Relevant medical history and concomitant health conditions were discussed with the patient. The patient's chart has been reviewed for relevant past medical history and comorbid health conditions and updated as necessary.   Past Medical History:   Diagnosis Date   • Allergic rhinitis    • Anemia    • Anxiety    • Asthma     MODERATE, PERSISTANT   • Asthma, extrinsic 2007    Progressively gets worse with age   • BCC (basal cell carcinoma) 2000    RIGHT EYELID   • Bowel habit changes    • BPPV (benign paroxysmal positional vertigo) 01/2020   • Bronchitis    • Cat bite of left hand 05/16/2020    SEEN AT [facility]   • Chronic bilateral low back pain with bilateral sciatica    • Chronic bronchitis (HCC) Aug 2018    also seasonal bronchitis every May and December   • Chronic idiopathic constipation    • Colon polyps     FOLLOWED BY DR. LORENA RICO   • COPD (chronic obstructive pulmonary disease) ([facility])    • CTS (carpal tunnel syndrome) 2010    BILATERAL, Wear wrist braces at night   • Demyelinating disease ([facility])    • Depression    • Disease of thyroid gland     HYPOTHYROIDISM   • DINERO (dyspnea on exertion) 06/2019   • Essential hypertension    • Fatigue    • Gastroenteritis 02/2020   • GERD (gastroesophageal reflux disease)    • Head injury when I was 10    concussion   • Hemorrhoids    • Hepatomegaly 11/2018   • History of echocardiogram 05/31/2011    mild ascending aortic root dilatation; mod RVC enlargement; global left EF 0.72  (normal 0.55-0.75) mild concentric LV hypertrophy; trace MR; mild TR    • History of PFTs 02/02/2016    good effort; normal lung volumes   • Hyperlipidemia    • Hypertrophy, nasal, turbinate    • Hypothyroidism 2019   • Midline cystocele    • Neuropathy of both feet 01/2019    FOLLOWED BY DR. VARSHA IRELAND   • Numbness and tingling in both hands 07/19/2021   • Numbness and tingling of both feet 01/30/2019   • ALANNA on CPAP 2007    Followed by Jin Cat @ [facility]   • Peptic ulceration     PUD   • Periodic headache syndrome, not intractable    • Plantar fasciitis, left 01/2019    FOLLOWED BY DR. VARSHA IRELAND   • Primary central sleep apnea 2006    Sleep apnea not sure if central or not   • Rectal bleeding 08/2020   • Rectal bleeding    • Rectal mass 08/2020   • RLS (restless legs syndrome) 06/2017   • Shortness of breath    • Strain of thoracic region 04/2017   • KALYAN (stress urinary incontinence, female)    • Uterine fibroid    • Vision loss 2008    wear glasses   • Vitamin B 12 deficiency    • Vitamin D deficiency      Social History     Socioeconomic History   • Marital status: Single   Tobacco Use   • Smoking status: Never Smoker   • Smokeless tobacco: Never Used   Vaping Use   • Vaping Use: Never used   Substance and Sexual Activity   • Alcohol use: Yes     Alcohol/week: 2.0 standard drinks     Types: 2 Glasses of wine per week     Comment: 2 glasses of wine about 4 x's per year   • Drug use: No   • Sexual activity: Not Currently     Partners: Male     Birth control/protection: Abstinence     Comment: single       Allergies  Known allergies and reactions were discussed with the patient. The patient's chart has been reviewed for allergy information and updated as necessary.   Percocet [oxycodone-acetaminophen], Brevital sodium [methohexital], and Other    Relevant Laboratory Values  No results for input(s): CMP, BMP, CBC in the last 72 hours.    Current Medication List  This medication list has been  reviewed with the patient and evaluated for any interactions or necessary modifications/recommendations, and updated to include all prescription medications, OTC medications, and supplements the patient is currently taking.  This list reflects what is contained in the patient's profile, which has also been marked as reviewed to communicate to other providers it is the most up to date version of the patient's current medication therapy.     Current Outpatient Medications:   •  Dupilumab (Dupixent) 300 MG/2ML solution prefilled syringe, Inject 300 mg (2 ml's) under the skin into the appropriate area as directed Every 14 (Fourteen) Days., Disp: 4 mL, Rfl: 11  •  EPINEPHrine (EPIPEN) 0.3 MG/0.3ML solution auto-injector injection, Use as directed for acute allergic reaction., Disp: 2 each, Rfl: 3  •  estradiol (Anisa) 0.075 MG/24HR patch, Place 1 patch on the skin as directed by provider 2 (Two) Times a Week., Disp: 8 each, Rfl: 3  •  fluticasone-salmeterol (ADVAIR) 500-50 MCG/DOSE DISKUS, Inhale 1 puff 2 (Two) Times a Day., Disp: 60 each, Rfl: 6  •  Glucosamine-Chondroitin (MOVE FREE PO), Take 1 capsule by mouth Daily., Disp: , Rfl:   •  hydroCHLOROthiazide (HYDRODIURIL) 25 MG tablet, Take 1 tablet by mouth Daily., Disp: 90 tablet, Rfl: 0  •  levocetirizine (XYZAL) 5 MG tablet, Take 1 tablet by mouth Daily., Disp: 90 tablet, Rfl: 3  •  levothyroxine (Synthroid) 75 MCG tablet, Take 1 tablet by mouth Daily., Disp: 30 tablet, Rfl: 2  •  Magnesium Gluconate (MAGNESIUM 27 PO), Take 1 capsule by mouth Daily., Disp: , Rfl:   •  metroNIDAZOLE (METROCREAM) 0.75 % cream, Apply 1 application topically to the appropriate area as directed 2 (Two) Times a Day., Disp: 45 g, Rfl: 5  •  montelukast (SINGULAIR) 10 MG tablet, Take 1 tablet by mouth every night at bedtime., Disp: 90 tablet, Rfl: 3  •  Multiple Vitamins-Minerals (CENTRUM SILVER PO), Centrum Silver, Disp: , Rfl:   •  potassium chloride 10 MEQ CR tablet, Take 1 tablet by  mouth Daily., Disp: 90 tablet, Rfl: 1  •  Tiotropium Bromide Monohydrate (SPIRIVA RESPIMAT) 1.25 MCG/ACT aerosol solution inhaler, Inhale 2 puffs Daily., Disp: 4 g, Rfl: 11  •  venlafaxine XR (EFFEXOR-XR) 75 MG 24 hr capsule, Take 1 capsule by mouth Daily., Disp: 90 capsule, Rfl: 0  •  vitamin D3 125 MCG (5000 UT) capsule capsule, Take 5,000 Units by mouth Daily., Disp: , Rfl:     Drug Interactions  none     Adverse Drug Reactions  • Adverse Reactions Experienced: none  • Plan for ADR Management: N/A (patient education provided, discontinue drug, pharmacist to consult provider, etc.)    Hospitalizations and Urgent Care Since Last Assessment  • Hospitalizations or Admissions: none  • ED Visits: none  • Urgent Office Visits: none     Adherence and Self-Administration  • Approximate Number of Doses Missed Since Last Assessment: 2 doses, patient still had 1 box at home  • Ongoing or New Barriers to Patient Adherence and/or Self-Administration: none   • Methods for Supporting Patient Adherence and/or Self-Administration: N/A     Goals of Therapy  Progress Toward Meeting Patient-Identified Goals of Therapy: On Track  o New Patient-Identified Goals, If Applicable:     • Progress Toward Meeting Clinical Goals or Therapeutic Targets: On Track  o New Clinical Goals or Therapeutic Targets, If Applicable:     Quality of Life Assessment   Quality of Life related to the patient's specialty therapy was discussed with the patient. The QOL segment of this outreach has been reviewed and updated.     Quality of Life Assessment  Quality of Life Improvement Scale: No change  Comments on Quality of Life: no change    Reassessment Plan & Follow-Up  1. Medication Therapy Changes: none  2. Additional Plans, Therapy Recommendations, or Therapy Problems to Be Addressed: none   3. Patient still has 1 box at home, told patient we would check back in in 3 weeks  4. Pharmacist to perform regular reassessments no more than (6) months from the  previous assessment.  5. Care Coordinator to set up future refill outreaches, coordinate prescription delivery, and escalate clinical questions to pharmacist.     Attestation  I attest that the specialty medication(s) addressed above are appropriate for the patient based on my reassessment.  If the prescribed therapy is at any point deemed not appropriate based on the current or future assessments, a consultation will be initiated with the patient's specialty care provider to determine the best course of action. The revised plan of therapy will be documented along with any additional patient education provided.     Electronically signed by Luciano Smith RPH, 08/01/22, 9:23 AM EDT.

## 2022-08-04 ENCOUNTER — HOSPITAL ENCOUNTER (OUTPATIENT)
Dept: DIABETES SERVICES | Facility: HOSPITAL | Age: 62
Setting detail: RECURRING SERIES
Discharge: HOME | End: 2022-08-04

## 2022-08-04 NOTE — PROGRESS NOTES
Adult Outpatient Nutrition    Patient Name:  Brittny Han  YOB: 1960  MRN: [id number]    Assessment Date:  8/4/2022    Patient attended a one hour Diabetes Prevention Program via zoom.         Electronically signed by:  Medina Marcum RD  08/04/22 16:33 EDT

## 2022-08-11 ENCOUNTER — HOSPITAL ENCOUNTER (OUTPATIENT)
Dept: DIABETES SERVICES | Facility: HOSPITAL | Age: 62
Setting detail: RECURRING SERIES
Discharge: HOME | End: 2022-08-11

## 2022-08-16 RX ORDER — LEVOTHYROXINE SODIUM 0.07 MG/1
75 TABLET ORAL DAILY
Qty: 30 TABLET | Refills: 2 | OUTPATIENT
Start: 2022-08-16

## 2022-08-16 RX ORDER — LEVOTHYROXINE SODIUM 0.07 MG/1
75 TABLET ORAL DAILY
Qty: 30 TABLET | Refills: 5 | Status: SHIPPED | OUTPATIENT
Start: 2022-08-16 | End: 2023-02-03 | Stop reason: SDUPTHER

## 2022-08-18 ENCOUNTER — HOSPITAL ENCOUNTER (OUTPATIENT)
Dept: DIABETES SERVICES | Facility: HOSPITAL | Age: 62
Setting detail: RECURRING SERIES
Discharge: HOME | End: 2022-08-18

## 2022-08-22 ENCOUNTER — SPECIALTY PHARMACY (OUTPATIENT)
Dept: PHARMACY | Facility: TELEHEALTH | Age: 62
End: 2022-08-22

## 2022-08-22 NOTE — PROGRESS NOTES
Specialty Pharmacy Patient Management Program  Call Center Refill Outreach      Brittny is a 62 y.o. female contacted today regarding refills of her medication(s).    Specialty medication(s) and dose(s) confirmed: Dupixent 300mg/2ml      Refill Questions    Flowsheet Row Most Recent Value   Changes to allergies? No   Changes to medications? No   New conditions since last clinic visit No   Unplanned office visit, urgent care, ED, or hospital admission in the last 4 weeks  No   How does patient/caregiver feel medication is working? Very good   Financial problems or insurance changes  No   If yes, describe changes in insurance or financial issues. N/A   Since the previous refill, were any specialty medication doses or scheduled injections missed or delayed?  No   If yes, please provide the amount 0   Why were doses missed? N/A   Does this patient require a clinical escalation to a pharmacist? No              Medication Adherence    Adherence tools used: directed education  Support network for adherence: family member            Follow-up: 21 Days     Lacey Nguyen CPhT  Care Coordinator, Specialty Pharmacy Call Center  [phone]  8/22/2022  09:10 EDT

## 2022-08-25 ENCOUNTER — HOSPITAL ENCOUNTER (OUTPATIENT)
Dept: DIABETES SERVICES | Facility: HOSPITAL | Age: 62
Setting detail: RECURRING SERIES
Discharge: HOME | End: 2022-08-25

## 2022-08-25 NOTE — CONSULTS
Adult Outpatient Nutrition    Patient Name:  Brittny Han  YOB: 1960  MRN: [id number]    Assessment Date:  8/25/2022    Comments:      Patient attended a one hour Diabetes Prevention Program via zoom.         Electronically signed by:  Dee Sanchez RD  08/25/22 16:20 EDT

## 2022-09-01 ENCOUNTER — HOSPITAL ENCOUNTER (OUTPATIENT)
Dept: DIABETES SERVICES | Facility: HOSPITAL | Age: 62
Setting detail: RECURRING SERIES
Discharge: HOME | End: 2022-09-01

## 2022-09-08 ENCOUNTER — HOSPITAL ENCOUNTER (OUTPATIENT)
Dept: DIABETES SERVICES | Facility: HOSPITAL | Age: 62
Setting detail: RECURRING SERIES
Discharge: HOME | End: 2022-09-08

## 2022-09-09 ENCOUNTER — PATIENT OUTREACH (OUTPATIENT)
Dept: CASE MANAGEMENT | Facility: OTHER | Age: 62
End: 2022-09-09

## 2022-09-09 NOTE — OUTREACH NOTE
AMBULATORY CASE MANAGEMENT NOTE    Call for f/u. Patient is participating in diabetes education classes for prediabetes dx.  Not checking BS at this time and no orders to do so. Labs are within normal limits. Let patient know about Livongo as an option if should need in the future. No other concerns at this time.    BRYANT WILSON  Ambulatory Case Management    9/9/2022, 16:27 EDT

## 2022-09-14 ENCOUNTER — SPECIALTY PHARMACY (OUTPATIENT)
Dept: PHARMACY | Facility: TELEHEALTH | Age: 62
End: 2022-09-14

## 2022-09-14 DIAGNOSIS — J45.50 SEVERE PERSISTENT ASTHMA WITHOUT COMPLICATION: Primary | ICD-10-CM

## 2022-09-14 NOTE — TELEPHONE ENCOUNTER
Received phone call from [facility] requesting prescription change from Dupixent PFS to Dupixent PEN. New prescription for Dupixent PEN sent electronically.

## 2022-09-14 NOTE — PROGRESS NOTES
Specialty Pharmacy Patient Management Program  Call Center Refill Outreach      Brittny is a 62 y.o. female contacted today regarding refills of her medication(s).    Specialty medication(s) and dose(s) confirmed: Dupixent 300mg/2ml  Other medications being refilled: Levocetirizine, Potassium (refill needed)    Refill Questions    Flowsheet Row Most Recent Value   Changes to allergies? No   Changes to medications? No   New conditions since last clinic visit No   Unplanned office visit, urgent care, ED, or hospital admission in the last 4 weeks  No   How does patient/caregiver feel medication is working? Very good   Financial problems or insurance changes  No   If yes, describe changes in insurance or financial issues. N/A   Since the previous refill, were any specialty medication doses or scheduled injections missed or delayed?  No   If yes, please provide the amount 0   Why were doses missed? N/A   Does this patient require a clinical escalation to a pharmacist? No              Medication Adherence    Adherence tools used: directed education  Support network for adherence: family member            Follow-up: 21 Days     Lacey Nguyen CPhT  Care Coordinator, Specialty Pharmacy Call Center  [phone]  9/14/2022  16:19 EDT

## 2022-09-15 ENCOUNTER — HOSPITAL ENCOUNTER (OUTPATIENT)
Dept: DIABETES SERVICES | Facility: HOSPITAL | Age: 62
Setting detail: RECURRING SERIES
Discharge: HOME | End: 2022-09-15

## 2022-09-15 RX ORDER — POTASSIUM CHLORIDE 750 MG/1
10 TABLET, FILM COATED, EXTENDED RELEASE ORAL DAILY
Qty: 90 TABLET | Refills: 0 | Status: SHIPPED | OUTPATIENT
Start: 2022-09-15 | End: 2022-10-03 | Stop reason: SDUPTHER

## 2022-09-15 RX ORDER — POTASSIUM CHLORIDE 750 MG/1
10 TABLET, FILM COATED, EXTENDED RELEASE ORAL DAILY
Qty: 90 TABLET | Refills: 1 | OUTPATIENT
Start: 2022-09-15

## 2022-09-15 NOTE — TELEPHONE ENCOUNTER
Caller: Brittny Han    Relationship: Self    Best call back number: [phone]    Requested Prescriptions:   Requested Prescriptions     Pending Prescriptions Disp Refills   • potassium chloride 10 MEQ CR tablet 90 tablet 1     Sig: Take 1 tablet by mouth Daily.        Pharmacy where request should be sent: [facility]     Additional details provided by patient: PATIENT HAS ABOUT 7 DAYS     Does the patient have less than a 3 day supply:  [x] Yes  [] No    Maribell Belcher Rep   09/15/22 10:11 EDT

## 2022-09-15 NOTE — PROGRESS NOTES
Adult Outpatient Nutrition    Patient Name:  Brittny Han  YOB: 1960  MRN: [id number]    Assessment Date:  9/15/2022    Patient attended a one hour Diabetes Prevention Program via zoom.     Electronically signed by:  Medina Marcum RD  09/15/22 16:19 EDT

## 2022-09-22 ENCOUNTER — HOSPITAL ENCOUNTER (OUTPATIENT)
Dept: DIABETES SERVICES | Facility: HOSPITAL | Age: 62
Setting detail: RECURRING SERIES
Discharge: HOME | End: 2022-09-22

## 2022-09-22 NOTE — PROGRESS NOTES
Adult Outpatient Nutrition  Assessment    Patient Name:  Brittny Han  YOB: 1960  MRN: [id number]    Assessment Date:  9/22/2022    Comments:  Patient attended a one hour Diabetes Prevention Program via zoom.                     Electronically signed by:  Day Andrea RD  09/22/22 16:30 EDT

## 2022-09-28 ENCOUNTER — SPECIALTY PHARMACY (OUTPATIENT)
Dept: PHARMACY | Facility: TELEHEALTH | Age: 62
End: 2022-09-28

## 2022-09-29 ENCOUNTER — HOSPITAL ENCOUNTER (OUTPATIENT)
Dept: DIABETES SERVICES | Facility: HOSPITAL | Age: 62
Setting detail: RECURRING SERIES
Discharge: HOME | End: 2022-09-29

## 2022-10-03 ENCOUNTER — OFFICE VISIT (OUTPATIENT)
Dept: FAMILY MEDICINE CLINIC | Facility: CLINIC | Age: 62
End: 2022-10-03

## 2022-10-03 VITALS
BODY MASS INDEX: 35.16 KG/M2 | RESPIRATION RATE: 16 BRPM | DIASTOLIC BLOOD PRESSURE: 82 MMHG | SYSTOLIC BLOOD PRESSURE: 122 MMHG | OXYGEN SATURATION: 97 % | HEART RATE: 77 BPM | WEIGHT: 224 LBS | HEIGHT: 67 IN | TEMPERATURE: 96.8 F

## 2022-10-03 DIAGNOSIS — J40 BRONCHITIS: Primary | ICD-10-CM

## 2022-10-03 DIAGNOSIS — K21.9 GASTROESOPHAGEAL REFLUX DISEASE, UNSPECIFIED WHETHER ESOPHAGITIS PRESENT: ICD-10-CM

## 2022-10-03 DIAGNOSIS — J45.50 SEVERE PERSISTENT ASTHMA, UNSPECIFIED WHETHER COMPLICATED: ICD-10-CM

## 2022-10-03 DIAGNOSIS — L84 PRE-ULCERATIVE CORN OR CALLOUS: ICD-10-CM

## 2022-10-03 PROCEDURE — 99213 OFFICE O/P EST LOW 20 MIN: CPT | Performed by: FAMILY MEDICINE

## 2022-10-03 RX ORDER — VENLAFAXINE HYDROCHLORIDE 75 MG/1
75 CAPSULE, EXTENDED RELEASE ORAL DAILY
Qty: 90 CAPSULE | Refills: 3 | Status: SHIPPED | OUTPATIENT
Start: 2022-10-03

## 2022-10-03 RX ORDER — POTASSIUM CHLORIDE 750 MG/1
10 TABLET, FILM COATED, EXTENDED RELEASE ORAL DAILY
Qty: 90 TABLET | Refills: 3 | Status: SHIPPED | OUTPATIENT
Start: 2022-10-03 | End: 2022-11-04 | Stop reason: DRUGHIGH

## 2022-10-03 RX ORDER — HYDROCHLOROTHIAZIDE 25 MG/1
25 TABLET ORAL DAILY
Qty: 90 TABLET | Refills: 3 | Status: SHIPPED | OUTPATIENT
Start: 2022-10-03

## 2022-10-03 RX ORDER — ALBUTEROL SULFATE 90 UG/1
2 AEROSOL, METERED RESPIRATORY (INHALATION) EVERY 4 HOURS PRN
Qty: 18 G | Refills: 5 | Status: SHIPPED | OUTPATIENT
Start: 2022-10-03 | End: 2022-11-04 | Stop reason: ALTCHOICE

## 2022-10-03 RX ORDER — METHYLPREDNISOLONE 4 MG/1
TABLET ORAL
Qty: 21 TABLET | Refills: 0 | Status: SHIPPED | OUTPATIENT
Start: 2022-10-03 | End: 2022-11-04

## 2022-10-03 NOTE — PROGRESS NOTES
Subjective   Brittny Han is a 62 y.o. female    Cough  Chest pain  Shortness of breath  History of asthma    History of Present Illness  The patient presents today for evaluation of cough, chest pain, and shortness of breath. She has a history of asthma. Her chest has been really tight and she is assuming it might be bronchitis. She uses Advair, Singulair, and Spiriva. Her rescue inhaler is outdated. She also uses Dupixent. The patient sees a pulmonologist down the street.     She reports she had an A1c high at one time. The patient sees Dr. Frausto about her thyroid.    The patient needs a refill of her potassium.    She reports she has a callus on her toe that keeps getting bigger. She went to the podiatrist and they put a thing on top of her insert.    When she swallows, it feels like her esophagus is going in 2 directions and it is fighting to go down. The patient has been tested for reflux.    The following portions of the patient's history were reviewed and updated as appropriate: allergies, current medications, past social history and problem list    Review of Systems   Constitutional: Negative for appetite change, chills, fatigue, fever and unexpected weight change.   HENT: Negative.  Negative for congestion.    Respiratory: Negative for cough, chest tightness, shortness of breath and wheezing.    Cardiovascular: Negative for chest pain.   Gastrointestinal: Negative for abdominal distention, abdominal pain, diarrhea and nausea.        Patient experiencing heartburn/acid reflux         Objective     Vitals:    10/03/22 1523   BP: 122/82   Pulse: 77   Resp: 16   Temp: 96.8 °F (36 °C)   SpO2: 97%       Physical Exam  Vitals and nursing note reviewed.   Constitutional:       General: She is not in acute distress.     Appearance: Normal appearance. She is well-developed. She is not ill-appearing, toxic-appearing or diaphoretic.   HENT:      Head: Normocephalic and atraumatic.      Nose: Nose normal.   Eyes:       General: No scleral icterus.     Conjunctiva/sclera: Conjunctivae normal.   Neck:      Vascular: No JVD.   Cardiovascular:      Rate and Rhythm: Normal rate and regular rhythm.      Heart sounds: Normal heart sounds. No murmur heard.  Pulmonary:      Effort: Pulmonary effort is normal. No respiratory distress.      Breath sounds: Normal breath sounds. No stridor. No wheezing or rales.   Chest:      Chest wall: No tenderness.   Abdominal:      General: Bowel sounds are normal. There is no distension.      Palpations: Abdomen is soft. There is no mass.      Tenderness: There is no abdominal tenderness. There is no guarding or rebound.      Hernia: No hernia is present.   Musculoskeletal:      Cervical back: Neck supple.   Lymphadenopathy:      Cervical: No cervical adenopathy.   Skin:     General: Skin is warm and dry.      Coloration: Skin is not jaundiced or pale.   Neurological:      Mental Status: She is alert.         Assessment & Plan     Problems Addressed this Visit        Pulmonary and Pneumonias    Severe persistent asthma      Other Visit Diagnoses     Bronchitis    -  Primary    Gastroesophageal reflux disease, unspecified whether esophagitis present        Pre-ulcerative corn or callous          Diagnoses       Codes Comments    Bronchitis    -  Primary ICD-10-CM: J40  ICD-9-CM: 490     Gastroesophageal reflux disease, unspecified whether esophagitis present     ICD-10-CM: K21.9  ICD-9-CM: 530.81     Severe persistent asthma, unspecified whether complicated     ICD-10-CM: J45.50  ICD-9-CM: 493.90     Pre-ulcerative corn or callous     ICD-10-CM: L84  ICD-9-CM: 700           The patient's rescue inhaler was updated today. She was prescribed a 6-day Medrol dosepak. Her potassium was refilled today.     I spent 20 minutes in patient care: Reviewing records prior to the visit, examining the patient, entering orders and documentation    Part of this note may be an electronic transcription/translation of  spoken language to printed text using the Dragon Dictation System.           Transcribed from ambient dictation for R Doug Alvarez MD by Razia Driver.  10/03/22   16:23 EDT  I have personally performed the services described in this document as transcribed by the above individual, and it is both accurate and complete.  Patient verbalized consent to the visit recording.

## 2022-10-06 ENCOUNTER — HOSPITAL ENCOUNTER (OUTPATIENT)
Dept: DIABETES SERVICES | Facility: HOSPITAL | Age: 62
Setting detail: RECURRING SERIES
Discharge: HOME | End: 2022-10-06

## 2022-10-06 NOTE — PROGRESS NOTES
Adult Outpatient Nutrition    Patient Name:  Brittny Han  YOB: 1960  MRN: [id number]    Assessment Date:  10/6/2022    Patient attended a one hour Diabetes Prevention Program via zoom.       Electronically signed by:  Medina Marcum RD  10/06/22 15:59 EDT

## 2022-10-07 ENCOUNTER — DOCUMENTATION (OUTPATIENT)
Dept: PHARMACY | Facility: TELEHEALTH | Age: 62
End: 2022-10-07

## 2022-10-10 RX ORDER — ESTRADIOL 0.07 MG/D
1 FILM, EXTENDED RELEASE TRANSDERMAL 2 TIMES WEEKLY
Qty: 8 EACH | Refills: 3 | OUTPATIENT
Start: 2022-10-10

## 2022-10-12 ENCOUNTER — SPECIALTY PHARMACY (OUTPATIENT)
Dept: PHARMACY | Facility: TELEHEALTH | Age: 62
End: 2022-10-12

## 2022-10-12 NOTE — PROGRESS NOTES
Specialty Pharmacy Patient Management Program  Call Center Refill Outreach      Brittny is a 62 y.o. female contacted today regarding refills of her medication(s).    Specialty medication(s) and dose(s) confirmed: Dupixent  Other medications being refilled: N/A    Refill Questions    Flowsheet Row Most Recent Value   Changes to allergies? No   Changes to medications? No   New conditions since last clinic visit No   Unplanned office visit, urgent care, ED, or hospital admission in the last 4 weeks  No   How does patient/caregiver feel medication is working? Very good   Financial problems or insurance changes  No   If yes, describe changes in insurance or financial issues. N/A   Since the previous refill, were any specialty medication doses or scheduled injections missed or delayed?  No   If yes, please provide the amount N/A   Why were doses missed? N/A   Does this patient require a clinical escalation to a pharmacist? No              Medication Adherence    Adherence tools used: directed education  Support network for adherence: family member            Follow-up: 21 Days     Lamin Virk CPhT  Care Coordinator, Specialty Pharmacy Call Center  10/12/2022  10:18 EDT

## 2022-10-13 ENCOUNTER — HOSPITAL ENCOUNTER (OUTPATIENT)
Dept: DIABETES SERVICES | Facility: HOSPITAL | Age: 62
Setting detail: RECURRING SERIES
Discharge: HOME | End: 2022-10-13

## 2022-10-19 ENCOUNTER — SPECIALTY PHARMACY (OUTPATIENT)
Dept: PHARMACY | Facility: TELEHEALTH | Age: 62
End: 2022-10-19

## 2022-11-03 ENCOUNTER — HOSPITAL ENCOUNTER (OUTPATIENT)
Dept: DIABETES SERVICES | Facility: HOSPITAL | Age: 62
Setting detail: RECURRING SERIES
Discharge: HOME | End: 2022-11-03

## 2022-11-04 ENCOUNTER — OFFICE VISIT (OUTPATIENT)
Dept: PULMONOLOGY | Facility: CLINIC | Age: 62
End: 2022-11-04

## 2022-11-04 VITALS
OXYGEN SATURATION: 96 % | BODY MASS INDEX: 35.47 KG/M2 | RESPIRATION RATE: 18 BRPM | WEIGHT: 226 LBS | DIASTOLIC BLOOD PRESSURE: 102 MMHG | HEIGHT: 67 IN | SYSTOLIC BLOOD PRESSURE: 154 MMHG | TEMPERATURE: 98.4 F | HEART RATE: 82 BPM

## 2022-11-04 DIAGNOSIS — G47.33 OBSTRUCTIVE SLEEP APNEA SYNDROME: ICD-10-CM

## 2022-11-04 DIAGNOSIS — R06.02 SHORTNESS OF BREATH: Primary | ICD-10-CM

## 2022-11-04 DIAGNOSIS — J45.50 SEVERE PERSISTENT ASTHMA, UNSPECIFIED WHETHER COMPLICATED: ICD-10-CM

## 2022-11-04 PROCEDURE — 94726 PLETHYSMOGRAPHY LUNG VOLUMES: CPT | Performed by: NURSE PRACTITIONER

## 2022-11-04 PROCEDURE — 94729 DIFFUSING CAPACITY: CPT | Performed by: NURSE PRACTITIONER

## 2022-11-04 PROCEDURE — 94375 RESPIRATORY FLOW VOLUME LOOP: CPT | Performed by: NURSE PRACTITIONER

## 2022-11-04 PROCEDURE — 99214 OFFICE O/P EST MOD 30 MIN: CPT | Performed by: NURSE PRACTITIONER

## 2022-11-04 RX ORDER — VENLAFAXINE HYDROCHLORIDE 37.5 MG/1
CAPSULE, EXTENDED RELEASE ORAL EVERY 24 HOURS
COMMUNITY
End: 2022-11-28

## 2022-11-04 RX ORDER — ALBUTEROL SULFATE 90 UG/1
AEROSOL, METERED RESPIRATORY (INHALATION) EVERY 6 HOURS SCHEDULED
COMMUNITY

## 2022-11-04 RX ORDER — LORATADINE 10 MG/1
10 TABLET ORAL DAILY
COMMUNITY

## 2022-11-04 RX ORDER — POTASSIUM CHLORIDE 1.5 G/1.77G
POWDER, FOR SOLUTION ORAL EVERY 12 HOURS SCHEDULED
COMMUNITY
End: 2023-02-07

## 2022-11-04 NOTE — PROGRESS NOTES
"[facility] Follow up      Chief Complaint  Severe Persistent Asthma (F/u)    Subjective          Brittny Han presents to [facility] for routine testing for her asthma.  She does have a history of severe persistent asthma.  Currently he is on Dupixent prior to that she was on Xolair.  She is not sure if she is noticed much difference with the Dupixent.  She is on Advair discus twice daily as well as her antihistamines and montelukast.    In October she had a several weeks of bronchitis symptoms and completed course of antibiotics and steroids.    She also has obstructive sleep apnea CPAP at night.  She has received her new machine from [employer].  She is wearing it nightly and tolerating it well.  She is in need of a new mask.        Objective     Vital Signs:   BP (!) 154/102 (BP Location: Left arm, Patient Position: Sitting, Cuff Size: Adult)   Pulse 82   Temp 98.4 °F (36.9 °C)   Resp 18   Ht 170.2 cm (67.01\")   Wt 103 kg (226 lb)   SpO2 96% Comment: room air at rest  BMI 35.39 kg/m²       Immunization History   Administered Date(s) Administered   • COVID-19 (PFIZER) PURPLE CAP 04/19/2021, 05/10/2021, 12/27/2021   • Flu Vaccine Intradermal Quad 18-64YR 10/06/2014, 10/01/2015   • Flu Vaccine Quad PF >36MO 10/10/2018   • Flu Vaccine Split Quad 10/10/2018   • FluLaval/Fluzone >6mos 11/03/2021   • Flublok 18+yrs 10/03/2020   • Influenza TIV (IM) 10/06/2014, 10/01/2015   • Influenza, Unspecified 10/21/2019, 09/23/2020   • Pneumococcal Conjugate 13-Valent (PCV13) 04/04/2016   • Pneumococcal Polysaccharide (PPSV23) 10/06/2009, 10/10/2014, 12/23/2020   • Shingrix 03/01/2021, 06/04/2021   • TD Preservative Free 05/16/2020   • Tdap 02/12/2010       Physical Exam  Vitals reviewed.   Constitutional:       Appearance: She is well-developed.   HENT:      Head: Normocephalic and atraumatic.   Eyes:      Pupils: Pupils are equal, round, and reactive to " light.   Cardiovascular:      Rate and Rhythm: Normal rate and regular rhythm.      Heart sounds: No murmur heard.  Pulmonary:      Effort: Pulmonary effort is normal. No respiratory distress.      Breath sounds: Normal breath sounds. No wheezing or rales.   Abdominal:      General: Bowel sounds are normal. There is no distension.      Palpations: Abdomen is soft.   Musculoskeletal:         General: Normal range of motion.      Cervical back: Normal range of motion and neck supple.   Skin:     General: Skin is warm and dry.      Findings: No erythema.   Neurological:      Mental Status: She is alert and oriented to person, place, and time.   Psychiatric:         Behavior: Behavior normal.          Result Review :            PFTs done in the office today:  No obstruction or restriction, normal PFTs FVC was 3.86, 105% predicted normal total lung capacity    PA and lateral chest x-ray done the office today reviewed by me  Awaiting final MD interpretation                 Assessment and Plan    Problem List Items Addressed This Visit        Pulmonary and Pneumonias    Shortness of breath - Primary    Relevant Orders    XR Chest PA & Lateral (Completed)    Pulmonary Function Test (Completed)    Severe persistent asthma    Relevant Medications    albuterol sulfate  (90 Base) MCG/ACT inhaler       Sleep    Obstructive sleep apnea syndrome       Went over her testing today in the office.  Her PFTs were normal and no acute findings on her chest x-ray.    Continue on her Dupixent.  She is also on allergy injections with her allergist.  Continue on her Advair discus, she did seem to do better on the Advair HFA is going to follow-up with her insurance and see if it is covered.  Continue on her antihistamine and montelukast.    She will continue on her CPAP for her obstructive sleep apnea.  She has no needs at this time.    Routine follow-up in 6 months or sooner over the winter if needed.    Follow Up     No follow-ups on  file.  Patient was given instructions and counseling regarding her condition or for health maintenance advice. Please see specific information pulled into the AVS if appropriate.     I spent 35 minutes caring for Brittny on this date of service. This time includes time spent by me in the following activities:preparing for the visit, reviewing tests, obtaining and/or reviewing a separately obtained history, performing a medically appropriate examination and/or evaluation , counseling and educating the patient/family/caregiver, ordering medications, tests, or procedures, referring and communicating with other health care professionals , documenting information in the medical record and independently interpreting results and communicating that information with the patient/family/caregiver    Moderate level of Medical Decision Making complexity based on 2 or more chronic stable illnesses and prescription drug management.    BLANCA Jackson, ACNP  [facility]  Electronically signed

## 2022-11-17 ENCOUNTER — HOSPITAL ENCOUNTER (OUTPATIENT)
Dept: DIABETES SERVICES | Facility: HOSPITAL | Age: 62
Setting detail: RECURRING SERIES
Discharge: HOME | End: 2022-11-17

## 2022-11-22 ENCOUNTER — SPECIALTY PHARMACY (OUTPATIENT)
Dept: PHARMACY | Facility: TELEHEALTH | Age: 62
End: 2022-11-22

## 2022-11-22 NOTE — PROGRESS NOTES
Specialty Pharmacy Patient Management Program  Call Center Refill Outreach      Brittny is a 62 y.o. female contacted today regarding refills of her medication(s).    Specialty medication(s) and dose(s) confirmed: Dupixent  Other medications being refilled: N/a    Refill Questions    Flowsheet Row Most Recent Value   Changes to allergies? No   Changes to medications? No   New conditions since last clinic visit No   Unplanned office visit, urgent care, ED, or hospital admission in the last 4 weeks  No   How does patient/caregiver feel medication is working? Very good   Financial problems or insurance changes  No   If yes, describe changes in insurance or financial issues. N/A   Since the previous refill, were any specialty medication doses or scheduled injections missed or delayed?  No   If yes, please provide the amount N/A   Why were doses missed? N/A   Does this patient require a clinical escalation to a pharmacist? No              Medication Adherence    Adherence tools used: directed education  Support network for adherence: family member       Patient has routinely filled early and gotten ahead. There were no missed doses but we are back on track for future fills now.    Follow-up: 21 days     Lamin Virk CPhT  Care Coordinator, Specialty Pharmacy Call Center  11/22/2022  09:29 EST

## 2022-11-28 ENCOUNTER — LAB (OUTPATIENT)
Dept: LAB | Facility: HOSPITAL | Age: 62
End: 2022-11-28

## 2022-11-28 ENCOUNTER — OFFICE VISIT (OUTPATIENT)
Dept: INTERNAL MEDICINE | Facility: CLINIC | Age: 62
End: 2022-11-28

## 2022-11-28 VITALS
WEIGHT: 224.2 LBS | HEART RATE: 57 BPM | HEIGHT: 67 IN | BODY MASS INDEX: 35.19 KG/M2 | SYSTOLIC BLOOD PRESSURE: 132 MMHG | TEMPERATURE: 97.8 F | OXYGEN SATURATION: 98 % | DIASTOLIC BLOOD PRESSURE: 82 MMHG

## 2022-11-28 DIAGNOSIS — Z12.31 ENCOUNTER FOR SCREENING MAMMOGRAM FOR MALIGNANT NEOPLASM OF BREAST: ICD-10-CM

## 2022-11-28 DIAGNOSIS — Z12.4 PAPANICOLAOU SMEAR: ICD-10-CM

## 2022-11-28 DIAGNOSIS — Z00.00 ANNUAL PHYSICAL EXAM: Primary | ICD-10-CM

## 2022-11-28 LAB
DEPRECATED RDW RBC AUTO: 41.1 FL (ref 37–54)
ERYTHROCYTE [DISTWIDTH] IN BLOOD BY AUTOMATED COUNT: 12.5 % (ref 12.3–15.4)
HBA1C MFR BLD: 5.5 % (ref 4.8–5.6)
HCT VFR BLD AUTO: 39.1 % (ref 34–46.6)
HGB BLD-MCNC: 13.1 G/DL (ref 12–15.9)
MCH RBC QN AUTO: 30.7 PG (ref 26.6–33)
MCHC RBC AUTO-ENTMCNC: 33.5 G/DL (ref 31.5–35.7)
MCV RBC AUTO: 91.6 FL (ref 79–97)
PLATELET # BLD AUTO: 295 10*3/MM3 (ref 140–450)
PMV BLD AUTO: 9.7 FL (ref 6–12)
RBC # BLD AUTO: 4.27 10*6/MM3 (ref 3.77–5.28)
VIT B12 BLD-MCNC: 403 PG/ML (ref 211–946)
WBC NRBC COR # BLD: 4.74 10*3/MM3 (ref 3.4–10.8)

## 2022-11-28 PROCEDURE — 80050 GENERAL HEALTH PANEL: CPT | Performed by: INTERNAL MEDICINE

## 2022-11-28 PROCEDURE — 99396 PREV VISIT EST AGE 40-64: CPT | Performed by: INTERNAL MEDICINE

## 2022-11-28 PROCEDURE — 82607 VITAMIN B-12: CPT | Performed by: INTERNAL MEDICINE

## 2022-11-28 PROCEDURE — 83036 HEMOGLOBIN GLYCOSYLATED A1C: CPT | Performed by: INTERNAL MEDICINE

## 2022-11-28 PROCEDURE — 80061 LIPID PANEL: CPT | Performed by: INTERNAL MEDICINE

## 2022-11-28 NOTE — PROGRESS NOTES
Office Note      Date: 2022  Patient Name: Brittny Han  MRN: [id number]  : 1960    Chief Complaint   Patient presents with   • Annual Exam     Has piece of glass in finger       History of Present Illness: Brittny Han is a 62 y.o. female who presents for Annual Exam (Has piece of glass in finger). Doing well. Last Saturday had a shard of glass in finger.     Subjective      Review of Systems:   Pertinent review of systems per HPI.    Review of Systems   Constitutional: Negative for activity change, appetite change, chills, diaphoresis, fatigue, fever and unexpected weight change.   HENT: Negative for congestion, dental problem, drooling, ear discharge, ear pain, facial swelling, hearing loss and mouth sores.    Eyes: Negative for pain, discharge and itching.   Respiratory: Negative for apnea, cough, choking, chest tightness and shortness of breath.    Cardiovascular: Negative for chest pain, palpitations and leg swelling.   Gastrointestinal: Negative for abdominal distention, abdominal pain, blood in stool, constipation and diarrhea.   Endocrine: Negative for cold intolerance, heat intolerance, polydipsia and polyuria.   Genitourinary: Negative for difficulty urinating, dysuria, frequency and hematuria.   Skin: Negative for color change, pallor, rash and wound.   Allergic/Immunologic: Negative for environmental allergies, food allergies and immunocompromised state.   Neurological: Negative for dizziness, weakness and light-headedness.   Psychiatric/Behavioral: Negative for agitation, behavioral problems, confusion, decreased concentration and self-injury. The patient is not nervous/anxious.    All other systems reviewed and are negative.    Allergies   Allergen Reactions   • Percocet [Oxycodone-Acetaminophen] Anaphylaxis     Breathing difficulty, tongue swelling, severe    • Brevital Sodium [Methohexital] Paresthesia     CHILLS, SHAKING   • Other Delirium     brevatol sedation for  "dentistry, chills, shaking       Objective     Physical Exam:  Vital Signs:   Vitals:    11/28/22 1536   BP: 132/82   Pulse: 57   Temp: 97.8 °F (36.6 °C)   SpO2: 98%   Weight: 102 kg (224 lb 3.2 oz)   Height: 170.2 cm (67.01\")   PainSc: 0-No pain      Body mass index is 35.1 kg/m².    Physical Exam  Vitals and nursing note reviewed.   Constitutional:       General: She is not in acute distress.     Appearance: She is well-developed.   HENT:      Head: Normocephalic and atraumatic.      Right Ear: External ear normal.      Left Ear: External ear normal.   Eyes:      General: No scleral icterus.        Right eye: No discharge.         Left eye: No discharge.      Conjunctiva/sclera: Conjunctivae normal.   Cardiovascular:      Rate and Rhythm: Normal rate and regular rhythm.      Heart sounds: Normal heart sounds. No murmur heard.    No friction rub. No gallop.   Pulmonary:      Effort: Pulmonary effort is normal. No respiratory distress.      Breath sounds: Normal breath sounds. No wheezing or rales.   Skin:     General: Skin is warm and dry.      Coloration: Skin is not pale.      Comments: Proximal left index finger has a tender spot, no visible foreign body         Assessment / Plan      Assessment & Plan:    1. Annual physical exam  Counseled on:  Mammo starting at age 40  Pap smear q5 years if normal pap cytology with neg HPV, otherwise q3 years  Colonoscopy at 46 y/o  PNA vaccinations starting at age 65  shingrix at age 50  Td/Tdap q 10 years  Wear seatbelt when driving  Flu shot annually  No foreign body in finger noted, no erythema or pus. Advised ctm    - CBC (No Diff)  - Comprehensive Metabolic Panel  - Lipid Panel  - TSH Rfx On Abnormal To Free T4  - Vitamin B12  - Hemoglobin A1c    2. Encounter for screening mammogram for malignant neoplasm of breast    - Mammo Screening Digital Tomosynthesis Bilateral With CAD; Future    3. Papanicolaou smear    - Ambulatory Referral to Obstetrics / Gynecology      Mariaelena " MD Darren  11/28/2022

## 2022-11-29 LAB
ALBUMIN SERPL-MCNC: 4.6 G/DL (ref 3.5–5.2)
ALBUMIN/GLOB SERPL: 1.5 G/DL
ALP SERPL-CCNC: 84 U/L (ref 39–117)
ALT SERPL W P-5'-P-CCNC: 26 U/L (ref 1–33)
ANION GAP SERPL CALCULATED.3IONS-SCNC: 10.2 MMOL/L (ref 5–15)
AST SERPL-CCNC: 25 U/L (ref 1–32)
BILIRUB SERPL-MCNC: 0.3 MG/DL (ref 0–1.2)
BUN SERPL-MCNC: 12 MG/DL (ref 8–23)
BUN/CREAT SERPL: 13.8 (ref 7–25)
CALCIUM SPEC-SCNC: 9.5 MG/DL (ref 8.6–10.5)
CHLORIDE SERPL-SCNC: 103 MMOL/L (ref 98–107)
CHOLEST SERPL-MCNC: 232 MG/DL (ref 0–200)
CO2 SERPL-SCNC: 26.8 MMOL/L (ref 22–29)
CREAT SERPL-MCNC: 0.87 MG/DL (ref 0.57–1)
EGFRCR SERPLBLD CKD-EPI 2021: 76.4 ML/MIN/1.73
GLOBULIN UR ELPH-MCNC: 3 GM/DL
GLUCOSE SERPL-MCNC: 85 MG/DL (ref 65–99)
HDLC SERPL-MCNC: 55 MG/DL (ref 40–60)
LDLC SERPL CALC-MCNC: 150 MG/DL (ref 0–100)
LDLC/HDLC SERPL: 2.66 {RATIO}
POTASSIUM SERPL-SCNC: 3.7 MMOL/L (ref 3.5–5.2)
PROT SERPL-MCNC: 7.6 G/DL (ref 6–8.5)
SODIUM SERPL-SCNC: 140 MMOL/L (ref 136–145)
TRIGL SERPL-MCNC: 153 MG/DL (ref 0–150)
TSH SERPL DL<=0.05 MIU/L-ACNC: 2.92 UIU/ML (ref 0.27–4.2)
VLDLC SERPL-MCNC: 27 MG/DL (ref 5–40)

## 2022-12-01 ENCOUNTER — HOSPITAL ENCOUNTER (OUTPATIENT)
Dept: MAMMOGRAPHY | Facility: HOSPITAL | Age: 62
Discharge: HOME | End: 2022-12-01
Admitting: INTERNAL MEDICINE

## 2022-12-01 DIAGNOSIS — Z12.31 ENCOUNTER FOR SCREENING MAMMOGRAM FOR MALIGNANT NEOPLASM OF BREAST: ICD-10-CM

## 2022-12-01 PROCEDURE — 77067 SCR MAMMO BI INCL CAD: CPT

## 2022-12-01 PROCEDURE — 77067 SCR MAMMO BI INCL CAD: CPT | Performed by: RADIOLOGY

## 2022-12-01 PROCEDURE — 77063 BREAST TOMOSYNTHESIS BI: CPT

## 2022-12-01 PROCEDURE — 77063 BREAST TOMOSYNTHESIS BI: CPT | Performed by: RADIOLOGY

## 2022-12-05 ENCOUNTER — PATIENT ROUNDING (BHMG ONLY) (OUTPATIENT)
Dept: INTERNAL MEDICINE | Facility: CLINIC | Age: 62
End: 2022-12-05

## 2022-12-08 ENCOUNTER — HOSPITAL ENCOUNTER (OUTPATIENT)
Dept: DIABETES SERVICES | Facility: HOSPITAL | Age: 62
Setting detail: RECURRING SERIES
Discharge: HOME | End: 2022-12-08

## 2022-12-28 ENCOUNTER — SPECIALTY PHARMACY (OUTPATIENT)
Dept: PHARMACY | Facility: TELEHEALTH | Age: 62
End: 2022-12-28

## 2022-12-28 NOTE — PROGRESS NOTES
Specialty Pharmacy Patient Management Program  Call Center Refill Outreach      Brittny is a 62 y.o. female contacted today regarding refills of her medication(s).    Specialty medication(s) and dose(s) confirmed: Dupixent   Other medications being refilled: N/a    Refill Questions    Flowsheet Row Most Recent Value   Changes to allergies? No   Changes to medications? No   New conditions since last clinic visit No   Unplanned office visit, urgent care, ED, or hospital admission in the last 4 weeks  No   How does patient/caregiver feel medication is working? Very good   Financial problems or insurance changes  No   If yes, describe changes in insurance or financial issues. N/A   Since the previous refill, were any specialty medication doses or scheduled injections missed or delayed?  No   If yes, please provide the amount N/A   Why were doses missed? N/A   Does this patient require a clinical escalation to a pharmacist? No              Medication Adherence    Adherence tools used: directed education  Support network for adherence: family member            Follow-up: 21 days     Lamin Virk CPhT  Care Coordinator, Specialty Pharmacy Call Center  12/28/2022  12:14 EST

## 2023-01-03 RX ORDER — SODIUM, POTASSIUM,MAG SULFATES 17.5-3.13G
177 SOLUTION, RECONSTITUTED, ORAL ORAL TAKE AS DIRECTED
Qty: 354 ML | Refills: 0 | Status: SHIPPED | OUTPATIENT
Start: 2023-01-03 | End: 2023-02-07

## 2023-01-13 RX ORDER — ESTRADIOL 0.07 MG/D
1 FILM, EXTENDED RELEASE TRANSDERMAL 2 TIMES WEEKLY
Qty: 8 EACH | Refills: 3 | Status: SHIPPED | OUTPATIENT
Start: 2023-01-16 | End: 2023-01-24 | Stop reason: SDUPTHER

## 2023-01-13 RX ORDER — POTASSIUM CHLORIDE 750 MG/1
10 TABLET, FILM COATED, EXTENDED RELEASE ORAL DAILY
Qty: 90 TABLET | Refills: 3 | OUTPATIENT
Start: 2023-01-13

## 2023-01-17 ENCOUNTER — OUTSIDE FACILITY SERVICE (OUTPATIENT)
Dept: GASTROENTEROLOGY | Facility: CLINIC | Age: 63
End: 2023-01-17
Payer: COMMERCIAL

## 2023-01-17 PROCEDURE — 45388 COLONOSCOPY W/ABLATION: CPT | Performed by: INTERNAL MEDICINE

## 2023-01-19 ENCOUNTER — HOSPITAL ENCOUNTER (OUTPATIENT)
Dept: DIABETES SERVICES | Facility: HOSPITAL | Age: 63
Setting detail: RECURRING SERIES
Discharge: HOME | End: 2023-01-19
Payer: COMMERCIAL

## 2023-01-19 NOTE — PROGRESS NOTES
Adult Outpatient Nutrition    Patient Name:  Brittny Han  YOB: 1960  MRN: [id number]    Assessment Date:  1/19/2023    Patient attended a one hour Diabetes Prevention Program via zoom.     Electronically signed by:  Medina Marcum RD  01/19/23 16:11 EST

## 2023-01-24 ENCOUNTER — TELEPHONE (OUTPATIENT)
Dept: INTERNAL MEDICINE | Facility: CLINIC | Age: 63
End: 2023-01-24
Payer: COMMERCIAL

## 2023-01-24 ENCOUNTER — SPECIALTY PHARMACY (OUTPATIENT)
Dept: PHARMACY | Facility: TELEHEALTH | Age: 63
End: 2023-01-24
Payer: COMMERCIAL

## 2023-01-24 RX ORDER — ESTRADIOL 0.07 MG/D
1 FILM, EXTENDED RELEASE TRANSDERMAL 2 TIMES WEEKLY
Qty: 8 EACH | Refills: 3 | Status: SHIPPED | OUTPATIENT
Start: 2023-01-26

## 2023-01-24 NOTE — TELEPHONE ENCOUNTER
Rx Refill Note  Requested Prescriptions     Signed Prescriptions Disp Refills   • estradiol (Anisa) 0.075 MG/24HR patch 8 each 3     Sig: Place 1 patch on the skin as directed by provider 2 (Two) Times a Week.     Authorizing Provider: SONJA WU     Ordering User: GOKUL NOEL      Last office visit with prescribing clinician: 11/28/2022   Last telemedicine visit with prescribing clinician: Visit date not found   Next office visit with prescribing clinician: Visit date not found                           Gokul Noel MA  01/24/23, 13:25 EST

## 2023-01-24 NOTE — PROGRESS NOTES
Specialty Pharmacy Patient Management Program  Call Center Refill Outreach      Brittny is a 63 y.o. female contacted today regarding refills of her medication(s).    Specialty medication(s) and dose(s) confirmed: Dupixent  Other medications being refilled: N/a    Refill Questions    Flowsheet Row Most Recent Value   Changes to allergies? No   Changes to medications? No   New conditions since last clinic visit No   Unplanned office visit, urgent care, ED, or hospital admission in the last 4 weeks  No   How does patient/caregiver feel medication is working? Very good   Financial problems or insurance changes  No   If yes, describe changes in insurance or financial issues. N/A   Since the previous refill, were any specialty medication doses or scheduled injections missed or delayed?  No   If yes, please provide the amount N/A   Why were doses missed? N/A   Does this patient require a clinical escalation to a pharmacist? No              Medication Adherence    Adherence tools used: directed education  Support network for adherence: family member            Follow-up: 21 days     Lamin Virk CPhT  Care Coordinator, Specialty Pharmacy Call Center  1/24/2023  11:23 EST

## 2023-01-24 NOTE — TELEPHONE ENCOUNTER
Pt would like to know if we could call in those estradiol patches for her, she has an appt with the obgyn march 19th, but she said she'll need patched until then, please advise

## 2023-01-24 NOTE — PROGRESS NOTES
Specialty Pharmacy Patient Management Program  Reassessment     Brittny Han is a 63 y.o. female with chronic asthma and enrolled in the Patient Management program offered by [facility].  A follow-up outreach was conducted, including assessment of continued therapy appropriateness, medication adherence, and side effect incidence and management for Dupixent 300mg/2ml.     Changes to Insurance Coverage or Financial Support  none    Relevant Past Medical History and Comorbidities  Relevant medical history and concomitant health conditions were discussed with the patient. The patient's chart has been reviewed for relevant past medical history and comorbid health conditions and updated as necessary.   Past Medical History:   Diagnosis Date   • Allergic rhinitis    • Anemia    • Anxiety    • Asthma     MODERATE, PERSISTANT   • Asthma, extrinsic 2007    Progressively gets worse with age   • BCC (basal cell carcinoma) 2000    RIGHT EYELID   • Bowel habit changes    • BPPV (benign paroxysmal positional vertigo) 01/2020   • Bronchitis    • Cat bite of left hand 05/16/2020    SEEN AT [facility]   • Chronic bilateral low back pain with bilateral sciatica    • Chronic bronchitis (HCC) Aug 2018    also seasonal bronchitis every May and December   • Chronic idiopathic constipation    • Colon polyps     FOLLOWED BY DR. LORENA RICO   • COPD (chronic obstructive pulmonary disease) ([facility])    • CTS (carpal tunnel syndrome) 2010    BILATERAL, Wear wrist braces at night   • Demyelinating disease ([facility])    • Depression    • Disease of thyroid gland     HYPOTHYROIDISM   • DINERO (dyspnea on exertion) 06/2019   • Essential hypertension    • Fatigue    • Gastroenteritis 02/2020   • GERD (gastroesophageal reflux disease)    • Head injury when I was 10    concussion   • Hemorrhoids    • Hepatomegaly 11/2018   • History of echocardiogram 05/31/2011    mild ascending aortic root dilatation; mod RVC enlargement; global left  EF 0.72 (normal 0.55-0.75) mild concentric LV hypertrophy; trace MR; mild TR    • History of PFTs 02/02/2016    good effort; normal lung volumes   • Hyperlipidemia    • Hypertrophy, nasal, turbinate    • Hypothyroidism 2019   • Midline cystocele    • Neuropathy of both feet 01/2019    FOLLOWED BY DR. VARSHA IRELAND   • Numbness and tingling in both hands 07/19/2021   • Numbness and tingling of both feet 01/30/2019   • ALANNA on CPAP 2007    Followed by Jin Cat @ [facility]   • Peptic ulceration     PUD   • Periodic headache syndrome, not intractable    • Plantar fasciitis, left 01/2019    FOLLOWED BY DR. VARSHA IRELAND   • Primary central sleep apnea 2006    Sleep apnea not sure if central or not   • Rectal bleeding 08/2020   • Rectal bleeding    • Rectal mass 08/2020   • RLS (restless legs syndrome) 06/2017   • Shortness of breath    • Strain of thoracic region 04/2017   • KALYAN (stress urinary incontinence, female)    • Uterine fibroid    • Vision loss 2008    wear glasses   • Vitamin B 12 deficiency    • Vitamin D deficiency      Social History     Socioeconomic History   • Marital status: Single   Tobacco Use   • Smoking status: Never   • Smokeless tobacco: Never   Vaping Use   • Vaping Use: Never used   Substance and Sexual Activity   • Alcohol use: Yes     Alcohol/week: 2.0 standard drinks     Types: 2 Glasses of wine per week     Comment: 2 glasses of wine about 4 x's per year   • Drug use: No   • Sexual activity: Not Currently     Partners: Male     Birth control/protection: Abstinence     Comment: single       Allergies  Known allergies and reactions were discussed with the patient. The patient's chart has been reviewed for allergy information and updated as necessary.   Percocet [oxycodone-acetaminophen], Brevital sodium [methohexital], and Other    Relevant Laboratory Values  No results for input(s): CMP, BMP, CBC in the last 72 hours.    Current Medication List  This medication list has been  reviewed with the patient and evaluated for any interactions or necessary modifications/recommendations, and updated to include all prescription medications, OTC medications, and supplements the patient is currently taking.  This list reflects what is contained in the patient's profile, which has also been marked as reviewed to communicate to other providers it is the most up to date version of the patient's current medication therapy.     Current Outpatient Medications:   •  albuterol sulfate  (90 Base) MCG/ACT inhaler, Every 6 (Six) Hours., Disp: , Rfl:   •  Dupilumab 300 MG/2ML solution pen-injector, Inject 2 mL under the skin into the appropriate area as directed Every 14 (Fourteen) Days., Disp: 4 mL, Rfl: 11  •  EPINEPHrine (EPIPEN) 0.3 MG/0.3ML solution auto-injector injection, Use as directed for acute allergic reaction., Disp: 2 each, Rfl: 3  •  estradiol (Anisa) 0.075 MG/24HR patch, Place 1 patch on the skin as directed by provider 2 (Two) Times a Week., Disp: 8 each, Rfl: 3  •  Fluticasone-Salmeterol (Advair Diskus) 500-50 MCG/ACT DISKUS, Inhale 1 puff 2 (Two) Times a Day., Disp: 60 each, Rfl: 3  •  Glucosamine-Chondroitin (MOVE FREE PO), Take 1 capsule by mouth Daily., Disp: , Rfl:   •  hydroCHLOROthiazide (HYDRODIURIL) 25 MG tablet, Take 1 tablet by mouth Daily., Disp: 90 tablet, Rfl: 3  •  levocetirizine (XYZAL) 5 MG tablet, Take 1 tablet by mouth Daily., Disp: 90 tablet, Rfl: 3  •  levothyroxine (SYNTHROID, LEVOTHROID) 75 MCG tablet, Take 1 tablet by mouth Daily., Disp: 30 tablet, Rfl: 5  •  loratadine (CLARITIN) 10 MG tablet, Take 1 tablet by mouth Daily., Disp: , Rfl:   •  Magnesium Gluconate (MAGNESIUM 27 PO), Take 1 capsule by mouth Daily., Disp: , Rfl:   •  metroNIDAZOLE (METROCREAM) 0.75 % cream, Apply 1 application topically to the appropriate area as directed 2 (Two) Times a Day., Disp: 45 g, Rfl: 5  •  montelukast (SINGULAIR) 10 MG tablet, Take 1 tablet by mouth every night at bedtime.,  Disp: 90 tablet, Rfl: 3  •  Multiple Vitamins-Minerals (CENTRUM SILVER PO), Centrum Silver, Disp: , Rfl:   •  potassium chloride (KLOR-CON) 20 MEQ packet, Every 12 (Twelve) Hours., Disp: , Rfl:   •  sodium-potassium-magnesium sulfates (Suprep Bowel Prep Kit) 17.5-3.13-1.6 GM/177ML solution oral solution, Take 1 bottle by mouth As Directed for 2 doses., Disp: 354 mL, Rfl: 0  •  Tiotropium Bromide Monohydrate (SPIRIVA RESPIMAT) 1.25 MCG/ACT aerosol solution inhaler, Inhale 2 puffs Daily., Disp: 4 g, Rfl: 11  •  venlafaxine XR (EFFEXOR-XR) 75 MG 24 hr capsule, Take 1 capsule by mouth Daily., Disp: 90 capsule, Rfl: 3  •  vitamin D3 125 MCG (5000 UT) capsule capsule, Take 5,000 Units by mouth Daily., Disp: , Rfl:     Drug Interactions  none     Adverse Drug Reactions  • Adverse Reactions Experienced: none  • Plan for ADR Management: N/A (patient education provided, discontinue drug, pharmacist to consult provider, etc.)    Hospitalizations and Urgent Care Since Last Assessment  • Hospitalizations or Admissions: none  • ED Visits: none  • Urgent Office Visits: none     Adherence and Self-Administration  • Approximate Number of Doses Missed Since Last Assessment: none  • Ongoing or New Barriers to Patient Adherence and/or Self-Administration: none   • Methods for Supporting Patient Adherence and/or Self-Administration: N/A     Goals of Therapy  Progress Toward Meeting Patient-Identified Goals of Therapy: On Track  o New Patient-Identified Goals, If Applicable:     • Progress Toward Meeting Clinical Goals or Therapeutic Targets: On Track  o New Clinical Goals or Therapeutic Targets, If Applicable:     Quality of Life Assessment   Quality of Life related to the patient's specialty therapy was discussed with the patient. The QOL segment of this outreach has been reviewed and updated.     Quality of Life Assessment  Quality of Life Improvement Scale: No change  Comments on Quality of Life: tolerating well    Reassessment Plan  & Follow-Up  1. Medication Therapy Changes: none  2. Additional Plans, Therapy Recommendations, or Therapy Problems to Be Addressed: none   3. Pharmacist to perform regular reassessments no more than (6) months from the previous assessment.  4. Care Coordinator to set up future refill outreaches, coordinate prescription delivery, and escalate clinical questions to pharmacist.     Attestation  I attest that the specialty medication(s) addressed above are appropriate for the patient based on my reassessment.  If the prescribed therapy is at any point deemed not appropriate based on the current or future assessments, a consultation will be initiated with the patient's specialty care provider to determine the best course of action. The revised plan of therapy will be documented along with any additional patient education provided.     Electronically signed by Luciano Smith RPH, 01/24/23, 11:32 AM EST.

## 2023-02-02 ENCOUNTER — HOSPITAL ENCOUNTER (OUTPATIENT)
Dept: DIABETES SERVICES | Facility: HOSPITAL | Age: 63
Setting detail: RECURRING SERIES
Discharge: HOME | End: 2023-02-02
Payer: COMMERCIAL

## 2023-02-03 RX ORDER — LEVOTHYROXINE SODIUM 0.07 MG/1
75 TABLET ORAL DAILY
Qty: 30 TABLET | Refills: 5 | Status: SHIPPED | OUTPATIENT
Start: 2023-02-03

## 2023-02-03 NOTE — TELEPHONE ENCOUNTER
Rx Refill Note  Requested Prescriptions     Pending Prescriptions Disp Refills   • levothyroxine (SYNTHROID, LEVOTHROID) 75 MCG tablet 30 tablet 5     Sig: Take 1 tablet by mouth Daily.      Last office visit with prescribing clinician: 11/28/2022   Last telemedicine visit with prescribing clinician: Visit date not found   Next office visit with prescribing clinician: Visit date not found        11/28/2022                 Angelito Noel MA  02/03/23, 11:00 EST

## 2023-02-07 ENCOUNTER — OFFICE VISIT (OUTPATIENT)
Dept: INTERNAL MEDICINE | Facility: CLINIC | Age: 63
End: 2023-02-07
Payer: COMMERCIAL

## 2023-02-07 ENCOUNTER — LAB (OUTPATIENT)
Dept: LAB | Facility: HOSPITAL | Age: 63
End: 2023-02-07
Payer: COMMERCIAL

## 2023-02-07 ENCOUNTER — HOSPITAL ENCOUNTER (OUTPATIENT)
Dept: GENERAL RADIOLOGY | Facility: HOSPITAL | Age: 63
Discharge: HOME | End: 2023-02-07
Admitting: NURSE PRACTITIONER
Payer: COMMERCIAL

## 2023-02-07 VITALS
BODY MASS INDEX: 34.5 KG/M2 | OXYGEN SATURATION: 99 % | HEIGHT: 67 IN | SYSTOLIC BLOOD PRESSURE: 120 MMHG | HEART RATE: 68 BPM | DIASTOLIC BLOOD PRESSURE: 82 MMHG | TEMPERATURE: 97.1 F | WEIGHT: 219.8 LBS

## 2023-02-07 DIAGNOSIS — E03.9 ACQUIRED HYPOTHYROIDISM: ICD-10-CM

## 2023-02-07 DIAGNOSIS — M79.644 PAIN OF RIGHT MIDDLE FINGER: ICD-10-CM

## 2023-02-07 DIAGNOSIS — J45.20 MILD INTERMITTENT ASTHMA WITHOUT COMPLICATION: ICD-10-CM

## 2023-02-07 DIAGNOSIS — M79.644 PAIN OF RIGHT MIDDLE FINGER: Primary | ICD-10-CM

## 2023-02-07 DIAGNOSIS — F41.9 ANXIETY: ICD-10-CM

## 2023-02-07 LAB — TSH SERPL DL<=0.05 MIU/L-ACNC: 3.96 UIU/ML (ref 0.27–4.2)

## 2023-02-07 PROCEDURE — 86376 MICROSOMAL ANTIBODY EACH: CPT | Performed by: NURSE PRACTITIONER

## 2023-02-07 PROCEDURE — 86800 THYROGLOBULIN ANTIBODY: CPT | Performed by: NURSE PRACTITIONER

## 2023-02-07 PROCEDURE — 73140 X-RAY EXAM OF FINGER(S): CPT

## 2023-02-07 PROCEDURE — 99214 OFFICE O/P EST MOD 30 MIN: CPT | Performed by: NURSE PRACTITIONER

## 2023-02-07 PROCEDURE — 84443 ASSAY THYROID STIM HORMONE: CPT | Performed by: NURSE PRACTITIONER

## 2023-02-07 RX ORDER — PREDNISONE 10 MG/1
TABLET ORAL
Qty: 21 TABLET | Refills: 0 | Status: SHIPPED | OUTPATIENT
Start: 2023-02-07

## 2023-02-07 NOTE — PROGRESS NOTES
"Chief Complaint   Patient presents with   • Right middle finger pain       HPI  Brittny Han is a 63 y.o. female presents for right middle finger pain for about 2 weeks.  She denies any trauma.  She states the pain started after she had a colonoscopy.  She feels like the finger is a little swollen.  She can't bend it completely.   She would like to have her thyroid antibodies checked.  She's curious if she has Hashimotos.    The following portions of the patient's history were reviewed and updated as appropriate: allergies, current medications, past family history, past medical history, past social history, past surgical history and problem list.    Subjective  Review of Systems   Constitutional: Negative for activity change, appetite change and fatigue.   HENT: Negative for congestion.    Respiratory: Negative for cough and shortness of breath.    Cardiovascular: Negative for chest pain and leg swelling.   Gastrointestinal: Negative for abdominal pain.   Musculoskeletal: Positive for arthralgias and joint swelling.   Neurological: Negative for dizziness, weakness and confusion.   Psychiatric/Behavioral: Negative for behavioral problems and decreased concentration.       Objective  Visit Vitals  /82 (BP Location: Left arm, Patient Position: Sitting)   Pulse 68   Temp 97.1 °F (36.2 °C)   Ht 170.2 cm (67\")   Wt 99.7 kg (219 lb 12.8 oz)   LMP  (LMP Unknown)   SpO2 99%   BMI 34.43 kg/m²        Physical Exam  Vitals and nursing note reviewed.   HENT:      Head: Normocephalic.   Eyes:      Pupils: Pupils are equal, round, and reactive to light.   Pulmonary:      Effort: Pulmonary effort is normal.   Musculoskeletal:      Comments: Decreased ROM of right middle finger   Skin:     General: Skin is warm and dry.      Capillary Refill: Capillary refill takes less than 2 seconds.   Neurological:      General: No focal deficit present.      Mental Status: She is alert and oriented to person, place, and time.      " Gait: Gait is intact.   Psychiatric:         Attention and Perception: Attention normal.         Mood and Affect: Mood normal.         Behavior: Behavior normal.          Procedures     Assessment and Plan  Diagnoses and all orders for this visit:    1. Pain of right middle finger (Primary)  -     XR Finger 2+ View Right; Future    2. Acquired hypothyroidism  -     TSH Rfx On Abnormal To Free T4  -     Thyroid Antibodies    3. Anxiety    4. Mild intermittent asthma without complication      XR of finger ordered  Labs to eval thyroid antibodies per pt request  Anxiety well controlled on Effexor  Asthma well controlled on Spiriva/Advair      Yonatan Kenyon, APRN

## 2023-02-09 LAB
BIOTAP/ACTX MEDICAL LINK: NORMAL
THYROGLOB AB SERPL-ACNC: 2.4 IU/ML (ref 0–0.9)
THYROPEROXIDASE AB SERPL-ACNC: 254 IU/ML (ref 0–34)

## 2023-02-16 ENCOUNTER — HOSPITAL ENCOUNTER (OUTPATIENT)
Dept: DIABETES SERVICES | Facility: HOSPITAL | Age: 63
Setting detail: RECURRING SERIES
Discharge: HOME | End: 2023-02-16
Payer: COMMERCIAL

## 2023-03-02 ENCOUNTER — HOSPITAL ENCOUNTER (OUTPATIENT)
Dept: DIABETES SERVICES | Facility: HOSPITAL | Age: 63
Setting detail: RECURRING SERIES
Discharge: HOME | End: 2023-03-02
Payer: COMMERCIAL

## 2023-03-02 ENCOUNTER — SPECIALTY PHARMACY (OUTPATIENT)
Dept: PHARMACY | Facility: TELEHEALTH | Age: 63
End: 2023-03-02
Payer: COMMERCIAL

## 2023-03-02 ENCOUNTER — EDUCATION (OUTPATIENT)
Dept: DIABETES SERVICES | Facility: HOSPITAL | Age: 63
End: 2023-03-02
Payer: COMMERCIAL

## 2023-03-02 NOTE — CONSULTS
Diabetes Education    Patient Name:  Brittny Han  YOB: 1960  MRN: [id number]  Admit Date:  (Not on file)    Patient attended a one hour Diabetes Prevention Program via zoom.     Electronically signed by:  Alexandra Hernandez RN  03/02/23 16:44 EST

## 2023-03-08 ENCOUNTER — SPECIALTY PHARMACY (OUTPATIENT)
Dept: PHARMACY | Facility: TELEHEALTH | Age: 63
End: 2023-03-08
Payer: COMMERCIAL

## 2023-03-08 NOTE — PROGRESS NOTES
" Specialty Pharmacy Patient Management Program  Call Center Refill Outreach      Brittny \"Padmini\" is a 63 y.o. female contacted today regarding refills of her medication(s).    Specialty medication(s) and dose(s) confirmed: Dupixent 300mg/2ml  Other medications being refilled: none    Refill Questions    Flowsheet Row Most Recent Value   Changes to allergies? No   Changes to medications? No   New conditions since last clinic visit No   Unplanned office visit, urgent care, ED, or hospital admission in the last 4 weeks  No   How does patient/caregiver feel medication is working? Very good   Financial problems or insurance changes  No   Since the previous refill, were any specialty medication doses or scheduled injections missed or delayed?  No   Does this patient require a clinical escalation to a pharmacist? No              Medication Adherence    Adherence tools used: directed education  Support network for adherence: family member            Follow-up: 3 weeks     Luciano Smith RPH  Specialty Pharmacist  3/8/2023  13:45 EST    "

## 2023-03-23 ENCOUNTER — HOSPITAL ENCOUNTER (OUTPATIENT)
Dept: DIABETES SERVICES | Facility: HOSPITAL | Age: 63
Setting detail: RECURRING SERIES
Discharge: HOME | End: 2023-03-23
Payer: COMMERCIAL

## 2023-03-24 NOTE — PROGRESS NOTES
Patient Name:  Brittny Han  YOB: 1960  MRN: [id number]    Assessment Date:  3/24/2023    Comments:      Patient attended a one hour Diabetes Prevention Program via zoom.       Electronically signed by:  Dee Sanchez RD  03/24/23 10:02 EDT

## 2023-04-03 ENCOUNTER — SPECIALTY PHARMACY (OUTPATIENT)
Dept: PHARMACY | Facility: TELEHEALTH | Age: 63
End: 2023-04-03
Payer: COMMERCIAL

## 2023-04-03 NOTE — PROGRESS NOTES
"Specialty Pharmacy Refill Coordination Note     Brittny \"Padmini\" is a 63 y.o. female contacted today regarding refills of  Dupixent specialty medication(s).    Reviewed and verified with patient:  Allergies  Meds       Specialty medication(s) and dose(s) confirmed: yes    Refill Questions    Flowsheet Row Most Recent Value   Changes to allergies? Yes  [New additions]   Changes to medications? No   New conditions since last clinic visit Yes  [Hashimoto's disease (hypothyroidism)]   Unplanned office visit, urgent care, ED, or hospital admission in the last 4 weeks  No   How does patient/caregiver feel medication is working? Very good   Financial problems or insurance changes  No   Since the previous refill, were any specialty medication doses or scheduled injections missed or delayed?  No   Does this patient require a clinical escalation to a pharmacist? Yes                Medication Adherence    Adherence tools used: directed education  Support network for adherence: family member          Follow-up: 23 day(s)     Je No, Pharmacy Technician  Specialty Pharmacy Technician      "

## 2023-04-06 DIAGNOSIS — J45.50 SEVERE PERSISTENT ASTHMA, UNSPECIFIED WHETHER COMPLICATED: ICD-10-CM

## 2023-04-06 DIAGNOSIS — R06.02 SHORTNESS OF BREATH: Primary | ICD-10-CM

## 2023-04-06 DIAGNOSIS — J45.40 MODERATE PERSISTENT ASTHMA, UNSPECIFIED WHETHER COMPLICATED: ICD-10-CM

## 2023-04-06 RX ORDER — TIOTROPIUM BROMIDE INHALATION SPRAY 1.56 UG/1
2 SPRAY, METERED RESPIRATORY (INHALATION) DAILY
Qty: 4 G | Refills: 0 | Status: SHIPPED | OUTPATIENT
Start: 2023-04-06

## 2023-04-11 RX ORDER — ESTRADIOL 0.07 MG/D
1 FILM, EXTENDED RELEASE TRANSDERMAL 2 TIMES WEEKLY
Qty: 8 EACH | Refills: 3 | Status: SHIPPED | OUTPATIENT
Start: 2023-04-13

## 2023-04-11 NOTE — TELEPHONE ENCOUNTER
Rx Refill Note  Requested Prescriptions     Pending Prescriptions Disp Refills   • estradiol (Anisa) 0.075 MG/24HR patch 8 each 3     Sig: Place 1 patch on the skin as directed by provider 2 (Two) Times a Week.      Last office visit with prescribing clinician: 11/28/2022   Last telemedicine visit with prescribing clinician: 7/11/2023   Next office visit with prescribing clinician: 7/11/2023                         Angelito Noel MA  04/11/23, 08:31 EDT

## 2023-04-13 ENCOUNTER — EDUCATION (OUTPATIENT)
Dept: DIABETES SERVICES | Facility: HOSPITAL | Age: 63
End: 2023-04-13
Payer: COMMERCIAL

## 2023-04-13 ENCOUNTER — HOSPITAL ENCOUNTER (OUTPATIENT)
Dept: DIABETES SERVICES | Facility: HOSPITAL | Age: 63
Setting detail: RECURRING SERIES
Discharge: HOME | End: 2023-04-13
Payer: COMMERCIAL

## 2023-04-13 NOTE — CONSULTS
Diabetes Education    Patient Name:  Brittny Han  YOB: 1960  MRN: [id number]  Admit Date:  (Not on file)    Patient attended a one hour Diabetes Prevention Program.     Electronically signed by:  Alexandra Hernandez RN  04/13/23 16:56 EDT

## 2023-04-19 ENCOUNTER — OFFICE VISIT (OUTPATIENT)
Dept: OBSTETRICS AND GYNECOLOGY | Facility: CLINIC | Age: 63
End: 2023-04-19
Payer: COMMERCIAL

## 2023-04-19 VITALS — BODY MASS INDEX: 34.42 KG/M2 | HEIGHT: 67 IN

## 2023-04-19 DIAGNOSIS — Z01.419 ENCOUNTER FOR GYNECOLOGICAL EXAMINATION WITHOUT ABNORMAL FINDING: ICD-10-CM

## 2023-04-19 DIAGNOSIS — Z12.31 ENCOUNTER FOR SCREENING MAMMOGRAM FOR MALIGNANT NEOPLASM OF BREAST: Primary | ICD-10-CM

## 2023-04-19 RX ORDER — ESTRADIOL 0.1 MG/G
1 CREAM VAGINAL TAKE AS DIRECTED
Qty: 42.5 G | Refills: 12 | Status: SHIPPED | OUTPATIENT
Start: 2023-04-19

## 2023-04-19 NOTE — PROGRESS NOTES
Chief Complaint   Patient presents with   • Gynecologic Exam       Subjective   HPI  Brittny Han is a 63 y.o. female, , who presents for annual exam.     Her last LMP was No LMP recorded (lmp unknown). Patient has had a hysterectomy and vaginal sling placement in  for fibroids and prolapse.  Partner Status: Marital Status: single.  New Partners since last visit: no.  Desires STD Screening: no. Not currently sexually active.     Additional OB/GYN History   Current contraception: contraceptive methods: Hysterectomy  Desires to: continue contraception  Last Pap : 2011 Negative  Last Completed Pap Smear     This patient has no relevant Health Maintenance data.        History of abnormal Pap smear: no  Last mammogram: 2022  Last Completed Mammogram          Ordered - MAMMOGRAM (Every 2 Years) Ordered on 2022  Mammo Screening Digital Tomosynthesis Bilateral With CAD    2020  Mammo Screening Digital Tomosynthesis Bilateral With CAD    10/05/2017  Mammo Screening Digital Tomosynthesis Bilateral With CAD    2016  Mammo screening bilateral    2014  MAMMOGRAPHY SCREENING BILATERAL              Tobacco Usage?: No   OB History        2    Para   2    Term   2            AB        Living   1       SAB        IAB        Ectopic        Molar        Multiple        Live Births   1                Health Maintenance   Topic Date Due   • PAP SMEAR  2017   • COVID-19 Vaccine (4 - Booster for Pfizer series) 2022   • INFLUENZA VACCINE  2023   • ANNUAL PHYSICAL  2023   • LIPID PANEL  2023   • Annual Gynecologic Pelvic and Breast Exam  2024   • MAMMOGRAM  2024   • Pneumococcal Vaccine 0-64 (3 - PPSV23 if available, else PCV20) 01/15/2027   • TDAP/TD VACCINES (3 - Td or Tdap) 2030   • COLORECTAL CANCER SCREENING  2033   • HEPATITIS C SCREENING  Completed   • ZOSTER VACCINE  Completed       The  "additional following portions of the patient's history were reviewed and updated as appropriate: allergies, current medications, past family history, past medical history, past social history, past surgical history and problem list.    Review of Systems    I have reviewed and agree with the HPI, ROS, and historical information as entered above. Ale Garcia MD    Objective   Ht 170.2 cm (67.01\")   LMP  (LMP Unknown)   BMI 34.42 kg/m²     Physical Exam    General:  well developed; well nourished  no acute distress, BMI 35  Skin:  No suspicious lesions seen  Thyroid: normal to inspection and palpation  Breasts:  Examined in supine position  Symmetric without masses or skin dimpling  Nipples normal without inversion, lesions or discharge  There are no palpable axillary nodes  CVS: RRR, no M/R/G, distal pulses wnl  Resp: CTAB, No W/R/R  Abdomen: soft, non-tender; no masses  no umbilical or inguinal hernias are present  no hepato-splenomegaly  Pelvis: Clinical staff was present for exam  External genitalia:  normal appearance of the external genitalia including Bartholin's and [place]'s glands.  Urethra: no masses or tenderness  Urethral meatus: normal size;  No lesions or signs of prolapse  Bladder: non tender to palpation, no masses, no prolapse  Vagina:  atrophic mucosa without prolapse or lesions.  Adnexa:  normal bimanual exam of the adnexa.  Perineum/Anus: normal appearance, no external hemorrhoids  Ext: 2+ pulses, no edema      Assessment & Plan     Assessment     Problem List Items Addressed This Visit     Encounter for gynecological examination without abnormal finding    Encounter for screening mammogram for malignant neoplasm of breast - Primary    Relevant Orders    Mammo Screening Digital Tomosynthesis Bilateral With CAD       Plan     1. GYN annual well woman exam.   2. Encouraged to try going off of HRT.  Will try vaginal estrogen and prn clonidine vs. Antidepressant if very symptomatic.  3. Reviewed " monthly self breast exams.  Instructed to call with lumps, pain, or breast discharge.  Yearly mammograms ordered.  4. Ordered mammogram today.  5. Osteoporosis screening discussed.  Will review with PCP.  6. Reviewed exercise as a preventative health measures.   7. Instructed on [place] exercises and gave patient educational information.  8. Return in about 1 year (around 4/19/2024) for Annual physical.     Ale Garcia MD  04/19/2023

## 2023-04-26 ENCOUNTER — SPECIALTY PHARMACY (OUTPATIENT)
Dept: PHARMACY | Facility: TELEHEALTH | Age: 63
End: 2023-04-26
Payer: COMMERCIAL

## 2023-04-26 NOTE — PROGRESS NOTES
"Specialty Pharmacy Refill Coordination Note     Brittny \"Padmini\" is a 63 y.o. female contacted today regarding refills of  Dupixent specialty medication(s).    Reviewed and verified with patient:  Allergies  Meds       Specialty medication(s) and dose(s) confirmed: yes    Refill Questions    Flowsheet Row Most Recent Value   Changes to allergies? No   Changes to medications? Yes  [Switching from Anisa to Estrace]   New conditions since last clinic visit No   Unplanned office visit, urgent care, ED, or hospital admission in the last 4 weeks  No   How does patient/caregiver feel medication is working? Very good   Financial problems or insurance changes  No   If yes, describe changes in insurance or financial issues. n/a   Since the previous refill, were any specialty medication doses or scheduled injections missed or delayed?  No   If yes, please provide the amount n/a   Why were doses missed? n/a   Does this patient require a clinical escalation to a pharmacist? Yes                Medication Adherence    Adherence tools used: directed education  Support network for adherence: family member          Follow-up: 23 day(s)     Je No, Pharmacy Technician  Specialty Pharmacy Technician      "

## 2023-05-02 ENCOUNTER — TELEPHONE (OUTPATIENT)
Dept: INTERNAL MEDICINE | Facility: CLINIC | Age: 63
End: 2023-05-02
Payer: COMMERCIAL

## 2023-05-02 NOTE — TELEPHONE ENCOUNTER
"    Caller: Brittny Han \"Padmini\"    Relationship: Self    Best call back number: [phone]    What form or medical record are you requesting: WORK EXCUSE     Who is requesting this form or medical record from you: LISSA     How would you like to receive the form or medical records (pick-up, mail, fax): PICK-UP  If fax, what is the fax number:   If mail, what is the address:   If pick-up, provide patient with address and location details    Timeframe paperwork needed: BY 5/5/23    Additional notes: PATIENT STATES LISSA IS REQUIRING A WORK EXCUSE DUE TO CALLING IN FOR A MIGRAINE 4/29/23-4/30/23. PATIENT STATES SHE THINKS THE MIGRAINE WAS CAUSED EITHER BY THE WEATHER OR HER GLASSES. IF APPOINTMENT IS REQUIRED PLEASE LET PATIENT KNOW.             "

## 2023-05-05 ENCOUNTER — OFFICE VISIT (OUTPATIENT)
Dept: INTERNAL MEDICINE | Facility: CLINIC | Age: 63
End: 2023-05-05
Payer: COMMERCIAL

## 2023-05-05 ENCOUNTER — TELEPHONE (OUTPATIENT)
Dept: INTERNAL MEDICINE | Facility: CLINIC | Age: 63
End: 2023-05-05

## 2023-05-05 VITALS
BODY MASS INDEX: 37.29 KG/M2 | DIASTOLIC BLOOD PRESSURE: 84 MMHG | HEIGHT: 67 IN | OXYGEN SATURATION: 98 % | TEMPERATURE: 97.2 F | SYSTOLIC BLOOD PRESSURE: 132 MMHG | HEART RATE: 74 BPM | WEIGHT: 237.6 LBS

## 2023-05-05 DIAGNOSIS — G44.209 TENSION HEADACHE: Primary | ICD-10-CM

## 2023-05-05 DIAGNOSIS — M54.50 CHRONIC MIDLINE LOW BACK PAIN WITHOUT SCIATICA: ICD-10-CM

## 2023-05-05 DIAGNOSIS — H81.10 BENIGN PAROXYSMAL POSITIONAL VERTIGO, UNSPECIFIED LATERALITY: ICD-10-CM

## 2023-05-05 DIAGNOSIS — G89.29 CHRONIC MIDLINE LOW BACK PAIN WITHOUT SCIATICA: ICD-10-CM

## 2023-05-05 DIAGNOSIS — J34.89 SINUS PRESSURE: ICD-10-CM

## 2023-05-05 PROCEDURE — 99214 OFFICE O/P EST MOD 30 MIN: CPT | Performed by: INTERNAL MEDICINE

## 2023-05-05 NOTE — PROGRESS NOTES
Answers for HPI/ROS submitted by the patient on 5/3/2023  Please describe your symptoms.: Headache and back pain  Have you had these symptoms before?: Yes  How long have you been having these symptoms?: 1-4 days  Please list any medications you are currently taking for this condition.: Excedrin migraine  Please describe any probable cause for these symptoms. : Could have been the weather and/or stress.  As for the back pain, sitting or standing all day  What is the primary reason for your visit?: Other         Office Note      Date: 2023  Patient Name: Brittny Han  MRN: [id number]  : 1960    Chief Complaint   Patient presents with   • Headache       History of Present Illness: Brittny Han is a 63 y.o. female who presents for Headache.  Has sinus pressure and bandlike headache.  History of BPPV and would like referral to PT for this.    Subjective      Review of Systems:   Pertinent review of systems per HPI.    Review of Systems   Constitutional: Negative for activity change, appetite change, chills, diaphoresis, fatigue, fever and unexpected weight change.   HENT: Negative for congestion, dental problem, drooling, ear discharge, ear pain, facial swelling, hearing loss and mouth sores.    Eyes: Negative for pain, discharge and itching.   Respiratory: Negative for apnea, cough, choking, chest tightness and shortness of breath.    Cardiovascular: Negative for chest pain, palpitations and leg swelling.   Gastrointestinal: Negative for abdominal distention, abdominal pain, blood in stool, constipation and diarrhea.   Endocrine: Negative for cold intolerance, heat intolerance, polydipsia and polyuria.   Genitourinary: Negative for difficulty urinating, dysuria, frequency and hematuria.   Skin: Negative for color change, pallor, rash and wound.   Allergic/Immunologic: Negative for environmental allergies, food allergies and immunocompromised state.   Neurological: Positive for dizziness and  "headaches. Negative for weakness and light-headedness.   Psychiatric/Behavioral: Negative for agitation, behavioral problems, confusion, decreased concentration and self-injury. The patient is not nervous/anxious.    All other systems reviewed and are negative.    Allergies   Allergen Reactions   • Percocet [Oxycodone-Acetaminophen] Anaphylaxis     Breathing difficulty, tongue swelling, severe    • Brevital Sodium [Methohexital] Paresthesia     CHILLS, SHAKING   • Other Delirium     brevatol sedation for dentistry, chills, shaking   • Banana Unknown - Low Severity   • Pork-Derived Products Unknown - Low Severity       Objective     Physical Exam:  Vital Signs:   Vitals:    05/05/23 1524   BP: 132/84   Pulse: 74   Temp: 97.2 °F (36.2 °C)   SpO2: 98%   Weight: 108 kg (237 lb 9.6 oz)   Height: 170.2 cm (67.01\")   PainSc: 0-No pain      Body mass index is 37.2 kg/m².    Physical Exam  Vitals and nursing note reviewed.   Constitutional:       General: She is not in acute distress.     Appearance: She is well-developed.   HENT:      Head: Normocephalic and atraumatic.      Right Ear: External ear normal.      Left Ear: External ear normal.   Eyes:      General: No scleral icterus.        Right eye: No discharge.         Left eye: No discharge.      Conjunctiva/sclera: Conjunctivae normal.   Cardiovascular:      Rate and Rhythm: Normal rate and regular rhythm.      Heart sounds: Normal heart sounds. No murmur heard.    No friction rub. No gallop.   Pulmonary:      Effort: Pulmonary effort is normal. No respiratory distress.      Breath sounds: Normal breath sounds. No wheezing or rales.   Skin:     General: Skin is warm and dry.      Coloration: Skin is not pale.         Assessment / Plan      Assessment & Plan:    1. Tension headache  tx for alleries w/ otc flonase and allergy pill. rx for nurtec  - Rimegepant Sulfate (NURTEC) 75 MG tablet dispersible tablet; Take 1 tablet by mouth Daily As Needed (migraine). Do not take " more than 1 tablet in 24 hours  Dispense: 16 tablet; Refill: 3    2. Sinus pressure  Advised over-the-counter allergy pill and Flonase    3. Benign paroxysmal positional vertigo, unspecified laterality    - Ambulatory Referral to Physical Therapy Vestibular    4. Chronic midline low back pain without sciatica -chronic medical issue, continue over-the-counter supportive treatment.      Mariaelena Banks MD  05/05/2023

## 2023-05-05 NOTE — TELEPHONE ENCOUNTER
"Caller: Brittny Han \"Padmini\"    Relationship: Self    Best call back number: [phone]    What form or medical record are you requesting: PATIENT FORGOT WORK EXCUSE    Who is requesting this form or medical record from you: PATIENT    How would you like to receive the form or medical records (pick-up, mail, fax):     Timeframe paperwork needed: ASAP    Additional notes: PLEASE PRINT AND HAVE READY MONDAY  "

## 2023-05-08 ENCOUNTER — SPECIALTY PHARMACY (OUTPATIENT)
Dept: PHARMACY | Facility: TELEHEALTH | Age: 63
End: 2023-05-08
Payer: COMMERCIAL

## 2023-05-08 RX ORDER — MONTELUKAST SODIUM 10 MG/1
10 TABLET ORAL
Qty: 90 TABLET | Refills: 0 | Status: SHIPPED | OUTPATIENT
Start: 2023-05-08 | End: 2023-05-11 | Stop reason: SDUPTHER

## 2023-05-09 ENCOUNTER — SPECIALTY PHARMACY (OUTPATIENT)
Dept: PHARMACY | Facility: TELEHEALTH | Age: 63
End: 2023-05-09
Payer: COMMERCIAL

## 2023-05-09 ENCOUNTER — TELEPHONE (OUTPATIENT)
Dept: INTERNAL MEDICINE | Facility: CLINIC | Age: 63
End: 2023-05-09
Payer: COMMERCIAL

## 2023-05-09 NOTE — PROGRESS NOTES
Specialty Pharmacy Patient Management Program  Initial Assessment     Brittny Han is a 63 y.o. female with chronic migraine and enrolled in the Patient Management program offered by [facility]. An initial outreach was conducted, including assessment of therapy appropriateness and specialty medication education for Nurtec 75mg odt. The patient was introduced to services offered by [facility], including: regular assessments, refill coordination, curbside pick-up or mail order delivery options, prior authorization maintenance, and financial assistance programs as applicable. The patient was also provided with contact information for the pharmacy team.     Insurance Coverage & Financial Support  covered by an appeal through [employer]     Relevant Past Medical History and Comorbidities  Relevant medical history and concomitant health conditions were discussed with the patient. The patient's chart has been reviewed for relevant past medical history and comorbid health conditions and updated as necessary.   Past Medical History:   Diagnosis Date   • Allergic    • Allergic rhinitis    • Anemia    • Anxiety    • Asthma     MODERATE, PERSISTANT   • Asthma, extrinsic 2007    Progressively gets worse with age   • BCC (basal cell carcinoma) 2000    RIGHT EYELID   • Bowel habit changes    • BPPV (benign paroxysmal positional vertigo) 01/2020   • Bronchitis    • Cat bite of left hand 05/16/2020    SEEN AT [facility]   • Chronic bilateral low back pain with bilateral sciatica    • Chronic bronchitis Aug 2018    also seasonal bronchitis every May and December   • Chronic idiopathic constipation    • Colon polyps     FOLLOWED BY DR. LORENA RICO   • COPD (chronic obstructive pulmonary disease)    • CTS (carpal tunnel syndrome) 2010    BILATERAL, Wear wrist braces at night   • Demyelinating disease    • Depression    • Disease of thyroid gland     HYPOTHYROIDISM   • DINERO (dyspnea on  exertion) 06/2019   • Essential hypertension    • Fatigue    • Gastroenteritis 02/2020   • GERD (gastroesophageal reflux disease)    • Head injury when I was 10    concussion   • Hemorrhoids    • Hepatomegaly 11/2018   • History of echocardiogram 05/31/2011    mild ascending aortic root dilatation; mod RVC enlargement; global left EF 0.72 (normal 0.55-0.75) mild concentric LV hypertrophy; trace MR; mild TR    • History of PFTs 02/02/2016    good effort; normal lung volumes   • Hyperlipidemia    • Hypertrophy, nasal, turbinate    • Hypothyroidism 2019   • Irritable bowel syndrome    • Midline cystocele    • Neuropathy of both feet 01/2019    FOLLOWED BY DR. VARSHA IRELAND   • Numbness and tingling in both hands 07/19/2021   • Numbness and tingling of both feet 01/30/2019   • ALANNA on CPAP 2007    Followed by Jin Cat @ [facility]   • Peptic ulceration     PUD   • Periodic headache syndrome, not intractable    • Plantar fasciitis, left 01/2019    FOLLOWED BY DR. VARSHA IRELAND   • Primary central sleep apnea 2006    Sleep apnea not sure if central or not   • Rectal bleeding 08/2020   • Rectal bleeding    • Rectal mass 08/2020   • RLS (restless legs syndrome) 06/2017   • Shortness of breath    • Strain of thoracic region 04/2017   • KALYAN (stress urinary incontinence, female)    • Uterine fibroid    • Varicella 1966    This is a guess   • Vision loss 2008    wear glasses   • Vitamin B 12 deficiency    • Vitamin D deficiency      Social History     Socioeconomic History   • Marital status: Single   Tobacco Use   • Smoking status: Never   • Smokeless tobacco: Never   Vaping Use   • Vaping Use: Never used   Substance and Sexual Activity   • Alcohol use: Yes     Alcohol/week: 2.0 standard drinks     Types: 2 Glasses of wine per week     Comment: 2 glasses of wine about 4 x's per year   • Drug use: No   • Sexual activity: Not Currently     Partners: Male     Birth control/protection: Abstinence     Comment: single        Allergies  Known allergies and reactions were discussed with the patient. The patient's chart has been reviewed for allergy information and updated as necessary.   Percocet [oxycodone-acetaminophen], Brevital sodium [methohexital], Other, Banana, and Pork-derived products    Current Medication List  This medication list has been reviewed with the patient and evaluated for any interactions or necessary modifications/recommendations, and updated to include all prescription medications, OTC medications, and supplements the patient is currently taking. This list reflects what is contained in the patient's profile, which has also been marked as reviewed to communicate to other providers it is the most up to date version of the patient's current medication therapy.     Current Outpatient Medications:   •  albuterol sulfate  (90 Base) MCG/ACT inhaler, Every 6 (Six) Hours., Disp: , Rfl:   •  Dupilumab 300 MG/2ML solution pen-injector, Inject 2 mL under the skin into the appropriate area as directed Every 14 (Fourteen) Days., Disp: 4 mL, Rfl: 11  •  EPINEPHrine (EPIPEN) 0.3 MG/0.3ML solution auto-injector injection, Use as directed for acute allergic reaction., Disp: 2 each, Rfl: 3  •  estradiol (Anisa) 0.075 MG/24HR patch, Place 1 patch on the skin as directed by provider 2 (Two) Times a Week., Disp: 8 each, Rfl: 3  •  estradiol (ESTRACE VAGINAL) 0.1 MG/GM vaginal cream, Insert 1 gram intravaginally as directed 3 times each week for 3 weeks; THEN use weekly thereafter, Disp: 42.5 g, Rfl: 12  •  Fluticasone-Salmeterol (Advair Diskus) 500-50 MCG/ACT DISKUS, Inhale 1 puff 2 (Two) Times a Day., Disp: 60 each, Rfl: 3  •  Glucosamine-Chondroitin (MOVE FREE PO), Take 1 capsule by mouth Daily., Disp: , Rfl:   •  hydroCHLOROthiazide (HYDRODIURIL) 25 MG tablet, Take 1 tablet by mouth Daily., Disp: 90 tablet, Rfl: 3  •  levocetirizine (XYZAL) 5 MG tablet, Take 1 tablet by mouth Daily., Disp: 90 tablet, Rfl: 3  •   levothyroxine (SYNTHROID, LEVOTHROID) 75 MCG tablet, Take 1 tablet by mouth Daily., Disp: 30 tablet, Rfl: 5  •  loratadine (CLARITIN) 10 MG tablet, Take 1 tablet by mouth Daily., Disp: , Rfl:   •  metroNIDAZOLE (METROCREAM) 0.75 % cream, Apply 1 application topically to the appropriate area as directed 2 (Two) Times a Day., Disp: 45 g, Rfl: 5  •  montelukast (SINGULAIR) 10 MG tablet, Take 1 tablet by mouth every night at bedtime. Pt will need to schedule an apt before any further refills., Disp: 90 tablet, Rfl: 0  •  Multiple Vitamins-Minerals (CENTRUM SILVER PO), Centrum Silver, Disp: , Rfl:   •  Rimegepant Sulfate (NURTEC) 75 MG tablet dispersible tablet, Take 1 tablet by mouth Daily As Needed (migraine). Do not take more than 1 tablet in 24 hours, Disp: 16 tablet, Rfl: 3  •  Tiotropium Bromide Monohydrate (Spiriva Respimat) 1.25 MCG/ACT aerosol solution inhaler, Inhale 2 puffs Daily., Disp: 4 g, Rfl: 0  •  venlafaxine XR (EFFEXOR-XR) 75 MG 24 hr capsule, Take 1 capsule by mouth Daily., Disp: 90 capsule, Rfl: 3  •  vitamin D3 125 MCG (5000 UT) capsule capsule, Take 1 capsule by mouth Daily., Disp: , Rfl:     Drug Interactions  none     Relevant Laboratory Values  Lab Results   Component Value Date    GLUCOSE 85 11/28/2022    CALCIUM 9.5 11/28/2022     11/28/2022    K 3.7 11/28/2022    CO2 26.8 11/28/2022     11/28/2022    BUN 12 11/28/2022    CREATININE 0.87 11/28/2022    BCR 13.8 11/28/2022    ANIONGAP 10.2 11/28/2022     Lab Results   Component Value Date    WBC 4.74 11/28/2022    HGB 13.1 11/28/2022    HCT 39.1 11/28/2022    MCV 91.6 11/28/2022     11/28/2022       Initial Education Provided for Specialty Medication  The patient has been provided with the following education and any applicable administration techniques (i.e. self-injection) have been demonstrated for the therapies indicated. All questions and concerns have been addressed prior to the patient receiving the medication, and the  patient has verbalized understanding of the education and any materials provided. Additional patient education shall be provided and documented upon request by the patient, provider or payer.      Nurtec (rimegepant)  Medication Expectations   Why am I taking this medication? You are taking this medication for migraine prophylaxis or to treat an acute migraine.   What should I expect while on this medication? You should expect to see a decrease in the frequency and severity of your migraines.   How does the medication work? Nurtec is a monoclonal antibody that binds to calcitonin gene-related peptide (CGRP) and blocks its binding to the receptor decreasing the severity of migraines.   How long will I be on this medication for? The amount of time you will be on this medication will be determined by your doctor and your response to the medication.    How do I take this medication? Take as directed on your prescription label.   What are some possible side effects? Potential side effects including, but not limited to nausea. Pt verbalized understanding.   What happens if I miss a dose? Take the missed dose as soon as possible, and resume the every other day timed from the last dose..     Medication Safety   What are things I should warn my doctor immediately about? Hypersensitivity reactions - trouble breathing or swallowing.   What are things that I should be cautious of? Hypersensitivity reactions (eg, dyspnea, rash), including delayed serious reactions, have occurred; discontinue use if suspected    What are some medications that can interact with this one? Avoid concomitant administration of Nurtec ODT with strong inhibitors of CY, strong or moderate inducers of CYP3A or inhibitors of P-gp or BCRP. Avoid another dose of Nurtec ODT within 48 hours when it is administered with moderate inhibitors of CY.  Ask your pharmacist or health care provider before starting new medications     Medication Storage/Handling    How should I handle this medication? Keep this medication out of reach of pets/children in original container. Ensure hands are dry before opening blister pack.   How does this medication need to be stored? Store at room temperature away from heat/cold, sunlight or moisture   How should I dispose of this medication? There should not be a need to dispose of this medication unless your provider decides to change the dose or therapy. If that is the case, take to your local police station for proper disposal. Some pharmacies also have take-back bins for medication drop-off.      Resources/Support   How can I remind myself to take this medication? You can download reminder apps to help you manage your refills. You may also set an alarm on your phone to remind you. The pharmacy carries pill boxes that you can place next to an area you pass everyday (such as where you place your car keys or where you charge your phone)   Is financial support available?  Yes, [employer] can provide co-pay cards if you have commercial insurance or patient assistance if you have Medicare or no insurance.    Which vaccines are recommended for me? Talk to your doctor about these vaccines: Flu, Coronavirus (COVID-19), Pneumococcal (pneumonia), Tdap, Hepatitis B, Zoster (shingles)          Adherence and Self-Administration  • Barriers to Patient Adherence and/or Self-Administration: none   • Methods for Supporting Patient Adherence and/or Self-Administration: N/A     Goals of Therapy   Goals     • Specialty Pharmacy General Goal- dupixent      1. Better compliance with taking injection q 14 days  2. Still having difficulty breathing, hoping to ease that   3. Patient mentioned her voice is hoarse all the time.  Recommended taking a full glass of water with her Advair inhaler to rinse the powder residue from the mouth and throat.      • Specialty Pharmacy General Goal- nurtec      Reduction in severity and duration of migraines at  onset            Reassessment Plan & Follow-Up  1. Medication Therapy Changes: advised patient to stop excedrin migraine, and told her to only use Nurtec for prn abortive use.  2. Additional Plans, Therapy Recommendations, or Therapy Problems to Be Addressed: none   3. Pharmacist to perform regular reassessments no more than (6) months from the previous assessment.  4. Welcome information and patient satisfaction survey to be sent by retail team with patient's initial fill.  5. Care Coordinator to set up future refill outreaches, coordinate prescription delivery, and escalate clinical questions to pharmacist.     Attestation  I attest that the initiated specialty medication(s) are appropriate for the patient based on my assessment. If the prescribed therapy is at any point deemed not appropriate based on the current or future assessments, a consultation will be initiated with the patient's specialty care provider to determine the best course of action. The revised plan of therapy will be documented along with any additional patient education provided.     Electronically signed by Luciano Smith RPH, 05/09/23, 4:07 PM EDT.

## 2023-05-10 ENCOUNTER — TREATMENT (OUTPATIENT)
Dept: PHYSICAL THERAPY | Facility: CLINIC | Age: 63
End: 2023-05-10
Payer: COMMERCIAL

## 2023-05-10 ENCOUNTER — SPECIALTY PHARMACY (OUTPATIENT)
Dept: PHARMACY | Facility: TELEHEALTH | Age: 63
End: 2023-05-10
Payer: COMMERCIAL

## 2023-05-10 DIAGNOSIS — R42 DIZZINESS: Primary | ICD-10-CM

## 2023-05-10 PROCEDURE — 97161 PT EVAL LOW COMPLEX 20 MIN: CPT | Performed by: PHYSICAL THERAPIST

## 2023-05-10 PROCEDURE — 95992 CANALITH REPOSITIONING PROC: CPT | Performed by: PHYSICAL THERAPIST

## 2023-05-10 NOTE — PROGRESS NOTES
"  Physical Therapy Initial Evaluation and Plan of Care      Patient: Brittny Han   : 1960  Diagnosis/ICD-10 Code:  Dizziness [R42]  Referring practitioner: Mariaelena Banks MD    Subjective Evaluation    History of Present Illness  Mechanism of injury: Room spins when in shower and when turning in bed. Gets dizzy going down stairs. Has had this in past but this episode started about 3-4 weeks ago. Dizziness lasts around a minute and does get a \"sinking\" feeling.     Also has back and neck pain that she would like to be seen for. (will get referral for this and evaluate next visit).    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  Current pain ratin (neck)  Aggravating factors: sleeping, stairs, standing and movement    Hand dominance: right    Patient Goals  Patient goals for therapy: decreased edema, decreased pain, improved balance, increased motion, return to sport/leisure activities, independence with ADLs/IADLs and increased strength  Patient goal: gardening            Objective          Functional Assessment     Comments  Vestibular testing    Cervical restriction:   R rotation 45 deg  L rotation 35 deg with pain  R lateral flexion 24 with pain  L lat 28 deg with pain    Eye movement range: norm      Smooth pursuit H-formation: norm    End point nystagmus: neg    Gaze evoked nystagmus: neg    VOR:   VOR maintained fixation: neg     VOR head thrust:  To Right: neg  To Left: neg    Head shake test: mild dizziness, no nystagmus       Head stable with torso movement:     Fukuda step test: drifted forward      BPPV:   Chiquis Hallpike:   R: neg   L: positive for dizziness that lasted 30 sec, could not visualize nystagmus       Dynamic balance:    Nguyen balance:    Or    Sit>Stand:     Static balance:  Feet together eyes open: 30 sec   Feet together eyes closed: 10 sec  SLS eyes open: 30 sec b/l   SLS eyes closed   right: 6 sec  left: 8sec "       Walking balance  Walking with head up/down: mild onset of dizziness and mild misstep   Walking with head left/right: mild onset of dizziness     Onset of vertigo with eyes open and looking up.             Assessment & Plan     Assessment  Impairments: abnormal coordination, abnormal gait, abnormal or restricted ROM, impaired balance, lacks appropriate home exercise program and pain with function  Functional Limitations: walking, uncomfortable because of pain, moving in bed and standing  Assessment details: Patient is a 63 year old female presenting with vertigo symptoms when turning in bed, showering, and walking upstairs. Signs and symptoms are consistent with left BPPV. She has had this issue in the past which was resolved with therapy. She is also dealing with neck and back pain and wants to get a referral to be seen for this at the same time. Will defer evaluation on this until referral is obtained. No sign of other vestibular issues at this time. She is appropriate for physical therapy to address this issue.   Prognosis: good  Prognosis details: Goals:   1. Have no instances of vertigo when turning over in bed.   2. Patient demonstrates stable gait when head turning for 50 feet.   3. Patient able to hold SLS bilaterally for 30 seconds with eyes closed.     Plan  Therapy options: will be seen for skilled therapy services  Planned therapy interventions: therapeutic activities, stretching, strengthening, soft tissue mobilization, postural training, neuromuscular re-education, motor coordination training, manual therapy, joint mobilization, IADL retraining, home exercise program, gait training, functional ROM exercises, flexibility, compression, body mechanics training, balance/weight-bearing training, ADL retraining and abdominal trunk stabilization  Frequency: 2x week  Duration in weeks: 8  Treatment plan discussed with: patient  Plan details: Goals will be added after evaluating neck/back.       Manual  Therapy:         mins  58779;  Therapeutic Exercise:         mins  07934;     Neuromuscular Charley:        mins  90781;    Therapeutic Activity:          mins  48693;     Gait Training:           mins  65488;     Ultrasound:          mins  93318;    Electrical Stimulation:         mins  66639 ( );  Dry Needling          mins self-pay  [id number] canalith repositioning 10 min     Timed Treatment:   10   mins   Total Treatment:     35   mins    PT SIGNATURE: Lilian Michaud, PT   DATE TREATMENT INITIATED: 5/10/2023    Initial Certification  Certification Period: 8/8/2023  I certify that the therapy services are furnished while this patient is under my care.  The services outlined above are required by this patient, and will be reviewed every 90 days.     PHYSICIAN: Mariaelena Banks MD      DATE:     Please sign and return via fax to [phone].. Thank you, [facility].

## 2023-05-11 DIAGNOSIS — R06.02 SHORTNESS OF BREATH: ICD-10-CM

## 2023-05-11 DIAGNOSIS — J45.40 MODERATE PERSISTENT ASTHMA, UNSPECIFIED WHETHER COMPLICATED: ICD-10-CM

## 2023-05-11 DIAGNOSIS — J45.50 SEVERE PERSISTENT ASTHMA, UNSPECIFIED WHETHER COMPLICATED: ICD-10-CM

## 2023-05-11 RX ORDER — MONTELUKAST SODIUM 10 MG/1
10 TABLET ORAL
Qty: 90 TABLET | Refills: 0 | Status: SHIPPED | OUTPATIENT
Start: 2023-05-11

## 2023-05-11 RX ORDER — FLUTICASONE PROPIONATE AND SALMETEROL 500; 50 UG/1; UG/1
1 POWDER RESPIRATORY (INHALATION) 2 TIMES DAILY
Qty: 60 EACH | Refills: 3 | Status: SHIPPED | OUTPATIENT
Start: 2023-05-11

## 2023-05-11 RX ORDER — TIOTROPIUM BROMIDE INHALATION SPRAY 1.56 UG/1
2 SPRAY, METERED RESPIRATORY (INHALATION) DAILY
Qty: 4 G | Refills: 0 | Status: SHIPPED | OUTPATIENT
Start: 2023-05-11

## 2023-05-24 ENCOUNTER — OFFICE VISIT (OUTPATIENT)
Dept: INTERNAL MEDICINE | Facility: CLINIC | Age: 63
End: 2023-05-24
Payer: COMMERCIAL

## 2023-05-24 VITALS
SYSTOLIC BLOOD PRESSURE: 136 MMHG | OXYGEN SATURATION: 94 % | TEMPERATURE: 97.6 F | HEIGHT: 67 IN | HEART RATE: 73 BPM | WEIGHT: 236 LBS | DIASTOLIC BLOOD PRESSURE: 84 MMHG | BODY MASS INDEX: 37.04 KG/M2

## 2023-05-24 DIAGNOSIS — M19.042 PRIMARY OSTEOARTHRITIS OF BOTH HANDS: ICD-10-CM

## 2023-05-24 DIAGNOSIS — G56.21 ULNAR NEUROPATHY AT ELBOW OF RIGHT UPPER EXTREMITY: ICD-10-CM

## 2023-05-24 DIAGNOSIS — G44.209 TENSION HEADACHE: ICD-10-CM

## 2023-05-24 DIAGNOSIS — L57.0 ACTINIC KERATOSES: ICD-10-CM

## 2023-05-24 DIAGNOSIS — M19.041 PRIMARY OSTEOARTHRITIS OF BOTH HANDS: ICD-10-CM

## 2023-05-24 DIAGNOSIS — Z12.83 SKIN CANCER SCREENING: Primary | ICD-10-CM

## 2023-05-24 PROBLEM — E66.9 OBESITY (BMI 35.0-39.9 WITHOUT COMORBIDITY): Status: ACTIVE | Noted: 2023-05-24

## 2023-05-24 NOTE — PROGRESS NOTES
Office Note      Date: 2023  Patient Name: Brittny Han  MRN: [id number]  : 1960    Chief Complaint   Patient presents with   • Follow-up     Right hand, elbow, and wrist pain, states has gotten better.       History of Present Illness: Brittny Han is a 63 y.o. female who presents for Follow-up (Right hand, elbow, and wrist pain, states has gotten better.). HA from last visit improved. Has not taken nurtec.   Having rt elbow pain that radiates to last three digits of hand. Having persistent b/l hand pain and stiffness.     Subjective      Review of Systems:   Pertinent review of systems per HPI.    Review of Systems   Constitutional: Negative for activity change, appetite change, chills, diaphoresis, fatigue, fever and unexpected weight change.   HENT: Negative for congestion, dental problem, drooling, ear discharge, ear pain, facial swelling, hearing loss and mouth sores.    Eyes: Negative for pain, discharge and itching.   Respiratory: Negative for apnea, cough, choking, chest tightness and shortness of breath.    Cardiovascular: Negative for chest pain, palpitations and leg swelling.   Gastrointestinal: Negative for abdominal distention, abdominal pain, blood in stool, constipation and diarrhea.   Endocrine: Negative for cold intolerance, heat intolerance, polydipsia and polyuria.   Genitourinary: Negative for difficulty urinating, dysuria, frequency and hematuria.   Skin: Negative for color change, pallor, rash and wound.   Allergic/Immunologic: Negative for environmental allergies, food allergies and immunocompromised state.   Neurological: Negative for dizziness, weakness and light-headedness.   Psychiatric/Behavioral: Negative for agitation, behavioral problems, confusion, decreased concentration and self-injury. The patient is not nervous/anxious.    All other systems reviewed and are negative.    Allergies   Allergen Reactions   • Percocet [Oxycodone-Acetaminophen]  "Anaphylaxis     Breathing difficulty, tongue swelling, severe    • Brevital Sodium [Methohexital] Paresthesia     CHILLS, SHAKING   • Other Delirium     brevatol sedation for dentistry, chills, shaking   • Banana Unknown - Low Severity   • Pork-Derived Products Unknown - Low Severity       Objective     Physical Exam:  Vital Signs:   Vitals:    05/24/23 0849   BP: 136/84   Pulse: 73   Temp: 97.6 °F (36.4 °C)   SpO2: 94%   Weight: 107 kg (236 lb)   Height: 170.2 cm (67.01\")   PainSc:   2   PainLoc: Hand  Comment: wrist, elbow      Body mass index is 36.95 kg/m².    Physical Exam  Vitals and nursing note reviewed.   Constitutional:       General: She is not in acute distress.     Appearance: She is well-developed.   HENT:      Head: Normocephalic and atraumatic.      Right Ear: External ear normal.      Left Ear: External ear normal.   Eyes:      General: No scleral icterus.        Right eye: No discharge.         Left eye: No discharge.      Conjunctiva/sclera: Conjunctivae normal.   Cardiovascular:      Rate and Rhythm: Normal rate and regular rhythm.      Heart sounds: Normal heart sounds. No murmur heard.    No friction rub. No gallop.   Pulmonary:      Effort: Pulmonary effort is normal. No respiratory distress.      Breath sounds: Normal breath sounds. No wheezing or rales.   Skin:     General: Skin is warm and dry.      Coloration: Skin is not pale.         Assessment / Plan      Assessment & Plan:    1. Skin cancer screening  - Ambulatory Referral to Dermatology    2. Ulnar neuropathy at elbow of right upper extremity  Advised nsaids/tylenol, avoid pressure on elbow    3. Actinic keratoses  Counseled on what this is, f/u with derm    4. Tension headache  Improved. Cont nurtec prn    5. Primary osteoarthritis of both hands  Advised [facility]saids[facility]tylenol[facility]      Mariaelena Banks MD  05/24/2023   "

## 2023-05-25 ENCOUNTER — SPECIALTY PHARMACY (OUTPATIENT)
Dept: PHARMACY | Facility: TELEHEALTH | Age: 63
End: 2023-05-25
Payer: COMMERCIAL

## 2023-05-25 NOTE — PROGRESS NOTES
"Specialty Pharmacy Refill Coordination Note     Brittny \"Padmini\" is a 63 y.o. female contacted today regarding refills of  Dupixent specialty medication(s).    Reviewed and verified with patient:  Allergies  Meds       Specialty medication(s) and dose(s) confirmed: yes    Refill Questions    Flowsheet Row Most Recent Value   Changes to allergies? No   Changes to medications? No   New conditions since last clinic visit No   Unplanned office visit, urgent care, ED, or hospital admission in the last 4 weeks  No   How does patient/caregiver feel medication is working? Very good   Financial problems or insurance changes  No   If yes, describe changes in insurance or financial issues. n/a   Since the previous refill, were any specialty medication doses or scheduled injections missed or delayed?  No   If yes, please provide the amount n/a   Why were doses missed? n/a   Does this patient require a clinical escalation to a pharmacist? No                Medication Adherence    Adherence tools used: directed education  Support network for adherence: family member          Follow-up: 23 day(s)     Je No, Pharmacy Technician  Specialty Pharmacy Technician      "

## 2023-06-05 ENCOUNTER — DOCUMENTATION (OUTPATIENT)
Dept: PHARMACY | Facility: TELEHEALTH | Age: 63
End: 2023-06-05
Payer: COMMERCIAL

## 2023-06-18 DIAGNOSIS — R06.02 SHORTNESS OF BREATH: ICD-10-CM

## 2023-06-18 DIAGNOSIS — J45.40 MODERATE PERSISTENT ASTHMA, UNSPECIFIED WHETHER COMPLICATED: ICD-10-CM

## 2023-06-18 DIAGNOSIS — J45.50 SEVERE PERSISTENT ASTHMA, UNSPECIFIED WHETHER COMPLICATED: ICD-10-CM

## 2023-06-19 RX ORDER — TIOTROPIUM BROMIDE INHALATION SPRAY 1.56 UG/1
2 SPRAY, METERED RESPIRATORY (INHALATION) DAILY
Qty: 4 G | Refills: 0 | Status: SHIPPED | OUTPATIENT
Start: 2023-06-19

## 2023-08-18 ENCOUNTER — SPECIALTY PHARMACY (OUTPATIENT)
Dept: PHARMACY | Facility: TELEHEALTH | Age: 63
End: 2023-08-18
Payer: COMMERCIAL

## 2023-08-18 DIAGNOSIS — J45.50 SEVERE PERSISTENT ASTHMA, UNSPECIFIED WHETHER COMPLICATED: ICD-10-CM

## 2023-08-18 DIAGNOSIS — J45.40 MODERATE PERSISTENT ASTHMA, UNSPECIFIED WHETHER COMPLICATED: ICD-10-CM

## 2023-08-18 DIAGNOSIS — R06.02 SHORTNESS OF BREATH: ICD-10-CM

## 2023-08-18 RX ORDER — TIOTROPIUM BROMIDE INHALATION SPRAY 1.56 UG/1
2 SPRAY, METERED RESPIRATORY (INHALATION) DAILY
Qty: 4 G | Refills: 0 | OUTPATIENT
Start: 2023-08-18

## 2023-08-18 NOTE — PROGRESS NOTES
"Specialty Pharmacy Refill Coordination Note     Brittny \"Padmnii\" is a 63 y.o. female contacted today regarding refills of  Dupixent specialty medication(s).    Reviewed and verified with patient:  Allergies  Meds       Specialty medication(s) and dose(s) confirmed: yes    Refill Questions      Flowsheet Row Most Recent Value   Changes to allergies? No   Changes to medications? No   New conditions since last clinic visit No   Unplanned office visit, urgent care, ED, or hospital admission in the last 4 weeks  No   How does patient/caregiver feel medication is working? Very good   Financial problems or insurance changes  No   If yes, describe changes in insurance or financial issues. n/a   Since the previous refill, were any specialty medication doses or scheduled injections missed or delayed?  Yes   [Missed dose]   If yes, please provide the amount 1 dose   Why were doses missed? Patient forgot to take.   Does this patient require a clinical escalation to a pharmacist? Yes                  Medication Adherence    Adherence tools used: directed education  Support network for adherence: family member          Follow-up: 23 day(s)     Je No, Pharmacy Technician  Specialty Pharmacy Technician        "

## 2023-08-23 DIAGNOSIS — J45.50 SEVERE PERSISTENT ASTHMA, UNSPECIFIED WHETHER COMPLICATED: ICD-10-CM

## 2023-08-23 DIAGNOSIS — R06.02 SHORTNESS OF BREATH: ICD-10-CM

## 2023-08-23 DIAGNOSIS — J45.40 MODERATE PERSISTENT ASTHMA, UNSPECIFIED WHETHER COMPLICATED: ICD-10-CM

## 2023-08-23 RX ORDER — TIOTROPIUM BROMIDE INHALATION SPRAY 1.56 UG/1
2 SPRAY, METERED RESPIRATORY (INHALATION) DAILY
Qty: 4 G | Refills: 0 | Status: SHIPPED | OUTPATIENT
Start: 2023-08-23

## 2023-09-01 ENCOUNTER — OFFICE VISIT (OUTPATIENT)
Dept: PULMONOLOGY | Facility: CLINIC | Age: 63
End: 2023-09-01
Payer: COMMERCIAL

## 2023-09-01 VITALS
HEIGHT: 67 IN | HEART RATE: 76 BPM | SYSTOLIC BLOOD PRESSURE: 126 MMHG | DIASTOLIC BLOOD PRESSURE: 74 MMHG | BODY MASS INDEX: 35.31 KG/M2 | WEIGHT: 225 LBS | OXYGEN SATURATION: 95 % | TEMPERATURE: 97.7 F

## 2023-09-01 DIAGNOSIS — J45.50 SEVERE PERSISTENT ASTHMA, UNSPECIFIED WHETHER COMPLICATED: ICD-10-CM

## 2023-09-01 DIAGNOSIS — G47.33 OBSTRUCTIVE SLEEP APNEA SYNDROME: Primary | ICD-10-CM

## 2023-09-01 DIAGNOSIS — R06.02 SHORTNESS OF BREATH: ICD-10-CM

## 2023-09-01 DIAGNOSIS — J45.40 MODERATE PERSISTENT ASTHMA, UNSPECIFIED WHETHER COMPLICATED: ICD-10-CM

## 2023-09-01 PROCEDURE — 99214 OFFICE O/P EST MOD 30 MIN: CPT | Performed by: NURSE PRACTITIONER

## 2023-09-01 RX ORDER — TIOTROPIUM BROMIDE INHALATION SPRAY 1.56 UG/1
2 SPRAY, METERED RESPIRATORY (INHALATION) DAILY
Qty: 12 G | Refills: 3 | Status: SHIPPED | OUTPATIENT
Start: 2023-09-01

## 2023-09-01 RX ORDER — MONTELUKAST SODIUM 10 MG/1
10 TABLET ORAL
Qty: 90 TABLET | Refills: 3 | Status: SHIPPED | OUTPATIENT
Start: 2023-09-01

## 2023-09-01 NOTE — PROGRESS NOTES
"[facility] Pulmonary Follow up      Chief Complaint  Shortness of Breath (Follow Up )    Subjective          Brittny Han presents to [facility] for routine follow-up on her severe persistent asthma and chronic allergies.  I last saw her in October of last year, she missed her 6-month follow-up.    She does complain of some worsening shortness of breath and a bit of a cough.  She is having quite a bit of postnasal drainage.  She continues on her allergy injections with her allergist.    She is also on Dupixent.  She has been on that for over a year.  She is on Advair twice daily, Spiriva daily and albuterol rescue inhaler.  She has not been using her albuterol nebulizers recently.    She does have struct sleep apnea wears a CPAP nightly.  She tolerates it fairly well, she does have some trouble with the headgear and mask.  She says she would not sleep without it.      Objective     Vital Signs:   /74 (BP Location: Left arm, Patient Position: Sitting, Cuff Size: Adult)   Pulse 76   Temp 97.7 øF (36.5 øC) (Infrared)   Ht 170.2 cm (67\")   Wt 102 kg (225 lb)   SpO2 95% Comment: Room air at rest  BMI 35.24 kg/mý       Immunization History   Administered Date(s) Administered    COVID-19 (PFIZER) Purple Cap Monovalent 04/19/2021, 05/10/2021, 12/27/2021    Flu Vaccine Intradermal Quad 18-64YR 10/06/2014, 10/01/2015    Flu Vaccine Quad PF >36MO 10/10/2018    Flu Vaccine Split Quad 10/10/2018    Flublok 18+yrs 10/03/2020, 11/15/2022    Fluzone >6mos 11/03/2021    Influenza TIV (IM) 10/06/2014, 10/01/2015    Influenza, Unspecified 10/21/2019, 09/23/2020    Pneumococcal Conjugate 13-Valent (PCV13) 04/04/2016    Pneumococcal Polysaccharide (PPSV23) 10/06/2009, 10/10/2014, 12/23/2020    Shingrix 03/01/2021, 06/04/2021    TD Preservative Free (Tenivac) 05/16/2020    Tdap 02/12/2010       Physical Exam  Vitals reviewed.   Constitutional:       Appearance: She is " well-developed.   HENT:      Head: Normocephalic and atraumatic.   Eyes:      Pupils: Pupils are equal, round, and reactive to light.   Cardiovascular:      Rate and Rhythm: Normal rate and regular rhythm.      Heart sounds: No murmur heard.  Pulmonary:      Effort: Pulmonary effort is normal. No respiratory distress.      Breath sounds: Normal breath sounds. No wheezing or rales.   Abdominal:      General: Bowel sounds are normal. There is no distension.      Palpations: Abdomen is soft.   Musculoskeletal:         General: Normal range of motion.      Cervical back: Normal range of motion and neck supple.   Skin:     General: Skin is warm and dry.      Findings: No erythema.   Neurological:      Mental Status: She is alert and oriented to person, place, and time.   Psychiatric:         Behavior: Behavior normal.        Result Review :                             Assessment and Plan    Problem List Items Addressed This Visit          Pulmonary and Pneumonias    Shortness of breath    Relevant Medications    Tiotropium Bromide Monohydrate (Spiriva Respimat) 1.25 MCG/ACT aerosol solution inhaler    Moderate persistent asthma    Relevant Medications    montelukast (SINGULAIR) 10 MG tablet    Tiotropium Bromide Monohydrate (Spiriva Respimat) 1.25 MCG/ACT aerosol solution inhaler    Severe persistent asthma    Relevant Medications    montelukast (SINGULAIR) 10 MG tablet    Tiotropium Bromide Monohydrate (Spiriva Respimat) 1.25 MCG/ACT aerosol solution inhaler       Sleep    Obstructive sleep apnea syndrome - Primary       We did discuss her worsening shortness of breath and postnasal drainage.  She does have significant allergies.  She continues on her allergy injections.  This has been a fairly rough allergy season with the air quality.  She also recently adopted her sister's dog, which could be a source of the allergens as well.    Continue on her current regimen of Dupixent injections, she gives those to herself at  home.  Advair, Spiriva.  She could use her albuterol nebulizers if she is having some worsening shortness of breath and cough.  Continue with her allergy injections with her allergist as well as her Singulair and antihistamines.    Routine follow-up about 6 months with PFTs and chest x-ray.      Follow Up     No follow-ups on file.  Patient was given instructions and counseling regarding her condition or for health maintenance advice. Please see specific information pulled into the AVS if appropriate.       Moderate level of Medical Decision Making complexity based on 2 or more chronic stable illnesses and prescription drug management.    BLANCA Jackson, ACNP  [facility] Pulmonary Critical Care Medicine  Electronically signed

## 2023-09-08 ENCOUNTER — SPECIALTY PHARMACY (OUTPATIENT)
Dept: PHARMACY | Facility: TELEHEALTH | Age: 63
End: 2023-09-08
Payer: COMMERCIAL

## 2023-09-11 ENCOUNTER — SPECIALTY PHARMACY (OUTPATIENT)
Dept: PHARMACY | Facility: TELEHEALTH | Age: 63
End: 2023-09-11
Payer: COMMERCIAL

## 2023-09-11 DIAGNOSIS — J45.50 SEVERE PERSISTENT ASTHMA WITHOUT COMPLICATION: ICD-10-CM

## 2023-09-11 RX ORDER — DUPILUMAB 300 MG/2ML
300 INJECTION, SOLUTION SUBCUTANEOUS
Qty: 4 ML | Refills: 11 | Status: SHIPPED | OUTPATIENT
Start: 2023-09-11

## 2023-09-13 ENCOUNTER — DOCUMENTATION (OUTPATIENT)
Dept: PHARMACY | Facility: TELEHEALTH | Age: 63
End: 2023-09-13
Payer: COMMERCIAL

## 2023-09-19 ENCOUNTER — LAB (OUTPATIENT)
Dept: LAB | Facility: HOSPITAL | Age: 63
End: 2023-09-19
Payer: COMMERCIAL

## 2023-09-19 ENCOUNTER — OFFICE VISIT (OUTPATIENT)
Dept: FAMILY MEDICINE CLINIC | Facility: CLINIC | Age: 63
End: 2023-09-19
Payer: COMMERCIAL

## 2023-09-19 VITALS
SYSTOLIC BLOOD PRESSURE: 130 MMHG | WEIGHT: 260 LBS | HEIGHT: 67 IN | OXYGEN SATURATION: 99 % | HEART RATE: 78 BPM | DIASTOLIC BLOOD PRESSURE: 84 MMHG | BODY MASS INDEX: 40.81 KG/M2

## 2023-09-19 DIAGNOSIS — J45.50 SEVERE PERSISTENT ASTHMA, UNSPECIFIED WHETHER COMPLICATED: Primary | ICD-10-CM

## 2023-09-19 DIAGNOSIS — R06.02 SHORTNESS OF BREATH: ICD-10-CM

## 2023-09-19 DIAGNOSIS — R73.03 PREDIABETES: ICD-10-CM

## 2023-09-19 DIAGNOSIS — E55.9 VITAMIN D DEFICIENCY: ICD-10-CM

## 2023-09-19 DIAGNOSIS — I10 BENIGN ESSENTIAL HYPERTENSION: ICD-10-CM

## 2023-09-19 DIAGNOSIS — E03.9 ACQUIRED HYPOTHYROIDISM: ICD-10-CM

## 2023-09-19 DIAGNOSIS — R00.2 PALPITATIONS: ICD-10-CM

## 2023-09-19 LAB
BASOPHILS # BLD AUTO: 0.07 10*3/MM3 (ref 0–0.2)
BASOPHILS NFR BLD AUTO: 1.1 % (ref 0–1.5)
BILIRUB UR QL STRIP: NEGATIVE
CLARITY UR: CLEAR
COLOR UR: YELLOW
DEPRECATED RDW RBC AUTO: 42.4 FL (ref 37–54)
EOSINOPHIL # BLD AUTO: 0.24 10*3/MM3 (ref 0–0.4)
EOSINOPHIL NFR BLD AUTO: 3.9 % (ref 0.3–6.2)
ERYTHROCYTE [DISTWIDTH] IN BLOOD BY AUTOMATED COUNT: 12.8 % (ref 12.3–15.4)
GLUCOSE UR STRIP-MCNC: NEGATIVE MG/DL
HCT VFR BLD AUTO: 41.5 % (ref 34–46.6)
HGB BLD-MCNC: 13.7 G/DL (ref 12–15.9)
HGB UR QL STRIP.AUTO: NEGATIVE
IMM GRANULOCYTES # BLD AUTO: 0.01 10*3/MM3 (ref 0–0.05)
IMM GRANULOCYTES NFR BLD AUTO: 0.2 % (ref 0–0.5)
KETONES UR QL STRIP: NEGATIVE
LEUKOCYTE ESTERASE UR QL STRIP.AUTO: NEGATIVE
LYMPHOCYTES # BLD AUTO: 2.1 10*3/MM3 (ref 0.7–3.1)
LYMPHOCYTES NFR BLD AUTO: 34 % (ref 19.6–45.3)
MCH RBC QN AUTO: 30.3 PG (ref 26.6–33)
MCHC RBC AUTO-ENTMCNC: 33 G/DL (ref 31.5–35.7)
MCV RBC AUTO: 91.8 FL (ref 79–97)
MONOCYTES # BLD AUTO: 0.52 10*3/MM3 (ref 0.1–0.9)
MONOCYTES NFR BLD AUTO: 8.4 % (ref 5–12)
NEUTROPHILS NFR BLD AUTO: 3.23 10*3/MM3 (ref 1.7–7)
NEUTROPHILS NFR BLD AUTO: 52.4 % (ref 42.7–76)
NITRITE UR QL STRIP: NEGATIVE
NRBC BLD AUTO-RTO: 0.2 /100 WBC (ref 0–0.2)
PH UR STRIP.AUTO: 7 [PH] (ref 5–8)
PLATELET # BLD AUTO: 325 10*3/MM3 (ref 140–450)
PMV BLD AUTO: 9.7 FL (ref 6–12)
PROT UR QL STRIP: NEGATIVE
RBC # BLD AUTO: 4.52 10*6/MM3 (ref 3.77–5.28)
SP GR UR STRIP: 1.01 (ref 1–1.03)
UROBILINOGEN UR QL STRIP: NORMAL
WBC NRBC COR # BLD: 6.17 10*3/MM3 (ref 3.4–10.8)

## 2023-09-19 PROCEDURE — 83036 HEMOGLOBIN GLYCOSYLATED A1C: CPT

## 2023-09-19 PROCEDURE — 81003 URINALYSIS AUTO W/O SCOPE: CPT

## 2023-09-19 PROCEDURE — 84439 ASSAY OF FREE THYROXINE: CPT

## 2023-09-19 PROCEDURE — 82306 VITAMIN D 25 HYDROXY: CPT

## 2023-09-19 PROCEDURE — 80050 GENERAL HEALTH PANEL: CPT

## 2023-09-19 NOTE — PROGRESS NOTES
Chief Complaint   Patient presents with    Establish Care     Patient wants to discuss the heart feels flutters and papillations, a little swelling in the feet.        HPI:  Brittny Han is a 63 y.o. female who presents today for establish care.  She would like to discuss ongoing shortness of breath.  Currently established with pulmonology being treated for asthma.  She would like to discuss evaluating for heart disease.    ROS:  Constitutional: no fevers, night sweats or unexplained weight loss  Eyes: no vision changes  ENT: no runny nose, ear pain, sore throat  Cardio: no chest pain, palpitations  Pulm: + shortness of breath, +wheezing, + cough  GI: no abdominal pain or changes in bowel movements  : no difficulty urinating  MSK: no difficulty ambulating, no joint pain  Neuro: no weakness, dizziness or headache  Psych: no trouble sleeping  Endo: no change in appetite      Past Medical History:   Diagnosis Date    Allergic     Allergic rhinitis     Anemia     Anxiety     Asthma     MODERATE, PERSISTANT    Asthma, extrinsic 2007    Progressively gets worse with age    BCC (basal cell carcinoma) 2000    RIGHT EYELID    Bowel habit changes     BPPV (benign paroxysmal positional vertigo) 01/2020    Bronchitis     Cat bite of left hand 05/16/2020    SEEN AT [facility]    Chronic bilateral low back pain with bilateral sciatica     Chronic bronchitis Aug 2018    also seasonal bronchitis every May and December    Chronic idiopathic constipation     Colon polyps     FOLLOWED BY DR. LORENA RICO    COPD (chronic obstructive pulmonary disease)     CTS (carpal tunnel syndrome) 2010    BILATERAL, Wear wrist braces at night    Demyelinating disease     Depression     Disease of thyroid gland     HYPOTHYROIDISM    DINERO (dyspnea on exertion) 06/2019    Essential hypertension     Fatigue     Gastroenteritis 02/2020    GERD (gastroesophageal reflux disease)     Head injury when I was 10    concussion    Hemorrhoids     Hepatomegaly  11/2018    History of echocardiogram 05/31/2011    mild ascending aortic root dilatation; mod RVC enlargement; global left EF 0.72 (normal 0.55-0.75) mild concentric LV hypertrophy; trace MR; mild TR     History of PFTs 02/02/2016    good effort; normal lung volumes    Hyperlipidemia     Hypertrophy, nasal, turbinate     Hypothyroidism 2019    Irritable bowel syndrome     Midline cystocele     Neuropathy of both feet 01/2019    FOLLOWED BY DR. VARSHA IRELAND    Numbness and tingling in both hands 07/19/2021    Numbness and tingling of both feet 01/30/2019    ALANNA on CPAP 2007    Followed by Jin Cat @ [facility]    Peptic ulceration     PUD    Periodic headache syndrome, not intractable     Plantar fasciitis, left 01/2019    FOLLOWED BY DR. VARSHA IRELAND    Primary central sleep apnea 2006    Sleep apnea not sure if central or not    Rectal bleeding 08/2020    Rectal bleeding     Rectal mass 08/2020    RLS (restless legs syndrome) 06/2017    Shortness of breath     Strain of thoracic region 04/2017    KALYAN (stress urinary incontinence, female)     Uterine fibroid     Varicella 1966    This is a guess    Vision loss 2008    wear glasses    Vitamin B 12 deficiency     Vitamin D deficiency       Family History   Problem Relation Age of Onset    Heart disease Mother         cardiomyopathy, Hypertension, Heart Failure    Hypertension Mother     Sleep apnea Mother     Hyperlipidemia Mother     Heart attack Mother     Heart failure Mother     COPD Father         Father is alive at age 80 with COPD and asthma    Asthma Father         COPD    Basal cell carcinoma Father     Cancer Father     Hyperlipidemia Father     Melanoma Father     Ulcerative colitis Cousin     Crohn's disease Cousin     Cancer Niece     Melanoma Niece     Narcolepsy Sister     Sleep apnea Sister     Anemia Sister         Aplastic Anemia    Diabetes Sister         Type 2 2ndry to steroid use    Thyroid disease Sister         had 2 parathyroid  removed    Diabetes Brother         Type 1 or Juvenile    Cancer Maternal Grandfather         Multiple  Myeloma - not sure of name    Lung cancer Paternal Grandmother     Cancer Paternal Grandmother         Lung Cancer    Heart disease Paternal Grandfather         unknown    Diabetes Paternal Aunt         type 2    Breast cancer Neg Hx     Ovarian cancer Neg Hx       Social History     Socioeconomic History    Marital status: Single   Tobacco Use    Smoking status: Never    Smokeless tobacco: Never   Vaping Use    Vaping Use: Never used   Substance and Sexual Activity    Alcohol use: Yes     Alcohol/week: 2.0 standard drinks     Types: 2 Glasses of wine per week     Comment: 2 glasses of wine about 4 x's per year    Drug use: No    Sexual activity: Not Currently     Partners: Male     Birth control/protection: Abstinence     Comment: single      Allergies   Allergen Reactions    Percocet [Oxycodone-Acetaminophen] Anaphylaxis     Breathing difficulty, tongue swelling, severe     Brevital Sodium [Methohexital] Paresthesia     CHILLS, SHAKING    Other Delirium     brevatol sedation for dentistry, chills, shaking    Banana Unknown - Low Severity    Pork-Derived Products Unknown - Low Severity      Immunization History   Administered Date(s) Administered    COVID-19 (PFIZER) Purple Cap Monovalent 04/19/2021, 05/10/2021, 12/27/2021    Flu Vaccine Intradermal Quad 18-64YR 10/06/2014, 10/01/2015    Flu Vaccine Quad PF >36MO 10/10/2018    Flu Vaccine Split Quad 10/10/2018    Flublok 18+yrs 10/03/2020, 11/15/2022    Fluzone (or Fluarix & Flulaval for VFC) >6mos 11/03/2021    Influenza TIV (IM) 10/06/2014, 10/01/2015    Influenza, Unspecified 10/21/2019, 09/23/2020    Pneumococcal Conjugate 13-Valent (PCV13) 04/04/2016    Pneumococcal Polysaccharide (PPSV23) 10/06/2009, 10/10/2014, 12/23/2020    Shingrix 03/01/2021, 06/04/2021    TD Preservative Free (Tenivac) 05/16/2020    Tdap 02/12/2010        PE:  Vitals:    09/19/23 143    BP: 130/84   Pulse: 78   SpO2: 99%      Body mass index is 40.72 kg/m².    Gen Appearance: NAD  HEENT: Normocephalic, PERRLA, no thyromegaly, trache midline  Heart: RRR, normal S1 and S2, no murmur  Lungs: Diminished breath sounds bilaterally, no wheezing, no crackles  Abdomen: Soft, non-tender, non-distended, no guarding and BSx4  MSK: Moves all extremities well, normal gait, no peripheral edema  Pulses: Palpable and equal b/l  Lymph nodes: No palpable lymphadenopathy   Neuro: No focal deficits      Current Outpatient Medications   Medication Sig Dispense Refill    albuterol sulfate  (90 Base) MCG/ACT inhaler Every 6 (Six) Hours.      ciclopirox (LOPROX) 0.77 % cream Apply 1 application  topically to the appropriate area as directed Daily. 30 g 3    doxycycline (VIBRAMYCIN) 100 MG capsule Take 1 capsule by mouth 2 (Two) Times a Day. 60 capsule 2    Dupilumab (Dupixent) 300 MG/2ML solution pen-injector Inject 2 mL under the skin into the appropriate area as directed Every 14 (Fourteen) Days. 4 mL 11    EPINEPHrine (EPIPEN) 0.3 MG/0.3ML solution auto-injector injection Use as directed for acute allergic reaction. 2 each 3    estradiol (ESTRACE VAGINAL) 0.1 MG/GM vaginal cream Insert 1 gram intravaginally as directed 3 times each week for 3 weeks; THEN use weekly thereafter 42.5 g 12    Fluticasone-Salmeterol (Advair Diskus) 500-50 MCG/ACT DISKUS Inhale 1 puff 2 (Two) Times a Day. 60 each 3    hydroCHLOROthiazide (HYDRODIURIL) 25 MG tablet Take 1 tablet by mouth Daily. 90 tablet 3    levocetirizine (XYZAL) 5 MG tablet Take 1 tablet by mouth Daily. 90 tablet 3    levothyroxine (SYNTHROID, LEVOTHROID) 75 MCG tablet Take 1 tablet by mouth Daily. 30 tablet 5    loratadine (CLARITIN) 10 MG tablet Take 1 tablet by mouth Daily.      metroNIDAZOLE (METROCREAM) 0.75 % cream Apply 1 application topically to the appropriate area as directed 2 (Two) Times a Day. 45 g 5    montelukast (SINGULAIR) 10 MG tablet Take 1  tablet by mouth every night at bedtime. 90 tablet 3    Multiple Vitamins-Minerals (CENTRUM SILVER PO) Centrum Silver      Rimegepant Sulfate (NURTEC) 75 MG tablet dispersible tablet Take 1 tablet by mouth Daily As Needed (migraine). Do not take more than 1 tablet in 24 hours 16 tablet 3    Tiotropium Bromide Monohydrate (Spiriva Respimat) 1.25 MCG/ACT aerosol solution inhaler Inhale 2 puffs Daily, as directed. 12 g 3    venlafaxine XR (EFFEXOR-XR) 75 MG 24 hr capsule Take 1 capsule by mouth Daily. 90 capsule 3    vitamin D3 125 MCG (5000 UT) capsule capsule Take 1 capsule by mouth Daily.      estradiol (Anisa) 0.075 MG/24HR patch Place 1 patch on the skin as directed by provider 2 (Two) Times a Week. 8 each 3    Glucosamine-Chondroitin (MOVE FREE PO) Take 1 capsule by mouth Daily.       No current facility-administered medications for this visit.      Recommend evaluation at heart valve clinic to follow-up cardiac disease.  She reports intermittent palpitations with exertion.  Patient reports a normal stress test approximately 10 years ago.  Data deficient.    Recommend checking blood work, follow-up in 3 months for annual physical.    Counseling was given to patient for the following topics: diagnostic results, instructions for management, and impressions . Total time of the encounter was 45 minutes and 24 minutes was spent face to face counseling.      Diagnoses and all orders for this visit:    1. Severe persistent asthma, unspecified whether complicated (Primary)    2. Shortness of breath  -     Ambulatory Referral to [facility] [place] [facility] [place] - ARJUN  -     Comprehensive Metabolic Panel; Future  -     CBC & Differential; Future  -     Hemoglobin A1c; Future  -     Cancel: Lipid Panel; Future  -     TSH+Free T4; Future  -     Urinalysis With Culture If Indicated - Urine, Clean Catch; Future  -     Vitamin D,25-Hydroxy; Future    3. Palpitations  -     Ambulatory Referral to [facility] [place] [facility]  [place] - ARJUN  -     Comprehensive Metabolic Panel; Future  -     CBC & Differential; Future  -     Hemoglobin A1c; Future  -     Cancel: Lipid Panel; Future  -     TSH+Free T4; Future  -     Urinalysis With Culture If Indicated - Urine, Clean Catch; Future  -     Vitamin D,25-Hydroxy; Future    4. Benign essential hypertension  -     Comprehensive Metabolic Panel; Future  -     CBC & Differential; Future  -     Hemoglobin A1c; Future  -     Cancel: Lipid Panel; Future  -     TSH+Free T4; Future  -     Urinalysis With Culture If Indicated - Urine, Clean Catch; Future  -     Vitamin D,25-Hydroxy; Future    5. Prediabetes  -     Comprehensive Metabolic Panel; Future  -     CBC & Differential; Future  -     Hemoglobin A1c; Future  -     Cancel: Lipid Panel; Future  -     TSH+Free T4; Future  -     Urinalysis With Culture If Indicated - Urine, Clean Catch; Future  -     Vitamin D,25-Hydroxy; Future    6. Acquired hypothyroidism  -     Comprehensive Metabolic Panel; Future  -     CBC & Differential; Future  -     Hemoglobin A1c; Future  -     Cancel: Lipid Panel; Future  -     TSH+Free T4; Future  -     Urinalysis With Culture If Indicated - Urine, Clean Catch; Future  -     Vitamin D,25-Hydroxy; Future    7. Vitamin D deficiency  -     Vitamin D,25-Hydroxy; Future         Return in about 3 months (around 12/19/2023) for Annual.     Dictated Utilizing Dragon Dictation    Please note that portions of this note were completed with a voice recognition program.    Part of this note may be an electronic transcription/translation of spoken language to printed text using the Dragon Dictation System.

## 2023-09-20 LAB
25(OH)D3 SERPL-MCNC: 31.1 NG/ML (ref 30–100)
ALBUMIN SERPL-MCNC: 4.7 G/DL (ref 3.5–5.2)
ALBUMIN/GLOB SERPL: 1.5 G/DL
ALP SERPL-CCNC: 102 U/L (ref 39–117)
ALT SERPL W P-5'-P-CCNC: 29 U/L (ref 1–33)
ANION GAP SERPL CALCULATED.3IONS-SCNC: 16 MMOL/L (ref 5–15)
AST SERPL-CCNC: 31 U/L (ref 1–32)
BILIRUB SERPL-MCNC: 0.3 MG/DL (ref 0–1.2)
BUN SERPL-MCNC: 12 MG/DL (ref 8–23)
BUN/CREAT SERPL: 13 (ref 7–25)
CALCIUM SPEC-SCNC: 10.1 MG/DL (ref 8.6–10.5)
CHLORIDE SERPL-SCNC: 99 MMOL/L (ref 98–107)
CO2 SERPL-SCNC: 25 MMOL/L (ref 22–29)
CREAT SERPL-MCNC: 0.92 MG/DL (ref 0.57–1)
EGFRCR SERPLBLD CKD-EPI 2021: 71 ML/MIN/1.73
GLOBULIN UR ELPH-MCNC: 3.1 GM/DL
GLUCOSE SERPL-MCNC: 83 MG/DL (ref 65–99)
HBA1C MFR BLD: 5.7 % (ref 4.8–5.6)
HOLD SPECIMEN: NORMAL
POTASSIUM SERPL-SCNC: 3.8 MMOL/L (ref 3.5–5.2)
PROT SERPL-MCNC: 7.8 G/DL (ref 6–8.5)
SODIUM SERPL-SCNC: 140 MMOL/L (ref 136–145)
T4 FREE SERPL-MCNC: 1.16 NG/DL (ref 0.93–1.7)
TSH SERPL DL<=0.05 MIU/L-ACNC: 3.45 UIU/ML (ref 0.27–4.2)

## 2023-09-21 ENCOUNTER — PATIENT ROUNDING (BHMG ONLY) (OUTPATIENT)
Dept: FAMILY MEDICINE CLINIC | Facility: CLINIC | Age: 63
End: 2023-09-21
Payer: COMMERCIAL

## 2023-09-26 ENCOUNTER — HOSPITAL ENCOUNTER (OUTPATIENT)
Dept: CARDIOLOGY | Facility: HOSPITAL | Age: 63
Discharge: HOME | End: 2023-09-26
Payer: COMMERCIAL

## 2023-09-26 ENCOUNTER — OFFICE VISIT (OUTPATIENT)
Dept: CARDIOLOGY | Facility: HOSPITAL | Age: 63
End: 2023-09-26
Payer: COMMERCIAL

## 2023-09-26 VITALS
HEART RATE: 78 BPM | RESPIRATION RATE: 18 BRPM | SYSTOLIC BLOOD PRESSURE: 123 MMHG | OXYGEN SATURATION: 94 % | BODY MASS INDEX: 35.82 KG/M2 | DIASTOLIC BLOOD PRESSURE: 69 MMHG | TEMPERATURE: 97.9 F | WEIGHT: 228.2 LBS | HEIGHT: 67 IN

## 2023-09-26 DIAGNOSIS — R06.02 SHORTNESS OF BREATH: Primary | ICD-10-CM

## 2023-09-26 DIAGNOSIS — I10 BENIGN ESSENTIAL HYPERTENSION: ICD-10-CM

## 2023-09-26 DIAGNOSIS — R07.9 CHEST PAIN, UNSPECIFIED TYPE: ICD-10-CM

## 2023-09-26 DIAGNOSIS — R00.2 PALPITATIONS: ICD-10-CM

## 2023-09-26 DIAGNOSIS — R06.02 SHORTNESS OF BREATH: ICD-10-CM

## 2023-09-26 LAB
QT INTERVAL: 420 MS
QTC INTERVAL: 462 MS

## 2023-09-26 PROCEDURE — 93242 EXT ECG>48HR<7D RECORDING: CPT

## 2023-09-26 PROCEDURE — 93005 ELECTROCARDIOGRAM TRACING: CPT | Performed by: NURSE PRACTITIONER

## 2023-09-26 NOTE — PROGRESS NOTES
[facility] Heart Monitor Documentation    Brittny Han  1960  [id number]  09/26/23      [] ZIO XT Patch  Model [id number] Prescribed for  Days    Serial Number: (N + 9 Digits) N  Apply-By Date on Box:   USPS Tracking Number:   USPS Tracking        [] [employer] MINI PLUS Mobile Cardiac Telemetry  Model BGMINIPLUS Prescribed for  Days    Serial Number: (BGM + 7 Digits) BGM  Shipped-By Date on Box:   UPS Tracking Number: [id number]  UPS Tracking      [x] [employer] MINI Holter Monitor  Model BGMINIEL Prescribed for 14 Days    Serial Number: (7 Digits) [id number]  Shipped-By Date on Box: 09/18/2023  UPS Tracking Number: [id number]  UPS Tracking        This monitor was applied to the patient's chest and checked for proper functioning.  Ms. Brittny Han was instructed in the proper use of this monitor.  She was given the opportunity to ask questions and left the office with the device 's instruction manual.    Shani Arceo [facility], 09:55 EDT, 09/26/23                  [facility] 8[facility]

## 2023-09-26 NOTE — PATIENT INSTRUCTIONS
- Heart monitor for 2 weeks    - Office will call to schedule testing  - Office will call or send message with testing results

## 2023-09-26 NOTE — PROGRESS NOTES
"Chief Complaint  Palpitations    Subjective      History of Present Illness {CC  Problem List  Visit  Diagnosis   Encounters  Notes  Medications  Labs  Result Review Imaging  Media :23}     Brittny Han, 63 y.o. female with asthma, HTN, prediabetes, obesity, ALANNA on CPAP presents to [facility] for Palpitations.    Patient presents today upon referral from PCP Dr. Olaf Brown for evaluation of shortness of breath, palpitations.  Noted history of severe persistent asthma, HTN, prediabetes.  Lab work completed with unremarkable electrolytes, CBC, TSH.    Presents today noting shortness of breath and palpitations ongoing for the past couple of months. Palpitations described as a couple of harder heartbeats in a row heartbeat; symptoms last for a few seconds. Worsening symptoms include walking/exercise, symptoms resolve within a few seconds. Not noticeably worsened with caffeine. Palpitations 1-2 times per week. Describes a chest tightness, occurs at rest and with exertion/activity. Shortness of breath at rest and with exertion, inhaler does help with symptoms. No syncope. Previous cardiac workup for symptoms include: stress test more than 10 years ago that was reported a normal. Caffeine use: 3 cups of coffee per day, alcohol use: rare. Mother with CMOP, HTN, MI (possibly age 50-60s).         Objective     Vital Signs:   Vitals:    09/26/23 0852 09/26/23 0854 09/26/23 0855   BP: 124/76 128/71 123/69   BP Location: Right arm Left arm Left arm   Patient Position: Sitting Standing Sitting   Cuff Size: Large Adult Large Adult Large Adult   Pulse: 76 80 78   Resp:   18   Temp: 96.9 °F (36.1 °C) 97.9 °F (36.6 °C) 97.9 °F (36.6 °C)   TempSrc: Temporal Temporal Temporal   SpO2: 95% 96% 94%   Weight:   104 kg (228 lb 3.2 oz)   Height:   170.2 cm (67\")     Body mass index is 35.74 kg/m².  Physical Exam  Vitals and nursing note reviewed.   Constitutional:       Appearance: Normal " appearance.   HENT:      Head: Normocephalic.   Eyes:      Extraocular Movements: Extraocular movements intact.   Neck:      Vascular: No carotid bruit.   Cardiovascular:      Rate and Rhythm: Normal rate and regular rhythm.      Pulses: Normal pulses.      Heart sounds: Normal heart sounds, S1 normal and S2 normal. No murmur heard.  Pulmonary:      Effort: Pulmonary effort is normal. No respiratory distress.      Breath sounds: Normal breath sounds.   Musculoskeletal:      Cervical back: Neck supple.      Right lower leg: No edema.      Left lower leg: No edema.   Skin:     General: Skin is warm and dry.   Neurological:      General: No focal deficit present.      Mental Status: She is alert.   Psychiatric:         Mood and Affect: Mood normal.         Behavior: Behavior normal.         Thought Content: Thought content normal.      Data Reviewed:{ Labs  Result Review  Imaging  Med Tab  Media :23}     ECG 12 Lead (09/26/2023 09:05)   Vitamin D,25-Hydroxy (09/19/2023 15:10)  TSH+Free T4 (09/19/2023 15:10)  Hemoglobin A1c (09/19/2023 15:10)  CBC & Differential (09/19/2023 15:10)  Comprehensive Metabolic Panel (09/19/2023 15:10)  ECG 12 Lead (03/12/2021 13:50)     Assessment & Plan   Assessment and Plan {CC Problem List  Visit Diagnosis  ROS  Review (Popup)  Health Maintenance  Quality  BestPractice  Medications  SmartSets  SnapShot Encounters  Media :23}     1. Shortness of breath  -Intermittent dyspnea; mixed rest and exertional symptoms. In setting of asthma, inhaler does somewhat improve symptoms  - Adult Transthoracic Echo Complete W/ Cont if Necessary Per Protocol; Future  - Holter Monitor - 14 Days; Future  - Stress Test With Myocardial Perfusion (1 Day); Future    2. Chest pain  -New intermittent chest tightness.  Symptoms mixed occurring at rest as well as exertion.  -ECG today with normal sinus rhythm, no evidence of acute ischemia  -EH score:0  -Given her new onset symptoms with no recent  ischemic evaluation will complete myocardial perfusion study for ischemic evaluation  - Stress Test With Myocardial Perfusion (1 Day); Future  -TTE as above for structural/functional evaluation    3. Palpitations  -Intermittent short duration palpitation.  No syncope.  - ECG today with normal sinus rhythm, no evidence of arrhythmia  - Adult Transthoracic Echo Complete W/ Cont if Necessary Per Protocol; Future  - Holter Monitor - 14 Days; Future  - Stress Test With Myocardial Perfusion (1 Day); Future  -Follow-up in approximately 5 weeks for monitor results, symptom check    4. Benign essential hypertension  -Well controlled  -Continue HCTZ as prescribed      Follow Up {Instructions Charge Capture  Follow-up Communications :23}     Return in about 5 weeks (around 10/31/2023) for Office follow-up, Monitor results, Results follow-up, Recheck.    Patient was given instructions and counseling regarding her condition or for health maintenance advice. Please see specific information pulled into the AVS if appropriate.  Patient was instructed to call the [facility] Center with any questions, concerns, or worsening symptoms.    Dictated Utilizing Dragon Dictation   Please note that portions of this note were completed with a voice recognition program.   Part of this note may be an electronic transcription/translation of spoken language to printed text using the Dragon Dictation System.

## 2023-10-04 ENCOUNTER — SPECIALTY PHARMACY (OUTPATIENT)
Dept: PHARMACY | Facility: TELEHEALTH | Age: 63
End: 2023-10-04
Payer: COMMERCIAL

## 2023-10-04 ENCOUNTER — TELEPHONE (OUTPATIENT)
Dept: FAMILY MEDICINE CLINIC | Facility: CLINIC | Age: 63
End: 2023-10-04
Payer: COMMERCIAL

## 2023-10-04 NOTE — PROGRESS NOTES
" Specialty Pharmacy Patient Management Program  Call Center Refill Outreach      Brittny \"Padmini\" is a 63 y.o. female contacted today regarding refills of her medication(s).    Specialty medication(s) and dose(s) confirmed: Dupixent 300mg/2ml  Other medications being refilled: none     Refill Questions      Flowsheet Row Most Recent Value   Changes to allergies? No   Changes to medications? No   New conditions since last clinic visit No   Unplanned office visit, urgent care, ED, or hospital admission in the last 4 weeks  No   How does patient/caregiver feel medication is working? Very good   Financial problems or insurance changes  No   If yes, describe changes in insurance or financial issues. N/A   Since the previous refill, were any specialty medication doses or scheduled injections missed or delayed?  No   If yes, please provide the amount N/A   Why were doses missed? N/A   Does this patient require a clinical escalation to a pharmacist? No                Medication Adherence    Adherence tools used: directed education  Support network for adherence: family member            Follow-up: 21 days     Elroy Fernando [facility]  Specialty Pharmacist  10/4/2023  09:51 EDT   "

## 2023-10-04 NOTE — TELEPHONE ENCOUNTER
Informed patient to reach out to the cardiology office to inquire on how they can help with her monitor placement since she is having issues, it may be possible they have another option.   I gave her the phone number for Cardiology to reach out. She did not have any further questions or concerns at this time.

## 2023-10-04 NOTE — TELEPHONE ENCOUNTER
"  Caller: Brittny Han \"Padmini\"    Relationship: Self    Best call back number: [phone]     What is the best time to reach you: ANYTIME    Who are you requesting to speak with (clinical staff, provider,  specific staff member): PCP/MA        What was the call regarding: PATIENT STATED SHE WAS GIVEN A HEART MONITOR TO WEAR AND THE PIECES THAT STICK TO HER TO CONNECT HER DOG ATE PLUS SHE STATED SHE IS BREAKING OUT AND ITS BECOMING RAW UNDER THE TAPE AND THEY KEEP COMING UNSTUCK DUE TO HER SWEATING. PATIENT REQUESTING A CALLBACK TODAY PLEASE    Is it okay if the provider responds through MyChart: CALLBACK          "

## 2023-10-05 ENCOUNTER — TELEPHONE (OUTPATIENT)
Dept: CARDIOLOGY | Facility: HOSPITAL | Age: 63
End: 2023-10-05
Payer: COMMERCIAL

## 2023-10-05 NOTE — TELEPHONE ENCOUNTER
Patient called stating that she has been having issues keeping her monitor patches on which resulted in her taking the monitor off momentarily and is also experiencing some sensitivity. Will send patient sensitive patches via [employer].

## 2023-10-11 ENCOUNTER — TELEPHONE (OUTPATIENT)
Dept: CARDIOLOGY | Facility: HOSPITAL | Age: 63
End: 2023-10-11
Payer: COMMERCIAL

## 2023-10-11 NOTE — TELEPHONE ENCOUNTER
Patient stated she ran out of patches for her monitor and her dog ate the last patch she had and by the time patches from the company came to her house it was already the last day she would have had to wear the monitor, I told her to go ahead and mail the monitor back in and we can use the information we have in the report with the time frame she's got on the monitor and if we need a longer time frame then we can do another monitor or a longer one.

## 2023-10-16 ENCOUNTER — HOSPITAL ENCOUNTER (OUTPATIENT)
Dept: CARDIOLOGY | Facility: HOSPITAL | Age: 63
Discharge: HOME | End: 2023-10-16
Payer: COMMERCIAL

## 2023-10-16 VITALS
SYSTOLIC BLOOD PRESSURE: 136 MMHG | WEIGHT: 229.28 LBS | HEIGHT: 67 IN | DIASTOLIC BLOOD PRESSURE: 80 MMHG | BODY MASS INDEX: 35.99 KG/M2 | HEART RATE: 77 BPM

## 2023-10-16 VITALS — HEIGHT: 67 IN | WEIGHT: 229.28 LBS | BODY MASS INDEX: 35.99 KG/M2

## 2023-10-16 DIAGNOSIS — R06.02 SHORTNESS OF BREATH: ICD-10-CM

## 2023-10-16 DIAGNOSIS — R00.2 PALPITATIONS: ICD-10-CM

## 2023-10-16 DIAGNOSIS — R07.9 CHEST PAIN, UNSPECIFIED TYPE: ICD-10-CM

## 2023-10-16 LAB
BH CV ECHO MEAS - AO MAX PG: 6.8 MMHG
BH CV ECHO MEAS - AO MEAN PG: 4 MMHG
BH CV ECHO MEAS - AO ROOT DIAM: 3.8 CM
BH CV ECHO MEAS - AO V2 MAX: 130 CM/SEC
BH CV ECHO MEAS - AO V2 VTI: 28.8 CM
BH CV ECHO MEAS - AVA(I,D): 2.44 CM2
BH CV ECHO MEAS - EDV(CUBED): 97.3 ML
BH CV ECHO MEAS - EDV(MOD-SP2): 99.2 ML
BH CV ECHO MEAS - EDV(MOD-SP4): 79.3 ML
BH CV ECHO MEAS - EF(MOD-BP): 57.2 %
BH CV ECHO MEAS - EF(MOD-SP2): 57 %
BH CV ECHO MEAS - EF(MOD-SP4): 61.2 %
BH CV ECHO MEAS - ESV(CUBED): 22.3 ML
BH CV ECHO MEAS - ESV(MOD-SP2): 42.7 ML
BH CV ECHO MEAS - ESV(MOD-SP4): 30.8 ML
BH CV ECHO MEAS - FS: 38.8 %
BH CV ECHO MEAS - IVS/LVPW: 1 CM
BH CV ECHO MEAS - IVSD: 1.1 CM
BH CV ECHO MEAS - LA DIMENSION: 3.7 CM
BH CV ECHO MEAS - LAT PEAK E' VEL: 8.2 CM/SEC
BH CV ECHO MEAS - LV MASS(C)D: 181.2 GRAMS
BH CV ECHO MEAS - LV MAX PG: 3.6 MMHG
BH CV ECHO MEAS - LV MEAN PG: 2 MMHG
BH CV ECHO MEAS - LV V1 MAX: 95.2 CM/SEC
BH CV ECHO MEAS - LV V1 VTI: 21.6 CM
BH CV ECHO MEAS - LVIDD: 4.6 CM
BH CV ECHO MEAS - LVIDS: 2.8 CM
BH CV ECHO MEAS - LVOT AREA: 3.3 CM2
BH CV ECHO MEAS - LVOT DIAM: 2.04 CM
BH CV ECHO MEAS - LVPWD: 1.1 CM
BH CV ECHO MEAS - MED PEAK E' VEL: 6.9 CM/SEC
BH CV ECHO MEAS - MV A MAX VEL: 72 CM/SEC
BH CV ECHO MEAS - MV DEC SLOPE: 378.6 CM/SEC2
BH CV ECHO MEAS - MV DEC TIME: 0.22 SEC
BH CV ECHO MEAS - MV E MAX VEL: 83.6 CM/SEC
BH CV ECHO MEAS - MV E/A: 1.16
BH CV ECHO MEAS - MV MAX PG: 3.3 MMHG
BH CV ECHO MEAS - MV MEAN PG: 1.81 MMHG
BH CV ECHO MEAS - MV P1/2T: 74.3 MSEC
BH CV ECHO MEAS - MV V2 VTI: 30.6 CM
BH CV ECHO MEAS - MVA(P1/2T): 3 CM2
BH CV ECHO MEAS - MVA(VTI): 2.3 CM2
BH CV ECHO MEAS - PA ACC TIME: 0.15 SEC
BH CV ECHO MEAS - PI END-D VEL: 77.1 CM/SEC
BH CV ECHO MEAS - RAP SYSTOLE: 15 MMHG
BH CV ECHO MEAS - RVSP: 33 MMHG
BH CV ECHO MEAS - SV(LVOT): 70.3 ML
BH CV ECHO MEAS - SV(MOD-SP2): 56.5 ML
BH CV ECHO MEAS - SV(MOD-SP4): 48.5 ML
BH CV ECHO MEAS - TAPSE (>1.6): 2.44 CM
BH CV ECHO MEAS - TR MAX PG: 18.1 MMHG
BH CV ECHO MEAS - TR MAX VEL: 212.2 CM/SEC
BH CV ECHO MEASUREMENTS AVERAGE E/E' RATIO: 11.07
BH CV REST NUCLEAR ISOTOPE DOSE: 9.2 MCI
BH CV STRESS BP STAGE 1: NORMAL
BH CV STRESS DURATION MIN STAGE 1: 3
BH CV STRESS DURATION MIN STAGE 2: 3
BH CV STRESS DURATION MIN STAGE 3: 2
BH CV STRESS DURATION SEC STAGE 1: 0
BH CV STRESS DURATION SEC STAGE 2: 0
BH CV STRESS DURATION SEC STAGE 3: 0
BH CV STRESS GRADE STAGE 1: 10
BH CV STRESS GRADE STAGE 2: 12
BH CV STRESS GRADE STAGE 3: 14
BH CV STRESS HR STAGE 1: 115
BH CV STRESS HR STAGE 2: 129
BH CV STRESS HR STAGE 3: 136
BH CV STRESS METS STAGE 1: 5
BH CV STRESS METS STAGE 2: 7.5
BH CV STRESS METS STAGE 3: 10
BH CV STRESS NUCLEAR ISOTOPE DOSE: 32.5 MCI
BH CV STRESS O2 STAGE 1: 98
BH CV STRESS O2 STAGE 2: 98
BH CV STRESS O2 STAGE 3: 97
BH CV STRESS PROTOCOL 1: NORMAL
BH CV STRESS RECOVERY BP: NORMAL MMHG
BH CV STRESS RECOVERY HR: 87 BPM
BH CV STRESS RECOVERY O2: 99 %
BH CV STRESS SPEED STAGE 1: 1.7
BH CV STRESS SPEED STAGE 2: 2.5
BH CV STRESS SPEED STAGE 3: 3.4
BH CV STRESS STAGE 1: 1
BH CV STRESS STAGE 2: 2
BH CV STRESS STAGE 3: 3
BH CV VAS BP RIGHT ARM: NORMAL MMHG
BH CV XLRA - RV BASE: 4 CM
BH CV XLRA - RV LENGTH: 7.1 CM
BH CV XLRA - RV MID: 2.8 CM
BH CV XLRA - TDI S': 13.3 CM/SEC
LEFT ATRIUM VOLUME INDEX: 36.1 ML/M2
LV EF NUC BP: 77 %
MAXIMAL PREDICTED HEART RATE: 159 BPM
PERCENT MAX PREDICTED HR: 86.16 %
STRESS BASELINE BP: NORMAL MMHG
STRESS BASELINE HR: 71 BPM
STRESS O2 SAT REST: 100 %
STRESS PERCENT HR: 101 %
STRESS POST ESTIMATED WORKLOAD: 8.8 METS
STRESS POST EXERCISE DUR MIN: 8 MIN
STRESS POST EXERCISE DUR SEC: 0 SEC
STRESS POST O2 SAT PEAK: 98 %
STRESS POST PEAK BP: NORMAL MMHG
STRESS POST PEAK HR: 137 BPM
STRESS TARGET HR: 135 BPM

## 2023-10-16 PROCEDURE — 0 TECHNETIUM SESTAMIBI: Performed by: NURSE PRACTITIONER

## 2023-10-16 PROCEDURE — 78452 HT MUSCLE IMAGE SPECT MULT: CPT | Performed by: INTERNAL MEDICINE

## 2023-10-16 PROCEDURE — 93306 TTE W/DOPPLER COMPLETE: CPT

## 2023-10-16 PROCEDURE — A9500 TC99M SESTAMIBI: HCPCS | Performed by: NURSE PRACTITIONER

## 2023-10-16 PROCEDURE — 93018 CV STRESS TEST I&R ONLY: CPT | Performed by: INTERNAL MEDICINE

## 2023-10-16 PROCEDURE — 78452 HT MUSCLE IMAGE SPECT MULT: CPT

## 2023-10-16 PROCEDURE — 93017 CV STRESS TEST TRACING ONLY: CPT

## 2023-10-16 RX ORDER — ALBUTEROL SULFATE 90 UG/1
2 AEROSOL, METERED RESPIRATORY (INHALATION) EVERY 6 HOURS SCHEDULED
Qty: 18 G | Refills: 1 | Status: SHIPPED | OUTPATIENT
Start: 2023-10-16

## 2023-10-16 RX ADMIN — TECHNETIUM TC 99M SESTAMIBI 1 DOSE: 1 INJECTION INTRAVENOUS at 09:10

## 2023-10-16 RX ADMIN — TECHNETIUM TC 99M SESTAMIBI 1 DOSE: 1 INJECTION INTRAVENOUS at 11:25

## 2023-10-16 NOTE — TELEPHONE ENCOUNTER
Rx Refill Note  Requested Prescriptions     Pending Prescriptions Disp Refills    albuterol sulfate  (90 Base) MCG/ACT inhaler       Sig: Every 6 (Six) Hours.      Last office visit with prescribing clinician: 9/19/2023     Next office visit with prescribing clinician: 12/21/2023   Cristin Courtney MA  10/16/23, 14:57 EDT

## 2023-10-19 ENCOUNTER — DOCUMENTATION (OUTPATIENT)
Dept: PHARMACY | Facility: TELEHEALTH | Age: 63
End: 2023-10-19
Payer: COMMERCIAL

## 2023-10-20 ENCOUNTER — SPECIALTY PHARMACY (OUTPATIENT)
Dept: PHARMACY | Facility: TELEHEALTH | Age: 63
End: 2023-10-20
Payer: COMMERCIAL

## 2023-10-27 ENCOUNTER — SPECIALTY PHARMACY (OUTPATIENT)
Dept: PHARMACY | Facility: TELEHEALTH | Age: 63
End: 2023-10-27
Payer: COMMERCIAL

## 2023-10-27 NOTE — PROGRESS NOTES
Specialty Pharmacy Patient Management Program  Reassessment     Brittny Han is a 63 y.o. female with chronic migraine and enrolled in the Patient Management program offered by [facility]. A follow-up outreach was conducted, including assessment of continued therapy appropriateness, medication adherence, and side effect incidence and management for Nurtec 75mg.     Changes to Insurance Coverage or Financial Support  none    Relevant Past Medical History and Comorbidities  Relevant medical history and concomitant health conditions were discussed with the patient. The patient's chart has been reviewed for relevant past medical history and comorbid health conditions and updated as necessary.   Past Medical History:   Diagnosis Date    Allergic     Allergic rhinitis     Anemia     Anxiety     Asthma     MODERATE, PERSISTANT    Asthma, extrinsic 2007    Progressively gets worse with age    BCC (basal cell carcinoma) 2000    RIGHT EYELID    Bowel habit changes     BPPV (benign paroxysmal positional vertigo) 01/2020    Bronchitis     Cat bite of left hand 05/16/2020    SEEN AT [facility]    Chronic bilateral low back pain with bilateral sciatica     Chronic bronchitis Aug 2018    also seasonal bronchitis every May and December    Chronic idiopathic constipation     Colon polyps     FOLLOWED BY DR. LORENA RICO    COPD (chronic obstructive pulmonary disease)     CTS (carpal tunnel syndrome) 2010    BILATERAL, Wear wrist braces at night    Demyelinating disease     Depression     Disease of thyroid gland     HYPOTHYROIDISM    DINERO (dyspnea on exertion) 06/2019    Essential hypertension     Fatigue     Gastroenteritis 02/2020    GERD (gastroesophageal reflux disease)     Head injury when I was 10    concussion    Hemorrhoids     Hepatomegaly 11/2018    History of echocardiogram 05/31/2011    mild ascending aortic root dilatation; mod RVC enlargement; global left EF 0.72 (normal 0.55-0.75) mild  concentric LV hypertrophy; trace MR; mild TR     History of PFTs 02/02/2016    good effort; normal lung volumes    Hyperlipidemia     Hypertrophy, nasal, turbinate     Hypothyroidism 2019    Irritable bowel syndrome     Midline cystocele     Neuropathy of both feet 01/2019    FOLLOWED BY DR. VARSHA IRELAND    Numbness and tingling in both hands 07/19/2021    Numbness and tingling of both feet 01/30/2019    ALANNA on CPAP 2007    Followed by Jin Cat @ [facility]    Peptic ulceration     PUD    Periodic headache syndrome, not intractable     Plantar fasciitis, left 01/2019    FOLLOWED BY DR. VARSHA IRELAND    Primary central sleep apnea 2006    Sleep apnea not sure if central or not    Rectal bleeding 08/2020    Rectal bleeding     Rectal mass 08/2020    RLS (restless legs syndrome) 06/2017    Shortness of breath     Strain of thoracic region 04/2017    KALYAN (stress urinary incontinence, female)     Uterine fibroid     Varicella 1966    This is a guess    Vision loss 2008    wear glasses    Vitamin B 12 deficiency     Vitamin D deficiency      Social History     Socioeconomic History    Marital status: Single   Tobacco Use    Smoking status: Never    Smokeless tobacco: Never   Vaping Use    Vaping Use: Never used   Substance and Sexual Activity    Alcohol use: Yes     Alcohol/week: 2.0 standard drinks of alcohol     Types: 2 Glasses of wine per week     Comment: 2 glasses of wine about 4 x's per year    Drug use: No    Sexual activity: Not Currently     Partners: Male     Birth control/protection: Abstinence     Comment: single     Problem list reviewed by Luciano Smiht RPH on 10/27/2023 at 10:09 AM    Allergies  Known allergies and reactions were discussed with the patient. The patient's chart has been reviewed for allergy information and updated as necessary.   Allergies   Allergen Reactions    Percocet [Oxycodone-Acetaminophen] Anaphylaxis     Breathing difficulty, tongue swelling, severe     Brevital  Sodium [Methohexital] Paresthesia     CHILLS, SHAKING    Other Delirium     brevatol sedation for dentistry, chills, shaking    Banana Unknown - Low Severity    Pork-Derived Products Unknown - Low Severity     Allergies reviewed by Luciano Smith RP on 10/27/2023 at 10:09 AM    Relevant Laboratory Values  Lab Results   Component Value Date    GLUCOSE 83 09/19/2023    CALCIUM 10.1 09/19/2023     09/19/2023    K 3.8 09/19/2023    CO2 25.0 09/19/2023    CL 99 09/19/2023    BUN 12 09/19/2023    CREATININE 0.92 09/19/2023    BCR 13.0 09/19/2023    ANIONGAP 16.0 (H) 09/19/2023     Lab Results   Component Value Date    WBC 6.17 09/19/2023    HGB 13.7 09/19/2023    HCT 41.5 09/19/2023    MCV 91.8 09/19/2023     09/19/2023     Lab Value Review  The above lab values have been reviewed; the following specialty medication(s) dose adjustment(s) are recommended: none.    Current Medication List  This medication list has been reviewed with the patient and evaluated for any interactions or necessary modifications/recommendations, and updated to include all prescription medications, OTC medications, and supplements the patient is currently taking. This list reflects what is contained in the patient's profile, which has also been marked as reviewed to communicate to other providers it is the most up to date version of the patient's current medication therapy.     Current Outpatient Medications:     albuterol sulfate  (90 Base) MCG/ACT inhaler, Inhale 2 puffs Every 6 (Six) Hours., Disp: 18 g, Rfl: 1    ciclopirox (LOPROX) 0.77 % cream, Apply 1 application  topically to the appropriate area as directed Daily., Disp: 30 g, Rfl: 3    Dupilumab (Dupixent) 300 MG/2ML solution pen-injector, Inject 2 mL under the skin into the appropriate area as directed Every 14 (Fourteen) Days., Disp: 4 mL, Rfl: 11    EPINEPHrine (EPIPEN) 0.3 MG/0.3ML solution auto-injector injection, Use as directed for acute allergic reaction.,  Disp: 2 each, Rfl: 3    Fluticasone-Salmeterol (Advair Diskus) 500-50 MCG/ACT DISKUS, Inhale 1 puff 2 (Two) Times a Day., Disp: 60 each, Rfl: 3    hydroCHLOROthiazide (HYDRODIURIL) 25 MG tablet, Take 1 tablet by mouth Daily., Disp: 90 tablet, Rfl: 3    levocetirizine (XYZAL) 5 MG tablet, Take 1 tablet by mouth Daily., Disp: 90 tablet, Rfl: 3    levothyroxine (SYNTHROID, LEVOTHROID) 75 MCG tablet, Take 1 tablet by mouth Daily., Disp: 30 tablet, Rfl: 5    loratadine (CLARITIN) 10 MG tablet, Take 1 tablet by mouth Daily., Disp: , Rfl:     metroNIDAZOLE (METROCREAM) 0.75 % cream, Apply 1 application topically to the appropriate area as directed 2 (Two) Times a Day., Disp: 45 g, Rfl: 5    montelukast (SINGULAIR) 10 MG tablet, Take 1 tablet by mouth every night at bedtime., Disp: 90 tablet, Rfl: 3    Multiple Vitamins-Minerals (CENTRUM SILVER PO), Centrum Silver, Disp: , Rfl:     Rimegepant Sulfate (NURTEC) 75 MG tablet dispersible tablet, Take 1 tablet by mouth Daily As Needed (migraine). Do not take more than 1 tablet in 24 hours, Disp: 16 tablet, Rfl: 3    Tiotropium Bromide Monohydrate (Spiriva Respimat) 1.25 MCG/ACT aerosol solution inhaler, Inhale 2 puffs Daily, as directed., Disp: 12 g, Rfl: 3    venlafaxine XR (EFFEXOR-XR) 75 MG 24 hr capsule, Take 1 capsule by mouth Daily., Disp: 90 capsule, Rfl: 3    vitamin D3 125 MCG (5000 UT) capsule capsule, Take 1 capsule by mouth Daily., Disp: , Rfl:   No current facility-administered medications for this visit.  Medicines reviewed by Luciano Smith RPH on 10/27/2023 at 10:09 AM    Drug Interactions  none     Adverse Drug Reactions  Adverse Reactions Experienced: none  Plan for ADR Management: N/A     Hospitalizations and Urgent Care Since Last Assessment  Hospitalizations or Admissions: none  ED Visits: none  Urgent Office Visits: none     Adherence and Self-Administration  Approximate Number of Doses Missed Since Last Assessment: none  Ongoing or New Barriers to  Patient Adherence and/or Self-Administration: none   Methods for Supporting Patient Adherence and/or Self-Administration: N/A     Goals of Therapy  Goals related to the patient's specialty therapy were discussed with the patient. The Patient Goals segment of this outreach has been reviewed and updated.   Goals Addressed Today        Specialty Pharmacy General Goal- nurtec      Reduction in severity and duration of migraines at onset              Progress Toward Meeting Patient-Identified Goals of Therapy: On Track  New Patient-Identified Goals, If Applicable:     Progress Toward Meeting Clinical Goals or Therapeutic Targets: On Track  New Clinical Goals or Therapeutic Targets, If Applicable:     Quality of Life Assessment   Quality of Life related to the patient's specialty therapy was discussed with the patient. The QOL segment of this outreach has been reviewed and updated.   Quality of Life Assessment  Quality of Life Improvement Scale: Somewhat better  Comments on Quality of Life: tolerating well, only uses prn    Reassessment Plan & Follow-Up  Medication Therapy Changes: none  Additional Plans, Therapy Recommendations, or Therapy Problems to Be Addressed: none   Pharmacist to perform regular reassessments no more than (6) months from the previous assessment.  Care Coordinator to set up future refill outreaches, coordinate prescription delivery, and escalate clinical questions to pharmacist.     Attestation  I attest the patient was actively involved in and has agreed to the above plan of care. I attest that the specialty medication(s) addressed above are appropriate for the patient based on my reassessment. If the prescribed therapy is at any point deemed not appropriate based on the current or future assessments, a consultation will be initiated with the patient's specialty care provider to determine the best course of action. The revised plan of therapy will be documented along with any required assessments  and/or additional patient education provided.     Electronically signed by Luciano Smith RPH, 10/27/23, 10:10 AM EDT.

## 2023-10-27 NOTE — PROGRESS NOTES
" Specialty Pharmacy Patient Management Program  Call Center Refill Outreach      Brittny \"Padmini\" is a 63 y.o. female contacted today regarding refills of her medication(s).    Specialty medication(s) and dose(s) confirmed: dupixent 300mg/2ml  Other medications being refilled: none    Refill Questions      Flowsheet Row Most Recent Value   Changes to allergies? No   Changes to medications? No   New conditions since last clinic visit No   Unplanned office visit, urgent care, ED, or hospital admission in the last 4 weeks  No   How does patient/caregiver feel medication is working? Good   Financial problems or insurance changes  No   If yes, describe changes in insurance or financial issues. N/A   Since the previous refill, were any specialty medication doses or scheduled injections missed or delayed?  No   If yes, please provide the amount N/A   Why were doses missed? N/A   Does this patient require a clinical escalation to a pharmacist? No                Medication Adherence          Adherence tools used: directed education      Support network for adherence: family member                  Follow-up: 3 weeks     Luciano Smith RPH  Specialty Pharmacist  10/27/2023  10:08 EDT    "

## 2023-10-30 RX ORDER — VENLAFAXINE HYDROCHLORIDE 75 MG/1
75 CAPSULE, EXTENDED RELEASE ORAL DAILY
Qty: 90 CAPSULE | Refills: 3 | OUTPATIENT
Start: 2023-10-30

## 2023-10-30 RX ORDER — HYDROCHLOROTHIAZIDE 25 MG/1
25 TABLET ORAL DAILY
Qty: 90 TABLET | Refills: 3 | OUTPATIENT
Start: 2023-10-30

## 2023-10-31 ENCOUNTER — OFFICE VISIT (OUTPATIENT)
Dept: CARDIOLOGY | Facility: HOSPITAL | Age: 63
End: 2023-10-31
Payer: COMMERCIAL

## 2023-10-31 VITALS
TEMPERATURE: 96.8 F | DIASTOLIC BLOOD PRESSURE: 69 MMHG | BODY MASS INDEX: 37.42 KG/M2 | RESPIRATION RATE: 18 BRPM | OXYGEN SATURATION: 91 % | WEIGHT: 238.4 LBS | SYSTOLIC BLOOD PRESSURE: 127 MMHG | HEART RATE: 69 BPM | HEIGHT: 67 IN

## 2023-10-31 DIAGNOSIS — I10 BENIGN ESSENTIAL HYPERTENSION: Primary | ICD-10-CM

## 2023-10-31 RX ORDER — SPIRONOLACTONE 25 MG/1
12.5 TABLET ORAL DAILY
Qty: 30 TABLET | Refills: 1 | Status: SHIPPED | OUTPATIENT
Start: 2023-10-31

## 2023-10-31 NOTE — PROGRESS NOTES
"Chief Complaint  Palpitations, Shortness of Breath, and Dizziness    Subjective      History of Present Illness {  Problem List  Visit  Diagnosis   Encounters  Notes  Medications  Labs  Result Review Imaging  Media :23}     Brittny Han, 63 y.o. female with asthma, HTN, prediabetes, obesity, ALANNA on CPAP presents to [facility] for Palpitations, Shortness of Breath, and Dizziness.    Since previous evaluation patient completed echocardiogram that revealed normal LVEF, mild atrial dilation.  Exercise myocardial perfusion study with normal perfusion study, low risk study reported. Holter monitor completed with diagnostic time 5 days, 12 hours.  Monitor reported as normal study.  No evidence of atrial fibrillation/flutter, AV block/pauses, VT.  Average heart rate 81, minimum 59, maximum 125.  Low burden PAC/PVC less than 1%. Patient trigerred events not associated with arrhythmia.    Presents today with continued intermittent shortness of breath since April. Symptoms slightly improve with inhaler use. Chest tightness has essentially resolved. Still with intermittent rare palpitation\ described as a harder heartbeat, 3-4 times per week; worsened at night before falling asleep.  No tachypalpitation, syncope. SBP generally 130s.      Objective     Vital Signs:   Vitals:    10/31/23 0818   BP: 127/69   BP Location: Left arm   Patient Position: Sitting   Cuff Size: Adult   Pulse: 69   Resp: 18   Temp: 96.8 °F (36 °C)   TempSrc: Temporal   SpO2: 91%   Weight: 108 kg (238 lb 6.4 oz)   Height: 170.2 cm (67\")       Body mass index is 37.34 kg/m².  Physical Exam  Vitals and nursing note reviewed.   Constitutional:       Appearance: Normal appearance.   HENT:      Head: Normocephalic.   Eyes:      Extraocular Movements: Extraocular movements intact.   Neck:      Vascular: No carotid bruit.   Cardiovascular:      Rate and Rhythm: Normal rate and regular rhythm.      Pulses: Normal " pulses.      Heart sounds: Normal heart sounds, S1 normal and S2 normal. No murmur heard.  Pulmonary:      Effort: Pulmonary effort is normal. No respiratory distress.      Breath sounds: Normal breath sounds.   Musculoskeletal:      Cervical back: Neck supple.      Right lower leg: No edema.      Left lower leg: No edema.   Skin:     General: Skin is warm and dry.   Neurological:      General: No focal deficit present.      Mental Status: She is alert.   Psychiatric:         Mood and Affect: Mood normal.         Behavior: Behavior normal.         Thought Content: Thought content normal.        Data Reviewed:{ Labs  Result Review  Imaging  Med Tab  Media :23}     Adult Transthoracic Echo Complete W/ Cont if Necessary Per Protocol (10/16/2023 09:54)  Holter Monitor - 72 Hour Up To 15 Days (09/26/2023 13:49)  Stress Test With Myocardial Perfusion (1 Day) (10/16/2023 11:09)    ECG 12 Lead (09/26/2023 09:05)   Vitamin D,25-Hydroxy (09/19/2023 15:10)  TSH+Free T4 (09/19/2023 15:10)  Hemoglobin A1c (09/19/2023 15:10)  CBC & Differential (09/19/2023 15:10)  Comprehensive Metabolic Panel (09/19/2023 15:10)  ECG 12 Lead (03/12/2021 13:50)     Assessment & Plan   Assessment and Plan {CC Problem List  Visit Diagnosis  ROS  Review (Popup)  Health Maintenance  Quality  BestPractice  Medications  SmartSets  SnapShot Encounters  Media :23}     1. Shortness of breath  -Intermittent dyspnea; mixed rest and exertional symptoms. In setting of asthma, inhaler does somewhat improve symptoms  -Likely pulmonary related given her unremarkable TTE and stress test  -Echocardiogram that revealed normal LVEF, mild atrial dilation.    -Exercise myocardial perfusion study with normal perfusion study, low risk study reported.  -Will focus on further afterload control and re-evaluate    2. Chest pain  -Improved since previous evaluation  -Echocardiogram that revealed normal LVEF, mild atrial dilation.    -Exercise myocardial  perfusion study with normal perfusion study, low risk study reported.    3. Palpitations  -Intermittent short duration palpitation.  No syncope.  -Holter monitor completed with diagnostic time 5 days, 12 hours.  Monitor reported as normal study.  No evidence of atrial fibrillation/flutter, AV block/pauses, VT.  Average heart rate 81, minimum 59, maximum 125.  Low burden PAC/PVC less than 1%. Patient trigerred events not associated with arrhythmia.  -TTE as above    4. Benign essential hypertension  -Reasonable control on home monitoring, well controlled today  -Goal BP<130/80  -Initiate spironolactone 12.5mg daily for further control  -Continue HCTZ as prescribed  -Home BP monitoring      Follow Up {Instructions Charge Capture  Follow-up Communications :23}     Return in about 1 month (around 11/30/2023) for Office follow-up, BP check, lab work.    Time Spent: I spent 40 minutes caring for Brittny Han on this date of service. This time includes time spent by me in the following activities: preparing for the visit, reviewing tests, obtaining and/or reviewing a separately obtained history, performing a medically appropriate examination and/or evaluation, counseling and educating the patient/family/caregiver, documenting information in the medical record and independently interpreting results and communicating that information with the patient/family/caregiver. All time noted occurred on the date of service.      Patient was given instructions and counseling regarding her condition or for health maintenance advice. Please see specific information pulled into the AVS if appropriate.  Patient was instructed to call the [facility] with any questions, concerns, or worsening symptoms.    Dictated Utilizing Dragon Dictation   Please note that portions of this note were completed with a voice recognition program.   Part of this note may be an electronic transcription/translation of spoken language to  printed text using the Dragon Dictation System.

## 2023-11-20 ENCOUNTER — SPECIALTY PHARMACY (OUTPATIENT)
Dept: PHARMACY | Facility: TELEHEALTH | Age: 63
End: 2023-11-20
Payer: COMMERCIAL

## 2023-11-20 NOTE — PROGRESS NOTES
"Specialty Pharmacy Refill Coordination Note     Brittny \"Padmini\" is a 63 y.o. female contacted today regarding refills of  Dupixent specialty medication(s).    Reviewed and verified with patient:  Allergies  Meds       Specialty medication(s) and dose(s) confirmed: yes    Refill Questions      Flowsheet Row Most Recent Value   Changes to allergies? No   Changes to medications? Yes   [Patient started Spironolactone 12.5mg]   New conditions since last clinic visit No   Unplanned office visit, urgent care, ED, or hospital admission in the last 4 weeks  No   How does patient/caregiver feel medication is working? Very good   Financial problems or insurance changes  No   If yes, describe changes in insurance or financial issues. N/A   Since the previous refill, were any specialty medication doses or scheduled injections missed or delayed?  No  [Next Dose: 11/26/23 (Per patient)]   If yes, please provide the amount N/A   Why were doses missed? N/A   Does this patient require a clinical escalation to a pharmacist? Yes   [New medication]                  Medication Adherence          Adherence tools used: directed education      Support network for adherence: family member                Follow-up: 23 day(s)     Je No, Pharmacy Technician  Specialty Pharmacy Technician        "

## 2023-11-29 RX ORDER — LEVOTHYROXINE SODIUM 0.07 MG/1
75 TABLET ORAL DAILY
Qty: 30 TABLET | Refills: 5 | OUTPATIENT
Start: 2023-11-29

## 2023-11-29 RX ORDER — MULTIPLE VITAMINS W/ MINERALS TAB 9MG-400MCG
TAB ORAL
Status: CANCELLED | OUTPATIENT
Start: 2023-11-29

## 2023-11-29 RX ORDER — VENLAFAXINE HYDROCHLORIDE 75 MG/1
75 CAPSULE, EXTENDED RELEASE ORAL DAILY
Qty: 90 CAPSULE | Refills: 3 | OUTPATIENT
Start: 2023-11-29

## 2023-11-29 RX ORDER — HYDROCHLOROTHIAZIDE 25 MG/1
25 TABLET ORAL DAILY
Qty: 90 TABLET | Refills: 3 | OUTPATIENT
Start: 2023-11-29

## 2023-11-29 NOTE — TELEPHONE ENCOUNTER
Rx Refill Note  Requested Prescriptions     Pending Prescriptions Disp Refills    multivitamin with minerals (CENTRUM SILVER PO)        Last office visit with prescribing clinician: 9/19/2023     Next office visit with prescribing clinician: 12/21/2023       Nubia Dominique MA  11/29/23, 10:05 EST

## 2023-11-30 ENCOUNTER — LAB (OUTPATIENT)
Dept: LAB | Facility: HOSPITAL | Age: 63
End: 2023-11-30
Payer: COMMERCIAL

## 2023-11-30 ENCOUNTER — OFFICE VISIT (OUTPATIENT)
Dept: CARDIOLOGY | Facility: HOSPITAL | Age: 63
End: 2023-11-30
Payer: COMMERCIAL

## 2023-11-30 VITALS
TEMPERATURE: 96.6 F | OXYGEN SATURATION: 95 % | BODY MASS INDEX: 36.41 KG/M2 | RESPIRATION RATE: 18 BRPM | HEIGHT: 67 IN | SYSTOLIC BLOOD PRESSURE: 133 MMHG | DIASTOLIC BLOOD PRESSURE: 64 MMHG | WEIGHT: 232 LBS | HEART RATE: 81 BPM

## 2023-11-30 DIAGNOSIS — I10 BENIGN ESSENTIAL HYPERTENSION: ICD-10-CM

## 2023-11-30 LAB
ANION GAP SERPL CALCULATED.3IONS-SCNC: 12 MMOL/L (ref 5–15)
BUN SERPL-MCNC: 18 MG/DL (ref 8–23)
BUN/CREAT SERPL: 19.6 (ref 7–25)
CALCIUM SPEC-SCNC: 9.9 MG/DL (ref 8.6–10.5)
CHLORIDE SERPL-SCNC: 101 MMOL/L (ref 98–107)
CO2 SERPL-SCNC: 27 MMOL/L (ref 22–29)
CREAT SERPL-MCNC: 0.92 MG/DL (ref 0.57–1)
EGFRCR SERPLBLD CKD-EPI 2021: 71 ML/MIN/1.73
GLUCOSE SERPL-MCNC: 97 MG/DL (ref 65–99)
POTASSIUM SERPL-SCNC: 3.8 MMOL/L (ref 3.5–5.2)
SODIUM SERPL-SCNC: 140 MMOL/L (ref 136–145)

## 2023-11-30 PROCEDURE — 36415 COLL VENOUS BLD VENIPUNCTURE: CPT

## 2023-11-30 PROCEDURE — 80048 BASIC METABOLIC PNL TOTAL CA: CPT

## 2023-11-30 RX ORDER — SPIRONOLACTONE 25 MG/1
25 TABLET ORAL DAILY
Qty: 90 TABLET | Refills: 1 | Status: SHIPPED | OUTPATIENT
Start: 2023-11-30

## 2023-11-30 NOTE — PATIENT INSTRUCTIONS
- Labs on 7th floor    - Increase spironolactone to 25mg daily    - Will consider carvedilol for additional blood pressure control if needed or if palpitations worsen

## 2023-11-30 NOTE — PROGRESS NOTES
"Chief Complaint  Hypertension    Subjective      History of Present Illness {  Problem List  Visit  Diagnosis   Encounters  Notes  Medications  Labs  Result Review Imaging  Media :23}     Brittny Han, 63 y.o. female with asthma, HTN, prediabetes, obesity, ALANNA on CPAP presents to [facility] for Hypertension.    Presents today with continued slightly elevated blood pressure after initiation of spironolactone. SBP generally 132-134/70-80s. Still with intermittent palpitation a couple of nights. No lightheadedness/syncope.     Previous evaluation:  Since previous evaluation patient completed echocardiogram that revealed normal LVEF, mild atrial dilation.  Exercise myocardial perfusion study with normal perfusion study, low risk study reported. Holter monitor completed with diagnostic time 5 days, 12 hours.  Monitor reported as normal study.  No evidence of atrial fibrillation/flutter, AV block/pauses, VT.  Average heart rate 81, minimum 59, maximum 125.  Low burden PAC/PVC less than 1%. Patient trigerred events not associated with arrhythmia.    Presents today with continued intermittent shortness of breath since April. Symptoms slightly improve with inhaler use. Chest tightness has essentially resolved. Still with intermittent rare palpitation\ described as a harder heartbeat, 3-4 times per week; worsened at night before falling asleep.  No tachypalpitation, syncope. SBP generally 130s. Spironolactone 12.5mg daily initiated for further afterload control.      Objective     Vital Signs:   Vitals:    11/30/23 0801   BP: 133/64   BP Location: Left arm   Patient Position: Sitting   Cuff Size: Large Adult   Pulse: 81   Resp: 18   Temp: 96.6 °F (35.9 °C)   TempSrc: Temporal   SpO2: 95%   Weight: 105 kg (232 lb)   Height: 170.2 cm (67\")       Body mass index is 36.34 kg/m².  Physical Exam  Vitals and nursing note reviewed.   Constitutional:       Appearance: Normal appearance. "   HENT:      Head: Normocephalic.   Eyes:      Extraocular Movements: Extraocular movements intact.   Neck:      Vascular: No carotid bruit.   Cardiovascular:      Rate and Rhythm: Normal rate and regular rhythm.      Pulses: Normal pulses.      Heart sounds: Normal heart sounds, S1 normal and S2 normal. No murmur heard.  Pulmonary:      Effort: Pulmonary effort is normal. No respiratory distress.      Breath sounds: Normal breath sounds.   Musculoskeletal:      Cervical back: Neck supple.      Right lower leg: No edema.      Left lower leg: No edema.   Skin:     General: Skin is warm and dry.   Neurological:      General: No focal deficit present.      Mental Status: She is alert.   Psychiatric:         Mood and Affect: Mood normal.         Behavior: Behavior normal.         Thought Content: Thought content normal.        Data Reviewed:{ Labs  Result Review  Imaging  Med Tab  Media :23}     Adult Transthoracic Echo Complete W/ Cont if Necessary Per Protocol (10/16/2023 09:54)  Holter Monitor - 72 Hour Up To 15 Days (09/26/2023 13:49)  Stress Test With Myocardial Perfusion (1 Day) (10/16/2023 11:09)    ECG 12 Lead (09/26/2023 09:05)   Vitamin D,25-Hydroxy (09/19/2023 15:10)  TSH+Free T4 (09/19/2023 15:10)  Hemoglobin A1c (09/19/2023 15:10)  CBC & Differential (09/19/2023 15:10)  Comprehensive Metabolic Panel (09/19/2023 15:10)  ECG 12 Lead (03/12/2021 13:50)     Assessment & Plan   Assessment and Plan {CC Problem List  Visit Diagnosis  ROS  Review (Popup)  Health Maintenance  Quality  BestPractice  Medications  SmartSets  SnapShot Encounters  Media :23}     1. Shortness of breath  -Intermittent dyspnea; mixed rest and exertional symptoms. In setting of asthma.  -Likely pulmonary related given her unremarkable TTE and stress test  -Echocardiogram that revealed normal LVEF, mild atrial dilation.    -Exercise myocardial perfusion study with normal perfusion study, low risk study reported.  -Will focus  on further afterload control and re-evaluate  -Continue scheduled follow-up with pulmonary as scheduled    2. Palpitations  -Intermittent palpitation.  No syncope.  -Holter monitor completed with diagnostic time 5 days, 12 hours.  Monitor reported as normal study.  No evidence of atrial fibrillation/flutter, AV block/pauses, VT.  Average heart rate 81, minimum 59, maximum 125.  Low burden PAC/PVC less than 1%. Patient trigerred events not associated with arrhythmia.  -TTE as above    3. Benign essential hypertension  -Reasonable control on home monitoring/today  -Goal BP<130/80  -Increase spironolactone to 25mg daily  - Will consider carvedilol for additional blood pressure control if needed or if palpitations worsen   -Continue HCTZ as prescribed  -Home BP monitoring      Follow Up {Instructions Charge Capture  Follow-up Communications :23}     Return in about 3 weeks (around 12/21/2023) for Video visit, BP check.      Patient was given instructions and counseling regarding her condition or for health maintenance advice. Please see specific information pulled into the AVS if appropriate.  Patient was instructed to call the [facility] with any questions, concerns, or worsening symptoms.    Dictated Utilizing Dragon Dictation   Please note that portions of this note were completed with a voice recognition program.   Part of this note may be an electronic transcription/translation of spoken language to printed text using the Dragon Dictation System.

## 2023-12-07 ENCOUNTER — OFFICE VISIT (OUTPATIENT)
Dept: PULMONOLOGY | Facility: CLINIC | Age: 63
End: 2023-12-07
Payer: COMMERCIAL

## 2023-12-07 VITALS
DIASTOLIC BLOOD PRESSURE: 82 MMHG | OXYGEN SATURATION: 97 % | HEART RATE: 85 BPM | TEMPERATURE: 97.5 F | HEIGHT: 67 IN | WEIGHT: 230.2 LBS | BODY MASS INDEX: 36.13 KG/M2 | SYSTOLIC BLOOD PRESSURE: 138 MMHG

## 2023-12-07 DIAGNOSIS — R05.3 CHRONIC COUGH: Primary | ICD-10-CM

## 2023-12-07 DIAGNOSIS — J30.89 ENVIRONMENTAL AND SEASONAL ALLERGIES: ICD-10-CM

## 2023-12-07 DIAGNOSIS — J45.40 MODERATE PERSISTENT ASTHMA, UNSPECIFIED WHETHER COMPLICATED: ICD-10-CM

## 2023-12-07 PROBLEM — J30.2 SEASONAL AND PERENNIAL ALLERGIC RHINITIS: Status: RESOLVED | Noted: 2018-02-08 | Resolved: 2023-12-07

## 2023-12-07 PROBLEM — R19.4 CHANGE IN BOWEL HABITS: Status: RESOLVED | Noted: 2020-09-18 | Resolved: 2023-12-07

## 2023-12-07 PROCEDURE — 99214 OFFICE O/P EST MOD 30 MIN: CPT | Performed by: NURSE PRACTITIONER

## 2023-12-07 RX ORDER — VENLAFAXINE HYDROCHLORIDE 75 MG/1
75 CAPSULE, EXTENDED RELEASE ORAL DAILY
Qty: 90 CAPSULE | Refills: 3 | Status: SHIPPED | OUTPATIENT
Start: 2023-12-07

## 2023-12-07 RX ORDER — AZITHROMYCIN 250 MG/1
TABLET, FILM COATED ORAL
Qty: 6 TABLET | Refills: 0 | Status: SHIPPED | OUTPATIENT
Start: 2023-12-07

## 2023-12-07 RX ORDER — FLUTICASONE PROPIONATE AND SALMETEROL 500; 50 UG/1; UG/1
1 POWDER RESPIRATORY (INHALATION) 2 TIMES DAILY
Qty: 60 EACH | Refills: 3 | Status: SHIPPED | OUTPATIENT
Start: 2023-12-07

## 2023-12-07 RX ORDER — LEVOTHYROXINE SODIUM 0.07 MG/1
75 TABLET ORAL DAILY
Qty: 30 TABLET | Refills: 5 | OUTPATIENT
Start: 2023-12-07

## 2023-12-07 RX ORDER — METHYLPREDNISOLONE 4 MG/1
TABLET ORAL
Qty: 21 TABLET | Refills: 0 | Status: SHIPPED | OUTPATIENT
Start: 2023-12-07

## 2023-12-07 RX ORDER — HYDROCHLOROTHIAZIDE 25 MG/1
25 TABLET ORAL DAILY
Qty: 90 TABLET | Refills: 3 | Status: SHIPPED | OUTPATIENT
Start: 2023-12-07

## 2023-12-07 NOTE — PROGRESS NOTES
"[facility] Pulmonary Follow up      Chief Complaint  Cough (Sick)    Subjective          Brittny Han presents to [facility] for evaluation of her worsening cough.  She does have a chronic cough but it has worsened since the summer.  She is having trouble with waking up at night coughing.  Last night she cleaned out her CPAP and changed her filter to see if that was the cause of the cough.  She does use the Mucinex which does seem to help.  She does have quite a bit of postnasal drainage and sinus congestion.  She feels like the cough is dry and coming from the middle of her chest she feels like there is cotton balls in her chest.    She says she does have some reflux symptoms.    She does follow-up with Dr. Casillas annually and is on allergy injections.  She is also seen him in the past for thyroid issue.        Objective     Vital Signs:   /82 (BP Location: Left arm, Patient Position: Sitting, Cuff Size: Adult)   Pulse 85   Temp 97.5 °F (36.4 °C) (Infrared)   Ht 170.2 cm (67\")   Wt 104 kg (230 lb 3.2 oz)   SpO2 97% Comment: room air at rest  BMI 36.05 kg/m²       Immunization History   Administered Date(s) Administered    COVID-19 (PFIZER) Purple Cap Monovalent 04/19/2021, 05/10/2021, 12/27/2021    Flu Vaccine Intradermal Quad 18-64YR 10/06/2014, 10/01/2015    Flu Vaccine Quad PF >36MO 10/10/2018    Flu Vaccine Split Quad 10/10/2018    Flublok 18+yrs 10/03/2020, 11/15/2022    Fluzone (or Fluarix & Flulaval for VFC) >6mos 11/03/2021    Influenza TIV (IM) 10/06/2014, 10/01/2015    Influenza, Unspecified 10/21/2019, 09/23/2020    Pneumococcal Conjugate 13-Valent (PCV13) 04/04/2016    Pneumococcal Polysaccharide (PPSV23) 10/06/2009, 10/10/2014, 12/23/2020    Shingrix 03/01/2021, 06/04/2021    TD Preservative Free (Tenivac) 05/16/2020    Tdap 02/12/2010       Physical Exam  Vitals reviewed.   Constitutional:       Appearance: She is well-developed. "   HENT:      Head: Normocephalic and atraumatic.   Eyes:      Pupils: Pupils are equal, round, and reactive to light.   Cardiovascular:      Rate and Rhythm: Normal rate and regular rhythm.      Heart sounds: No murmur heard.  Pulmonary:      Effort: Pulmonary effort is normal. No respiratory distress.      Breath sounds: Normal breath sounds. No wheezing or rales.   Abdominal:      General: Bowel sounds are normal. There is no distension.      Palpations: Abdomen is soft.   Musculoskeletal:         General: Normal range of motion.      Cervical back: Normal range of motion and neck supple.   Skin:     General: Skin is warm and dry.      Findings: No erythema.   Neurological:      Mental Status: She is alert and oriented to person, place, and time.   Psychiatric:         Behavior: Behavior normal.          Result Review :                           Assessment and Plan    Problem List Items Addressed This Visit          Allergies and Adverse Reactions    Environmental and seasonal allergies       Pulmonary and Pneumonias    Moderate persistent asthma    Relevant Medications    Fluticasone-Salmeterol (Advair Diskus) 500-50 MCG/ACT DISKUS     Other Visit Diagnoses       Chronic cough    -  Primary    Relevant Orders    CT Chest Hi Resolution Diagnostic          At this time she is on full treatment for her asthma and allergies.  She is on combination inhalers including ICS/LABA/LAMA.  She is on antihistamines and nasal sprays.  She takes allergy injections with ENT.  She is also on Dupixent.  She has no bronchospasms today in the office.    We did discuss we may need to figure out another cause of this worsening cough since the summer.  She does have quite a bit of postnasal drainage and sinus congestion as well as some reflux history.  Wondering if she is having some vocal cord issues.  I suggest that she follow-up with Dr. Casillas her ENT for further evaluation of her cough.    I also like to follow-up on  high-resolution CT scan to rule out any bronchiectasis as well as full PFTs on her next visit.          Follow Up     No follow-ups on file.  Patient was given instructions and counseling regarding her condition or for health maintenance advice. Please see specific information pulled into the AVS if appropriate.         BLANCA Jackson, ACNP  [facility]  Electronically signed

## 2023-12-07 NOTE — TELEPHONE ENCOUNTER
Rx Refill Note  Requested Prescriptions     Pending Prescriptions Disp Refills    venlafaxine XR (EFFEXOR-XR) 75 MG 24 hr capsule 90 capsule 3     Sig: Take 1 capsule by mouth Daily.    hydroCHLOROthiazide (HYDRODIURIL) 25 MG tablet 90 tablet 3     Sig: Take 1 tablet by mouth Daily.      Last office visit with prescribing clinician: 9/19/2023   Last telemedicine visit with prescribing clinician: Visit date not found   Next office visit with prescribing clinician: 12/7/2023                         Would you like a call back once the refill request has been completed: [] Yes [] No    If the office needs to give you a call back, can they leave a voicemail: [] Yes [] No    Heather Garcia MA  12/07/23, 15:32 EST

## 2023-12-14 ENCOUNTER — SPECIALTY PHARMACY (OUTPATIENT)
Dept: PHARMACY | Facility: TELEHEALTH | Age: 63
End: 2023-12-14
Payer: COMMERCIAL

## 2023-12-14 NOTE — PROGRESS NOTES
"Specialty Pharmacy Refill Coordination Note     Brittny \"Padmini\" is a 63 y.o. female contacted today regarding refills of  [employer] specialty medication(s).    Reviewed and verified with patient:  Allergies  Meds       Specialty medication(s) and dose(s) confirmed: yes    Refill Questions      Flowsheet Row Most Recent Value   Changes to allergies? No   Changes to medications? No   New conditions or infections since last clinic visit No   Unplanned office visit, urgent care, ED, or hospital admission in the last 4 weeks  Yes   [Patient could not stop coughing, was told she had dry vocal cords.]   How does patient/caregiver feel medication is working? Very good   Financial problems or insurance changes  No   If yes, describe changes in insurance or financial issues. N/A   Since the previous refill, were any specialty medication doses or scheduled injections missed or delayed?  Yes   [Next dose due 12/23]   If yes, please provide the amount 1   Why were doses missed? Dose was delayed by 3 days. Patient forgot to take.    Does this patient require a clinical escalation to a pharmacist? Yes            Delivery Questions      Flowsheet Row Most Recent Value   Delivery method FedEx   Delivery address verified with patient/caregiver? Yes   Delivery address Home   Number of medications in delivery 1   Medication(s) being filled and delivered Dupilumab   Doses left of specialty medications 1   Copay verified? Yes   Copay amount [id number]   Copay form of payment Credit/debit on file   [Dupixent [facility]]              Medication Adherence          Adherence tools used: directed education      Support network for adherence: family member                Follow-up: 23 day(s)     Je No, Pharmacy Technician  Specialty Pharmacy Technician        "

## 2023-12-22 ENCOUNTER — TELEMEDICINE (OUTPATIENT)
Dept: CARDIOLOGY | Facility: HOSPITAL | Age: 63
End: 2023-12-22
Payer: COMMERCIAL

## 2023-12-22 VITALS — DIASTOLIC BLOOD PRESSURE: 78 MMHG | HEART RATE: 83 BPM | SYSTOLIC BLOOD PRESSURE: 139 MMHG

## 2023-12-22 DIAGNOSIS — I10 BENIGN ESSENTIAL HYPERTENSION: Primary | ICD-10-CM

## 2023-12-22 DIAGNOSIS — R06.02 SHORTNESS OF BREATH: ICD-10-CM

## 2023-12-22 DIAGNOSIS — R00.2 PALPITATIONS: ICD-10-CM

## 2023-12-22 RX ORDER — SPIRONOLACTONE 25 MG/1
25 TABLET ORAL DAILY
Qty: 90 TABLET | Refills: 1 | Status: SHIPPED | OUTPATIENT
Start: 2023-12-22

## 2023-12-22 RX ORDER — CARVEDILOL 6.25 MG/1
6.25 TABLET ORAL 2 TIMES DAILY
Qty: 180 TABLET | Refills: 1 | Status: SHIPPED | OUTPATIENT
Start: 2023-12-22

## 2023-12-22 NOTE — PROGRESS NOTES
Mode of visit: Video  Location of patient: Home   You have chosen to receive care through the use of telemedicine. Telemedicine enables health care providers at different locations to provide safe, effective, and convenient care through the use of technology. As with any health care service, there are risks associated with the use of telemedicine, including equipment failure, poor connections, and  issues.  Do you understand the risks and benefits of telemedicine as I have explained them to you? Yes  Have your questions regarding telemedicine been answered? Yes  Do you consent to the use of telemedicine in your medical care today? Yes      Chief Complaint  Hypertension (Follow-up)    [facility]  [facility]  Telemedicine note    This was a video enabled telemedicine encounter.    Subjective    History of Present Illness {CC  Problem List  Visit  Diagnosis   Encounters  Notes  Medications  Labs  Result Review Imaging  Media :23}     Brittny Morejon Guille, 63 y.o. female with asthma, HTN, prediabetes, obesity, ALANNA on CPAP presents to [facility] telemedicine visit for Hypertension (Follow-up).    Since previous evaluation recent blood pressure average 139/78; other SBP readings of 122,132, 132, 143, 145. No recurrent chest pain. Intermittent palpitations while at rest continue. Shortness of breath improved, pulmonary appointment scheduled next week, recent treatment with azithromycin and medrol dose pack.      Objective     Vital Signs:   Vitals:    12/22/23 1236   BP: 139/78   Pulse: 83     There is no height or weight on file to calculate BMI.  Physical Exam  Constitutional:       Appearance: Normal appearance.   HENT:      Head: Normocephalic.   Pulmonary:      Effort: Pulmonary effort is normal. No respiratory distress.   Neurological:      Mental Status: She is alert.   Psychiatric:         Mood and Affect: Mood normal.         Behavior:  Behavior normal.         Thought Content: Thought content normal.        Data Reviewed:{ Labs  Result Review  Imaging  Med Tab  Media :23}     Adult Transthoracic Echo Complete W/ Cont if Necessary Per Protocol (10/16/2023 09:54)  Holter Monitor - 72 Hour Up To 15 Days (09/26/2023 13:49)  Stress Test With Myocardial Perfusion (1 Day) (10/16/2023 11:09)     ECG 12 Lead (09/26/2023 09:05)   Vitamin D,25-Hydroxy (09/19/2023 15:10)  TSH+Free T4 (09/19/2023 15:10)  Hemoglobin A1c (09/19/2023 15:10)  CBC & Differential (09/19/2023 15:10)  Comprehensive Metabolic Panel (09/19/2023 15:10)  ECG 12 Lead (03/12/2021 13:50)       Assessment and Plan {CC Problem List  Visit Diagnosis  ROS  Review (Popup)  Health Maintenance  Quality  BestPractice  Medications  SmartSets  SnapShot Encounters  Media :23}     This visit has been scheduled as a video visit to comply with patient safety concerns in accordance with CDC recommendations.       1. Benign essential hypertension  -Continued slightly elevated pressures on home monitoring  -Goal BP<130/80  -Recent blood pressure average 139/78  Initiate carvedilol 6.25mg BID  -Continue HCTZ, spironolactone as prescribed  -Home BP monitoring     2. Palpitations  -Intermittent palpitation.  No syncope.  -Holter monitor completed with diagnostic time 5 days, 12 hours.  Monitor reported as normal study.  No evidence of atrial fibrillation/flutter, AV block/pauses, VT.  Average heart rate 81, minimum 59, maximum 125.  Low burden PAC/PVC less than 1%. Patient trigerred events not associated with arrhythmia.  -TTE with preserved LVEF, no significant valvular abnormality.  -Initiate carvedilol as above for further afterload control, palpitations     3. Shortness of breath  -Intermittent dyspnea but recently slightly improved. Likely pulmonary related given her unremarkable TTE and stress test  -Echocardiogram that revealed normal LVEF, mild atrial dilation.    -Exercise myocardial  perfusion study with normal perfusion study, low risk study reported.  -Will focus on further afterload control and re-evaluate  -Continue scheduled follow-up with pulmonary as scheduled      Follow Up {Instructions Charge Capture  Follow-up Communications :23}   Return in about 1 month (around 1/22/2024) for Office follow-up, BP check.    Time Spent: I spent a total of 26 minutes caring for Brittny Han on this date of service. This time includes time spent by me in the following activities: preparing for the visit, reviewing tests, obtaining and/or reviewing a separately obtained history, performing a medically appropriate examination and/or evaluation, counseling and educating the patient/family/caregiver, documenting information in the medical record and independently interpreting results and communicating that information with the patient/family/caregiver. All time noted occurred on the date of service.    Patient was given instructions and counseling regarding her condition or for health maintenance advice. Please see specific information pulled into the AVS if appropriate.  Patient was instructed to call the [facility] Center with any questions, concerns, or worsening symptoms.    *Please note that portions of this note were completed with a voice recognition program. Efforts were made to edit the dictations, but occasionally words are mistranscribed.

## 2023-12-29 ENCOUNTER — OFFICE VISIT (OUTPATIENT)
Dept: PULMONOLOGY | Facility: CLINIC | Age: 63
End: 2023-12-29
Payer: COMMERCIAL

## 2023-12-29 VITALS
HEIGHT: 67 IN | SYSTOLIC BLOOD PRESSURE: 132 MMHG | TEMPERATURE: 96.8 F | DIASTOLIC BLOOD PRESSURE: 80 MMHG | RESPIRATION RATE: 16 BRPM | WEIGHT: 233.38 LBS | HEART RATE: 73 BPM | BODY MASS INDEX: 36.63 KG/M2 | OXYGEN SATURATION: 96 %

## 2023-12-29 DIAGNOSIS — R05.3 CHRONIC COUGH: ICD-10-CM

## 2023-12-29 DIAGNOSIS — J45.50 SEVERE PERSISTENT ASTHMA, UNSPECIFIED WHETHER COMPLICATED: ICD-10-CM

## 2023-12-29 DIAGNOSIS — J30.89 ENVIRONMENTAL AND SEASONAL ALLERGIES: ICD-10-CM

## 2023-12-29 PROBLEM — J45.40 MODERATE PERSISTENT ASTHMA: Status: RESOLVED | Noted: 2017-05-12 | Resolved: 2023-12-29

## 2023-12-29 PROCEDURE — 99214 OFFICE O/P EST MOD 30 MIN: CPT | Performed by: NURSE PRACTITIONER

## 2023-12-29 RX ORDER — AMOXICILLIN AND CLAVULANATE POTASSIUM 875; 125 MG/1; MG/1
1 TABLET, FILM COATED ORAL 2 TIMES DAILY
Qty: 20 TABLET | Refills: 0 | Status: SHIPPED | OUTPATIENT
Start: 2023-12-29

## 2023-12-29 NOTE — PROGRESS NOTES
"[facility] Pulmonary Follow up      Chief Complaint  Cough (F/u)    Subjective          Brittny Han presents to [facility] for follow-up on her cough.  I last saw her on December 7 when she was having quite a bit of worsening cough.  She does have a history of asthma and allergies and is on a combination inhaler as well as antihistamines and nasal sprays.  She also gets allergy shots with ENT.  The plan was for a high-resolution CT follow-up for any other causes of her cough, and full PFTs.  She is here today to get her full PFTs but her CT scan is not scheduled until January 11.      She does continue have quite a bit of a chronic cough, she does feel like she had a bit more congestion since Christmas with sinus congestion and drainage.    She is also followed up with ENT since I last saw her, she was told she had dry vocal cords which could be causing her cough.  She continues on her Xyzal daily and takes Alavert as needed.        Objective     Vital Signs:   /80   Pulse 73   Temp 96.8 °F (36 °C)   Resp 16   Ht 170.2 cm (67.01\")   Wt 106 kg (233 lb 6 oz)   SpO2 96% Comment: room air at rest  BMI 36.54 kg/m²       Immunization History   Administered Date(s) Administered    ABRYSVO (RSV, 60+ or pregnant women 32-36 wks) 10/19/2023    COVID-19 (PFIZER) Purple Cap Monovalent 04/19/2021, 05/10/2021, 12/27/2021    Flu Vaccine Intradermal Quad 18-64YR 10/06/2014, 10/01/2015    Flu Vaccine Quad PF >36MO 10/10/2018    Flu Vaccine Split Quad 10/10/2018    Flublok 18+yrs 10/03/2020, 11/15/2022, 10/19/2023    Fluzone (or Fluarix & Flulaval for VFC) >6mos 11/03/2021    Influenza TIV (IM) 10/06/2014, 10/01/2015    Influenza, Unspecified 10/21/2019, 09/23/2020    Pneumococcal Conjugate 13-Valent (PCV13) 04/04/2016    Pneumococcal Polysaccharide (PPSV23) 10/06/2009, 10/10/2014, 12/23/2020    Shingrix 03/01/2021, 06/04/2021    TD Preservative Free (Tenivac) " 05/16/2020    Tdap 02/12/2010       Physical Exam  Vitals reviewed.   Constitutional:       Appearance: She is well-developed.   HENT:      Head: Normocephalic and atraumatic.   Eyes:      Pupils: Pupils are equal, round, and reactive to light.   Cardiovascular:      Rate and Rhythm: Normal rate and regular rhythm.      Heart sounds: No murmur heard.  Pulmonary:      Effort: Pulmonary effort is normal. No respiratory distress.      Breath sounds: Normal breath sounds. No wheezing or rales.   Abdominal:      General: Bowel sounds are normal. There is no distension.      Palpations: Abdomen is soft.   Musculoskeletal:         General: Normal range of motion.      Cervical back: Normal range of motion and neck supple.   Skin:     General: Skin is warm and dry.      Findings: No erythema.   Neurological:      Mental Status: She is alert and oriented to person, place, and time.   Psychiatric:         Behavior: Behavior normal.          Result Review :            PFTs done in the office today:  No obstruction or restriction, normal PFTs.                   Assessment and Plan    Problem List Items Addressed This Visit          Allergies and Adverse Reactions    Environmental and seasonal allergies       Pulmonary and Pneumonias    Severe persistent asthma - Primary    Chronic cough       She does have a chronic cough is multifactorial including her underlying asthma and reactive airway disease.  She also has issues with ENT and has been found to have dry vocal cords.    We did discuss her medication use, currently she is on 2 antihistamines as well as 2 diuretics.  We discussed those of both very drying.  She will continue on her ICS/LABA, we did discuss the importance of rinsing her mouth after brushing her teeth after each use.  She does use Biotene as needed as well.    I will follow-up with her once we get her high-resolution CT scan back, to rule out any other causes of her chronic cough.    Follow Up     Return in  about 3 months (around 3/29/2024).  Patient was given instructions and counseling regarding her condition or for health maintenance advice. Please see specific information pulled into the AVS if appropriate.         Excluding time spent on other separate services such as performing procedures or test interpretation, if applicable    Moderate level of Medical Decision Making complexity based on 2 or more chronic stable illnesses and prescription drug management.    BLANCA Jackson, ACNP  [facility]  Electronically signed

## 2024-01-11 ENCOUNTER — HOSPITAL ENCOUNTER (OUTPATIENT)
Dept: CT IMAGING | Facility: HOSPITAL | Age: 64
Discharge: HOME | End: 2024-01-11
Admitting: NURSE PRACTITIONER
Payer: COMMERCIAL

## 2024-01-11 DIAGNOSIS — R05.3 CHRONIC COUGH: ICD-10-CM

## 2024-01-11 PROCEDURE — 71250 CT THORAX DX C-: CPT

## 2024-01-16 ENCOUNTER — SPECIALTY PHARMACY (OUTPATIENT)
Dept: PHARMACY | Facility: TELEHEALTH | Age: 64
End: 2024-01-16
Payer: COMMERCIAL

## 2024-01-16 NOTE — PROGRESS NOTES
"Specialty Pharmacy Refill Coordination Note     Brittny \"Padmini\" is a 64 y.o. female contacted today regarding refills of  Dupixent specialty medication(s).    Reviewed and verified with patient:       Specialty medication(s) and dose(s) confirmed: yes    Refill Questions      Flowsheet Row Most Recent Value   Changes to allergies? No   Changes to medications? No   New conditions or infections since last clinic visit No   Unplanned office visit, urgent care, ED, or hospital admission in the last 4 weeks  No   How does patient/caregiver feel medication is working? Very good   Financial problems or insurance changes  No   If yes, describe changes in insurance or financial issues. N/A   Since the previous refill, were any specialty medication doses or scheduled injections missed or delayed?  No  [Next dose due 1/21]   If yes, please provide the amount N/A   Why were doses missed? N/A   Does this patient require a clinical escalation to a pharmacist? No            Delivery Questions      Flowsheet Row Most Recent Value   Delivery method FedEx   Delivery address verified with patient/caregiver? Yes   Delivery address Home   Number of medications in delivery 1   Medication(s) being filled and delivered Dupilumab   Doses left of specialty medications 1   Copay verified? Yes   Copay amount 1198.00   Copay form of payment Credit/debit on file  [Dupixent CC]              Medication Adherence          Adherence tools used: directed education      Support network for adherence: family member                Follow-up: 23 day(s)     Je No, Pharmacy Technician  Specialty Pharmacy Technician        "

## 2024-01-22 ENCOUNTER — SPECIALTY PHARMACY (OUTPATIENT)
Dept: PHARMACY | Facility: TELEHEALTH | Age: 64
End: 2024-01-22
Payer: COMMERCIAL

## 2024-01-25 ENCOUNTER — OFFICE VISIT (OUTPATIENT)
Dept: CARDIOLOGY | Facility: HOSPITAL | Age: 64
End: 2024-01-25
Payer: COMMERCIAL

## 2024-01-25 VITALS
OXYGEN SATURATION: 97 % | DIASTOLIC BLOOD PRESSURE: 67 MMHG | TEMPERATURE: 97.4 F | HEART RATE: 73 BPM | SYSTOLIC BLOOD PRESSURE: 131 MMHG | HEIGHT: 67 IN | WEIGHT: 234.6 LBS | RESPIRATION RATE: 18 BRPM | BODY MASS INDEX: 36.82 KG/M2

## 2024-01-25 DIAGNOSIS — I10 BENIGN ESSENTIAL HYPERTENSION: Primary | ICD-10-CM

## 2024-01-25 RX ORDER — CARVEDILOL 12.5 MG/1
12.5 TABLET ORAL 2 TIMES DAILY
Qty: 180 TABLET | Refills: 1 | Status: SHIPPED | OUTPATIENT
Start: 2024-01-25

## 2024-01-25 RX ORDER — SPIRONOLACTONE AND HYDROCHLOROTHIAZIDE 25; 25 MG/1; MG/1
1 TABLET ORAL DAILY
Qty: 90 TABLET | Refills: 1 | Status: SHIPPED | OUTPATIENT
Start: 2024-01-25

## 2024-01-25 NOTE — PROGRESS NOTES
"Chief Complaint  Hypertension    Subjective      History of Present Illness {  Problem List  Visit  Diagnosis   Encounters  Notes  Medications  Labs  Result Review Imaging  Media :23}     Brittny Han, 64 y.o. female with HTN, palpitations, asthma, prediabetes, obesity, ALANNA on CPAP presents to [facility] for Hypertension.      Presents today reporting reporting blood pressures generally less than 140s, typically 134/80s. Blood pressure monitoring trisha reported average SBP less than 130. Recent pulmonary appointment, and reports breathing has been ok lately but still with some intermittent dyspnea. Palpitations generally well controlled.       Previous evaluation:  Presents today with continued slightly elevated blood pressure after initiation of spironolactone. SBP generally 132-134/70-80s. Still with intermittent palpitation a couple of nights. No lightheadedness/syncope.     Previous echocardiogram that revealed normal LVEF, mild atrial dilation.  Exercise myocardial perfusion study with normal perfusion study, low risk study reported. Holter monitor completed with diagnostic time 5 days, 12 hours.  Monitor reported as normal study.  No evidence of atrial fibrillation/flutter, AV block/pauses, VT.  Average heart rate 81, minimum 59, maximum 125.  Low burden PAC/PVC less than 1%. Patient trigerred events not associated with arrhythmia.      Objective     Vital Signs:   Vitals:    01/25/24 1450   BP: 131/67   BP Location: Left arm   Patient Position: Sitting   Cuff Size: Adult   Pulse: 73   Resp: 18   Temp: 97.4 °F (36.3 °C)   TempSrc: Temporal   SpO2: 97%   Weight: 106 kg (234 lb 9.6 oz)   Height: 170.2 cm (67\")       Body mass index is 36.74 kg/m².  Physical Exam  Vitals and nursing note reviewed.   Constitutional:       Appearance: Normal appearance.   HENT:      Head: Normocephalic.   Eyes:      Extraocular Movements: Extraocular movements intact.   Neck:      " Vascular: No carotid bruit.   Cardiovascular:      Rate and Rhythm: Normal rate and regular rhythm.      Pulses: Normal pulses.      Heart sounds: Normal heart sounds, S1 normal and S2 normal. No murmur heard.  Pulmonary:      Effort: Pulmonary effort is normal. No respiratory distress.      Breath sounds: Normal breath sounds.   Musculoskeletal:      Cervical back: Neck supple.      Right lower leg: No edema.      Left lower leg: No edema.   Skin:     General: Skin is warm and dry.   Neurological:      General: No focal deficit present.      Mental Status: She is alert.   Psychiatric:         Mood and Affect: Mood normal.         Behavior: Behavior normal.         Thought Content: Thought content normal.        Data Reviewed:{ Labs  Result Review  Imaging  Med Tab  Media :23}     Adult Transthoracic Echo Complete W/ Cont if Necessary Per Protocol (10/16/2023 09:54)  Holter Monitor - 72 Hour Up To 15 Days (09/26/2023 13:49)  Stress Test With Myocardial Perfusion (1 Day) (10/16/2023 11:09)    ECG 12 Lead (09/26/2023 09:05)   Vitamin D,25-Hydroxy (09/19/2023 15:10)  TSH+Free T4 (09/19/2023 15:10)  Hemoglobin A1c (09/19/2023 15:10)  CBC & Differential (09/19/2023 15:10)  Comprehensive Metabolic Panel (09/19/2023 15:10)  ECG 12 Lead (03/12/2021 13:50)     Assessment & Plan   Assessment and Plan {CC Problem List  Visit Diagnosis  ROS  Review (Popup)  Health Maintenance  Quality  BestPractice  Medications  SmartSets  SnapShot Encounters  Media :23}       1. Benign essential hypertension  -Reasonable control on home monitoring/today  -Goal BP<130/80  -Increase carvedilol to 12.5mg BID  -Transition to combination HCTZ25mg-spironolactone 25mg daily  -Home BP monitoring    2. Palpitations  -Stable symptoms.  No syncope.  -Holter monitor completed with diagnostic time 5 days, 12 hours.  Monitor reported as normal study.  No evidence of atrial fibrillation/flutter, AV block/pauses, VT.  Average heart rate 81,  minimum 59, maximum 125.  Low burden PAC/PVC less than 1%. Patient trigerred events not associated with arrhythmia.  -TTE as above  -Increase carvedilol as above      Follow Up {Instructions Charge Capture  Follow-up Communications :23}     Return in about 6 months (around 7/25/2024) for Office follow-up, BP check.      Patient was given instructions and counseling regarding her condition or for health maintenance advice. Please see specific information pulled into the AVS if appropriate.  Patient was instructed to call the [facility] with any questions, concerns, or worsening symptoms.    Dictated Utilizing Dragon Dictation   Please note that portions of this note were completed with a voice recognition program.   Part of this note may be an electronic transcription/translation of spoken language to printed text using the Dragon Dictation System.

## 2024-02-14 ENCOUNTER — SPECIALTY PHARMACY (OUTPATIENT)
Dept: PHARMACY | Facility: TELEHEALTH | Age: 64
End: 2024-02-14
Payer: COMMERCIAL

## 2024-02-19 RX ORDER — LEVOTHYROXINE SODIUM 0.07 MG/1
75 TABLET ORAL DAILY
Qty: 90 TABLET | Refills: 1 | Status: SHIPPED | OUTPATIENT
Start: 2024-02-19

## 2024-03-13 ENCOUNTER — SPECIALTY PHARMACY (OUTPATIENT)
Dept: PHARMACY | Facility: TELEHEALTH | Age: 64
End: 2024-03-13
Payer: COMMERCIAL

## 2024-03-13 NOTE — PROGRESS NOTES
"Specialty Pharmacy Refill Coordination Note     Brittny \"Padmini\" is a 64 y.o. female contacted today regarding refills of  Dupixent specialty medication(s).    Reviewed and verified with patient:  Allergies  Meds       Specialty medication(s) and dose(s) confirmed: yes    Refill Questions      Flowsheet Row Most Recent Value   Changes to allergies? No   Changes to medications? No   New conditions or infections since last clinic visit No   Unplanned office visit, urgent care, ED, or hospital admission in the last 4 weeks  No   How does patient/caregiver feel medication is working? Very good   Financial problems or insurance changes  No   If yes, describe changes in insurance or financial issues. N/A   Since the previous refill, were any specialty medication doses or scheduled injections missed or delayed?  No  [Next dose due 03/17/2024]   If yes, please provide the amount N/A   Why were doses missed? N/A   Does this patient require a clinical escalation to a pharmacist? No            Delivery Questions      Flowsheet Row Most Recent Value   Delivery method [employer]   Delivery address verified with patient/caregiver? Yes   Delivery address Home   Number of medications in delivery 1   Medication(s) being filled and delivered Dupilumab   Doses left of specialty medications 1   Copay verified? Yes   Copay amount $1184.38   Copay form of payment Credit/debit on file  [Dupixent CC]              Medication Adherence    Adherence tools used: directed education  Support network for adherence: family member          Follow-up: 23 day(s)     Je No, Pharmacy Technician  Specialty Pharmacy Technician        "

## 2024-03-20 ENCOUNTER — TELEPHONE (OUTPATIENT)
Dept: CARDIOLOGY | Facility: HOSPITAL | Age: 64
End: 2024-03-20
Payer: COMMERCIAL

## 2024-03-20 NOTE — TELEPHONE ENCOUNTER
"  Caller: Brittny Han \"Padmini\"    Relationship: Self    Best call back number: [phone]    What is the best time to reach you: ANYTIME     What was the call regarding: PATIENT CALLED IN TO SEE IF RHIANNON COULD WRITE HER A PRESCRIPTION FOR PLAIN HYDROCHLOROTHIAZIDE 25 MG. SHE STATED RHIANNON PUT HER ON A COMBINATION OF THE SPIRONOLACTONE WITH THIS MEDICATION BUT HER DOG GOT A HOLD TO THE MEDICATION AND SPILLED THEM OUT OF THE BOTTLE IN THE RAIN. PATIENT STATED BEFORE SHE WAS ON THE SPIRONOLACTONE BY ITSELF AND SHE HAS 21 PILLS REMAINING OF THIS MEDICATION BUT NEEDS THE HYDROCHLOROTHIAZIDE.      Is it okay if the provider responds through MyChart: PREFERS A CALL   "

## 2024-03-22 DIAGNOSIS — I10 BENIGN ESSENTIAL HYPERTENSION: Primary | ICD-10-CM

## 2024-03-22 RX ORDER — HYDROCHLOROTHIAZIDE 25 MG/1
25 TABLET ORAL DAILY
Qty: 30 TABLET | Refills: 1 | Status: SHIPPED | OUTPATIENT
Start: 2024-03-22

## 2024-03-22 RX ORDER — SPIRONOLACTONE 25 MG/1
25 TABLET ORAL DAILY
Start: 2024-03-22

## 2024-04-05 ENCOUNTER — PATIENT OUTREACH (OUTPATIENT)
Dept: CASE MANAGEMENT | Facility: OTHER | Age: 64
End: 2024-04-05
Payer: COMMERCIAL

## 2024-04-05 NOTE — OUTREACH NOTE
"  Adult Wellness Assessment  Questions/Answers      Flowsheet Row Most Recent Value   How often do you have someone help you read facts about your health? never   How often do you have trouble learning about your health because you don't know what the written words mean? never   How confident are you filling out forms by yourself? always            Adult Patient Profile  Questions/Answers      Flowsheet Row Most Recent Value   Symptoms/Conditions Managed at Home cardiovascular, endocrine   Cardiovascular Symptoms/Conditions high blood cholesterol   Cardiovascular Management Strategies medication therapy   Cardiovascular Comment Patient was not sure when last lipid panel completed.  Found one from 2022 that was elevated. Discussed cholesterol management. Emailed cholesterol education to patient.   Endocrine Symptoms/Conditions thyroid disorder   Endocrine Management Strategies medication therapy   Endocrine Comment Patient reports she is taking her medication first thing in the am. Emailed hypothyroidism edcuation to patient.   How to be Addressed Padmini Han   How Well Do You Speak English? very well   Source of Information patient              AMBULATORY CASE MANAGEMENT NOTE    Name and Relationship of Patient/Support Person: Brittny Han \"Padmini\" - Self  Self    Patient Outreach    Telephone call with patient. Engaged in the Employee Case Management Program.  Discussed cholesterol and hypothyroidism.   Patient has issue with a medication that her dog go into and medication was ruined.  Encouraged her to work with pharmacy and provider to make sure she has enough pills until her next refill.    Spoke with patient for engagement call. Introduced self, explained ECM RN role and provided contact information to patient via email.  Education provided for CM  program.   No issue with social determinants,denies food, medication, transportation, or financial insecurities. No questions or concerns per pt at this " time. Advised pt to call RN-ECM with any new needs. Encouraged use of 24hour nurseline if needed.  Agrees to follow up outreach.       Education Documentation  No documentation found.        SARAI ABREU  Ambulatory Case Management    4/5/2024, 12:28 EDT

## 2024-04-08 ENCOUNTER — SPECIALTY PHARMACY (OUTPATIENT)
Dept: PHARMACY | Facility: TELEHEALTH | Age: 64
End: 2024-04-08
Payer: COMMERCIAL

## 2024-04-08 ENCOUNTER — OFFICE VISIT (OUTPATIENT)
Dept: PULMONOLOGY | Facility: CLINIC | Age: 64
End: 2024-04-08
Payer: COMMERCIAL

## 2024-04-08 VITALS
OXYGEN SATURATION: 95 % | BODY MASS INDEX: 36.73 KG/M2 | WEIGHT: 234 LBS | HEIGHT: 67 IN | TEMPERATURE: 97.8 F | DIASTOLIC BLOOD PRESSURE: 76 MMHG | SYSTOLIC BLOOD PRESSURE: 130 MMHG | HEART RATE: 74 BPM

## 2024-04-08 DIAGNOSIS — I10 BENIGN ESSENTIAL HYPERTENSION: ICD-10-CM

## 2024-04-08 DIAGNOSIS — J45.50 SEVERE PERSISTENT ASTHMA, UNSPECIFIED WHETHER COMPLICATED: Primary | ICD-10-CM

## 2024-04-08 DIAGNOSIS — R05.3 CHRONIC COUGH: ICD-10-CM

## 2024-04-08 DIAGNOSIS — G47.33 OBSTRUCTIVE SLEEP APNEA SYNDROME: ICD-10-CM

## 2024-04-08 DIAGNOSIS — R06.02 SHORTNESS OF BREATH: ICD-10-CM

## 2024-04-08 PROCEDURE — 99214 OFFICE O/P EST MOD 30 MIN: CPT | Performed by: NURSE PRACTITIONER

## 2024-04-08 RX ORDER — FLUTICASONE PROPIONATE AND SALMETEROL 500; 50 UG/1; UG/1
1 POWDER RESPIRATORY (INHALATION) 2 TIMES DAILY
Qty: 180 EACH | Refills: 3 | Status: SHIPPED | OUTPATIENT
Start: 2024-04-08

## 2024-04-08 RX ORDER — SPIRONOLACTONE AND HYDROCHLOROTHIAZIDE 25; 25 MG/1; MG/1
1 TABLET ORAL DAILY
Qty: 90 TABLET | Refills: 1 | OUTPATIENT
Start: 2024-04-08

## 2024-04-08 NOTE — PROGRESS NOTES
Specialty Pharmacy Patient Management Program  Reassessment     Brittny Han is a 64 y.o. female with Asthma and enrolled in the Patient Management program offered by [facility]. A follow-up outreach was conducted, including assessment of continued therapy appropriateness, medication adherence, and side effect incidence and management for Dupixent.     Changes to Insurance Coverage or Financial Support  none    Relevant Past Medical History and Comorbidities  Relevant medical history and concomitant health conditions were discussed with the patient. The patient's chart has been reviewed for relevant past medical history and comorbid health conditions and updated as necessary.   Past Medical History:   Diagnosis Date    Allergic     Allergic rhinitis     Anemia     Anxiety     Asthma     MODERATE, PERSISTANT    Asthma, extrinsic 2007    Progressively gets worse with age    BCC (basal cell carcinoma) 2000    RIGHT EYELID    Bowel habit changes     BPPV (benign paroxysmal positional vertigo) 01/2020    Bronchitis     Cat bite of left hand 05/16/2020    SEEN AT [facility]    Chronic bilateral low back pain with bilateral sciatica     Chronic bronchitis Aug 2018    also seasonal bronchitis every May and December    Chronic idiopathic constipation     Colon polyps     FOLLOWED BY DR. LORENA RICO    COPD (chronic obstructive pulmonary disease)     CTS (carpal tunnel syndrome) 2010    BILATERAL, Wear wrist braces at night    Demyelinating disease     Depression     Disease of thyroid gland     HYPOTHYROIDISM    DINERO (dyspnea on exertion) 06/2019    Essential hypertension     Fatigue     Gastroenteritis 02/2020    GERD (gastroesophageal reflux disease)     Head injury when I was 10    concussion    Hemorrhoids     Hepatomegaly 11/2018    History of echocardiogram 05/31/2011    mild ascending aortic root dilatation; mod RVC enlargement; global left EF 0.72 (normal 0.55-0.75) mild concentric LV  hypertrophy; trace MR; mild TR     History of PFTs 02/02/2016    good effort; normal lung volumes    Hyperlipidemia     Hypertrophy, nasal, turbinate     Hypothyroidism 2019    Idiopathic peripheral neuropathy 10/21/2021    Irritable bowel syndrome     Midline cystocele     Neuropathy of both feet 01/2019    FOLLOWED BY DR. VARSHA IRELAND    Numbness and tingling in both hands 07/19/2021    Numbness and tingling of both feet 01/30/2019    ALANNA on CPAP 2007    Followed by Jin Cat @ [facility]    Peptic ulceration     PUD    Periodic headache syndrome, not intractable     Plantar fasciitis, left 01/2019    FOLLOWED BY DR. VARSHA IRELAND    Primary central sleep apnea 2006    Sleep apnea not sure if central or not    Rectal bleeding 08/2020    Rectal bleeding     Rectal mass 08/2020    RLS (restless legs syndrome) 06/2017    Shortness of breath     Strain of thoracic region 04/2017    KALYAN (stress urinary incontinence, female)     Uterine fibroid     Varicella 1966    This is a guess    Vision loss 2008    wear glasses    Vitamin B 12 deficiency     Vitamin D deficiency      Social History     Socioeconomic History    Marital status: Single   Tobacco Use    Smoking status: Never     Passive exposure: Never    Smokeless tobacco: Never   Vaping Use    Vaping status: Never Used   Substance and Sexual Activity    Alcohol use: Yes     Alcohol/week: 2.0 standard drinks of alcohol     Types: 2 Glasses of wine per week     Comment: 2 glasses of wine about 4 x's per year    Drug use: No    Sexual activity: Not Currently     Partners: Male     Birth control/protection: Abstinence     Comment: single     Problem list reviewed by Elroy Fernando RP on 4/8/2024 at  9:59 AM    Allergies  Known allergies and reactions were discussed with the patient. The patient's chart has been reviewed for allergy information and updated as necessary.   Allergies   Allergen Reactions    Percocet [Oxycodone-Acetaminophen] Anaphylaxis      Breathing difficulty, tongue swelling, severe     Brevital Sodium [Methohexital] Paresthesia     CHILLS, SHAKING    Other Delirium     brevatol sedation for dentistry, chills, shaking    Banana Unknown - Low Severity    Pork-Derived Products Unknown - Low Severity     Allergies reviewed by Elroy Fernando RP on 4/8/2024 at  9:58 AM    Relevant Laboratory Values  Lab Results   Component Value Date    GLUCOSE 97 11/30/2023    CALCIUM 9.9 11/30/2023     11/30/2023    K 3.8 11/30/2023    CO2 27.0 11/30/2023     11/30/2023    BUN 18 11/30/2023    CREATININE 0.92 11/30/2023    BCR 19.6 11/30/2023    ANIONGAP 12.0 11/30/2023     Lab Results   Component Value Date    WBC 6.17 09/19/2023    HGB 13.7 09/19/2023    HCT 41.5 09/19/2023    MCV 91.8 09/19/2023     09/19/2023     Lab Value Review  The above lab values have been reviewed; the following specialty medication(s) dose adjustment(s) are recommended: none.    Current Medication List  This medication list has been reviewed with the patient and evaluated for any interactions or necessary modifications/recommendations, and updated to include all prescription medications, OTC medications, and supplements the patient is currently taking. This list reflects what is contained in the patient's profile, which has also been marked as reviewed to communicate to other providers it is the most up to date version of the patient's current medication therapy.     Current Outpatient Medications:     albuterol sulfate  (90 Base) MCG/ACT inhaler, Inhale 2 puffs Every 6 (Six) Hours., Disp: 18 g, Rfl: 1    carvedilol (COREG) 12.5 MG tablet, Take 1 tablet by mouth 2 (Two) Times a Day., Disp: 180 tablet, Rfl: 1    Dupilumab (Dupixent) 300 MG/2ML solution pen-injector, Inject 2 mL under the skin into the appropriate area as directed Every 14 (Fourteen) Days., Disp: 4 mL, Rfl: 11    EPINEPHrine (EPIPEN) 0.3 MG/0.3ML solution auto-injector injection, Use as directed  for acute allergic reaction., Disp: 2 each, Rfl: 3    Fluticasone-Salmeterol (Advair Diskus) 500-50 MCG/ACT DISKUS, Inhale 1 puff 2 (Two) Times a Day., Disp: 60 each, Rfl: 3    hydroCHLOROthiazide 25 MG tablet, Take 1 tablet by mouth Daily., Disp: 30 tablet, Rfl: 1    levocetirizine (XYZAL) 5 MG tablet, Take 1 tablet by mouth Daily., Disp: 90 tablet, Rfl: 3    levothyroxine (SYNTHROID, LEVOTHROID) 75 MCG tablet, Take 1 tablet by mouth Daily., Disp: 90 tablet, Rfl: 1    montelukast (SINGULAIR) 10 MG tablet, Take 1 tablet by mouth every night at bedtime., Disp: 90 tablet, Rfl: 3    Multiple Vitamins-Minerals (CENTRUM SILVER PO), Centrum Silver, Disp: , Rfl:     Rimegepant Sulfate (NURTEC) 75 MG tablet dispersible tablet, Take 1 tablet by mouth Daily As Needed (migraine). Do not take more than 1 tablet in 24 hours, Disp: 16 tablet, Rfl: 3    spironolactone (ALDACTONE) 25 MG tablet, Take 1 tablet by mouth Daily., Disp: , Rfl:     Tiotropium Bromide Monohydrate (Spiriva Respimat) 1.25 MCG/ACT aerosol solution inhaler, Inhale 2 puffs Daily, as directed., Disp: 12 g, Rfl: 3    venlafaxine XR (EFFEXOR-XR) 75 MG 24 hr capsule, Take 1 capsule by mouth Daily., Disp: 90 capsule, Rfl: 3  Medicines reviewed by Elroy Fernando RP on 4/8/2024 at  9:59 AM    Drug Interactions  none     Adverse Drug Reactions  Medication tolerability: Tolerating with no to minimal ADRs  Medication plan: Continue therapy with normal follow-up  Plan for ADR Management: none    Hospitalizations and Urgent Care Since Last Assessment  Hospitalizations or Admissions: none  ED Visits: none  Urgent Office Visits: none     Adherence, Self-Administration, and Current Therapy Problems  Adherence related to the patient's specialty therapy was discussed with the patient. The Adherence segment of this outreach has been reviewed and updated.     Adherence Questions  Linked Medication(s) Assessed: Dupilumab  On average, how many doses/injections does the patient  miss per month?: 0  What are the identified reasons for non-adherence or missed doses? : no problems identified  What is the estimated medication adherence level?: %  Based on the patient/caregiver response and refill history, does this patient require an MTP to track adherence improvements?: no    Additional Barriers to Patient Self-Administration: none  Methods for Supporting Patient Self-Administration: none    Open Medication Therapy Problems  No medication therapy recommendations to display    Goals of Therapy  Goals related to the patient's specialty therapy were discussed with the patient. The Patient Goals segment of this outreach has been reviewed and updated.   Goals Addressed Today        Specialty Pharmacy General Goal- dupixent      Better compliance with taking injection q 14 days  Still having difficulty breathing, hoping to ease that   Patient mentioned her voice is hoarse all the time.  Recommended taking a full glass of water with her Advair inhaler to rinse the powder residue from the mouth and throat.              Progress Toward Meeting Patient-Identified Goals of Therapy: On Track  New Patient-Identified Goals, If Applicable:     Progress Toward Meeting Clinical Goals or Therapeutic Targets: On Track  New Clinical Goals or Therapeutic Targets, If Applicable:     Quality of Life Assessment   Quality of Life related to the patient's enrollment in the patient management program and services provided was discussed with the patient. The QOL segment of this outreach has been reviewed and updated.  Quality of Life Improvement Scale: 7-Somewhat better    Reassessment Plan & Follow-Up  Medication Therapy Changes: none  Additional Plans, Therapy Recommendations, or Therapy Problems to Be Addressed: none   Pharmacist to perform regular reassessments no more than (6) months from the previous assessment.  Care Coordinator to set up future refill outreaches, coordinate prescription delivery, and  escalate clinical questions to pharmacist.     Attestation  I attest the patient was actively involved in and has agreed to the above plan of care. I attest that the specialty medication(s) addressed above are appropriate for the patient based on my reassessment. If the prescribed therapy is at any point deemed not appropriate based on the current or future assessments, a consultation will be initiated with the patient's specialty care provider to determine the best course of action. The revised plan of therapy will be documented along with any required assessments and/or additional patient education provided.     Electronically signed by Elroy Fernando RPH, 04/08/24, 10:05 AM EDT.

## 2024-04-08 NOTE — PROGRESS NOTES
Specialty Pharmacy Patient Management Program  Reassessment     Brittny Han is a 64 y.o. female with migraine headaches and enrolled in the Patient Management program offered by [facility]. A follow-up outreach was conducted, including assessment of continued therapy appropriateness, medication adherence, and side effect incidence and management for Nurtec ODT 75 mg.     Changes to Insurance Coverage or Financial Support  none    Relevant Past Medical History and Comorbidities  Relevant medical history and concomitant health conditions were discussed with the patient. The patient's chart has been reviewed for relevant past medical history and comorbid health conditions and updated as necessary.   Past Medical History:   Diagnosis Date    Allergic     Allergic rhinitis     Anemia     Anxiety     Asthma     MODERATE, PERSISTANT    Asthma, extrinsic 2007    Progressively gets worse with age    BCC (basal cell carcinoma) 2000    RIGHT EYELID    Bowel habit changes     BPPV (benign paroxysmal positional vertigo) 01/2020    Bronchitis     Cat bite of left hand 05/16/2020    SEEN AT [facility]    Chronic bilateral low back pain with bilateral sciatica     Chronic bronchitis Aug 2018    also seasonal bronchitis every May and December    Chronic idiopathic constipation     Colon polyps     FOLLOWED BY DR. LORENA RICO    COPD (chronic obstructive pulmonary disease)     CTS (carpal tunnel syndrome) 2010    BILATERAL, Wear wrist braces at night    Demyelinating disease     Depression     Disease of thyroid gland     HYPOTHYROIDISM    DINERO (dyspnea on exertion) 06/2019    Essential hypertension     Fatigue     Gastroenteritis 02/2020    GERD (gastroesophageal reflux disease)     Head injury when I was 10    concussion    Hemorrhoids     Hepatomegaly 11/2018    History of echocardiogram 05/31/2011    mild ascending aortic root dilatation; mod RVC enlargement; global left EF 0.72 (normal 0.55-0.75)  mild concentric LV hypertrophy; trace MR; mild TR     History of PFTs 02/02/2016    good effort; normal lung volumes    Hyperlipidemia     Hypertrophy, nasal, turbinate     Hypothyroidism 2019    Idiopathic peripheral neuropathy 10/21/2021    Irritable bowel syndrome     Midline cystocele     Neuropathy of both feet 01/2019    FOLLOWED BY DR. VARSHA IRELAND    Numbness and tingling in both hands 07/19/2021    Numbness and tingling of both feet 01/30/2019    ALANNA on CPAP 2007    Followed by Jin Cat @ [facility]    Peptic ulceration     PUD    Periodic headache syndrome, not intractable     Plantar fasciitis, left 01/2019    FOLLOWED BY DR. VARSHA IRELAND    Primary central sleep apnea 2006    Sleep apnea not sure if central or not    Rectal bleeding 08/2020    Rectal bleeding     Rectal mass 08/2020    RLS (restless legs syndrome) 06/2017    Shortness of breath     Strain of thoracic region 04/2017    KALYAN (stress urinary incontinence, female)     Uterine fibroid     Varicella 1966    This is a guess    Vision loss 2008    wear glasses    Vitamin B 12 deficiency     Vitamin D deficiency      Social History     Socioeconomic History    Marital status: Single   Tobacco Use    Smoking status: Never     Passive exposure: Never    Smokeless tobacco: Never   Vaping Use    Vaping status: Never Used   Substance and Sexual Activity    Alcohol use: Yes     Alcohol/week: 2.0 standard drinks of alcohol     Types: 2 Glasses of wine per week     Comment: 2 glasses of wine about 4 x's per year    Drug use: No    Sexual activity: Not Currently     Partners: Male     Birth control/protection: Abstinence     Comment: single     Problem list reviewed by Elroy Fernando [facility] on 4/8/2024 at  9:51 AM    Allergies  Known allergies and reactions were discussed with the patient. The patient's chart has been reviewed for allergy information and updated as necessary.   Allergies   Allergen Reactions    Percocet  [Oxycodone-Acetaminophen] Anaphylaxis     Breathing difficulty, tongue swelling, severe     Brevital Sodium [Methohexital] Paresthesia     CHILLS, SHAKING    Other Delirium     brevatol sedation for dentistry, chills, shaking    Banana Unknown - Low Severity    Pork-Derived Products Unknown - Low Severity     Allergies reviewed by Elroy Fernando [facility] on 4/8/2024 at  9:51 AM    Relevant Laboratory Values  Lab Results   Component Value Date    GLUCOSE 97 11/30/2023    CALCIUM 9.9 11/30/2023     11/30/2023    K 3.8 11/30/2023    CO2 27.0 11/30/2023     11/30/2023    BUN 18 11/30/2023    CREATININE 0.92 11/30/2023    BCR 19.6 11/30/2023    ANIONGAP 12.0 11/30/2023     Lab Results   Component Value Date    WBC 6.17 09/19/2023    HGB 13.7 09/19/2023    HCT 41.5 09/19/2023    MCV 91.8 09/19/2023     09/19/2023     Lab Value Review  The above lab values have been reviewed; the following specialty medication(s) dose adjustment(s) are recommended: none.    Current Medication List  This medication list has been reviewed with the patient and evaluated for any interactions or necessary modifications/recommendations, and updated to include all prescription medications, OTC medications, and supplements the patient is currently taking. This list reflects what is contained in the patient's profile, which has also been marked as reviewed to communicate to other providers it is the most up to date version of the patient's current medication therapy.     Current Outpatient Medications:     albuterol sulfate  (90 Base) MCG/ACT inhaler, Inhale 2 puffs Every 6 (Six) Hours., Disp: 18 g, Rfl: 1    carvedilol (COREG) 12.5 MG tablet, Take 1 tablet by mouth 2 (Two) Times a Day., Disp: 180 tablet, Rfl: 1    Dupilumab (Dupixent) 300 MG/2ML solution pen-injector, Inject 2 mL under the skin into the appropriate area as directed Every 14 (Fourteen) Days., Disp: 4 mL, Rfl: 11    EPINEPHrine (EPIPEN) 0.3 MG/0.3ML solution  auto-injector injection, Use as directed for acute allergic reaction., Disp: 2 each, Rfl: 3    Fluticasone-Salmeterol (Advair Diskus) 500-50 MCG/ACT DISKUS, Inhale 1 puff 2 (Two) Times a Day., Disp: 60 each, Rfl: 3    hydroCHLOROthiazide 25 MG tablet, Take 1 tablet by mouth Daily., Disp: 30 tablet, Rfl: 1    levocetirizine (XYZAL) 5 MG tablet, Take 1 tablet by mouth Daily., Disp: 90 tablet, Rfl: 3    levothyroxine (SYNTHROID, LEVOTHROID) 75 MCG tablet, Take 1 tablet by mouth Daily., Disp: 90 tablet, Rfl: 1    montelukast (SINGULAIR) 10 MG tablet, Take 1 tablet by mouth every night at bedtime., Disp: 90 tablet, Rfl: 3    Multiple Vitamins-Minerals (CENTRUM SILVER PO), Centrum Silver, Disp: , Rfl:     Rimegepant Sulfate (NURTEC) 75 MG tablet dispersible tablet, Take 1 tablet by mouth Daily As Needed (migraine). Do not take more than 1 tablet in 24 hours, Disp: 16 tablet, Rfl: 3    spironolactone (ALDACTONE) 25 MG tablet, Take 1 tablet by mouth Daily., Disp: , Rfl:     Tiotropium Bromide Monohydrate (Spiriva Respimat) 1.25 MCG/ACT aerosol solution inhaler, Inhale 2 puffs Daily, as directed., Disp: 12 g, Rfl: 3    venlafaxine XR (EFFEXOR-XR) 75 MG 24 hr capsule, Take 1 capsule by mouth Daily., Disp: 90 capsule, Rfl: 3  Medicines reviewed by Elroy Fernando RP on 4/8/2024 at  9:51 AM    Drug Interactions  none     Adverse Drug Reactions  Medication tolerability: Tolerating with no to minimal ADRs  Medication plan: Continue therapy with normal follow-up  Plan for ADR Management: none    Hospitalizations and Urgent Care Since Last Assessment  Hospitalizations or Admissions: none  ED Visits: none  Urgent Office Visits: none     Adherence, Self-Administration, and Current Therapy Problems  Adherence related to the patient's specialty therapy was discussed with the patient. The Adherence segment of this outreach has been reviewed and updated.     Adherence Questions  Linked Medication(s) Assessed: Rimegepant Sulfate  On  average, how many doses/injections does the patient miss per month?: 0 (PRN medication)  What are the identified reasons for non-adherence or missed doses? : no problems identified  What is the estimated medication adherence level?: %  Based on the patient/caregiver response and refill history, does this patient require an MTP to track adherence improvements?: no    Additional Barriers to Patient Self-Administration: none  Methods for Supporting Patient Self-Administration: none    Open Medication Therapy Problems  No medication therapy recommendations to display    Goals of Therapy  Goals related to the patient's specialty therapy were discussed with the patient. The Patient Goals segment of this outreach has been reviewed and updated.   Goals Addressed Today        Specialty Pharmacy General Goal- nurtec      Reduction in severity and duration of migraines at onset              Progress Toward Meeting Patient-Identified Goals of Therapy: On Track  New Patient-Identified Goals, If Applicable:     Progress Toward Meeting Clinical Goals or Therapeutic Targets: On Track  New Clinical Goals or Therapeutic Targets, If Applicable:     Quality of Life Assessment   Quality of Life related to the patient's enrollment in the patient management program and services provided was discussed with the patient. The QOL segment of this outreach has been reviewed and updated.  Quality of Life Improvement Scale: 8-Moderately better    Reassessment Plan & Follow-Up  Medication Therapy Changes: none  Additional Plans, Therapy Recommendations, or Therapy Problems to Be Addressed: none   Patient states Nurtec works well to relieve acute migraine headaches with no ADRs to report at this time.  Pharmacist to perform regular reassessments no more than (6) months from the previous assessment.  Care Coordinator to set up future refill outreaches, coordinate prescription delivery, and escalate clinical questions to pharmacist.      Attestation  I attest the patient was actively involved in and has agreed to the above plan of care. I attest that the specialty medication(s) addressed above are appropriate for the patient based on my reassessment. If the prescribed therapy is at any point deemed not appropriate based on the current or future assessments, a consultation will be initiated with the patient's specialty care provider to determine the best course of action. The revised plan of therapy will be documented along with any required assessments and/or additional patient education provided.     Electronically signed by Elroy Fernando RPH, 04/08/24, 9:54 AM EDT.

## 2024-04-08 NOTE — PROGRESS NOTES
"[facility] Pulmonary Follow up      Chief Complaint  Asthma (Follow up ) and Shortness of Breath    Subjective          Brittny Han presents to [facility] for routine follow-up.  I follow her here in the office for severe persistent asthma.  She is on Dupixent injections as well as Advair and Spiriva.    She has not had any recent acute exacerbations or respiratory illnesses.  Her shortness of breath with activity is at baseline.      She does have a chronic cough.  She has had some worsening issues lately.  The cough has become quite harsh at times as well as she has been having some difficulty swallowing or feeling like her esophagus is causing the cough.    She wears a CPAP nightly and tolerates it well.        Objective     Vital Signs:   /76   Pulse 74   Temp 97.8 °F (36.6 °C) (Infrared)   Ht 170.2 cm (67.01\")   Wt 106 kg (234 lb)   SpO2 95% Comment: room air at rest  BMI 36.64 kg/m²       Immunization History   Administered Date(s) Administered    ABRYSVO (RSV, 60+ or pregnant women 32-36 wks) 10/19/2023    COVID-19 (PFIZER) Purple Cap Monovalent 04/19/2021, 05/10/2021, 12/27/2021    Flu Vaccine Intradermal Quad 18-64YR 10/06/2014, 10/01/2015    Flu Vaccine Quad PF >36MO 10/10/2018    Flu Vaccine Split Quad 10/10/2018    Flublok 18+yrs 10/03/2020, 11/15/2022, 10/19/2023    Fluzone (or Fluarix & Flulaval for VFC) >6mos 11/03/2021    Influenza TIV (IM) 10/06/2014, 10/01/2015    Influenza, Unspecified 10/21/2019, 09/23/2020    Pneumococcal Conjugate 13-Valent (PCV13) 04/04/2016    Pneumococcal Polysaccharide (PPSV23) 10/06/2009, 10/10/2014, 12/23/2020    Shingrix 03/01/2021, 06/04/2021    TD Preservative Free (Tenivac) 05/16/2020    Tdap 02/12/2010       Physical Exam  Vitals reviewed.   Constitutional:       Appearance: She is well-developed.   HENT:      Head: Normocephalic and atraumatic.   Eyes:      Pupils: Pupils are equal, round, and " reactive to light.   Cardiovascular:      Rate and Rhythm: Normal rate and regular rhythm.      Heart sounds: No murmur heard.  Pulmonary:      Effort: Pulmonary effort is normal. No respiratory distress.      Breath sounds: Normal breath sounds. No wheezing or rales.   Abdominal:      General: Bowel sounds are normal. There is no distension.      Palpations: Abdomen is soft.   Musculoskeletal:         General: Normal range of motion.      Cervical back: Normal range of motion and neck supple.   Skin:     General: Skin is warm and dry.      Findings: No erythema.   Neurological:      Mental Status: She is alert and oriented to person, place, and time.   Psychiatric:         Behavior: Behavior normal.          Result Review :            HRCT   Impression:  Unremarkable high resolution CT chest. No acute process nor evidence of interstitial lung disease.               Assessment and Plan    Problem List Items Addressed This Visit          Pulmonary and Pneumonias    Shortness of breath    Severe persistent asthma - Primary    Relevant Medications    Fluticasone-Salmeterol (Advair Diskus) 500-50 MCG/ACT DISKUS    Chronic cough       Sleep    Obstructive sleep apnea syndrome     Mrs. Palma asthma is fairly well-controlled on her current regimen, she only needs a refill on her Advair.    She is having quite a bit of a worsening cough.  We did discuss dynamic airway collapse with a chronic cough as well as her issues with her swallowing, I think it would be beneficial for GI evaluation for a EGD.    Continue on her CPAP at night.    She will get full PFTs on her next visit.      Follow Up     No follow-ups on file.  Patient was given instructions and counseling regarding her condition or for health maintenance advice. Please see specific information pulled into the AVS if appropriate.       Moderate level of Medical Decision Making complexity based on 2 or more chronic stable illnesses and prescription drug  management.    Hien Shi APRN, ACNP  [facility]  Electronically signed

## 2024-04-08 NOTE — PROGRESS NOTES
" Specialty Pharmacy Patient Management Program  Call Center Refill Outreach      Brittny \"Padmini\" is a 64 y.o. female contacted today regarding refills of her medication(s).    Specialty medication(s) and dose(s) confirmed: Dupixent  Other medications being refilled: levocetirizine, spiriva     Refill Questions      Flowsheet Row Most Recent Value   Changes to allergies? No   Changes to medications? No   New conditions or infections since last clinic visit No   Unplanned office visit, urgent care, ED, or hospital admission in the last 4 weeks  No   How does patient/caregiver feel medication is working? Very good   Financial problems or insurance changes  No   If yes, describe changes in insurance or financial issues. N/A   Since the previous refill, were any specialty medication doses or scheduled injections missed or delayed?  No   If yes, please provide the amount N/A   Why were doses missed? N/A   Does this patient require a clinical escalation to a pharmacist? No            Delivery Questions      Flowsheet Row Most Recent Value   Delivery method FedEx   Delivery address verified with patient/caregiver? Yes   Delivery address Home   Number of medications in delivery 3   Medication(s) being filled and delivered Tiotropium Bromide Monohydrate, Levocetirizine Dihydrochloride (Antihistamines - Non-Sedating), Dupilumab   Doses left of specialty medications 1   Copay verified? Yes   Copay amount Dupixent and spiriva =$0 Xyzal=$7.64   Copay form of payment HAS/FSA on file            Medication Adherence    Adherence tools used: directed education  Support network for adherence: family member            Follow-up: 21 days     Elroy Fernando [facility]  Specialty Pharmacist  4/8/2024  10:06 EDT   "

## 2024-04-24 ENCOUNTER — HOSPITAL ENCOUNTER (EMERGENCY)
Facility: HOSPITAL | Age: 64
Discharge: HOME | End: 2024-04-24
Attending: EMERGENCY MEDICINE
Payer: COMMERCIAL

## 2024-04-24 ENCOUNTER — APPOINTMENT (OUTPATIENT)
Dept: GENERAL RADIOLOGY | Facility: HOSPITAL | Age: 64
End: 2024-04-24
Payer: COMMERCIAL

## 2024-04-24 VITALS
DIASTOLIC BLOOD PRESSURE: 94 MMHG | HEIGHT: 68 IN | RESPIRATION RATE: 18 BRPM | OXYGEN SATURATION: 96 % | BODY MASS INDEX: 34.86 KG/M2 | SYSTOLIC BLOOD PRESSURE: 153 MMHG | HEART RATE: 82 BPM | WEIGHT: 230 LBS | TEMPERATURE: 98.1 F

## 2024-04-24 DIAGNOSIS — S42.294A OTHER CLOSED NONDISPLACED FRACTURE OF PROXIMAL END OF RIGHT HUMERUS, INITIAL ENCOUNTER: Primary | ICD-10-CM

## 2024-04-24 PROCEDURE — 73090 X-RAY EXAM OF FOREARM: CPT

## 2024-04-24 PROCEDURE — 63710000001 ONDANSETRON ODT 4 MG TABLET DISPERSIBLE: Performed by: EMERGENCY MEDICINE

## 2024-04-24 PROCEDURE — 73030 X-RAY EXAM OF SHOULDER: CPT

## 2024-04-24 PROCEDURE — 73060 X-RAY EXAM OF HUMERUS: CPT

## 2024-04-24 PROCEDURE — 99283 EMERGENCY DEPT VISIT LOW MDM: CPT

## 2024-04-24 RX ORDER — HYDROCODONE BITARTRATE AND ACETAMINOPHEN 7.5; 325 MG/1; MG/1
1 TABLET ORAL ONCE
Status: COMPLETED | OUTPATIENT
Start: 2024-04-24 | End: 2024-04-24

## 2024-04-24 RX ORDER — DIAZEPAM 5 MG/1
5 TABLET ORAL ONCE
Status: COMPLETED | OUTPATIENT
Start: 2024-04-24 | End: 2024-04-24

## 2024-04-24 RX ORDER — CYCLOBENZAPRINE HCL 10 MG
10 TABLET ORAL 3 TIMES DAILY PRN
Qty: 12 TABLET | Refills: 0 | Status: SHIPPED | OUTPATIENT
Start: 2024-04-24

## 2024-04-24 RX ORDER — ONDANSETRON 4 MG/1
4 TABLET, ORALLY DISINTEGRATING ORAL ONCE
Status: COMPLETED | OUTPATIENT
Start: 2024-04-24 | End: 2024-04-24

## 2024-04-24 RX ORDER — HYDROCODONE BITARTRATE AND ACETAMINOPHEN 5; 325 MG/1; MG/1
1-2 TABLET ORAL EVERY 6 HOURS PRN
Qty: 12 TABLET | Refills: 0 | Status: SHIPPED | OUTPATIENT
Start: 2024-04-24

## 2024-04-24 RX ADMIN — ONDANSETRON 4 MG: 4 TABLET, ORALLY DISINTEGRATING ORAL at 14:11

## 2024-04-24 RX ADMIN — DIAZEPAM 5 MG: 5 TABLET ORAL at 17:20

## 2024-04-24 RX ADMIN — HYDROCODONE BITARTRATE AND ACETAMINOPHEN 1 TABLET: 7.5; 325 TABLET ORAL at 14:11

## 2024-04-24 NOTE — Clinical Note
[facility] EMERGENCY DEPARTMENT  [address]  [place] [facility] [place] [facility] [place] [address]  Phone: [phone]    Brittny Han was seen and treated in our emergency department on 4/24/2024.  She may return to work on 04/28/2024.         Thank you for choosing [facility].    Romeo Lou, RN

## 2024-05-03 ENCOUNTER — LAB (OUTPATIENT)
Dept: LAB | Facility: HOSPITAL | Age: 64
End: 2024-05-03
Payer: COMMERCIAL

## 2024-05-03 ENCOUNTER — OFFICE VISIT (OUTPATIENT)
Dept: FAMILY MEDICINE CLINIC | Facility: CLINIC | Age: 64
End: 2024-05-03
Payer: COMMERCIAL

## 2024-05-03 VITALS
DIASTOLIC BLOOD PRESSURE: 80 MMHG | HEIGHT: 68 IN | SYSTOLIC BLOOD PRESSURE: 124 MMHG | WEIGHT: 233.8 LBS | HEART RATE: 65 BPM | OXYGEN SATURATION: 94 % | BODY MASS INDEX: 35.43 KG/M2

## 2024-05-03 DIAGNOSIS — K21.9 GASTROESOPHAGEAL REFLUX DISEASE, UNSPECIFIED WHETHER ESOPHAGITIS PRESENT: ICD-10-CM

## 2024-05-03 DIAGNOSIS — R23.2 HOT FLASHES: ICD-10-CM

## 2024-05-03 DIAGNOSIS — R05.3 CHRONIC COUGH: ICD-10-CM

## 2024-05-03 DIAGNOSIS — Z00.00 PREVENTATIVE HEALTH CARE: Primary | ICD-10-CM

## 2024-05-03 DIAGNOSIS — Z00.00 PREVENTATIVE HEALTH CARE: ICD-10-CM

## 2024-05-03 DIAGNOSIS — Z12.31 ENCOUNTER FOR SCREENING MAMMOGRAM FOR MALIGNANT NEOPLASM OF BREAST: ICD-10-CM

## 2024-05-03 LAB
25(OH)D3 SERPL-MCNC: 30.5 NG/ML (ref 30–100)
ALBUMIN SERPL-MCNC: 4.5 G/DL (ref 3.5–5.2)
ALBUMIN/GLOB SERPL: 1.4 G/DL
ALP SERPL-CCNC: 113 U/L (ref 39–117)
ALT SERPL W P-5'-P-CCNC: 31 U/L (ref 1–33)
ANION GAP SERPL CALCULATED.3IONS-SCNC: 13.9 MMOL/L (ref 5–15)
AST SERPL-CCNC: 26 U/L (ref 1–32)
BASOPHILS # BLD AUTO: 0.05 10*3/MM3 (ref 0–0.2)
BASOPHILS NFR BLD AUTO: 0.7 % (ref 0–1.5)
BILIRUB SERPL-MCNC: 0.3 MG/DL (ref 0–1.2)
BILIRUB UR QL STRIP: NEGATIVE
BUN SERPL-MCNC: 15 MG/DL (ref 8–23)
BUN/CREAT SERPL: 17.6 (ref 7–25)
CALCIUM SPEC-SCNC: 9.8 MG/DL (ref 8.6–10.5)
CHLORIDE SERPL-SCNC: 102 MMOL/L (ref 98–107)
CLARITY UR: CLEAR
CO2 SERPL-SCNC: 25.1 MMOL/L (ref 22–29)
COLOR UR: YELLOW
CREAT SERPL-MCNC: 0.85 MG/DL (ref 0.57–1)
DEPRECATED RDW RBC AUTO: 43.2 FL (ref 37–54)
EGFRCR SERPLBLD CKD-EPI 2021: 77.6 ML/MIN/1.73
EOSINOPHIL # BLD AUTO: 0.33 10*3/MM3 (ref 0–0.4)
EOSINOPHIL NFR BLD AUTO: 4.9 % (ref 0.3–6.2)
ERYTHROCYTE [DISTWIDTH] IN BLOOD BY AUTOMATED COUNT: 12.7 % (ref 12.3–15.4)
GLOBULIN UR ELPH-MCNC: 3.2 GM/DL
GLUCOSE SERPL-MCNC: 87 MG/DL (ref 65–99)
GLUCOSE UR STRIP-MCNC: NEGATIVE MG/DL
HBA1C MFR BLD: 6.3 % (ref 4.8–5.6)
HCT VFR BLD AUTO: 39.8 % (ref 34–46.6)
HGB BLD-MCNC: 13.1 G/DL (ref 12–15.9)
HGB UR QL STRIP.AUTO: NEGATIVE
HOLD SPECIMEN: NORMAL
IMM GRANULOCYTES # BLD AUTO: 0.01 10*3/MM3 (ref 0–0.05)
IMM GRANULOCYTES NFR BLD AUTO: 0.1 % (ref 0–0.5)
KETONES UR QL STRIP: NEGATIVE
LEUKOCYTE ESTERASE UR QL STRIP.AUTO: NEGATIVE
LYMPHOCYTES # BLD AUTO: 2.35 10*3/MM3 (ref 0.7–3.1)
LYMPHOCYTES NFR BLD AUTO: 35.2 % (ref 19.6–45.3)
MCH RBC QN AUTO: 30.5 PG (ref 26.6–33)
MCHC RBC AUTO-ENTMCNC: 32.9 G/DL (ref 31.5–35.7)
MCV RBC AUTO: 92.8 FL (ref 79–97)
MONOCYTES # BLD AUTO: 0.68 10*3/MM3 (ref 0.1–0.9)
MONOCYTES NFR BLD AUTO: 10.2 % (ref 5–12)
NEUTROPHILS NFR BLD AUTO: 3.25 10*3/MM3 (ref 1.7–7)
NEUTROPHILS NFR BLD AUTO: 48.9 % (ref 42.7–76)
NITRITE UR QL STRIP: NEGATIVE
NRBC BLD AUTO-RTO: 0 /100 WBC (ref 0–0.2)
PH UR STRIP.AUTO: 6.5 [PH] (ref 5–8)
PLATELET # BLD AUTO: 365 10*3/MM3 (ref 140–450)
PMV BLD AUTO: 9.7 FL (ref 6–12)
POTASSIUM SERPL-SCNC: 3.8 MMOL/L (ref 3.5–5.2)
PROT SERPL-MCNC: 7.7 G/DL (ref 6–8.5)
PROT UR QL STRIP: NEGATIVE
RBC # BLD AUTO: 4.29 10*6/MM3 (ref 3.77–5.28)
SODIUM SERPL-SCNC: 141 MMOL/L (ref 136–145)
SP GR UR STRIP: 1.01 (ref 1–1.03)
T4 FREE SERPL-MCNC: 1.19 NG/DL (ref 0.93–1.7)
TSH SERPL DL<=0.05 MIU/L-ACNC: 4.27 UIU/ML (ref 0.27–4.2)
UROBILINOGEN UR QL STRIP: NORMAL
WBC NRBC COR # BLD AUTO: 6.67 10*3/MM3 (ref 3.4–10.8)

## 2024-05-03 PROCEDURE — 80050 GENERAL HEALTH PANEL: CPT

## 2024-05-03 PROCEDURE — 82306 VITAMIN D 25 HYDROXY: CPT

## 2024-05-03 PROCEDURE — 82607 VITAMIN B-12: CPT | Performed by: INTERNAL MEDICINE

## 2024-05-03 PROCEDURE — 99396 PREV VISIT EST AGE 40-64: CPT | Performed by: INTERNAL MEDICINE

## 2024-05-03 PROCEDURE — 83036 HEMOGLOBIN GLYCOSYLATED A1C: CPT

## 2024-05-03 PROCEDURE — 81003 URINALYSIS AUTO W/O SCOPE: CPT

## 2024-05-03 PROCEDURE — 84439 ASSAY OF FREE THYROXINE: CPT

## 2024-05-03 RX ORDER — OMEPRAZOLE 40 MG/1
40 CAPSULE, DELAYED RELEASE ORAL DAILY
Qty: 30 CAPSULE | Refills: 1 | Status: SHIPPED | OUTPATIENT
Start: 2024-05-03

## 2024-05-06 ENCOUNTER — PATIENT OUTREACH (OUTPATIENT)
Dept: CASE MANAGEMENT | Facility: OTHER | Age: 64
End: 2024-05-06
Payer: COMMERCIAL

## 2024-05-06 DIAGNOSIS — G44.209 TENSION HEADACHE: ICD-10-CM

## 2024-05-06 RX ORDER — RIMEGEPANT SULFATE 75 MG/75MG
1 TABLET, ORALLY DISINTEGRATING ORAL DAILY PRN
Qty: 16 TABLET | Refills: 3 | Status: CANCELLED | OUTPATIENT
Start: 2024-05-06

## 2024-05-09 DIAGNOSIS — I10 BENIGN ESSENTIAL HYPERTENSION: ICD-10-CM

## 2024-05-11 RX ORDER — SPIRONOLACTONE AND HYDROCHLOROTHIAZIDE 25; 25 MG/1; MG/1
1 TABLET ORAL DAILY
Qty: 90 TABLET | Refills: 0 | Status: SHIPPED | OUTPATIENT
Start: 2024-05-11 | End: 2024-08-10

## 2024-06-04 ENCOUNTER — PATIENT OUTREACH (OUTPATIENT)
Dept: CASE MANAGEMENT | Facility: OTHER | Age: 64
End: 2024-06-04
Payer: COMMERCIAL

## 2024-06-04 NOTE — OUTREACH NOTE
"  Adult Patient Profile  Questions/Answers      Flowsheet Row Most Recent Value   Symptoms/Conditions Managed at Home --  [preventing diabetes]   Diabetes Comment Telephone call with patient.  Discussed preventing diabetes. Patient received preventing diabetes information I sent her but said she hasn't had a chance to go through it all.  I encouraged her to reach out if she has any questions.          AMBULATORY CASE MANAGEMENT NOTE    Names and Relationships of Patient/Support Persons: Caller: Brittny Han \"Padmini\"; Relationship: Self  Caller: Brittny Han \"Padmini\"; Relationship: Self -     Patient Outreach    Telephone call with patient. Discussed  preventing diabetes. No other concerns discussed at this time. Encouraged to reach out to Employee Case Management as needed.    Education Documentation  No documentation found.        SARAI ABREU  Ambulatory Case Management    6/4/2024, 11:37 EDT  "

## 2024-06-07 ENCOUNTER — SPECIALTY PHARMACY (OUTPATIENT)
Dept: PHARMACY | Facility: TELEHEALTH | Age: 64
End: 2024-06-07
Payer: COMMERCIAL

## 2024-06-07 NOTE — PROGRESS NOTES
"Specialty Pharmacy Refill Coordination Note     Brittny \"Padmini\" is a 64 y.o. female contacted today regarding refills of  Dupixent specialty medication(s).    Reviewed and verified with patient:  Allergies  Meds       Specialty medication(s) and dose(s) confirmed: yes    Refill Questions      Flowsheet Row Most Recent Value   Changes to allergies? No   Changes to medications? Yes   [Pain meds for 1-2 days at end of April]   New conditions or infections since last clinic visit No   Unplanned office visit, urgent care, ED, or hospital admission in the last 4 weeks  Yes   [Broke shoulder at end of April]   How does patient/caregiver feel medication is working? Very good   Financial problems or insurance changes  No   If yes, describe changes in insurance or financial issues. N/A   Since the previous refill, were any specialty medication doses or scheduled injections missed or delayed?  No  [Patient states no missed doses. Unsure of adherence. Next dose due: 6/9]   If yes, please provide the amount N/A   Why were doses missed? N/A   Does this patient require a clinical escalation to a pharmacist? Yes   [Hospital Visit and New Medications at end of April]            Delivery Questions      Flowsheet Row Most Recent Value   Delivery method FedEx   Delivery address verified with patient/caregiver? Yes   Delivery address Home   Number of medications in delivery 1   Medication(s) being filled and delivered Dupilumab   Doses left of specialty medications 1   Copay verified? Yes   Copay amount $1007.06 **Dupixent [facility]**   Copay form of payment Credit/debit on file              Medication Adherence    Adherence tools used: directed education  Support network for adherence: family member          Follow-up: 23 day(s)     Divya Good, Pharmacy Technician  Specialty Pharmacy Technician        "

## 2024-06-10 ENCOUNTER — SPECIALTY PHARMACY (OUTPATIENT)
Dept: PHARMACY | Facility: TELEHEALTH | Age: 64
End: 2024-06-10
Payer: COMMERCIAL

## 2024-06-10 ENCOUNTER — TREATMENT (OUTPATIENT)
Dept: PHYSICAL THERAPY | Facility: CLINIC | Age: 64
End: 2024-06-10
Payer: COMMERCIAL

## 2024-06-10 ENCOUNTER — TRANSCRIBE ORDERS (OUTPATIENT)
Dept: PHYSICAL THERAPY | Facility: CLINIC | Age: 64
End: 2024-06-10
Payer: COMMERCIAL

## 2024-06-10 DIAGNOSIS — M25.611 POST-TRAUMATIC STIFFNESS OF RIGHT SHOULDER JOINT: ICD-10-CM

## 2024-06-10 DIAGNOSIS — K21.9 GASTROESOPHAGEAL REFLUX DISEASE, UNSPECIFIED WHETHER ESOPHAGITIS PRESENT: ICD-10-CM

## 2024-06-10 DIAGNOSIS — R29.898 WEAKNESS OF RIGHT SHOULDER: ICD-10-CM

## 2024-06-10 DIAGNOSIS — R05.3 CHRONIC COUGH: ICD-10-CM

## 2024-06-10 DIAGNOSIS — S42.411D RIGHT SUPRACONDYLAR HUMERUS FRACTURE, WITH ROUTINE HEALING, SUBSEQUENT ENCOUNTER: Primary | ICD-10-CM

## 2024-06-10 PROCEDURE — 97110 THERAPEUTIC EXERCISES: CPT | Performed by: PHYSICAL THERAPIST

## 2024-06-10 PROCEDURE — 97162 PT EVAL MOD COMPLEX 30 MIN: CPT | Performed by: PHYSICAL THERAPIST

## 2024-06-10 RX ORDER — OMEPRAZOLE 40 MG/1
40 CAPSULE, DELAYED RELEASE ORAL DAILY
Qty: 30 CAPSULE | Refills: 1 | Status: SHIPPED | OUTPATIENT
Start: 2024-06-10

## 2024-06-10 NOTE — PROGRESS NOTES
"Physical Therapy Initial Evaluation and Plan of Care  [address], [address], [facility] [address]    Patient: Brittny Han   : 1960  Diagnosis/ICD-10 Code:  Right supracondylar humerus fracture, with routine healing, subsequent encounter [S42.411D]  Referring practitioner: Buddy Mazariegos MD  Date of Initial Visit: 6/10/2024  Today's Date: 6/10/2024  Patient seen for 1 session         Visit Diagnoses:    ICD-10-CM ICD-9-CM   1. Right supracondylar humerus fracture, with routine healing, subsequent encounter  S42.411D V54.11   2. Weakness of right shoulder  R29.898 781.99   3. Post-traumatic stiffness of right shoulder joint  M25.611 719.51         Subjective Questionnaire: QuickDASH: 72.73      Subjective Evaluation    History of Present Illness  Date of onset: 2024  Mechanism of injury: Patient was at home and her dog got loose.  She was chasing the dog and tripped over a tree root and landed onto her shoulder.  [place] an audible \"crack\" at the time\".  XR showed fracture, has been conservatively managed.  Been out of her sling 2.5 weeks at this point. Noted some N/T in the LUE wearing the sling but has improved since d/c of the sling. Has impaired overall shoulder mobility and feels \"heavy\" and also feels pain/tightness along the superior shoulder/trap area.  Also has an increase in neck pain. Has some sleep impairment from inability to sleep on her right side.       Patient Occupation: [employer] and [employer] at [employer] on weekends.   Precautions and Work Restrictions: using Dragon sugar and has trouble cleaningPain  Current pain ratin  At best pain ratin  At worst pain ratin  Quality: burning and dull ache  Aggravating factors: keyboarding, lifting, sleeping, prolonged positioning and overhead activity    Diagnostic Tests  X-ray: normal    Treatments  Previous treatment: physical therapy  Patient Goals  Patient goals for therapy: decreased pain, " increased motion, return to work, increased strength, return to sport/leisure activities and independence with ADLs/IADLs             Objective          Tenderness     Additional Tenderness Details  Taught and tender along right upper trap.  Tenderness noted at the right lateral deltoid insertion and generically along the right anterior shoulder.    Active Range of Motion   Left Shoulder   Flexion: 165 degrees   Abduction: 165 degrees   External rotation BTH: T2   Internal rotation BTB: T10     Right Shoulder   Flexion: 90 degrees   External rotation BTH: C3   Internal rotation BTB: sacrum     Passive Range of Motion     Right Shoulder   Flexion: 145 degrees   Abduction: 125 degrees   External rotation 0°: 15 degrees with pain    Strength/Myotome Testing     Additional Strength Details  deferred          Assessment & Plan       Assessment  Impairments: abnormal or restricted ROM, activity intolerance, impaired physical strength, lacks appropriate home exercise program and pain with function   Functional limitations: carrying objects, lifting, sleeping, pulling, pushing, uncomfortable because of pain, moving in bed, sitting, reaching behind back and reaching overhead   Assessment details: Patient presents 6 wks s/p shoulder fracture that is nondisplace with routine healing.  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  She will benefit from skilled PT to restore normal function and improve both mobility and effective shoulder stability.     Goals  Plan Goals: Short Term Goals (2-3wks)  1. Patient to report right shoulder pain less than or equal to 0/10.  2. Patient to demonstrate at least 125degrees of AROM right shoulder flexion  3. Patient will be independent and compliant with initial home exercise program.       Long Term Goals (6-8wks)  1. Patient to demonstrate at least 145 degrees of right shoulder  flexion AROM.  2. Patient to demonstrate at least 145 degrees of right shoulder abduction AROM.  3. Patient to achieve right hand behind head to at least C7 without significant change in pain.  4. Pt will perform right  hand behind back to at least L3 to improve ability to tuck in shirt.  5. Pt. will be independent and compliant with advanced home exercise program to facilitate self-management of symptoms.  6. Patient will improve Quick Dash score to at least 25%.  7. Patient to type on her computer for 2 hours or greater without increasing pain.       Plan  Therapy options: will be seen for skilled therapy services  Planned modality interventions: TENS, thermotherapy (hydrocollator packs), cryotherapy, dry needling and electrical stimulation/Russian stimulation  Planned therapy interventions: manual therapy, strengthening, stretching, postural training, joint mobilization and neuromuscular re-education  Frequency: 2x week  Duration in visits: 12  Treatment plan discussed with: patient        History # of Personal Factors and/or Comorbidities: HIGH (3+)  Examination of Body System(s): # of elements: MODERATE (3)  Clinical Presentation: STABLE   Clinical Decision Making: MODERATE      Timed:         Manual Therapy:    0     mins  35224;     Therapeutic Exercise:    15     mins  03865;     Neuromuscular Charley:    0    mins  60280;    Therapeutic Activity:     00     mins  64735;     Gait Trainin     mins  52032;     Ultrasound:     0     mins  56085;    Ionto                               0    mins   12415  Self Care                       0     mins   03132  Canalith Repos    0     mins 31286      Un-Timed:  Electrical Stimulation:    0     mins  26099 ( );  Dry Needling     0     mins self-pay  Traction     0     mins 18715  Low Eval     0     Mins  88563  Mod Eval     20     Mins  63371  High Eval                       0     Mins  12257        Timed Treatment:   15   mins   Total Treatment:     35    mins          PT: Camacho Lopez, PT     License Number: [id number]    Electronically signed by Camacho Lopez, PT, 06/10/24, 2:22 PM EDT    Certification Period: 6/10/2024 thru 9/7/2024  I certify that the therapy services are furnished while this patient is under my care.  The services outlined above are required by this patient, and will be reviewed every 90 days.         Physician Signature:__________________________________________________    PHYSICIAN: Buddy Mazariegos MD  NPI: [id number]      DATE:     Please sign and return via fax to [email or web address] . Thank you, [facility].

## 2024-06-10 NOTE — TELEPHONE ENCOUNTER
Rx Refill Note  Requested Prescriptions     Pending Prescriptions Disp Refills    omeprazole (priLOSEC) 40 MG capsule 30 capsule 1     Sig: Take 1 capsule by mouth Daily.      Last office visit with prescribing clinician: 5/3/2024   Last telemedicine visit with prescribing clinician: Visit date not found   Next office visit with prescribing clinician: 5/5/2025                         Would you like a call back once the refill request has been completed: [] Yes [] No    If the office needs to give you a call back, can they leave a voicemail: [] Yes [] No    Minoo Self MA  06/10/24, 13:38 EDT

## 2024-06-14 ENCOUNTER — TREATMENT (OUTPATIENT)
Dept: PHYSICAL THERAPY | Facility: CLINIC | Age: 64
End: 2024-06-14
Payer: COMMERCIAL

## 2024-06-14 DIAGNOSIS — S42.411D RIGHT SUPRACONDYLAR HUMERUS FRACTURE, WITH ROUTINE HEALING, SUBSEQUENT ENCOUNTER: Primary | ICD-10-CM

## 2024-06-14 DIAGNOSIS — M25.611 POST-TRAUMATIC STIFFNESS OF RIGHT SHOULDER JOINT: ICD-10-CM

## 2024-06-14 DIAGNOSIS — R29.898 WEAKNESS OF RIGHT SHOULDER: ICD-10-CM

## 2024-06-14 NOTE — PROGRESS NOTES
Physical Therapy Daily Treatment Note  [address] [facility], [address], [facility] [address]      Patient: Brittny Han   : 1960  Referring practitioner: Buddy Mazariegos MD  Date of Initial Visit: Type: THERAPY  Noted: 6/10/2024  Today's Date: 2024  Patient seen for 2 sessions       Visit Diagnoses:    ICD-10-CM ICD-9-CM   1. Right supracondylar humerus fracture, with routine healing, subsequent encounter  S42.411D V54.11   2. Weakness of right shoulder  R29.898 781.99   3. Post-traumatic stiffness of right shoulder joint  M25.611 719.51       Subjective   Patient reports she feels she is moving better, still having some minor pain.  Feels she is improving, though.  her pain level is 1-2/10 upon arrival.      Objective   125 AROM shoulder flexion.  Passive ER arm at side 20 degrees, pain limited    See Exercise, Manual, and Modality Logs for complete treatment.       Assessment & Plan       Assessment  Assessment details: Patient is progressing well and has made significant ROM gains over the course of the last week.  We started scapular stabilization and she is doing well. Made some minor progression to her HEP but she seems to be improving quickly.           Timed:         Manual Therapy:    10     mins  80208;     Therapeutic Exercise:    30     mins  21885;     Neuromuscular Charley:    0    mins  53324;    Therapeutic Activity:     0     mins  00510;     Gait Trainin     mins  47475;     Ultrasound:     0     mins  14916;    Ionto                               0    mins   84384  Self Care                       0     mins   02083  Canalith Repos    0     mins 75180      Un-Timed:  Electrical Stimulation:    0     mins  08784 ( );  Dry Needling     0     mins self-pay  Traction     0     mins 09038      Timed Treatment:   40   mins   Total Treatment:     40   mins    Camacho Lopez, PT  [place] License: [id number]

## 2024-06-17 ENCOUNTER — TRANSCRIBE ORDERS (OUTPATIENT)
Dept: PHYSICAL THERAPY | Facility: CLINIC | Age: 64
End: 2024-06-17
Payer: COMMERCIAL

## 2024-06-17 ENCOUNTER — TREATMENT (OUTPATIENT)
Dept: PHYSICAL THERAPY | Facility: CLINIC | Age: 64
End: 2024-06-17
Payer: COMMERCIAL

## 2024-06-17 DIAGNOSIS — M77.41 METATARSALGIA OF BOTH FEET: ICD-10-CM

## 2024-06-17 DIAGNOSIS — M77.42 METATARSALGIA OF BOTH FEET: ICD-10-CM

## 2024-06-17 DIAGNOSIS — M25.611 POST-TRAUMATIC STIFFNESS OF RIGHT SHOULDER JOINT: ICD-10-CM

## 2024-06-17 DIAGNOSIS — R29.898 WEAKNESS OF RIGHT SHOULDER: ICD-10-CM

## 2024-06-17 DIAGNOSIS — S42.411D RIGHT SUPRACONDYLAR HUMERUS FRACTURE, WITH ROUTINE HEALING, SUBSEQUENT ENCOUNTER: Primary | ICD-10-CM

## 2024-06-17 DIAGNOSIS — M72.2 BILATERAL PLANTAR FASCIITIS: Primary | ICD-10-CM

## 2024-06-17 NOTE — PROGRESS NOTES
Physical Therapy Daily Treatment Note  [address] [facility], [address], [facility] [address]      Patient: Brittny Han   : 1960  Referring practitioner: Buddy Mazairegos MD  Date of Initial Visit: Type: THERAPY  Noted: 6/10/2024  Today's Date: 2024  Patient seen for 3 sessions       Visit Diagnoses:    ICD-10-CM ICD-9-CM   1. Right supracondylar humerus fracture, with routine healing, subsequent encounter  S42.411D V54.11   2. Weakness of right shoulder  R29.898 781.99   3. Post-traumatic stiffness of right shoulder joint  M25.611 719.51       Subjective   Patient reports that she feels she is getting better.  Arm still feels heavy when raising into elevation.  her pain level is 1-2/10 upon arrival.      Objective   See Exercise, Manual, and Modality Logs for complete treatment.       Assessment & Plan       Assessment  Assessment details: Patient is progressing well and now elevated through a functional range.  We worked on anterior structure stretching and she seems to be doing better.  She is likely ready to start light stabilization at next session.  Added door (S) arms down to HEP.           Timed:         Manual Therapy:    10     mins  12084;     Therapeutic Exercise:    35     mins  14504;     Neuromuscular Charley:    0    mins  30349;    Therapeutic Activity:     0     mins  24422;     Gait Trainin     mins  78638;     Ultrasound:     0     mins  40653;    Ionto                               0    mins   85842  Self Care                       0     mins   35395  Canalith Repos    0     mins 75486      Un-Timed:  Electrical Stimulation:    0     mins  84918 ( );  Dry Needling     0     mins self-pay  Traction     0     mins 48686      Timed Treatment:   45   mins   Total Treatment:     45   mins    Camacho Lopez, PT  [place] License: [id number]

## 2024-06-19 ENCOUNTER — TREATMENT (OUTPATIENT)
Dept: PHYSICAL THERAPY | Facility: CLINIC | Age: 64
End: 2024-06-19
Payer: COMMERCIAL

## 2024-06-19 DIAGNOSIS — M72.2 PLANTAR FASCIITIS, BILATERAL: Primary | ICD-10-CM

## 2024-06-19 NOTE — PROGRESS NOTES
Physical Therapy Initial Evaluation and Plan of Care  [address], [address], [place] [id number]    Patient: Brittny Han   : 1960  Diagnosis/ICD-10 Code:  Plantar fasciitis, bilateral [M72.2]  Referring practitioner: Isiah Holloway Jr., MD  Date of Initial Visit: 2024  Today's Date: 2024  Patient seen for 1 session         Visit Diagnoses:    ICD-10-CM ICD-9-CM   1. Plantar fasciitis, bilateral  M72.2 728.71         Subjective Questionnaire: LEFS: 38/80      Subjective Evaluation    History of Present Illness  Date of onset: 2019 (years)  Mechanism of injury: Patient notes bilateral plantar foot pain R>L for the last several years.Feels tingling and numbness in the toes.  Worst of the pain is along the IP joints of the toes.  Has a stabbing pain in the heel and notes that pain is more sharp in that area. Has tried night time splints and that has seemed to be somewhat helpful.  Notes that she has a hard time sleeping in the splints, however. Notes that the pain is generally worse after working at [email or web address] and standing for prolonged periods.  Has had some improvement with better shoes and insoles.       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      Tenderness     Additional Tenderness Details  Tender along bilateral calcaneal tubercle and achilles insertion.  R>L medial arch tenderness.  No significant toe  tenderness.    Neurological Testing     Sensation     Ankle/Foot   Left Ankle/Foot   Intact: light touch    Right Ankle/Foot   Intact: light touch     Active Range of Motion   Left Ankle/Foot   Dorsiflexion (ke): 5 degrees   Plantar flexion: 60 degrees   Inversion: 40 degrees   Eversion: 10 degrees     Right Ankle/Foot   Dorsiflexion (ke): 5 degrees   Plantar flexion: 50 degrees   Inversion: 30 degrees   Eversion: 10 degrees     Strength/Myotome Testing     Left Ankle/Foot   Dorsiflexion: 5  Inversion: 5  Eversion: 4+    Right Ankle/Foot   Dorsiflexion: 4+  Inversion: 4  Eversion: 4    Tests   Left Ankle/Foot   Negative for anterior drawer, syndesmosis squeeze and Cat.     Right Ankle/Foot   Positive for syndesmosis squeeze.   Negative for anterior drawer and Cat.     Ambulation     Comments   Increase rapid pronation at mid stance RLE.          Assessment & Plan       Assessment  Impairments: abnormal gait, abnormal or restricted ROM, activity intolerance, impaired balance, impaired physical strength, lacks appropriate home exercise program, pain with function, safety issue and weight-bearing intolerance   Functional limitations: walking, pulling, pushing, uncomfortable because of pain, moving in bed, sitting, standing, stooping and unable to perform repetitive tasks   Assessment details: Patient presents with chronic foot pain along the mid arch but also along the heel and plantar surface of the toes.  Her toe symptoms seem neuropathy-like.  She would benefit from arch loading and stretching and to improve activity tolerance.    Prognosis: good    Goals  Plan Goals: SHORT TERM GOALS:     2 weeks  1. Pt compliant with initial HEP.   2. Pt to demonstrate ability to ambulate in the clinic without any pain barefoot  3. Pt to demonstrate ability to ambulate in the clinic without antalgic gait   4. Pt to demonstrate ability to perform single leg heel raise on the right without increase in pain          LONG  TERM GOALS:   6 weeks  1. Pt to demonstrate ability to perform full functional squat with good form and no increase in pain in the bilateral foot/ankle  2. Pt to demonstrate ability to ambulate on TM for 10 minutes with normal gait pattern without increase in pain  3. Pt to report being able to work full shift without increase in pain in the bilateral ankle/foot  4. Pt to demonstrate ability to perform SLS on the bilateral lower extremity for 30 seconds without LOB or increased pain   5. Patient to demonstrate no reactivity with resisted MMT all planes.  6. Patient to improve LEFS score to at least 50/80.          Plan  Therapy options: will be seen for skilled therapy services  Planned modality interventions: electrical stimulation/Russian stimulation, TENS, ultrasound, cryotherapy, dry needling and thermotherapy (hydrocollator packs)  Planned therapy interventions: manual therapy, therapeutic activities, postural training, body mechanics training, functional ROM exercises, flexibility, gait training, stretching, strengthening, joint mobilization, neuromuscular re-education, soft tissue mobilization and spinal/joint mobilization  Frequency: 1x week  Duration in visits: 8  Treatment plan discussed with: patient        History # of Personal Factors and/or Comorbidities: HIGH (3+)  Examination of Body System(s): # of elements: MODERATE (3)  Clinical Presentation: EVOLVING  Clinical Decision Making: MODERATE      Timed:         Manual Therapy:    0     mins  61000;     Therapeutic Exercise:    20     mins  09532;     Neuromuscular Charley:    0    mins  71859;    Therapeutic Activity:     0     mins  75438;     Gait Trainin     mins  09230;     Ultrasound:     0     mins  72756;    Ionto                               0    mins   03974  Self Care                       0     mins   77340  Canalith Repos    0     mins 88900      Un-Timed:  Electrical Stimulation:    0     mins  52204 ( );  Dry Needling     0      mins self-pay  Traction     0     mins 37929  Low Eval     20     Mins  67399  Mod Eval     0     Mins  30095  High Eval                       0     Mins  73747        Timed Treatment:   20   mins   Total Treatment:     40   mins          PT: Camacho Lopez, PT     License Number: [id number]    Electronically signed by Camacho Lopez, PT, 06/19/24, 11:09 AM EDT    Certification Period: 6/19/2024 thru 9/16/2024  I certify that the therapy services are furnished while this patient is under my care.  The services outlined above are required by this patient, and will be reviewed every 90 days.         Physician Signature:__________________________________________________    PHYSICIAN: Isiah Holloway Jr., MD  NPI: [id number]      DATE:     Please sign and return via fax to [email or web address] . Thank you, [facility].

## 2024-06-20 ENCOUNTER — TREATMENT (OUTPATIENT)
Dept: PHYSICAL THERAPY | Facility: CLINIC | Age: 64
End: 2024-06-20
Payer: COMMERCIAL

## 2024-06-20 DIAGNOSIS — M25.611 POST-TRAUMATIC STIFFNESS OF RIGHT SHOULDER JOINT: ICD-10-CM

## 2024-06-20 DIAGNOSIS — R29.898 WEAKNESS OF RIGHT SHOULDER: ICD-10-CM

## 2024-06-20 DIAGNOSIS — S42.411D RIGHT SUPRACONDYLAR HUMERUS FRACTURE, WITH ROUTINE HEALING, SUBSEQUENT ENCOUNTER: Primary | ICD-10-CM

## 2024-06-20 NOTE — PROGRESS NOTES
Physical Therapy Daily Treatment Note  [address] [facility], [address], [place] [id number]      Patient: Brittny Han   : 1960  Referring practitioner: Buddy Mazariegos MD  Date of Initial Visit: Type: THERAPY  Noted: 6/10/2024  Today's Date: 2024  Patient seen for 4 sessions       Visit Diagnoses:    ICD-10-CM ICD-9-CM   1. Right supracondylar humerus fracture, with routine healing, subsequent encounter  S42.411D V54.11   2. Weakness of right shoulder  R29.898 781.99   3. Post-traumatic stiffness of right shoulder joint  M25.611 719.51       Subjective   Patient reports that her pain is generally improving but still is a little stiff at end range elevation.  She feels she is getting better.  She still has trouble lowering from a flexed position.  She was able to rake grass clippings in the yard but had some discomfort managing the bag and the full can.  her pain level is 1/10 upon arrival.      Objective   Functional elevation AROM.  80 ER at 90 ABD passively    See Exercise, Manual, and Modality Logs for complete treatment.       Assessment & Plan       Assessment  Assessment details: Patient is progressing remarkably well.  We progressed to improving cuff activation which was effective in reducing irritability with lowering through a full range.  She is doing so well that we may consider reducing frequency as early as next week, as she doesn't require a lot of manual stretching and is more exercise based at this point.           Timed:         Manual Therapy:    10     mins  51261;     Therapeutic Exercise:    25     mins  50866;     Neuromuscular Charley:    10    mins  20441;    Therapeutic Activity:     0     mins  05176;     Gait Trainin     mins  91133;     Ultrasound:     0     mins  09898;    Ionto                               0    mins   52098  Self Care                       0     mins   68594  Canalith Repos    0     mins 85090      Un-Timed:  Electrical Stimulation:     0     mins  15416 ( );  Dry Needling     0     mins self-pay  Traction     0     mins 44167      Timed Treatment:   45   mins   Total Treatment:     45   mins    Camacho Lopez, PT  [place] License: [id number]

## 2024-06-24 ENCOUNTER — TREATMENT (OUTPATIENT)
Dept: PHYSICAL THERAPY | Facility: CLINIC | Age: 64
End: 2024-06-24
Payer: COMMERCIAL

## 2024-06-24 DIAGNOSIS — S42.411D RIGHT SUPRACONDYLAR HUMERUS FRACTURE, WITH ROUTINE HEALING, SUBSEQUENT ENCOUNTER: Primary | ICD-10-CM

## 2024-06-24 DIAGNOSIS — R29.898 WEAKNESS OF RIGHT SHOULDER: ICD-10-CM

## 2024-06-24 DIAGNOSIS — M25.611 POST-TRAUMATIC STIFFNESS OF RIGHT SHOULDER JOINT: ICD-10-CM

## 2024-06-24 PROCEDURE — 97112 NEUROMUSCULAR REEDUCATION: CPT | Performed by: PHYSICAL THERAPIST

## 2024-06-24 PROCEDURE — 97140 MANUAL THERAPY 1/> REGIONS: CPT | Performed by: PHYSICAL THERAPIST

## 2024-06-24 PROCEDURE — 97110 THERAPEUTIC EXERCISES: CPT | Performed by: PHYSICAL THERAPIST

## 2024-06-24 NOTE — PROGRESS NOTES
Physical Therapy Daily Treatment Note  [address] [facility], [address], [facility] [address]      Patient: Brittny Han   : 1960  Referring practitioner: Buddy Mazariegos MD  Date of Initial Visit: Type: THERAPY  Noted: 2024  Today's Date: 2024  Patient seen for 2 sessions       Visit Diagnoses:    ICD-10-CM ICD-9-CM   1. Right supracondylar humerus fracture, with routine healing, subsequent encounter  S42.411D V54.11   2. Weakness of right shoulder  R29.898 781.99   3. Post-traumatic stiffness of right shoulder joint  M25.611 719.51       Subjective   Patient reports shoulder is still a little painful when lying flat on her back and she is causing some back discomfort trying to sleep on her back as well.  She has been having occasional morning tingling in her hands.  her pain level is 1-2/10 upon arrival.      Objective   See Exercise, Manual, and Modality Logs for complete treatment.       Assessment & Plan       Assessment  Assessment details: Patient is still demonstrating functional ROM.  Pain improves when we work on improving cuff recruitment.  She is doing well and we may consider reducing frequency.           Timed:         Manual Therapy:    10     mins  31325;     Therapeutic Exercise:    30     mins  26353;     Neuromuscular Charley:    10    mins  46729;    Therapeutic Activity:     0     mins  65127;     Gait Trainin     mins  31689;     Ultrasound:     0     mins  03043;    Ionto                               0    mins   18740  Self Care                       0     mins   99187  Canalith Repos    0     mins 89386      Un-Timed:  Electrical Stimulation:    0     mins  05371 ( );  Dry Needling     0     mins self-pay  Traction     0     mins 46982      Timed Treatment:   50   mins   Total Treatment:     50   mins    Camacho Lopez, PT  [place] License: [id number]

## 2024-06-27 ENCOUNTER — TREATMENT (OUTPATIENT)
Dept: PHYSICAL THERAPY | Facility: CLINIC | Age: 64
End: 2024-06-27
Payer: COMMERCIAL

## 2024-06-27 DIAGNOSIS — S42.411D RIGHT SUPRACONDYLAR HUMERUS FRACTURE, WITH ROUTINE HEALING, SUBSEQUENT ENCOUNTER: Primary | ICD-10-CM

## 2024-06-27 DIAGNOSIS — M25.611 POST-TRAUMATIC STIFFNESS OF RIGHT SHOULDER JOINT: ICD-10-CM

## 2024-06-27 DIAGNOSIS — R29.898 WEAKNESS OF RIGHT SHOULDER: ICD-10-CM

## 2024-06-27 NOTE — PROGRESS NOTES
Physical Therapy Daily Treatment Note  [address] [facility], [address], [facility] [address]      Patient: Brittny Han   : 1960  Referring practitioner: Buddy Mazariegos MD  Date of Initial Visit: Type: THERAPY  Noted: 6/10/2024  Today's Date: 2024  Patient seen for 5 sessions       Visit Diagnoses:    ICD-10-CM ICD-9-CM   1. Right supracondylar humerus fracture, with routine healing, subsequent encounter  S42.411D V54.11   2. Weakness of right shoulder  R29.898 781.99   3. Post-traumatic stiffness of right shoulder joint  M25.611 719.51       Subjective   Patient reports that she has noted her shoulder pain is more along the superior aspect and activity dependent.  She is progressing well, but still irritable.  her pain level is 1-2/10 upon arrival.      Objective   See Exercise, Manual, and Modality Logs for complete treatment.       Assessment & Plan       Assessment  Assessment details: Patient continues to demonstrate functional ROM.  We are continuing to encourage normalizing mechanics and improving shoulder stabilization.  Overall, she is doing really well.           Timed:         Manual Therapy:    10     mins  61832;     Therapeutic Exercise:    25     mins  45734;     Neuromuscular Charley:    10    mins  70679;    Therapeutic Activity:     0     mins  01133;     Gait Trainin     mins  16898;     Ultrasound:     0     mins  68298;    Ionto                               0    mins   28905  Self Care                       0     mins   55032  Canalith Repos    0     mins 50684      Un-Timed:  Electrical Stimulation:    0     mins  66669 (MC );  Dry Needling     0     mins self-pay  Traction     0     mins 01582      Timed Treatment:   45   mins   Total Treatment:     45   mins    Camacho Lopez, PT  [place] License: [id number]

## 2024-07-03 ENCOUNTER — SPECIALTY PHARMACY (OUTPATIENT)
Dept: PHARMACY | Facility: TELEHEALTH | Age: 64
End: 2024-07-03
Payer: COMMERCIAL

## 2024-07-03 NOTE — PROGRESS NOTES
"Specialty Pharmacy Refill Coordination Note     Brittny \"Padmini\" is a 64 y.o. female contacted today regarding refills of  Dupixent specialty medication(s).    Reviewed and verified with patient:  Allergies  Meds       Specialty medication(s) and dose(s) confirmed: yes    Refill Questions      Flowsheet Row Most Recent Value   Changes to allergies? No   Changes to medications? Yes   [Biotin and Vitamin B complex]   New conditions or infections since last clinic visit No   Unplanned office visit, urgent care, ED, or hospital admission in the last 4 weeks  No   How does patient/caregiver feel medication is working? Very good   Financial problems or insurance changes  No   If yes, describe changes in insurance or financial issues. N/A   Since the previous refill, were any specialty medication doses or scheduled injections missed or delayed?  No  [Next dose is due 7/6/24]   If yes, please provide the amount N/A   Why were doses missed? N/A   Does this patient require a clinical escalation to a pharmacist? Yes   [Medication Changes]            Delivery Questions      Flowsheet Row Most Recent Value   Delivery method FedEx   Delivery address verified with patient/caregiver? Yes   Delivery address Home   Number of medications in delivery 1   Medication(s) being filled and delivered Dupilumab   Doses left of specialty medications 1   Copay verified? Yes   Copay amount $1,007.06 **Dupixent [facility]**   Copay form of payment Credit/debit on file   Ship Date 07/08/2024   Delivery Date 07/09/2024   Signature Required No              Medication Adherence    Adherence tools used: directed education  Support network for adherence: family member          Follow-up: 21 day(s)     Divya Good, Pharmacy Technician  Specialty Pharmacy Technician        "

## 2024-07-05 ENCOUNTER — TREATMENT (OUTPATIENT)
Dept: PHYSICAL THERAPY | Facility: CLINIC | Age: 64
End: 2024-07-05
Payer: COMMERCIAL

## 2024-07-05 DIAGNOSIS — R29.898 WEAKNESS OF RIGHT SHOULDER: ICD-10-CM

## 2024-07-05 DIAGNOSIS — S42.411D RIGHT SUPRACONDYLAR HUMERUS FRACTURE, WITH ROUTINE HEALING, SUBSEQUENT ENCOUNTER: Primary | ICD-10-CM

## 2024-07-05 DIAGNOSIS — M25.611 POST-TRAUMATIC STIFFNESS OF RIGHT SHOULDER JOINT: ICD-10-CM

## 2024-07-05 NOTE — PROGRESS NOTES
Physical Therapy Daily Treatment Note  [address] [facility], [address], [facility] [address]      Patient: Brittny Han   : 1960  Referring practitioner: Buddy Mazariegos MD  Date of Initial Visit: Type: THERAPY  Noted: 6/10/2024  Today's Date: 2024  Patient seen for 6 sessions       Visit Diagnoses:    ICD-10-CM ICD-9-CM   1. Right supracondylar humerus fracture, with routine healing, subsequent encounter  S42.411D V54.11   2. Weakness of right shoulder  R29.898 781.99   3. Post-traumatic stiffness of right shoulder joint  M25.611 719.51       Subjective   Patient reports she has been stressed over her roommate situation and has had a little more pain intermittently.  Pain has been primarily along the anterior and lateral shoulder.  She feels she is getting better and normally doesn't have a lot of pain..  her pain level is 2-3/10 upon arrival.      Objective   See Exercise, Manual, and Modality Logs for complete treatment.       Assessment & Plan       Assessment  Assessment details: Patient is limited at the end range of flexion by drifting into the scapular plane.  We worked on facilitating better stretching of the medial arm.  She continues to progress well and has nearly full AROM, at this point.           Timed:         Manual Therapy:    15     mins  24746;     Therapeutic Exercise:    24     mins  36194;     Neuromuscular Charley:    10    mins  99141;    Therapeutic Activity:     0     mins  87783;     Gait Trainin     mins  50358;     Ultrasound:     0     mins  94881;    Ionto                               0    mins   87186  Self Care                       0     mins   92660  Canalith Repos    0     mins 92497      Un-Timed:  Electrical Stimulation:    0     mins  08832 ( );  Dry Needling     0     mins self-pay  Traction     0     mins 93466      Timed Treatment:   49   mins   Total Treatment:     49   mins    Camacho Lopez, PT  [place] License: [id number]

## 2024-07-08 ENCOUNTER — TREATMENT (OUTPATIENT)
Dept: PHYSICAL THERAPY | Facility: CLINIC | Age: 64
End: 2024-07-08
Payer: COMMERCIAL

## 2024-07-08 DIAGNOSIS — M25.611 POST-TRAUMATIC STIFFNESS OF RIGHT SHOULDER JOINT: ICD-10-CM

## 2024-07-08 DIAGNOSIS — R29.898 WEAKNESS OF RIGHT SHOULDER: ICD-10-CM

## 2024-07-08 DIAGNOSIS — S42.411D RIGHT SUPRACONDYLAR HUMERUS FRACTURE, WITH ROUTINE HEALING, SUBSEQUENT ENCOUNTER: Primary | ICD-10-CM

## 2024-07-08 NOTE — PROGRESS NOTES
Physical Therapy Daily Treatment Note  [address], [address], [facility] [address]      Patient: Brittny Han   : 1960  Referring practitioner: Buddy Mazariegos MD  Date of Initial Visit: Type: THERAPY  Noted: 6/10/2024  Today's Date: 2024  Patient seen for 7 sessions       Visit Diagnoses:    ICD-10-CM ICD-9-CM   1. Right supracondylar humerus fracture, with routine healing, subsequent encounter  S42.411D V54.11   2. Weakness of right shoulder  R29.898 781.99   3. Post-traumatic stiffness of right shoulder joint  M25.611 719.51       Subjective   Patient reports that she is doing well, has a little more pain after working at [employer] over the weekend.  This was the first time that she has worked away from the self-check in several months.  her pain level is 2-3/10 upon arrival.      Objective   Full passive motion and active elevation.  HBB L5 AROM RUE.    See Exercise, Manual, and Modality Logs for complete treatment.       Assessment & Plan       Assessment  Assessment details: Patient is doing quite well.  She follows with ortho next week, plan to advance to more strengthening and work on behind back range of motion.           Timed:         Manual Therapy:    10     mins  30237;     Therapeutic Exercise:    30     mins  89774;     Neuromuscular Charley:    10    mins  70852;    Therapeutic Activity:     0     mins  44914;     Gait Trainin     mins  16234;     Ultrasound:     0     mins  53754;    Ionto                               0    mins   98585  Self Care                       0     mins   09528  Canalith Repos    0     mins 03472      Un-Timed:  Electrical Stimulation:    0     mins  36371 ( );  Dry Needling     0     mins self-pay  Traction     0     mins 87581      Timed Treatment:   50   mins   Total Treatment:     50   mins    Camacho Lopez, PT  [place] License: [id number]

## 2024-07-16 DIAGNOSIS — J30.9 ALLERGIC RHINITIS, UNSPECIFIED: ICD-10-CM

## 2024-07-16 RX ORDER — LEVOCETIRIZINE DIHYDROCHLORIDE 5 MG/1
5 TABLET, FILM COATED ORAL DAILY
Qty: 90 TABLET | Refills: 3 | Status: SHIPPED | OUTPATIENT
Start: 2024-07-16

## 2024-07-23 ENCOUNTER — TRANSCRIBE ORDERS (OUTPATIENT)
Dept: PHYSICAL THERAPY | Facility: CLINIC | Age: 64
End: 2024-07-23
Payer: COMMERCIAL

## 2024-07-23 ENCOUNTER — TREATMENT (OUTPATIENT)
Dept: PHYSICAL THERAPY | Facility: CLINIC | Age: 64
End: 2024-07-23
Payer: COMMERCIAL

## 2024-07-23 DIAGNOSIS — M25.519 SHOULDER PAIN, UNSPECIFIED CHRONICITY, UNSPECIFIED LATERALITY: Primary | ICD-10-CM

## 2024-07-23 DIAGNOSIS — S42.411D RIGHT SUPRACONDYLAR HUMERUS FRACTURE, WITH ROUTINE HEALING, SUBSEQUENT ENCOUNTER: Primary | ICD-10-CM

## 2024-07-23 DIAGNOSIS — R29.898 WEAKNESS OF RIGHT SHOULDER: ICD-10-CM

## 2024-07-23 DIAGNOSIS — M25.611 POST-TRAUMATIC STIFFNESS OF RIGHT SHOULDER JOINT: ICD-10-CM

## 2024-07-23 NOTE — PROGRESS NOTES
Physical Therapy Daily Treatment Note  [address] [facility], [address], [facility] [address]      Patient: Brittny Han   : 1960  Referring practitioner: Buddy Mazariegos MD  Date of Initial Visit: Type: THERAPY  Noted: 6/10/2024  Today's Date: 2024  Patient seen for 8 sessions       Visit Diagnoses:    ICD-10-CM ICD-9-CM   1. Right supracondylar humerus fracture, with routine healing, subsequent encounter  S42.411D V54.11   2. Weakness of right shoulder  R29.898 781.99   3. Post-traumatic stiffness of right shoulder joint  M25.611 719.51       Subjective   Patient reports that she has been released for strengthening, feels good.  her pain level is 0/10 upon arrival.      Objective   See Exercise, Manual, and Modality Logs for complete treatment.       Assessment & Plan       Assessment  Assessment details: Patient continues to progress well, we progressed HEP and added band activities.  We will reduce to once/wk.  Next session for the foot.           Timed:         Manual Therapy:    10     mins  78298;     Therapeutic Exercise:    35     mins  91718;     Neuromuscular Charley:    0    mins  39208;    Therapeutic Activity:     0     mins  21997;     Gait Trainin     mins  78744;     Ultrasound:     0     mins  54614;    Ionto                               0    mins   86206  Self Care                       0     mins   81507  Canalith Repos    0     mins 65660      Un-Timed:  Electrical Stimulation:    0     mins  33797 ( );  Dry Needling     0     mins self-pay  Traction     0     mins 96255      Timed Treatment:   45   mins   Total Treatment:     45   mins    Camacho Lopez, PT  [place] License: [id number]

## 2024-07-24 ENCOUNTER — TELEPHONE (OUTPATIENT)
Dept: PHYSICAL THERAPY | Facility: CLINIC | Age: 64
End: 2024-07-24

## 2024-07-25 ENCOUNTER — TREATMENT (OUTPATIENT)
Dept: PHYSICAL THERAPY | Facility: CLINIC | Age: 64
End: 2024-07-25
Payer: COMMERCIAL

## 2024-07-25 DIAGNOSIS — R29.898 WEAKNESS OF RIGHT SHOULDER: ICD-10-CM

## 2024-07-25 DIAGNOSIS — S42.411D RIGHT SUPRACONDYLAR HUMERUS FRACTURE, WITH ROUTINE HEALING, SUBSEQUENT ENCOUNTER: Primary | ICD-10-CM

## 2024-07-25 DIAGNOSIS — M25.611 POST-TRAUMATIC STIFFNESS OF RIGHT SHOULDER JOINT: ICD-10-CM

## 2024-07-25 NOTE — PROGRESS NOTES
"Physical Therapy Daily Treatment Note  [address] [facility], [address], [facility] [address]      Patient: Brittny Han   : 1960  Referring practitioner: Buddy Mazariegos MD  Date of Initial Visit: Type: THERAPY  Noted: 2024  Today's Date: 2024  Patient seen for 3 sessions       Visit Diagnoses:    ICD-10-CM ICD-9-CM   1. Right supracondylar humerus fracture, with routine healing, subsequent encounter  S42.411D V54.11   2. Weakness of right shoulder  R29.898 781.99   3. Post-traumatic stiffness of right shoulder joint  M25.611 719.51       Subjective   Patient reports feet have bee n okay.  Had EMG and showed tarsal tunnel.  Plantar foot pain is better, neural symptoms about the same.  her pain level is 0/10 upon arrival.      Objective   See Exercise, Manual, and Modality Logs for complete treatment.       Assessment & Plan       Assessment  Assessment details: Patient is progressing slowly in regards to plantar foot pain but is still limited with neural symptoms identified as \"tarsal tunnel\" on EMG per patient report.           Timed:         Manual Therapy:    10     mins  68330;     Therapeutic Exercise:    20     mins  95758;     Neuromuscular Charley:    10    mins  14620;    Therapeutic Activity:     0     mins  03138;     Gait Trainin     mins  85599;     Ultrasound:     0     mins  63385;    Ionto                               0    mins   75494  Self Care                       0     mins   91192  Canalith Repos    0     mins 71087      Un-Timed:  Electrical Stimulation:    0     mins  42782 ( );  Dry Needling     0     mins self-pay  Traction     0     mins 40089      Timed Treatment:   40   mins   Total Treatment:     40   mins    Camacho Lopez, PT  [place] License: [id number]      "

## 2024-07-29 LAB
NCCN CRITERIA FLAG: NORMAL
TYRER CUZICK SCORE: 9.3

## 2024-08-01 ENCOUNTER — TREATMENT (OUTPATIENT)
Dept: PHYSICAL THERAPY | Facility: CLINIC | Age: 64
End: 2024-08-01
Payer: COMMERCIAL

## 2024-08-01 DIAGNOSIS — R29.898 WEAKNESS OF RIGHT SHOULDER: ICD-10-CM

## 2024-08-01 DIAGNOSIS — M25.611 POST-TRAUMATIC STIFFNESS OF RIGHT SHOULDER JOINT: ICD-10-CM

## 2024-08-01 DIAGNOSIS — S42.411D RIGHT SUPRACONDYLAR HUMERUS FRACTURE, WITH ROUTINE HEALING, SUBSEQUENT ENCOUNTER: Primary | ICD-10-CM

## 2024-08-02 NOTE — PROGRESS NOTES
Physical Therapy Daily Treatment Note  [address] [facility], [address], [facility] [address]      Patient: Brittny Han   : 1960  Referring practitioner: Buddy Mazariegos MD  Date of Initial Visit: Type: THERAPY  Noted: 2024  Today's Date: 2024  Patient seen for 4 sessions       Visit Diagnoses:    ICD-10-CM ICD-9-CM   1. Right supracondylar humerus fracture, with routine healing, subsequent encounter  S42.411D V54.11   2. Weakness of right shoulder  R29.898 781.99   3. Post-traumatic stiffness of right shoulder joint  M25.611 719.51       Subjective   Patient reports that she is doing better but still has some trouble in sidelying and occasional aches.  Feels minimal restriction in ROM.  She has pain along the anterolateral shoulder, when present.  her pain level is 0/10 upon arrival.      Objective   See Exercise, Manual, and Modality Logs for complete treatment.       Assessment & Plan       Assessment  Assessment details: Patient is progressing well with her shoulder and continuing to make good progress.  We continue to make minor advancements in her HEP.           Timed:         Manual Therapy:    10     mins  57241;     Therapeutic Exercise:    25     mins  41907;     Neuromuscular Charley:    10    mins  55559;    Therapeutic Activity:     0     mins  57577;     Gait Trainin     mins  01011;     Ultrasound:     0     mins  89147;    Ionto                               0    mins   65153  Self Care                       0     mins   02940  Canalith Repos    0     mins 22594      Un-Timed:  Electrical Stimulation:    0     mins  29137 ( );  Dry Needling     0     mins self-pay  Traction     0     mins 58795      Timed Treatment:   45   mins   Total Treatment:     45   mins    Camacho Lopez, PT  [place] License: [id number]

## 2024-08-05 ENCOUNTER — OFFICE VISIT (OUTPATIENT)
Dept: CARDIOLOGY | Facility: HOSPITAL | Age: 64
End: 2024-08-05
Payer: COMMERCIAL

## 2024-08-05 VITALS
WEIGHT: 233 LBS | BODY MASS INDEX: 36.57 KG/M2 | DIASTOLIC BLOOD PRESSURE: 66 MMHG | OXYGEN SATURATION: 95 % | HEIGHT: 67 IN | SYSTOLIC BLOOD PRESSURE: 122 MMHG | HEART RATE: 65 BPM

## 2024-08-05 DIAGNOSIS — E03.9 HYPOTHYROIDISM (ACQUIRED): ICD-10-CM

## 2024-08-05 DIAGNOSIS — I51.7 LVH (LEFT VENTRICULAR HYPERTROPHY): Primary | ICD-10-CM

## 2024-08-05 DIAGNOSIS — E78.00 ELEVATED LDL CHOLESTEROL LEVEL: ICD-10-CM

## 2024-08-05 DIAGNOSIS — I10 BENIGN ESSENTIAL HYPERTENSION: ICD-10-CM

## 2024-08-05 PROCEDURE — 99214 OFFICE O/P EST MOD 30 MIN: CPT | Performed by: NURSE PRACTITIONER

## 2024-08-05 RX ORDER — MAGNESIUM GLUCONATE 27 MG(500)
500 TABLET ORAL DAILY
COMMUNITY

## 2024-08-05 RX ORDER — SPIRONOLACTONE AND HYDROCHLOROTHIAZIDE 25; 25 MG/1; MG/1
1 TABLET ORAL DAILY
Qty: 90 TABLET | Refills: 1 | Status: SHIPPED | OUTPATIENT
Start: 2024-08-05 | End: 2025-02-01

## 2024-08-05 RX ORDER — CHOLECALCIFEROL (VITAMIN D3) 25 MCG
2000 TABLET ORAL DAILY
COMMUNITY

## 2024-08-05 RX ORDER — CARVEDILOL 12.5 MG/1
12.5 TABLET ORAL 2 TIMES DAILY
Qty: 180 TABLET | Refills: 1 | Status: SHIPPED | OUTPATIENT
Start: 2024-08-05

## 2024-08-05 NOTE — PATIENT INSTRUCTIONS
- Continue current medications    - Dr. Jacome's and Dr. Nugent office will call to schedule an appointment

## 2024-08-05 NOTE — PROGRESS NOTES
"Chief Complaint  Hypertension    Subjective      History of Present Illness {  Problem List  Visit  Diagnosis   Encounters  Notes  Medications  Labs  Result Review Imaging  Media :23}     Brittny Han, 64 y.o. female with HTN, palpitations, asthma, prediabetes, obesity, ALANNA on CPAP presents to [facility] for Hypertension.    Presents today feeling well overall from a cardiac standpoint. Blood pressures generally well controlled on home monitoring; SBP generally less than 130. One evening she had intermittent harder heartbeat after forgetting medicine, but otherwise feeling well and no sustained palpitation/tachypalpitation. No chest pain or shortness of breath. Planning to get lipid panel completed when she is fasting.      Previous evaluation:  Presents today reporting reporting blood pressures generally less than 140s, typically 134/80s. Blood pressure monitoring trisha reported average SBP less than 130. Recent pulmonary appointment, and reports breathing has been ok lately but still with some intermittent dyspnea. Palpitations generally well controlled. Previous echocardiogram that revealed normal LVEF, mild atrial dilation.  Exercise myocardial perfusion study with normal perfusion study, low risk study reported. Holter monitor completed with diagnostic time 5 days, 12 hours.  Monitor reported as normal study.  No evidence of atrial fibrillation/flutter, AV block/pauses, VT.  Average heart rate 81, minimum 59, maximum 125.  Low burden PAC/PVC less than 1%. Patient trigerred events not associated with arrhythmia.      Objective     Vital Signs:   Vitals:    08/05/24 0938   BP: 122/66   BP Location: Left arm   Patient Position: Sitting   Pulse: 65   SpO2: 95%   Weight: 106 kg (233 lb)   Height: 170.2 cm (67\")         Body mass index is 36.49 kg/m².  Physical Exam  Vitals and nursing note reviewed.   Constitutional:       Appearance: Normal appearance.   HENT:      Head: " Normocephalic.   Eyes:      Extraocular Movements: Extraocular movements intact.   Neck:      Vascular: No carotid bruit.   Cardiovascular:      Rate and Rhythm: Normal rate and regular rhythm.      Pulses: Normal pulses.      Heart sounds: Normal heart sounds, S1 normal and S2 normal. No murmur heard.  Pulmonary:      Effort: Pulmonary effort is normal. No respiratory distress.      Breath sounds: Normal breath sounds.   Musculoskeletal:      Cervical back: Neck supple.      Right lower leg: No edema.      Left lower leg: No edema.   Skin:     General: Skin is warm and dry.   Neurological:      General: No focal deficit present.      Mental Status: She is alert.   Psychiatric:         Mood and Affect: Mood normal.         Behavior: Behavior normal.         Thought Content: Thought content normal.        Data Reviewed:{ Labs  Result Review  Imaging  Med Tab  Media :23}     TSH+Free T4 (05/03/2024 14:45)  Hemoglobin A1c (05/03/2024 14:45)  Comprehensive Metabolic Panel (05/03/2024 14:45)  CBC & Differential (05/03/2024 14:45)    Adult Transthoracic Echo Complete W/ Cont if Necessary Per Protocol (10/16/2023 09:54)  Holter Monitor - 72 Hour Up To 15 Days (09/26/2023 13:49)  Stress Test With Myocardial Perfusion (1 Day) (10/16/2023 11:09)  ECG 12 Lead (09/26/2023 09:05)       Assessment & Plan   Assessment and Plan {CC Problem List  Visit Diagnosis  ROS  Review (Popup)  Health Maintenance  Quality  BestPractice  Medications  SmartSets  SnapShot Encounters  Media :23}       1. Benign essential hypertension  -Well-controlled on ambulatory blood pressure monitoring  -Prior TTE October 2023 with mild LVH  -Goal BP<130/80  -Continue current medication regimen including: Carvedilol to 12.5mg BID, HCTZ 25mg-spironolactone 25mg daily  -Continue home BP monitoring  -Referral to cardiology for surveillance; mother follows with Dr. Jacome    2. Palpitations  -Overall well-controlled.  No sustained  palpitations/tachypalpitation.  -Holter monitor completed with diagnostic time 5 days, 12 hours.  Monitor reported as normal study.  No evidence of atrial fibrillation/flutter, AV block/pauses, VT.  Average heart rate 81, minimum 59, maximum 125.  Low burden PAC/PVC less than 1%. Patient trigerred events not associated with arrhythmia.  -TTE as above  -Continue carvedilol 12.5 Mg twice daily    3.  Elevated LDL  -Most recent  mg/dL in November 2022  -Recent lipid panel ordered by PCP, planning to obtain when she is fasting  -Continue with plan to check LDL prior to establishing with cardiology      Follow Up {Instructions Charge Capture  Follow-up Communications :23}     Return if symptoms worsen or fail to improve.      Patient was given instructions and counseling regarding her condition or for health maintenance advice. Please see specific information pulled into the AVS if appropriate.  Patient was instructed to call the [facility] with any questions, concerns, or worsening symptoms.    Dictated Utilizing Dragon Dictation   Please note that portions of this note were completed with a voice recognition program.   Part of this note may be an electronic transcription/translation of spoken language to printed text using the Dragon Dictation System.

## 2024-08-06 ENCOUNTER — TELEPHONE (OUTPATIENT)
Dept: ENDOCRINOLOGY | Facility: CLINIC | Age: 64
End: 2024-08-06

## 2024-08-06 NOTE — TELEPHONE ENCOUNTER
"  Caller: Brittny Han \"Padmini\"    Relationship: Self    Best call back number: [phone]    Who is your current provider: DR LINDSEY LUGO    Is your current provider offboarding? NO    Who would you like your new provider to be: DR SACHI ORTA    What are your reasons for transferring care: REFERRAL BY DR FARLEY    Additional notes: PLEASE CONTACT PT TO ADVISE.          "

## 2024-08-06 NOTE — TELEPHONE ENCOUNTER
Provider change request has been filled out. Will contact pt to schedule once both providers have approved

## 2024-08-06 NOTE — TELEPHONE ENCOUNTER
"Caller: Brittny Han \"Padmini\"    Relationship: Self    Best call back number: [phone]    Who is your current provider: DR. AYANA LUGO    Is your current provider offboarding? NO    Who would you like your new provider to be: DR KAREN ORTA    What are your reasons for transferring care: REFERRAL BY DR FARLEY    Additional notes: PLEASE CONTACT PT TO ADVISE             "

## 2024-08-07 ENCOUNTER — HOSPITAL ENCOUNTER (OUTPATIENT)
Facility: HOSPITAL | Age: 64
Discharge: HOME | End: 2024-08-07
Admitting: INTERNAL MEDICINE
Payer: COMMERCIAL

## 2024-08-07 DIAGNOSIS — Z12.31 ENCOUNTER FOR SCREENING MAMMOGRAM FOR MALIGNANT NEOPLASM OF BREAST: ICD-10-CM

## 2024-08-07 PROCEDURE — 77067 SCR MAMMO BI INCL CAD: CPT

## 2024-08-07 PROCEDURE — 77063 BREAST TOMOSYNTHESIS BI: CPT

## 2024-08-08 ENCOUNTER — TREATMENT (OUTPATIENT)
Dept: PHYSICAL THERAPY | Facility: CLINIC | Age: 64
End: 2024-08-08
Payer: COMMERCIAL

## 2024-08-08 DIAGNOSIS — M25.611 POST-TRAUMATIC STIFFNESS OF RIGHT SHOULDER JOINT: ICD-10-CM

## 2024-08-08 DIAGNOSIS — R29.898 WEAKNESS OF RIGHT SHOULDER: ICD-10-CM

## 2024-08-08 DIAGNOSIS — S42.411D RIGHT SUPRACONDYLAR HUMERUS FRACTURE, WITH ROUTINE HEALING, SUBSEQUENT ENCOUNTER: Primary | ICD-10-CM

## 2024-08-08 NOTE — PROGRESS NOTES
"Physical Therapy Daily Treatment Note  [address] [facility], [address], [facility] [address]      Patient: Brittny Han   : 1960  Referring practitioner: Buddy Mazariegos MD  Date of Initial Visit: Type: THERAPY  Noted: 2024  Today's Date: 2024  Patient seen for 5 sessions       Visit Diagnoses:    ICD-10-CM ICD-9-CM   1. Right supracondylar humerus fracture, with routine healing, subsequent encounter  S42.411D V54.11   2. Weakness of right shoulder  R29.898 781.99   3. Post-traumatic stiffness of right shoulder joint  M25.611 719.51       Subjective   Patient reports the shoulder feels \"good\".  No new complaints.  Has been having symptoms of thyroid dysfunction.  her pain level is 0/10 upon arrival.      Objective   Grossly full AROM noted left shoulder.     See Exercise, Manual, and Modality Logs for complete treatment.       Assessment & Plan       Assessment  Assessment details: Patient tolerates treatment well and has minimal to no pain throughout.  She has minimal passive or active limitation in motion.  Plan to progress strengthening and begin to consider d/c at her next 1-2 sessions.           Timed:         Manual Therapy:    10     mins  63183;     Therapeutic Exercise:    25     mins  55323;     Neuromuscular Charley:    11    mins  28574;    Therapeutic Activity:     0     mins  43544;     Gait Trainin     mins  86529;     Ultrasound:     0     mins  14613;    Ionto                               0    mins   34667  Self Care                       0     mins   57149  Canalith Repos    0     mins 06933      Un-Timed:  Electrical Stimulation:    0     mins  75865 ( );  Dry Needling     0     mins self-pay  Traction     0     mins 76227      Timed Treatment:   46   mins   Total Treatment:     46   mins    Camacho Lopez, PT  [place] License: [id number]      "

## 2024-08-12 ENCOUNTER — SPECIALTY PHARMACY (OUTPATIENT)
Dept: PHARMACY | Facility: TELEHEALTH | Age: 64
End: 2024-08-12
Payer: COMMERCIAL

## 2024-08-12 RX ORDER — LEVOTHYROXINE SODIUM 0.07 MG/1
75 TABLET ORAL DAILY
Qty: 90 TABLET | Refills: 1 | OUTPATIENT
Start: 2024-08-12

## 2024-08-12 NOTE — PROGRESS NOTES
"Specialty Pharmacy Refill Coordination Note     Brittny \"Padmini\" is a 64 y.o. female contacted today regarding refills of  Dupixent specialty medication(s).    Reviewed and verified with patient:  Allergies  Meds       Specialty medication(s) and dose(s) confirmed: yes    Refill Questions      Flowsheet Row Most Recent Value   Changes to allergies? No   Changes to medications? No   New conditions or infections since last clinic visit No   Unplanned office visit, urgent care, ED, or hospital admission in the last 4 weeks  No   How does patient/caregiver feel medication is working? Very good   Financial problems or insurance changes  No   If yes, describe changes in insurance or financial issues. N/A   Since the previous refill, were any specialty medication doses or scheduled injections missed or delayed?  Yes  [Next dose due 8/18/24]   If yes, please provide the amount 1 dose   Why were doses missed? a few weeks ago, dose delayed by a few days   Does this patient require a clinical escalation to a pharmacist? No            Delivery Questions      Flowsheet Row Most Recent Value   Delivery method Other (Comment)  [UPS]   Delivery address verified with patient/caregiver? Yes   Delivery address Home   Number of medications in delivery 1   Medication(s) being filled and delivered Dupilumab   Doses left of specialty medications 0   Copay verified? Yes   Copay amount $1007.06 **Dupixent [facility]**   Copay form of payment Credit/debit on file   Ship Date 08/13/24   Delivery Date 08/14/24   Signature Required No              Medication Adherence    Adherence tools used: directed education  Support network for adherence: family member          Follow-up: 21 day(s)     Divya Good, Pharmacy Technician  Specialty Pharmacy Technician        "

## 2024-08-13 ENCOUNTER — TREATMENT (OUTPATIENT)
Dept: PHYSICAL THERAPY | Facility: CLINIC | Age: 64
End: 2024-08-13
Payer: COMMERCIAL

## 2024-08-13 DIAGNOSIS — S42.411D RIGHT SUPRACONDYLAR HUMERUS FRACTURE, WITH ROUTINE HEALING, SUBSEQUENT ENCOUNTER: Primary | ICD-10-CM

## 2024-08-13 DIAGNOSIS — R29.898 WEAKNESS OF RIGHT SHOULDER: ICD-10-CM

## 2024-08-13 DIAGNOSIS — M25.611 POST-TRAUMATIC STIFFNESS OF RIGHT SHOULDER JOINT: ICD-10-CM

## 2024-08-13 RX ORDER — LEVOTHYROXINE SODIUM 0.07 MG/1
75 TABLET ORAL DAILY
Qty: 90 TABLET | Refills: 1 | Status: CANCELLED | OUTPATIENT
Start: 2024-08-13

## 2024-08-13 NOTE — PROGRESS NOTES
Physical Therapy Daily Treatment Note  [address], [address], [facility] [address]      Patient: Brittny Han   : 1960  Referring practitioner: Buddy Mazariegos MD  Date of Initial Visit: Type: THERAPY  Noted: 6/10/2024  Today's Date: 2024  Patient seen for 9 sessions       Visit Diagnoses:    ICD-10-CM ICD-9-CM   1. Right supracondylar humerus fracture, with routine healing, subsequent encounter  S42.411D V54.11   2. Weakness of right shoulder  R29.898 781.99   3. Post-traumatic stiffness of right shoulder joint  M25.611 719.51       Subjective   Patient reports that she notes some minor restriction while in a HBH position.  She doesn't have a lot of pain, but can have instances along the lateral upper arm.  her pain level is 0/10 upon arrival.      Objective   ER 80 @ 90 ABD.  IR 10 @ 90 ABD    See Exercise, Manual, and Modality Logs for complete treatment.       Assessment & Plan       Assessment  Assessment details: Patient seems to have more of a posterior shoulder limitation and we emphasized sleeper (S) and reprinted her picture of this exercise.  She is doing well, but hopefully will improve pain reaching behind her head with better compliance with stretching program.           Timed:         Manual Therapy:    10     mins  45390;     Therapeutic Exercise:    30     mins  04430;     Neuromuscular Charley:    10    mins  49734;    Therapeutic Activity:     0     mins  43135;     Gait Trainin     mins  36808;     Ultrasound:     0     mins  19018;    Ionto                               0    mins   13499  Self Care                       0     mins   01492  Canalith Repos    0     mins 02416      Un-Timed:  Electrical Stimulation:    0     mins  56479 ( );  Dry Needling     0     mins self-pay  Traction     0     mins 86386      Timed Treatment:   50   mins   Total Treatment:     50   mins    Camacho Lopez, PT  [place] License: [id number]

## 2024-08-14 ENCOUNTER — TELEPHONE (OUTPATIENT)
Dept: ENDOCRINOLOGY | Facility: CLINIC | Age: 64
End: 2024-08-14
Payer: COMMERCIAL

## 2024-08-14 RX ORDER — LEVOTHYROXINE SODIUM 0.07 MG/1
75 TABLET ORAL DAILY
Qty: 90 TABLET | Refills: 1 | Status: SHIPPED | OUTPATIENT
Start: 2024-08-14 | End: 2024-08-16

## 2024-08-14 NOTE — TELEPHONE ENCOUNTER
Tried calling pt to get her scheduled with Dr. Nugent because of a provider change request. Left vm to call back.

## 2024-08-15 ENCOUNTER — OFFICE VISIT (OUTPATIENT)
Dept: ENDOCRINOLOGY | Facility: CLINIC | Age: 64
End: 2024-08-15
Payer: COMMERCIAL

## 2024-08-15 VITALS
WEIGHT: 231.2 LBS | DIASTOLIC BLOOD PRESSURE: 70 MMHG | BODY MASS INDEX: 36.29 KG/M2 | SYSTOLIC BLOOD PRESSURE: 118 MMHG | HEIGHT: 67 IN | HEART RATE: 73 BPM | OXYGEN SATURATION: 98 %

## 2024-08-15 DIAGNOSIS — R73.03 PREDIABETES: Primary | ICD-10-CM

## 2024-08-15 DIAGNOSIS — E03.9 ACQUIRED HYPOTHYROIDISM: ICD-10-CM

## 2024-08-15 DIAGNOSIS — D64.9 ANEMIA, UNSPECIFIED TYPE: ICD-10-CM

## 2024-08-15 PROBLEM — H52.4 MYOPIA OF BOTH EYES WITH ASTIGMATISM AND PRESBYOPIA: Status: ACTIVE | Noted: 2023-08-22

## 2024-08-15 PROBLEM — C44.111: Status: ACTIVE | Noted: 2023-08-22

## 2024-08-15 PROBLEM — H52.13 MYOPIA OF BOTH EYES WITH ASTIGMATISM AND PRESBYOPIA: Status: ACTIVE | Noted: 2023-08-22

## 2024-08-15 PROBLEM — H52.203 MYOPIA OF BOTH EYES WITH ASTIGMATISM AND PRESBYOPIA: Status: ACTIVE | Noted: 2023-08-22

## 2024-08-15 PROBLEM — H35.3131 EARLY DRY STAGE NONEXUDATIVE AGE-RELATED MACULAR DEGENERATION OF BOTH EYES: Status: ACTIVE | Noted: 2023-08-22

## 2024-08-15 PROBLEM — H25.13 AGE-RELATED NUCLEAR CATARACT OF BOTH EYES: Status: ACTIVE | Noted: 2023-08-22

## 2024-08-15 PROBLEM — H10.13 ALLERGIC CONJUNCTIVITIS OF BOTH EYES: Status: ACTIVE | Noted: 2023-08-22

## 2024-08-15 PROBLEM — H40.053 OCULAR HYPERTENSION, BILATERAL: Status: ACTIVE | Noted: 2023-08-22

## 2024-08-15 LAB
ALBUMIN SERPL-MCNC: 4.5 G/DL (ref 3.5–5.2)
ALBUMIN/GLOB SERPL: 1.3 G/DL
ALP SERPL-CCNC: 130 U/L (ref 39–117)
ALT SERPL W P-5'-P-CCNC: 39 U/L (ref 1–33)
ANION GAP SERPL CALCULATED.3IONS-SCNC: 12.4 MMOL/L (ref 5–15)
AST SERPL-CCNC: 32 U/L (ref 1–32)
BILIRUB SERPL-MCNC: 0.4 MG/DL (ref 0–1.2)
BUN SERPL-MCNC: 20 MG/DL (ref 8–23)
BUN/CREAT SERPL: 19.8 (ref 7–25)
CALCIUM SPEC-SCNC: 10 MG/DL (ref 8.6–10.5)
CHLORIDE SERPL-SCNC: 99 MMOL/L (ref 98–107)
CHOLEST SERPL-MCNC: 235 MG/DL (ref 0–200)
CO2 SERPL-SCNC: 26.6 MMOL/L (ref 22–29)
CREAT SERPL-MCNC: 1.01 MG/DL (ref 0.57–1)
EGFRCR SERPLBLD CKD-EPI 2021: 63.1 ML/MIN/1.73
GLOBULIN UR ELPH-MCNC: 3.4 GM/DL
GLUCOSE SERPL-MCNC: 98 MG/DL (ref 65–99)
HDLC SERPL-MCNC: 55 MG/DL (ref 40–60)
LDLC SERPL CALC-MCNC: 163 MG/DL (ref 0–100)
LDLC/HDLC SERPL: 2.93 {RATIO}
MAGNESIUM SERPL-MCNC: 2.4 MG/DL (ref 1.6–2.4)
POTASSIUM SERPL-SCNC: 3.7 MMOL/L (ref 3.5–5.2)
PROT SERPL-MCNC: 7.9 G/DL (ref 6–8.5)
SODIUM SERPL-SCNC: 138 MMOL/L (ref 136–145)
T4 FREE SERPL-MCNC: 1.19 NG/DL (ref 0.92–1.68)
TESTOST SERPL-MCNC: <2.5 NG/DL (ref 2.9–40.8)
TRIGL SERPL-MCNC: 95 MG/DL (ref 0–150)
TSH SERPL DL<=0.05 MIU/L-ACNC: 5.27 UIU/ML (ref 0.27–4.2)
VLDLC SERPL-MCNC: 17 MG/DL (ref 5–40)

## 2024-08-15 PROCEDURE — 84443 ASSAY THYROID STIM HORMONE: CPT | Performed by: INTERNAL MEDICINE

## 2024-08-15 PROCEDURE — 82784 ASSAY IGA/IGD/IGG/IGM EACH: CPT | Performed by: INTERNAL MEDICINE

## 2024-08-15 PROCEDURE — 80053 COMPREHEN METABOLIC PANEL: CPT | Performed by: INTERNAL MEDICINE

## 2024-08-15 PROCEDURE — 86800 THYROGLOBULIN ANTIBODY: CPT | Performed by: INTERNAL MEDICINE

## 2024-08-15 PROCEDURE — 84439 ASSAY OF FREE THYROXINE: CPT | Performed by: INTERNAL MEDICINE

## 2024-08-15 PROCEDURE — 86376 MICROSOMAL ANTIBODY EACH: CPT | Performed by: INTERNAL MEDICINE

## 2024-08-15 PROCEDURE — 84445 ASSAY OF TSI GLOBULIN: CPT | Performed by: INTERNAL MEDICINE

## 2024-08-15 PROCEDURE — 83540 ASSAY OF IRON: CPT | Performed by: INTERNAL MEDICINE

## 2024-08-15 PROCEDURE — 80061 LIPID PANEL: CPT | Performed by: INTERNAL MEDICINE

## 2024-08-15 PROCEDURE — 86231 EMA EACH IG CLASS: CPT | Performed by: INTERNAL MEDICINE

## 2024-08-15 PROCEDURE — 86258 DGP ANTIBODY EACH IG CLASS: CPT | Performed by: INTERNAL MEDICINE

## 2024-08-15 PROCEDURE — 84403 ASSAY OF TOTAL TESTOSTERONE: CPT | Performed by: INTERNAL MEDICINE

## 2024-08-15 PROCEDURE — 83036 HEMOGLOBIN GLYCOSYLATED A1C: CPT | Performed by: INTERNAL MEDICINE

## 2024-08-15 PROCEDURE — 86364 TISS TRNSGLTMNASE EA IG CLAS: CPT | Performed by: INTERNAL MEDICINE

## 2024-08-15 PROCEDURE — 83735 ASSAY OF MAGNESIUM: CPT | Performed by: INTERNAL MEDICINE

## 2024-08-15 PROCEDURE — 82627 DEHYDROEPIANDROSTERONE: CPT | Performed by: INTERNAL MEDICINE

## 2024-08-15 NOTE — ASSESSMENT & PLAN NOTE
Blood sugar and 90 day average sugar reviewed  Rising A1c discussed  Consider sensor to help with diet and awareness re: carb content of meals

## 2024-08-15 NOTE — PROGRESS NOTES
Brittny Han 64 y.o.  CC: follow up Prediabetes, Hypothyroid     [place]: follow up Prediabetes, Hypothyroid     She has looked at video on hashimotos- feels like symptoms she has attributed to other causes is due to hashimotos  She has inflammation - has had for a long time   Also interim rising A1c- she is working on diet  Having palpitations  C/o weight gain, hair loss - stopped estrogen when she turned 62 and the hot flashes have been worse since then  Also c/o restless legs  C/o heat intolerance    Allergies   Allergen Reactions    Percocet [Oxycodone-Acetaminophen] Anaphylaxis     Breathing difficulty, tongue swelling, severe     Brevital Sodium [Methohexital] Paresthesia     CHILLS, SHAKING    Banana Unknown - Low Severity    Pork-Derived Products Unknown - Low Severity       Current Outpatient Medications:     albuterol sulfate  (90 Base) MCG/ACT inhaler, Inhale 2 puffs Every 6 (Six) Hours., Disp: 18 g, Rfl: 1    carvedilol (COREG) 12.5 MG tablet, Take 1 tablet by mouth 2 (Two) Times a Day., Disp: 180 tablet, Rfl: 1    Cholecalciferol 25 MCG (1000 UT) tablet, Take 2 tablets by mouth Daily., Disp: , Rfl:     cyclobenzaprine (FLEXERIL) 10 MG tablet, Take 1 tablet by mouth 3 (Three) Times a Day As Needed for Muscle Spasms., Disp: 12 tablet, Rfl: 0    Dupilumab (Dupixent) 300 MG/2ML solution pen-injector, Inject 2 mL under the skin into the appropriate area as directed Every 14 (Fourteen) Days., Disp: 4 mL, Rfl: 11    EPINEPHrine (EPIPEN) 0.3 MG/0.3ML solution auto-injector injection, Use as directed for acute allergic reaction., Disp: 2 each, Rfl: 3    Fluticasone-Salmeterol (Advair Diskus) 500-50 MCG/ACT DISKUS, Inhale 1 puff 2 (Two) Times a Day., Disp: 180 each, Rfl: 3    levocetirizine (XYZAL) 5 MG tablet, Take 1 tablet by mouth Daily., Disp: 90 tablet, Rfl: 3    levothyroxine (SYNTHROID, LEVOTHROID) 75 MCG tablet, Take 1 tablet by mouth Daily., Disp: 90 tablet, Rfl: 1    magnesium gluconate  (MAGONATE) 500 MG tablet, Take 1 tablet by mouth Daily., Disp: , Rfl:     montelukast (SINGULAIR) 10 MG tablet, Take 1 tablet by mouth every night at bedtime., Disp: 90 tablet, Rfl: 3    Multiple Vitamins-Minerals (CENTRUM SILVER PO), Centrum Silver, Disp: , Rfl:     multivitamin with minerals (HAIR SKIN & NAILS ADVANCED PO), Take 1 tablet by mouth Daily., Disp: , Rfl:     omeprazole (priLOSEC) 40 MG capsule, Take 1 capsule by mouth Daily., Disp: 30 capsule, Rfl: 1    Rimegepant Sulfate (NURTEC) 75 MG tablet dispersible tablet, Take 1 tablet by mouth Daily As Needed (migraine). Do not take more than 1 tablet in 24 hours, Disp: 16 tablet, Rfl: 3    spironolactone-hydrochlorothiazide (ALDACTAZIDE) 25-25 MG tablet, Take 1 tablet by mouth Daily., Disp: 90 tablet, Rfl: 1    Tiotropium Bromide Monohydrate (Spiriva Respimat) 1.25 MCG/ACT aerosol solution inhaler, Inhale 2 puffs Daily, as directed., Disp: 12 g, Rfl: 3    venlafaxine XR (EFFEXOR-XR) 75 MG 24 hr capsule, Take 1 capsule by mouth Daily., Disp: 90 capsule, Rfl: 3    Patient Active Problem List    Diagnosis     Chronic cough [R05.3]     Environmental and seasonal allergies [J30.89]     Age-related nuclear cataract of both eyes [H25.13]     Allergic conjunctivitis of both eyes [H10.13]     Basal cell carcinoma (BCC) of right eyelid [C44.111]     Early dry stage nonexudative age-related macular degeneration of both eyes [H35.3131]     Myopia of both eyes with astigmatism and presbyopia [H52.13, H52.203, H52.4]     Ocular hypertension, bilateral [H40.053]     Obesity (BMI 35.0-39.9 without comorbidity) [E66.9]     Encounter for gynecological examination without abnormal finding [Z01.419]     Encounter for screening mammogram for malignant neoplasm of breast [Z12.31]     Prediabetes [R73.03]     Idiopathic peripheral neuropathy [G60.9]     Median neuropathy at elbow, right [G56.11]     Ulnar neuropathy at elbow of right upper extremity [G56.21]     Bowel habit  changes [R19.4]     Rectal bleeding [K62.5]     Hypothyroidism [E03.9]     Severe persistent asthma [J45.50]     H/O left breast biopsy 1981 [Z98.890]     Adenomatous colon polyp; 2012, 2017 [D12.6]     Menopausal symptoms [N95.1]     Polyp of colon [K63.5]     Chronic idiopathic constipation [K59.04]     Restless legs syndrome (RLS) [G25.81]     Periodic headache syndrome, not intractable [G43.C0]     Hypertrophy of nasal turbinates [J34.3]     Abnormal blood chemistry level [R79.9]     Backache [M54.9]     Neck pain [M54.2]     Headache disorder [R51.9]     Chronic neck and back pain [M54.2, M54.9, G89.29]     Disequilibrium [R42]     Peptic ulcer [K27.9]     Shortness of breath [R06.02]     Anxiety [F41.9]     Anemia [D64.9]     Benign essential hypertension [I10]     Obstructive sleep apnea syndrome [G47.33]     Chronic obstructive lung disease [J44.9]     Hypertensive disorder [I10]      Review of Systems   Constitutional:  Negative for activity change, appetite change and unexpected weight change.   HENT:  Negative for congestion and rhinorrhea.    Eyes:  Negative for visual disturbance.   Respiratory:  Negative for cough and shortness of breath.    Cardiovascular:  Positive for leg swelling. Negative for palpitations.   Gastrointestinal:  Negative for constipation, diarrhea and nausea.   Endocrine: Positive for heat intolerance.   Genitourinary:  Negative for hematuria.   Musculoskeletal:  Positive for arthralgias. Negative for back pain, gait problem, joint swelling and myalgias.   Skin:  Negative for color change, rash and wound.        Hot flashes   Allergic/Immunologic: Negative for environmental allergies, food allergies and immunocompromised state.   Neurological:  Positive for weakness. Negative for dizziness and light-headedness.   Psychiatric/Behavioral:  Negative for confusion, decreased concentration, dysphoric mood and sleep disturbance. The patient is not nervous/anxious.      Social History  "    Socioeconomic History    Marital status: Single   Tobacco Use    Smoking status: Never     Passive exposure: Never    Smokeless tobacco: Never   Vaping Use    Vaping status: Never Used   Substance and Sexual Activity    Alcohol use: Yes     Alcohol/week: 2.0 standard drinks of alcohol     Types: 2 Glasses of wine per week     Comment: 2 glasses of wine about 4 x's per year    Drug use: No    Sexual activity: Not Currently     Partners: Male     Birth control/protection: Abstinence     Comment: single     Family History   Problem Relation Age of Onset    Heart disease Mother         cardiomyopathy, Hypertension, Heart Failure    Hypertension Mother     Sleep apnea Mother     Hyperlipidemia Mother     Heart attack Mother     Heart failure Mother     COPD Father         Father is alive at age 80 with COPD and asthma    Asthma Father         COPD    Basal cell carcinoma Father     Cancer Father         Skin    Hyperlipidemia Father     Melanoma Father     Ulcerative colitis Cousin     Crohn's disease Cousin     Cancer Niece     Melanoma Niece     Narcolepsy Sister     Sleep apnea Sister     Anemia Sister         Aplastic Anemia    Diabetes Sister         Type 2 2ndry to steroid use    Thyroid disease Sister         had 2 parathyroid removed    Asthma Sister         Asthma    Diabetes Brother         Type 1 or Juvenile    Cancer Maternal Grandfather         Multiple  Myeloma - not sure of name    Lung cancer Paternal Grandmother     Cancer Paternal Grandmother         Lung Cancer    Heart disease Paternal Grandfather         unknown    Hyperlipidemia Paternal Grandfather     Heart attack Paternal Grandfather     Heart failure Paternal Grandfather         Unsure as he  before I was born from heart issues    Diabetes Paternal Aunt         type 2    Breast cancer Neg Hx     Ovarian cancer Neg Hx      /70 (BP Location: Left arm, Patient Position: Sitting, Cuff Size: Adult)   Pulse 73   Ht 170.2 cm (67\")   Wt " 105 kg (231 lb 3.2 oz)   LMP  (LMP Unknown)   SpO2 98%   BMI 36.21 kg/m²   Physical Exam  Vitals and nursing note reviewed.   Constitutional:       Appearance: Normal appearance. She is well-developed.   HENT:      Head: Normocephalic and atraumatic.   Eyes:      General: Lids are normal.      Conjunctiva/sclera: Conjunctivae normal.      Pupils: Pupils are equal, round, and reactive to light.   Neck:      Thyroid: No thyroid mass or thyromegaly.      Vascular: No carotid bruit.      Trachea: Trachea normal. No tracheal deviation.   Cardiovascular:      Rate and Rhythm: Normal rate and regular rhythm.      Heart sounds: Normal heart sounds. No murmur heard.     No friction rub. No gallop.   Pulmonary:      Effort: Pulmonary effort is normal. No respiratory distress.      Breath sounds: Normal breath sounds. No wheezing.   Musculoskeletal:         General: No deformity. Normal range of motion.      Cervical back: Normal range of motion and neck supple.   Lymphadenopathy:      Cervical: No cervical adenopathy.   Skin:     General: Skin is warm and dry.      Findings: No erythema or rash.      Nails: There is no clubbing.   Neurological:      General: No focal deficit present.      Mental Status: She is alert and oriented to person, place, and time.      Cranial Nerves: No cranial nerve deficit.      Deep Tendon Reflexes: Reflexes are normal and symmetric. Reflexes normal.   Psychiatric:         Mood and Affect: Mood normal.         Speech: Speech normal.         Behavior: Behavior normal.         Thought Content: Thought content normal.         Judgment: Judgment normal.       Results for orders placed or performed in visit on 07/29/24   [facility] GENETIC ASSESSMENT - ,   Result Value Ref Range    TyrerCuzick 9.3     NCCN NCCN not met      *Note: Due to a large number of results and/or encounters for the requested time period, some results have not been displayed. A complete set of results can be found in Results  Review.     Diagnoses and all orders for this visit:    1. Prediabetes (Primary)  Assessment & Plan:  Blood sugar and 90 day average sugar reviewed  Rising A1c discussed  Consider sensor to help with diet and awareness re: carb content of meals       Orders:  -     Comprehensive Metabolic Panel; Future  -     Hemoglobin A1c; Future  -     Lipid Panel; Future  -     Testosterone; Future  -     DHEA-Sulfate; Future  -     Comprehensive Metabolic Panel  -     Hemoglobin A1c  -     Lipid Panel  -     Testosterone  -     DHEA-Sulfate    2. Acquired hypothyroidism  Assessment & Plan:  Last tsh high- recheck and adjust prn   C/o fatigue      Orders:  -     TSH; Future  -     T4, Free; Future  -     Thyroid Antibodies; Future  -     Thyroid Stimulating Immunoglobulin; Future  -     Celiac Comprehensive Panel; Future  -     Magnesium; Future  -     TSH  -     T4, Free  -     Thyroid Antibodies  -     Thyroid Stimulating Immunoglobulin  -     Celiac Comprehensive Panel  -     Magnesium    Hot flashes- after hrt cessation   Chronic inflammation- check celiac   Does take D, B12 and magnesium  Return in about 4 months (around 12/15/2024) for Recheck.    Electronically signed by: Amanda Nugent MD

## 2024-08-16 LAB
DHEA-S SERPL-MCNC: 46.3 UG/DL (ref 29.4–220.5)
ENDOMYSIUM IGA SER QL: NEGATIVE
GLIADIN PEPTIDE IGA SER-ACNC: 4 UNITS (ref 0–19)
GLIADIN PEPTIDE IGG SER-ACNC: 3 UNITS (ref 0–19)
HBA1C MFR BLD: 6.3 % (ref 4.8–5.6)
IGA SERPL-MCNC: 234 MG/DL (ref 87–352)
IRON 24H UR-MRATE: 63 MCG/DL (ref 37–145)
THYROGLOB AB SERPL-ACNC: 4.3 IU/ML (ref 0–0.9)
THYROPEROXIDASE AB SERPL-ACNC: 183 IU/ML (ref 0–34)
TTG IGA SER-ACNC: <2 U/ML (ref 0–3)
TTG IGG SER-ACNC: 4 U/ML (ref 0–5)

## 2024-08-16 RX ORDER — LEVOTHYROXINE SODIUM 88 UG/1
88 TABLET ORAL DAILY
Qty: 90 TABLET | Refills: 1 | Status: SHIPPED | OUTPATIENT
Start: 2024-08-16 | End: 2025-08-16

## 2024-08-21 LAB — TSI SER-ACNC: <0.1 IU/L (ref 0–0.55)

## 2024-08-29 ENCOUNTER — TELEPHONE (OUTPATIENT)
Dept: ORTHOPEDICS | Facility: OTHER | Age: 64
End: 2024-08-29
Payer: COMMERCIAL

## 2024-09-01 DIAGNOSIS — R06.02 SHORTNESS OF BREATH: ICD-10-CM

## 2024-09-01 DIAGNOSIS — J45.50 SEVERE PERSISTENT ASTHMA, UNSPECIFIED WHETHER COMPLICATED: ICD-10-CM

## 2024-09-01 DIAGNOSIS — J45.40 MODERATE PERSISTENT ASTHMA, UNSPECIFIED WHETHER COMPLICATED: ICD-10-CM

## 2024-09-03 RX ORDER — TIOTROPIUM BROMIDE INHALATION SPRAY 1.56 UG/1
2 SPRAY, METERED RESPIRATORY (INHALATION) DAILY
Qty: 12 G | Refills: 3 | Status: SHIPPED | OUTPATIENT
Start: 2024-09-03

## 2024-09-05 ENCOUNTER — OFFICE VISIT (OUTPATIENT)
Dept: CARDIOLOGY | Facility: CLINIC | Age: 64
End: 2024-09-05
Payer: COMMERCIAL

## 2024-09-05 ENCOUNTER — SPECIALTY PHARMACY (OUTPATIENT)
Dept: PHARMACY | Facility: TELEHEALTH | Age: 64
End: 2024-09-05
Payer: COMMERCIAL

## 2024-09-05 VITALS
BODY MASS INDEX: 36.22 KG/M2 | WEIGHT: 230.8 LBS | SYSTOLIC BLOOD PRESSURE: 122 MMHG | DIASTOLIC BLOOD PRESSURE: 60 MMHG | HEIGHT: 67 IN | HEART RATE: 63 BPM | OXYGEN SATURATION: 97 %

## 2024-09-05 DIAGNOSIS — E66.9 OBESITY (BMI 35.0-39.9 WITHOUT COMORBIDITY): ICD-10-CM

## 2024-09-05 DIAGNOSIS — I11.9 HYPERTENSIVE LEFT VENTRICULAR HYPERTROPHY, WITHOUT HEART FAILURE: ICD-10-CM

## 2024-09-05 DIAGNOSIS — I10 PRIMARY HYPERTENSION: ICD-10-CM

## 2024-09-05 DIAGNOSIS — I10 BENIGN ESSENTIAL HYPERTENSION: Primary | ICD-10-CM

## 2024-09-05 PROCEDURE — 99214 OFFICE O/P EST MOD 30 MIN: CPT | Performed by: INTERNAL MEDICINE

## 2024-09-05 PROCEDURE — 93000 ELECTROCARDIOGRAM COMPLETE: CPT | Performed by: INTERNAL MEDICINE

## 2024-09-05 RX ORDER — VITAMIN B COMPLEX
1 CAPSULE ORAL DAILY
COMMUNITY

## 2024-09-05 NOTE — PROGRESS NOTES
Specialty Pharmacy Patient Management Program  Reassessment     Brittny Han is a 64 y.o. female with chronic asthma and enrolled in the Patient Management program offered by [facility]. A follow-up outreach was conducted, including assessment of continued therapy appropriateness, medication adherence, and side effect incidence and management for Dupixent 300mg/2ml.     Changes to Insurance Coverage or Financial Support  none    Relevant Past Medical History and Comorbidities  Relevant medical history and concomitant health conditions were discussed with the patient. The patient's chart has been reviewed for relevant past medical history and comorbid health conditions and updated as necessary.   Past Medical History:   Diagnosis Date    Allergic     Allergic rhinitis     Anemia     Anxiety     Asthma     MODERATE, PERSISTANT    Asthma, extrinsic 2007    Progressively gets worse with age    BCC (basal cell carcinoma) 2000    RIGHT EYELID    Bowel habit changes     BPPV (benign paroxysmal positional vertigo) 01/2020    Bronchitis     Cat bite of left hand 05/16/2020    SEEN AT [facility]    Chronic bilateral low back pain with bilateral sciatica     Chronic bronchitis Aug 2018    also seasonal bronchitis every May and December    Chronic idiopathic constipation     Chronic obstructive lung disease 3/2/2016    Colon polyps     FOLLOWED BY DR. LORENA RICO    COPD (chronic obstructive pulmonary disease)     CTS (carpal tunnel syndrome) 2010    BILATERAL, Wear wrist braces at night    Demyelinating disease     Depression     Diabetes mellitus Jan 2021    Pre-Diabetic    Disease of thyroid gland     HYPOTHYROIDISM    DINERO (dyspnea on exertion) 06/2019    Essential hypertension     Fatigue     Gastroenteritis 02/2020    GERD (gastroesophageal reflux disease)     Head injury when I was 10    concussion    Headache disorder 9/7/2016    Hemorrhoids     Hepatomegaly 11/2018    History of  echocardiogram 05/31/2011    mild ascending aortic root dilatation; mod RVC enlargement; global left EF 0.72 (normal 0.55-0.75) mild concentric LV hypertrophy; trace MR; mild TR     History of PFTs 02/02/2016    good effort; normal lung volumes    Hyperlipidemia     Hypertrophy, nasal, turbinate     Hypothyroidism 2019    Idiopathic peripheral neuropathy 10/21/2021    Irritable bowel syndrome     Midline cystocele     Neuropathy of both feet 01/2019    FOLLOWED BY DR. VARSHA IRELAND    Numbness and tingling in both hands 07/19/2021    Numbness and tingling of both feet 01/30/2019    ALANNA on CPAP 2007    Followed by Jin Cat @ [facility]    Peptic ulceration     PUD    Periodic headache syndrome, not intractable     Plantar fasciitis, left 01/2019    FOLLOWED BY DR. VARSHA IRELAND    Primary central sleep apnea 2006    Sleep apnea not sure if central or not    Rectal bleeding 08/2020    Rectal bleeding     Rectal mass 08/2020    RLS (restless legs syndrome) 06/2017    Shortness of breath     Strain of thoracic region 04/2017    KALYAN (stress urinary incontinence, female)     Uterine fibroid     Varicella 1966    This is a guess    Vision loss 2008    wear glasses    Vitamin B 12 deficiency     Vitamin D deficiency      Social History     Socioeconomic History    Marital status: Single   Tobacco Use    Smoking status: Never     Passive exposure: Never    Smokeless tobacco: Never   Vaping Use    Vaping status: Never Used   Substance and Sexual Activity    Alcohol use: Yes     Alcohol/week: 2.0 standard drinks of alcohol     Types: 2 Glasses of wine per week     Comment: 2 glasses of wine about 4 x's per year    Drug use: No    Sexual activity: Not Currently     Partners: Male     Birth control/protection: Abstinence     Comment: single     Problem list reviewed by Luciano Smith RPH on 9/5/2024 at  9:15 AM    Allergies  Known allergies and reactions were discussed with the patient. The patient's chart has  been reviewed for allergy information and updated as necessary.   Allergies   Allergen Reactions    Percocet [Oxycodone-Acetaminophen] Anaphylaxis     Breathing difficulty, tongue swelling, severe     Brevital Sodium [Methohexital] Paresthesia     CHILLS, SHAKING    Banana Unknown - Low Severity    Pork-Derived Products Unknown - Low Severity     Allergies reviewed by Luciano Smith RPH on 9/5/2024 at  9:15 AM    Relevant Laboratory Values  Lab Results   Component Value Date    GLUCOSE 98 08/15/2024    CALCIUM 10.0 08/15/2024     08/15/2024    K 3.7 08/15/2024    CO2 26.6 08/15/2024    CL 99 08/15/2024    BUN 20 08/15/2024    CREATININE 1.01 (H) 08/15/2024    BCR 19.8 08/15/2024    ANIONGAP 12.4 08/15/2024     Lab Results   Component Value Date    WBC 6.67 05/03/2024    HGB 13.1 05/03/2024    HCT 39.8 05/03/2024    MCV 92.8 05/03/2024     05/03/2024     Lab Value Review  The above lab values have been reviewed; the following specialty medication(s) dose adjustment(s) are recommended: none.    Current Medication List  This medication list has been reviewed with the patient and evaluated for any interactions or necessary modifications/recommendations, and updated to include all prescription medications, OTC medications, and supplements the patient is currently taking. This list reflects what is contained in the patient's profile, which has also been marked as reviewed to communicate to other providers it is the most up to date version of the patient's current medication therapy.     Current Outpatient Medications:     albuterol sulfate  (90 Base) MCG/ACT inhaler, Inhale 2 puffs Every 6 (Six) Hours., Disp: 18 g, Rfl: 1    carvedilol (COREG) 12.5 MG tablet, Take 1 tablet by mouth 2 (Two) Times a Day., Disp: 180 tablet, Rfl: 1    Cholecalciferol 25 MCG (1000 UT) tablet, Take 2 tablets by mouth Daily., Disp: , Rfl:     cyclobenzaprine (FLEXERIL) 10 MG tablet, Take 1 tablet by mouth 3 (Three) Times a  Day As Needed for Muscle Spasms., Disp: 12 tablet, Rfl: 0    Dupilumab (Dupixent) 300 MG/2ML solution pen-injector, Inject 2 mL under the skin into the appropriate area as directed Every 14 (Fourteen) Days., Disp: 4 mL, Rfl: 11    EPINEPHrine (EPIPEN) 0.3 MG/0.3ML solution auto-injector injection, Use as directed for acute allergic reaction., Disp: 2 each, Rfl: 3    Fluticasone-Salmeterol (Advair Diskus) 500-50 MCG/ACT DISKUS, Inhale 1 puff 2 (Two) Times a Day., Disp: 180 each, Rfl: 3    levocetirizine (XYZAL) 5 MG tablet, Take 1 tablet by mouth Daily., Disp: 90 tablet, Rfl: 3    levothyroxine (Synthroid) 88 MCG tablet, Take 1 tablet by mouth Daily., Disp: 90 tablet, Rfl: 1    magnesium gluconate (MAGONATE) 500 MG tablet, Take 1 tablet by mouth Daily., Disp: , Rfl:     montelukast (SINGULAIR) 10 MG tablet, Take 1 tablet by mouth every night at bedtime., Disp: 90 tablet, Rfl: 3    Multiple Vitamins-Minerals (CENTRUM SILVER PO), Centrum Silver, Disp: , Rfl:     multivitamin with minerals (HAIR SKIN & NAILS ADVANCED PO), Take 1 tablet by mouth Daily., Disp: , Rfl:     omeprazole (priLOSEC) 40 MG capsule, Take 1 capsule by mouth Daily., Disp: 30 capsule, Rfl: 1    Rimegepant Sulfate (NURTEC) 75 MG tablet dispersible tablet, Take 1 tablet by mouth Daily As Needed (migraine). Do not take more than 1 tablet in 24 hours, Disp: 16 tablet, Rfl: 3    spironolactone-hydrochlorothiazide (ALDACTAZIDE) 25-25 MG tablet, Take 1 tablet by mouth Daily., Disp: 90 tablet, Rfl: 1    Tiotropium Bromide Monohydrate (Spiriva Respimat) 1.25 MCG/ACT aerosol solution inhaler, Inhale 2 puffs Daily as directed., Disp: 12 g, Rfl: 3    venlafaxine XR (EFFEXOR-XR) 75 MG 24 hr capsule, Take 1 capsule by mouth Daily., Disp: 90 capsule, Rfl: 3  Medicines reviewed by Luciano Smith RPH on 9/5/2024 at  9:15 AM    Drug Interactions  none     Adverse Drug Reactions  Medication tolerability: Tolerating with no to minimal ADRs  Medication plan: Continue  therapy with normal follow-up  Plan for ADR Management: none    Hospitalizations and Urgent Care Since Last Assessment  Hospitalizations or Admissions: none  ED Visits: none  Urgent Office Visits: none     Adherence, Self-Administration, and Current Therapy Problems  Adherence related to the patient's specialty therapy was discussed with the patient. The Adherence segment of this outreach has been reviewed and updated.     Adherence Questions  Linked Medication(s) Assessed: Dupilumab  On average, how many doses/injections does the patient miss per month?: 0  What are the identified reasons for non-adherence or missed doses? : no problems identified  What is the estimated medication adherence level?: %  Based on the patient/caregiver response and refill history, does this patient require an MTP to track adherence improvements?: no    Additional Barriers to Patient Self-Administration: none  Methods for Supporting Patient Self-Administration: none    Open Medication Therapy Problems  No medication therapy recommendations to display    Goals of Therapy  Goals related to the patient's specialty therapy were discussed with the patient. The Patient Goals segment of this outreach has been reviewed and updated.   Goals Addressed Today        Specialty Pharmacy General Goal- dupixent      Better compliance with taking injection q 14 days  Still having difficulty breathing, hoping to ease that   Patient mentioned her voice is hoarse all the time.  Recommended taking a full glass of water with her Advair inhaler to rinse the powder residue from the mouth and throat.              Progress Toward Meeting Patient-Identified Goals of Therapy: On Track  New Patient-Identified Goals, If Applicable:     Progress Toward Meeting Clinical Goals or Therapeutic Targets: On Track  New Clinical Goals or Therapeutic Targets, If Applicable:     Quality of Life Assessment   Quality of Life related to the patient's enrollment in the  patient management program and services provided was discussed with the patient. The QOL segment of this outreach has been reviewed and updated.  Quality of Life Improvement Scale: 8-Moderately better    Reassessment Plan & Follow-Up  Medication Therapy Changes: none  Additional Plans, Therapy Recommendations, or Therapy Problems to Be Addressed: none   Pharmacist to perform regular reassessments no more than (6) months from the previous assessment.  Care Coordinator to set up future refill outreaches, coordinate prescription delivery, and escalate clinical questions to pharmacist.     Attestation  I attest the patient was actively involved in and has agreed to the above plan of care. I attest that the specialty medication(s) addressed above are appropriate for the patient based on my reassessment. If the prescribed therapy is at any point deemed not appropriate based on the current or future assessments, a consultation will be initiated with the patient's specialty care provider to determine the best course of action. The revised plan of therapy will be documented along with any required assessments and/or additional patient education provided.     Electronically signed by Luciano Smith RPH, 09/05/24, 9:18 AM EDT.

## 2024-09-05 NOTE — PROGRESS NOTES
" Specialty Pharmacy Patient Management Program  Call Center Refill Outreach      Brittny \"Padmini\" is a 64 y.o. female contacted today regarding refills of her medication(s).    Specialty medication(s) and dose(s) confirmed: Dupixent 300mg/2ml  Other medications being refilled: none    Refill Questions      Flowsheet Row Most Recent Value   Changes to allergies? No   Changes to medications? No   New conditions or infections since last clinic visit No   Unplanned office visit, urgent care, ED, or hospital admission in the last 4 weeks  No   How does patient/caregiver feel medication is working? Very good   Financial problems or insurance changes  No   If yes, describe changes in insurance or financial issues. n/a   Since the previous refill, were any specialty medication doses or scheduled injections missed or delayed?  No  [next dose due 9/14]   If yes, please provide the amount n/a   Why were doses missed? n/a   Does this patient require a clinical escalation to a pharmacist? No            Delivery Questions      Flowsheet Row Most Recent Value   Delivery method UPS   Delivery address verified with patient/caregiver? Yes   Delivery address Home   Number of medications in delivery 1   Medication(s) being filled and delivered Dupilumab   Doses left of specialty medications 1   Copay verified? Yes   Copay amount 993.22   Copay form of payment Credit/debit on file   Ship Date 9/11/24   Delivery Date 9/12/24   Signature Required No            Medication Adherence    Adherence tools used: directed education  Support network for adherence: family member            Follow-up: 3 weeks     Luciano Smith RPH  Specialty Pharmacist  9/5/2024  09:18 EDT    "

## 2024-09-05 NOTE — PROGRESS NOTES
OFFICE VISIT  NOTE  [facility] CARDIOLOGY      Name: Brittny Han    Date: 2024  MRN:  [id number]  :  1960      REFERRING/PRIMARY PROVIDER:  Olaf Lisa DO    Chief Complaint   Patient presents with    LVH (left ventricular hypertrophy)       HPI: Brittny Han is a 64 y.o. female who presents for palpitations, hypertension and LVH.  Her mother Jessika Han is also my patient echo 10/2023 showed EF 55 to 60% with mild LVH and mild LAUREN, low risk stress test at that time, she exercised for 8 minutes with no ischemia or infarct.  Main symptoms are palpitations, she feels they are related to Hashimoto's which is not been well-controlled over the last several months.  Recently increased levothyroxine which seems to be helping.  She was also started on carvedilol and spironolactone by Vance Amaral with good blood pressure control recently.    Past Medical History:   Diagnosis Date    Allergic     Allergic rhinitis     Anemia     Anxiety     Asthma     MODERATE, PERSISTANT    Asthma, extrinsic 2007    Progressively gets worse with age    BCC (basal cell carcinoma)     RIGHT EYELID    Bowel habit changes     BPPV (benign paroxysmal positional vertigo) 2020    Bronchitis     Cat bite of left hand 2020    SEEN AT [facility]    Chronic bilateral low back pain with bilateral sciatica     Chronic bronchitis Aug 2018    also seasonal bronchitis every May and December    Chronic idiopathic constipation     Chronic obstructive lung disease 3/2/2016    Colon polyps     FOLLOWED BY DR. LORENA RICO    COPD (chronic obstructive pulmonary disease)     CTS (carpal tunnel syndrome)     BILATERAL, Wear wrist braces at night    Demyelinating disease     Depression     Diabetes mellitus 2021    Pre-Diabetic    Disease of thyroid gland     HYPOTHYROIDISM    DINERO (dyspnea on exertion) 2019    Essential hypertension     Fatigue     Gastroenteritis 2020    GERD  (gastroesophageal reflux disease)     Head injury when I was 10    concussion    Headache disorder 9/7/2016    Hemorrhoids     Hepatomegaly 11/2018    History of echocardiogram 05/31/2011    mild ascending aortic root dilatation; mod RVC enlargement; global left EF 0.72 (normal 0.55-0.75) mild concentric LV hypertrophy; trace MR; mild TR     History of PFTs 02/02/2016    good effort; normal lung volumes    Hyperlipidemia     Hypertrophy, nasal, turbinate     Hypothyroidism 2019    Idiopathic peripheral neuropathy 10/21/2021    Irritable bowel syndrome     Midline cystocele     Neuropathy of both feet 01/2019    FOLLOWED BY DR. VARSHA IRELAND    Numbness and tingling in both hands 07/19/2021    Numbness and tingling of both feet 01/30/2019    ALANNA on CPAP 2007    Followed by Jin Cat @ [facility] Pulmonary    Peptic ulceration     PUD    Periodic headache syndrome, not intractable     Plantar fasciitis, left 01/2019    FOLLOWED BY DR. VARSHA IRELAND    Primary central sleep apnea 2006    Sleep apnea not sure if central or not    Rectal bleeding 08/2020    Rectal bleeding     Rectal mass 08/2020    RLS (restless legs syndrome) 06/2017    Shortness of breath     Strain of thoracic region 04/2017    KALYAN (stress urinary incontinence, female)     Uterine fibroid     Varicella 1966    This is a guess    Vision loss 2008    wear glasses    Vitamin B 12 deficiency     Vitamin D deficiency        Past Surgical History:   Procedure Laterality Date    BLADDER SLING MODIFIED, ANTERIOR AND POSTERIOR VAGINAL REPAIR  2011    BREAST EXCISIONAL BIOPSY Left 1981    BREAST SURGERY      fibroid tumor removal    COLON SURGERY  2021    Rectocele repair    COLONOSCOPY N/A 2019    Dr. Sammy Lockwood [facility].    COLONOSCOPY N/A 09/23/2020    2 TUBULAR ADENOMA POLYPS IN TRANSVERSE, 6 MM TUBULAR ADENOMA POLYP IN DESCENDING, 3 TUBULAR ADENOMA POLYPS IN SIGMOID, ANAL FISSURE, RESCOPE IN 3 YRS, DR. LORENA RICO AT [facility]    COLONOSCOPY W/  POLYPECTOMY N/A 09/14/2012    HEMORRHOIDS, 2 TUBULAR ADENOMA POLYPS IN SIGMOID, 5 TUBULAR ADENOMA POLYPS IN RECTUM, SPASTIC COLON IN DESCENDING AND SIGMOID, RESCOPE IN 5 YRS, DR. NALINI REICH AT [facility]    COLONOSCOPY W/ POLYPECTOMY N/A 10/16/2017    TUBULAR ADENOMA POLYP IN LEFT COLON, DR. RACHANA GAYLE AT [facility]    HYSTERECTOMY N/A 10/24/2011    LAPAROSCOPIC, WITH BILATERAL SALPINGECTOMY, DR. ROSITA MATA AT [facility]    RECTAL PROLAPSE REPAIR      SKIN CANCER EXCISION Right 2000    BCC ON RIGHT EYELID    SKIN CANCER EXCISION Right 2002    BCC OF RIGHT EYE    SUBTOTAL HYSTERECTOMY  10/24/2011    TRANSVAGINAL TAPING SUSPENSION N/A 10/24/2011    WITH CYSTOSCOPY, DR. ROSITA MATA AT [facility]    VAGINAL HYSTERECTOMY  2011    VAGINAL PROLAPSE REPAIR  2011    bladder sling    WISDOM TOOTH EXTRACTION  1979 ?       Social History     Socioeconomic History    Marital status: Single   Tobacco Use    Smoking status: Never     Passive exposure: Never    Smokeless tobacco: Never   Vaping Use    Vaping status: Never Used   Substance and Sexual Activity    Alcohol use: Yes     Alcohol/week: 2.0 standard drinks of alcohol     Types: 2 Glasses of wine per week     Comment: 2 glasses of wine about 4 x's per year    Drug use: No    Sexual activity: Not Currently     Partners: Male     Birth control/protection: Abstinence     Comment: single       Family History   Problem Relation Age of Onset    Heart disease Mother         cardiomyopathy, Hypertension, Heart Failure    Hypertension Mother     Sleep apnea Mother     Hyperlipidemia Mother     Heart attack Mother     Heart failure Mother     COPD Father         Father is alive at age 80 with COPD and asthma    Asthma Father         COPD    Basal cell carcinoma Father     Cancer Father         Skin    Hyperlipidemia Father     Melanoma Father     Ulcerative colitis Cousin     Crohn's disease Cousin     Cancer Niece     Melanoma Niece     Narcolepsy Sister     Sleep apnea Sister     Anemia Sister         Aplastic  Anemia    Diabetes Sister         Type 2 2ndry to steroid use    Thyroid disease Sister         had 2 parathyroid removed    Asthma Sister         Asthma    Diabetes Brother         Type 1 or Juvenile    Cancer Maternal Grandfather         Multiple  Myeloma - not sure of name    Lung cancer Paternal Grandmother     Cancer Paternal Grandmother         Lung Cancer    Heart disease Paternal Grandfather         unknown    Hyperlipidemia Paternal Grandfather     Heart attack Paternal Grandfather     Heart failure Paternal Grandfather         Unsure as he  before I was born from heart issues    Diabetes Paternal Aunt         type 2    Breast cancer Neg Hx     Ovarian cancer Neg Hx         ROS:   Constitutional no fever,  no weight loss   Skin no rash, no subcutaneous nodules   Otolaryngeal no difficulty swallowing   Cardiovascular See HPI   Pulmonary no cough, no sputum production   Gastrointestinal no constipation, no diarrhea   Genitourinary no dysuria, no hematuria   Hematologic no easy bruisability, no abnormal bleeding   Musculoskeletal no muscle pain   Neurologic no dizziness, no falls         Allergies   Allergen Reactions    Percocet [Oxycodone-Acetaminophen] Anaphylaxis     Breathing difficulty, tongue swelling, severe     Brevital Sodium [Methohexital] Paresthesia     CHILLS, SHAKING    Banana Unknown - Low Severity    Pork-Derived Products Unknown - Low Severity         Current Outpatient Medications:     albuterol sulfate  (90 Base) MCG/ACT inhaler, Inhale 2 puffs Every 6 (Six) Hours. (Patient taking differently: Inhale 2 puffs Every 6 (Six) Hours As Needed.), Disp: 18 g, Rfl: 1    B Complex Vitamins (vitamin b complex) capsule capsule, Take 1 capsule by mouth Daily., Disp: , Rfl:     carvedilol (COREG) 12.5 MG tablet, Take 1 tablet by mouth 2 (Two) Times a Day., Disp: 180 tablet, Rfl: 1    Cholecalciferol 25 MCG (1000 UT) tablet, Take 2 tablets by mouth Daily., Disp: , Rfl:     cyclobenzaprine  (FLEXERIL) 10 MG tablet, Take 1 tablet by mouth 3 (Three) Times a Day As Needed for Muscle Spasms., Disp: 12 tablet, Rfl: 0    Dupilumab (Dupixent) 300 MG/2ML solution pen-injector, Inject 2 mL under the skin into the appropriate area as directed Every 14 (Fourteen) Days., Disp: 4 mL, Rfl: 11    EPINEPHrine (EPIPEN) 0.3 MG/0.3ML solution auto-injector injection, Use as directed for acute allergic reaction., Disp: 2 each, Rfl: 3    Fluticasone-Salmeterol (Advair Diskus) 500-50 MCG/ACT DISKUS, Inhale 1 puff 2 (Two) Times a Day., Disp: 180 each, Rfl: 3    levocetirizine (XYZAL) 5 MG tablet, Take 1 tablet by mouth Daily., Disp: 90 tablet, Rfl: 3    levothyroxine (Synthroid) 88 MCG tablet, Take 1 tablet by mouth Daily., Disp: 90 tablet, Rfl: 1    magnesium gluconate (MAGONATE) 500 MG tablet, Take 1 tablet by mouth Daily., Disp: , Rfl:     montelukast (SINGULAIR) 10 MG tablet, Take 1 tablet by mouth every night at bedtime., Disp: 90 tablet, Rfl: 3    Multiple Vitamins-Minerals (CENTRUM SILVER PO), Centrum Silver, Disp: , Rfl:     multivitamin with minerals (HAIR SKIN & NAILS ADVANCED PO), Take 1 tablet by mouth Daily., Disp: , Rfl:     omeprazole (priLOSEC) 40 MG capsule, Take 1 capsule by mouth Daily., Disp: 30 capsule, Rfl: 1    Rimegepant Sulfate (NURTEC) 75 MG tablet dispersible tablet, Take 1 tablet by mouth Daily As Needed (migraine). Do not take more than 1 tablet in 24 hours, Disp: 16 tablet, Rfl: 3    spironolactone-hydrochlorothiazide (ALDACTAZIDE) 25-25 MG tablet, Take 1 tablet by mouth Daily., Disp: 90 tablet, Rfl: 1    Tiotropium Bromide Monohydrate (Spiriva Respimat) 1.25 MCG/ACT aerosol solution inhaler, Inhale 2 puffs Daily as directed., Disp: 12 g, Rfl: 3    venlafaxine XR (EFFEXOR-XR) 75 MG 24 hr capsule, Take 1 capsule by mouth Daily., Disp: 90 capsule, Rfl: 3    Vitals:    09/05/24 1005   BP: 122/60   BP Location: Right arm   Patient Position: Sitting   Cuff Size: Adult   Pulse: 63   SpO2: 97%  "  Weight: 105 kg (230 lb 12.8 oz)   Height: 170.2 cm (67\")     Body mass index is 36.15 kg/m².    PHYSICAL EXAM:    General Appearance:   well developed  well nourished  HENT:   oropharynx moist  lips not cyanotic  Neck:  thyroid not enlarged  supple  Respiratory:  no respiratory distress  normal breath sounds  no rales  Cardiovascular:  no jugular venous distention  regular rhythm  apical impulse normal  S1 normal, S2 normal  no S3, no S4   no murmur  no rub, no thrill  carotid pulses normal; no bruit  lower extremity edema: none      Musculoskeletal:  no clubbing of fingers.   normocephalic, head atraumatic  Skin:   warm, dry  Psychiatric:  judgement and insight appropriate  normal mood and affect    RESULTS:     ECG 12 Lead    Date/Time: 9/5/2024 10:44 AM  Performed by: Coleman Jacome MD    Authorized by: Coleman Jacome MD  Comparison: compared with previous ECG from 9/26/2023  Similar to previous ECG  Rhythm: sinus rhythm  Rate: normal  BPM: 63  QRS axis: normal    Clinical impression: normal ECG          Results for orders placed during the hospital encounter of 10/16/23    Adult Transthoracic Echo Complete W/ Cont if Necessary Per Protocol    Interpretation Summary    Left ventricular ejection fraction appears to be 56 - 60%.    Left ventricular wall thickness is consistent with mild concentric hypertrophy.    Left atrial volume is mildly increased.    The right atrial cavity is mildly  dilated.    Estimated right ventricular systolic pressure from tricuspid regurgitation is normal (<35 mmHg). Calculated right ventricular systolic pressure from tricuspid regurgitation is 33 mmHg.        Labs:  Lab Results   Component Value Date    CHOL 235 (H) 08/15/2024    TRIG 95 08/15/2024    HDL 55 08/15/2024     (H) 08/15/2024    AST 32 08/15/2024    ALT 39 (H) 08/15/2024     Lab Results   Component Value Date    HGBA1C 6.30 (H) 08/15/2024     No components found for: \"CREATINININE\"  eGFR Non  Amer "   Date Value Ref Range Status   09/01/2021 57 (L) >60 mL/min/1.73 Final   03/12/2021 67 >60 mL/min/1.73 Final   02/10/2021 69 >60 mL/min/1.73 Final       Most recent PCP note, imaging tests, and labs reviewed.    ASSESSMENT:  Problem List Items Addressed This Visit       Benign essential hypertension - Primary    Obesity (BMI 35.0-39.9 without comorbidity)    Hypertensive disorder    Overview     From Automated Load;Provider: Trinidad Ruffin;Status: Active  Formatting of this note might be different from the original.   From Automated Load;Provider: Trinidad Ruffin;Status: Active         Hypertensive left ventricular hypertrophy, without heart failure       PLAN:    1.  Primary hypertension:  Well-controlled on carvedilol and spironolactone/hydrochlorothiazide  Continue low-sodium diet and exercise  Goal blood pressure less than 130/80    2.  Mild LVH by echo 10/2023  Mild biatrial enlargement as well    3.  Obesity:  Discussed the importance of weight loss for cardiovascular health.    4.  Palpitations:  Certainly could be related to Hashimoto's, she is following with endocrinology, continue carvedilol  Reviewed cardiac testing all reassuring.    Return to clinic in 12 months, or sooner as needed.    Thank you for the opportunity to share in the care of your patient; please do not hesitate to call me with any questions.     Coleman Jacome MD, [facility], [facility]  Office: [phone] [address] [place] [address]    09/05/24

## 2024-09-06 ENCOUNTER — TREATMENT (OUTPATIENT)
Dept: PHYSICAL THERAPY | Facility: CLINIC | Age: 64
End: 2024-09-06
Payer: COMMERCIAL

## 2024-09-06 DIAGNOSIS — M25.611 POST-TRAUMATIC STIFFNESS OF RIGHT SHOULDER JOINT: ICD-10-CM

## 2024-09-06 DIAGNOSIS — S42.411D RIGHT SUPRACONDYLAR HUMERUS FRACTURE, WITH ROUTINE HEALING, SUBSEQUENT ENCOUNTER: Primary | ICD-10-CM

## 2024-09-06 DIAGNOSIS — R29.898 WEAKNESS OF RIGHT SHOULDER: ICD-10-CM

## 2024-09-06 NOTE — PROGRESS NOTES
Physical Therapy Daily Treatment Note  [address], [address], [place] [facility] [address]      Patient: Brittny Han   : 1960  Referring practitioner: Buddy Mazariegos MD  Date of Initial Visit: Type: THERAPY  Noted: 6/10/2024  Today's Date: 2024  Patient seen for 10 sessions       Visit Diagnoses:    ICD-10-CM ICD-9-CM   1. Right supracondylar humerus fracture, with routine healing, subsequent encounter  S42.411D V54.11   2. Weakness of right shoulder  R29.898 781.99   3. Post-traumatic stiffness of right shoulder joint  M25.611 719.51       Subjective   Patient reports that her shoudler is doing better, not bothering her and she is sleeping better. Still has occasional issues out of her foot.  her pain level is 0/10 upon arrival.      Objective   WFL AROM flex    4/5 Er neutral, 4+/5 in 45 ABD and 90 ABD    5/5 IR, biceps, triceps    See Exercise, Manual, and Modality Logs for complete treatment.       Assessment & Plan       Assessment  Assessment details: Patient's shoulder is doing well and we worked on some final stretching to help her continue working on long term strengthening.  Plan is to d/c formal PT.           Timed:         Manual Therapy:    10     mins  99852;     Therapeutic Exercise:    25     mins  93533;     Neuromuscular Charley:    0    mins  56113;    Therapeutic Activity:     0     mins  33496;     Gait Trainin     mins  13570;     Ultrasound:     0     mins  23131;    Ionto                               0    mins   63250  Self Care                       0     mins   10795  Canalith Repos    0     mins 78699      Un-Timed:  Electrical Stimulation:    0     mins  71732 ( );  Dry Needling     0     mins self-pay  Traction     0     mins 50457      Timed Treatment:   35   mins   Total Treatment:     35   mins    Camacho oLpez, PT  [place] License: [id number]

## 2024-09-10 ENCOUNTER — PATIENT OUTREACH (OUTPATIENT)
Dept: CASE MANAGEMENT | Facility: OTHER | Age: 64
End: 2024-09-10
Payer: COMMERCIAL

## 2024-09-10 ENCOUNTER — SPECIALTY PHARMACY (OUTPATIENT)
Dept: PHARMACY | Facility: TELEHEALTH | Age: 64
End: 2024-09-10
Payer: COMMERCIAL

## 2024-09-10 NOTE — OUTREACH NOTE
"Adult Patient Profile  Questions/Answers      Flowsheet Row Most Recent Value   Symptoms/Conditions Managed at Home other (see comments)  [preventing Diabetes]   Last A1C Result 8/15/24 6.3   Diabetes Comment Telephone call with patient.  Discussed preventing diabetes.  A1c completed on  8/15/24 and it remained at 6.3. Discussed preventing diabetes tr[employer] and [employer] with patient. Patient reports she has both apps and also has used the trisha [employer].  Encouraged patient to keep a food log of everything concumed for a week to have account of diet.  Encouraged patient to receive coaching from [employer] or [employer] to get support if she would like. Discussed benefits of exercise.  Patient reports her time is very limited.  Encouraged her to try and find time for her health if possible.            AMBULATORY CASE MANAGEMENT NOTE    Names and Relationships of Patient/Support Persons: Contact: Brittny Han \"Padmini\"; Relationship: Self -     Patient Outreach    Telephone call with engaged patient . Discussed preventing diabetes.  Encouraged patient to reach out to Employee Case Management as needed.     Education Documentation  No documentation found.        SARAI ABREU  Ambulatory Case Management    9/10/2024, 13:16 EDT  "

## 2024-09-19 ENCOUNTER — SPECIALTY PHARMACY (OUTPATIENT)
Dept: PHARMACY | Facility: TELEHEALTH | Age: 64
End: 2024-09-19
Payer: COMMERCIAL

## 2024-10-04 DIAGNOSIS — J45.50 SEVERE PERSISTENT ASTHMA WITHOUT COMPLICATION: ICD-10-CM

## 2024-10-07 RX ORDER — DUPILUMAB 300 MG/2ML
300 INJECTION, SOLUTION SUBCUTANEOUS
Qty: 4 ML | Refills: 11 | Status: SHIPPED | OUTPATIENT
Start: 2024-10-07

## 2024-10-08 ENCOUNTER — SPECIALTY PHARMACY (OUTPATIENT)
Dept: PHARMACY | Facility: TELEHEALTH | Age: 64
End: 2024-10-08
Payer: COMMERCIAL

## 2024-10-25 ENCOUNTER — SPECIALTY PHARMACY (OUTPATIENT)
Dept: PHARMACY | Facility: TELEHEALTH | Age: 64
End: 2024-10-25
Payer: COMMERCIAL

## 2024-10-25 NOTE — PROGRESS NOTES
"Specialty Pharmacy Refill Coordination Note     rBittny \"Padmini\" is a 64 y.o. female contacted today regarding refills of  Dupixent specialty medication(s).    Reviewed and verified with patient:  Allergies  Meds       Specialty medication(s) and dose(s) confirmed: yes    Refill Questions      Flowsheet Row Most Recent Value   Changes to allergies? No   Changes to medications? No   New conditions or infections since last clinic visit No   Unplanned office visit, urgent care, ED, or hospital admission in the last 4 weeks  No   How does patient/caregiver feel medication is working? Very good   Financial problems or insurance changes  No   If yes, describe changes in insurance or financial issues. n/a   Since the previous refill, were any specialty medication doses or scheduled injections missed or delayed?  No  [Next dose due on 10/26/2024]   If yes, please provide the amount n/a   Why were doses missed? n/a   Does this patient require a clinical escalation to a pharmacist? No            Delivery Questions      Flowsheet Row Most Recent Value   Delivery method UPS   Delivery address verified with patient/caregiver? Yes   Delivery address Home   Number of medications in delivery 1   Medication(s) being filled and delivered Dupilumab (Dupixent)   Doses left of specialty medications 1   Copay verified? Yes   Copay amount $993.22 **Dupixent [facility]**   Copay form of payment Credit/debit on file   Ship Date 10/28/2024   Delivery Date 10/28/2024   Signature Required No              Medication Adherence    Adherence tools used: directed education  Support network for adherence: family member          Follow-up: 21 day(s)     Lorie Arellano, Pharmacy Technician  Specialty Pharmacy Technician        "

## 2024-11-06 ENCOUNTER — OFFICE VISIT (OUTPATIENT)
Dept: PULMONOLOGY | Facility: CLINIC | Age: 64
End: 2024-11-06
Payer: COMMERCIAL

## 2024-11-06 VITALS
DIASTOLIC BLOOD PRESSURE: 70 MMHG | OXYGEN SATURATION: 94 % | BODY MASS INDEX: 36.1 KG/M2 | HEIGHT: 67 IN | WEIGHT: 230 LBS | HEART RATE: 75 BPM | TEMPERATURE: 97.7 F | SYSTOLIC BLOOD PRESSURE: 120 MMHG

## 2024-11-06 DIAGNOSIS — G47.33 OBSTRUCTIVE SLEEP APNEA SYNDROME: ICD-10-CM

## 2024-11-06 DIAGNOSIS — Z23 IMMUNIZATION DUE: ICD-10-CM

## 2024-11-06 DIAGNOSIS — R05.3 CHRONIC COUGH: ICD-10-CM

## 2024-11-06 DIAGNOSIS — J45.50 SEVERE PERSISTENT ASTHMA WITHOUT COMPLICATION: Primary | ICD-10-CM

## 2024-11-06 RX ORDER — MONTELUKAST SODIUM 10 MG/1
10 TABLET ORAL
Qty: 90 TABLET | Refills: 3 | Status: SHIPPED | OUTPATIENT
Start: 2024-11-06

## 2024-11-06 NOTE — LETTER
[facility]  Vaccine Consent Form    Patient Name:  Brittny Han  Patient :  1960     Vaccine(s) Ordered    Pneumococcal Conjugate Vaccine 20-Valent (PCV20)        Screening Checklist  The following questions should be completed prior to vaccination. If you answer “yes” to any question, it does not necessarily mean you should not be vaccinated. It just means we may need to clarify or ask more questions. If a question is unclear, please ask your healthcare provider to explain it.    Yes No   Any fever or moderate to severe illness today (mild illness and/or antibiotic treatment are not contraindications)?     Do you have a history of a serious reaction to any previous vaccinations, such as anaphylaxis, encephalopathy within 7 days, Guillain-[place] syndrome within 6 weeks, seizure?     Have you received any live vaccine(s) (e.g MMR, MATTEO) or any other vaccines in the last month (to ensure duplicate doses aren't given)?     Do you have an anaphylactic allergy to latex (DTaP, DTaP-IPV, Hep A, Hep B, MenB, RV, Td, Tdap), baker’s yeast (Hep B, HPV), polysorbates (RSV, nirsevimab, PCV 20, Rotavirrus, Tdap, Shingrix), or gelatin (MATTEO, MMR)?     Do you have an anaphylactic allergy to neomycin (Rabies, MATTEO, MMR, IPV, Hep A), polymyxin B (IPV), or streptomycin (IPV)?      Any cancer, leukemia, AIDS, or other immune system disorder? (MATTEO, MMR, RV)     Do you have a parent, brother, or sister with an immune system problem (if immune competence of vaccine recipient clinically verified, can proceed)? (MMR, MATTEO)     Any recent steroid treatments for >2 weeks, chemotherapy, or radiation treatment? (MATTEO, MMR)     Have you received antibody-containing blood transfusions or IVIG in the past 11 months (recommended interval is dependent on product)? (MMR, MATTEO)     Have you taken antiviral drugs (acyclovir, famciclovir, valacyclovir for MATTEO) in the last 24 or 48 hours, respectively?      Are you pregnant or planning to  "become pregnant within 1 month? (MATTEO, MMR, HPV, IPV, MenB, Abrexvy; For Hep B- refer to Engerix-B; For RSV - Abrysvo is indicated for 32-36 weeks of pregnancy from September to January)     For infants, have you ever been told your child has had intussusception or a medical emergency involving obstruction of the intestine (Rotavirus)? If not for an infant, can skip this question.         *Ordering Physicians/APC should be consulted if \"yes\" is checked by the patient or guardian above.  I have received, read, and understand the Vaccine Information Statement (VIS) for each vaccine ordered.  I have considered my or my child's health status as well as the health status of my close contacts.  I have taken the opportunity to discuss my vaccine questions with my or my child's health care provider.   I have requested that the ordered vaccine(s) be given to me or my child.  I understand the benefits and risks of the vaccines.  I understand that I should remain in the clinic for 15 minutes after receiving the vaccine(s).  _________________________________________________________  Signature of Patient or Parent/Legal Guardian ____________________  Date     "

## 2024-11-06 NOTE — PROGRESS NOTES
"[facility] Pulmonary Follow up      Chief Complaint  Asthma (F/u)    Subjective          Brittny Han presents to [facility] for routine follow-up on her asthma.    Unfortunately she has had quite a bit of issues with her thyroid since I last saw her.  She was having quite a bit of fatigue and dyspnea with activity.  Now that her medications are adjusted she is starting to feel better.    She did have an episode of bronchitis last month completed a round of antibiotics and steroids.  She feels back to baseline now.  She denies any significant shortness of breath, cough or sputum production currently.    She continues on her Dupixent injection is doing well with those.  She is on her Advair twice daily as well as her antihistamine and Singulair.    She does continue on her CPAP nightly.  She uses a nasal pillow.  She is wears it nightly and tolerates it well.        Objective     Vital Signs:   /70   Pulse 75   Temp 97.7 °F (36.5 °C)   Ht 170.2 cm (67.01\")   Wt 104 kg (230 lb)   SpO2 94%   BMI 36.01 kg/m²       Immunization History   Administered Date(s) Administered    ABRYSVO (RSV, 60+ or pregnant women 32-36 wks) 10/19/2023    COVID-19 (PFIZER) Purple Cap Monovalent 04/19/2021, 05/10/2021, 12/27/2021    Flu Vaccine Intradermal Quad 18-64YR 10/06/2014, 10/01/2015    Flu Vaccine Quad PF >36MO 10/10/2018    Flu Vaccine Split Quad 10/10/2018    Flublok 18+yrs 10/03/2020, 11/15/2022, 10/19/2023    Fluzone (or Fluarix & Flulaval for VFC) >6mos 11/03/2021    Influenza TIV (IM) 10/06/2014, 10/01/2015    Influenza recombinant 10/23/2024    Influenza, Unspecified 10/21/2019, 09/23/2020    Pneumococcal Conjugate 13-Valent (PCV13) 04/04/2016    Pneumococcal Conjugate 20-Valent (PCV20) 11/06/2024    Pneumococcal Polysaccharide (PPSV23) 10/06/2009, 10/10/2014, 12/23/2020    Shingrix 03/01/2021, 06/04/2021    TD Preservative Free (Tenivac) 05/16/2020    Tdap " 02/12/2010       Physical Exam  Vitals reviewed.   Constitutional:       Appearance: She is well-developed.   HENT:      Head: Normocephalic and atraumatic.   Eyes:      Pupils: Pupils are equal, round, and reactive to light.   Cardiovascular:      Rate and Rhythm: Normal rate and regular rhythm.      Heart sounds: No murmur heard.  Pulmonary:      Effort: Pulmonary effort is normal. No respiratory distress.      Breath sounds: Normal breath sounds. No wheezing or rales.   Abdominal:      General: Bowel sounds are normal. There is no distension.      Palpations: Abdomen is soft.   Musculoskeletal:         General: Normal range of motion.      Cervical back: Normal range of motion and neck supple.   Skin:     General: Skin is warm and dry.      Findings: No erythema.   Neurological:      Mental Status: She is alert and oriented to person, place, and time.   Psychiatric:         Behavior: Behavior normal.          Result Review :            PFTs done in the office today:  No obstruction or restriction.  Normal diffusion.  Normal PFTs.    PA and lateral chest x-ray done the office today reviewed by me  Awaiting final MD interpretation                 Assessment and Plan    Problem List Items Addressed This Visit          Pulmonary and Pneumonias    Severe persistent asthma - Primary    Relevant Medications    montelukast (SINGULAIR) 10 MG tablet    Other Relevant Orders    XR Chest PA & Lateral    Chronic cough       Sleep    Obstructive sleep apnea syndrome     Other Visit Diagnoses       Immunization due        Relevant Orders    Pneumococcal Conjugate Vaccine 20-Valent (PCV20) (Completed)          I follow Mrs. Han here in the office for asthma.  Her PFTs are stable and there is no acute changes on her chest x-ray.  She will continue on her Advair and Spiriva.  She has rescue inhaler and nebulizers to use as needed.  Continue on her antihistamine and Singulair.    Continue on her CPAP at night.    Routine  follow-up in 6 months or sooner as needed.      Follow Up     Return in about 6 months (around 5/6/2025).  Patient was given instructions and counseling regarding her condition or for health maintenance advice. Please see specific information pulled into the AVS if appropriate.     I spent 34 minutes caring for Brittny on this date of service. This time includes time spent by me in the following activities:preparing for the visit, reviewing tests, obtaining and/or reviewing a separately obtained history, performing a medically appropriate examination and/or evaluation , counseling and educating the patient/family/caregiver, ordering medications, tests, or procedures, referring and communicating with other health care professionals , documenting information in the medical record, and independently interpreting results and communicating that information with the patient/family/caregiver    Excluding time spent on other separate services such as performing procedures or test interpretation, if applicable      BLANCA Jackson, ACNP  [facility]  Electronically signed

## 2024-12-02 RX ORDER — VENLAFAXINE HYDROCHLORIDE 75 MG/1
75 CAPSULE, EXTENDED RELEASE ORAL DAILY
Qty: 90 CAPSULE | Refills: 3 | OUTPATIENT
Start: 2024-12-02

## 2024-12-17 ENCOUNTER — PATIENT OUTREACH (OUTPATIENT)
Dept: CASE MANAGEMENT | Facility: OTHER | Age: 64
End: 2024-12-17
Payer: COMMERCIAL

## 2024-12-17 NOTE — OUTREACH NOTE
"  Adult Patient Profile  Questions/Answers      Flowsheet Row Most Recent Value   Symptoms/Conditions Managed at Home --  [Pre Diabetes]   Diabetes Management Strategies medication therapy, diet modification   Diabetes Comment Telephone call with patient. Disdcussed pre diabetes management.  Patient reports she is trying to follow a mediterranean diet.  Reports she also had food allergy testing and is allergic to almonds, avocado, pork, eggs and bananas.  Patient reports she is thinking about starting to exercise with a friend. Discussed the benefits of working with a friend and holding each other accountable to follow through.  Discussed importance of healthy diet and activity towards management of preventing diabetes.  Patient reports her schedule is very busy but is working to find time for exercise.  Encouraged patient to reach out to Employee Case Management as needed. Patient is aware she has an appointment with Dr. Nugent, Endocrinology on 12/23/24.            AMBULATORY CASE MANAGEMENT NOTE    Names and Relationships of Patient/Support Persons: Contact: Brittny Han \"Padmini\"; Relationship: Self -     Telephone call with patient. Discussed preventing diabetes.  Patient's status changed to monitoring. Encouraged to reach out to Employee Case Management as needed.    Education Documentation  No documentation found.        SARAI ABREU  Ambulatory Case Management    12/17/2024, 12:55 EST  "

## 2024-12-23 ENCOUNTER — OFFICE VISIT (OUTPATIENT)
Dept: ENDOCRINOLOGY | Facility: CLINIC | Age: 64
End: 2024-12-23
Payer: COMMERCIAL

## 2024-12-23 VITALS
HEART RATE: 75 BPM | BODY MASS INDEX: 36.6 KG/M2 | RESPIRATION RATE: 16 BRPM | DIASTOLIC BLOOD PRESSURE: 88 MMHG | WEIGHT: 233.2 LBS | SYSTOLIC BLOOD PRESSURE: 130 MMHG | HEIGHT: 67 IN | OXYGEN SATURATION: 98 %

## 2024-12-23 DIAGNOSIS — I10 BENIGN ESSENTIAL HYPERTENSION: ICD-10-CM

## 2024-12-23 DIAGNOSIS — R73.03 PREDIABETES: Primary | ICD-10-CM

## 2024-12-23 DIAGNOSIS — E03.9 ACQUIRED HYPOTHYROIDISM: ICD-10-CM

## 2024-12-23 LAB
ALBUMIN SERPL-MCNC: 4.4 G/DL (ref 3.5–5.2)
ALBUMIN/GLOB SERPL: 1.4 G/DL
ALP SERPL-CCNC: 126 U/L (ref 39–117)
ALT SERPL W P-5'-P-CCNC: 35 U/L (ref 1–33)
ANION GAP SERPL CALCULATED.3IONS-SCNC: 14.9 MMOL/L (ref 5–15)
AST SERPL-CCNC: 34 U/L (ref 1–32)
BILIRUB SERPL-MCNC: 0.2 MG/DL (ref 0–1.2)
BUN SERPL-MCNC: 15 MG/DL (ref 8–23)
BUN/CREAT SERPL: 16.9 (ref 7–25)
CALCIUM SPEC-SCNC: 9.7 MG/DL (ref 8.6–10.5)
CHLORIDE SERPL-SCNC: 101 MMOL/L (ref 98–107)
CHOLEST SERPL-MCNC: 160 MG/DL (ref 0–200)
CO2 SERPL-SCNC: 24.1 MMOL/L (ref 22–29)
CREAT SERPL-MCNC: 0.89 MG/DL (ref 0.57–1)
EGFRCR SERPLBLD CKD-EPI 2021: 73.4 ML/MIN/1.73
EXPIRATION DATE: ABNORMAL
EXPIRATION DATE: ABNORMAL
GLOBULIN UR ELPH-MCNC: 3.2 GM/DL
GLUCOSE BLDC GLUCOMTR-MCNC: 137 MG/DL (ref 70–130)
GLUCOSE SERPL-MCNC: 110 MG/DL (ref 65–99)
HBA1C MFR BLD: 5.8 % (ref 4.5–5.7)
HDLC SERPL-MCNC: 51 MG/DL (ref 40–60)
LDLC SERPL CALC-MCNC: 96 MG/DL (ref 0–100)
LDLC/HDLC SERPL: 1.89 {RATIO}
Lab: ABNORMAL
Lab: ABNORMAL
POTASSIUM SERPL-SCNC: 3.8 MMOL/L (ref 3.5–5.2)
PROT SERPL-MCNC: 7.6 G/DL (ref 6–8.5)
SODIUM SERPL-SCNC: 140 MMOL/L (ref 136–145)
T4 FREE SERPL-MCNC: 1.35 NG/DL (ref 0.92–1.68)
TRIGL SERPL-MCNC: 63 MG/DL (ref 0–150)
TSH SERPL DL<=0.05 MIU/L-ACNC: 2.24 UIU/ML (ref 0.27–4.2)
VIT B12 BLD-MCNC: 565 PG/ML (ref 211–946)
VLDLC SERPL-MCNC: 13 MG/DL (ref 5–40)

## 2024-12-23 PROCEDURE — 80053 COMPREHEN METABOLIC PANEL: CPT | Performed by: INTERNAL MEDICINE

## 2024-12-23 PROCEDURE — 84439 ASSAY OF FREE THYROXINE: CPT | Performed by: INTERNAL MEDICINE

## 2024-12-23 PROCEDURE — 83036 HEMOGLOBIN GLYCOSYLATED A1C: CPT | Performed by: INTERNAL MEDICINE

## 2024-12-23 PROCEDURE — 99214 OFFICE O/P EST MOD 30 MIN: CPT | Performed by: INTERNAL MEDICINE

## 2024-12-23 PROCEDURE — 84443 ASSAY THYROID STIM HORMONE: CPT | Performed by: INTERNAL MEDICINE

## 2024-12-23 PROCEDURE — 82947 ASSAY GLUCOSE BLOOD QUANT: CPT | Performed by: INTERNAL MEDICINE

## 2024-12-23 PROCEDURE — 82607 VITAMIN B-12: CPT | Performed by: INTERNAL MEDICINE

## 2024-12-23 PROCEDURE — 80061 LIPID PANEL: CPT | Performed by: INTERNAL MEDICINE

## 2024-12-23 NOTE — PROGRESS NOTES
Brittny Han 64 y.o.  [facility]: follow up Prediabetes, Hypothyroid, Hypertension       [place]: follow up Prediabetes, Hypothyroid, Hypertension    Blood sugar and 90 day average sugar reviewed  A1c 8/24 6.3%  Today A1c discussed   Results for orders placed or performed in visit on 12/23/24   POC Glycosylated Hemoglobin (Hb A1C)    Collection Time: 12/23/24 11:00 AM    Specimen: Blood   Result Value Ref Range    Hemoglobin A1C 5.8 (A) 4.5 - 5.7 %    Lot Number [id number]     Expiration Date 2026-10-11    POC Glucose, Blood    Collection Time: 12/23/24 11:00 AM    Specimen: Blood   Result Value Ref Range    Glucose 137 (A) 70 - 130 mg/dL    Lot Number [id number]     Expiration Date 2025-07-26      *Note: Due to a large number of results and/or encounters for the requested time period, some results have not been displayed. A complete set of results can be found in Results Review.       BP is high/normal   Recheck - 132/80  Is eating low fat diet  Is taking levothyroxine 88 mcg daily   Low tsh discussed    Allergies   Allergen Reactions    Percocet [Oxycodone-Acetaminophen] Anaphylaxis     Breathing difficulty, tongue swelling, severe     Brevital Sodium [Methohexital] Paresthesia     CHILLS, SHAKING    Banana Unknown - Low Severity    Pork-Derived Products Unknown - Low Severity       Current Outpatient Medications:     albuterol sulfate  (90 Base) MCG/ACT inhaler, Inhale 2 puffs Every 6 (Six) Hours. (Patient taking differently: Inhale 2 puffs Every 6 (Six) Hours As Needed.), Disp: 18 g, Rfl: 1    Albuterol-Budesonide (Airsupra) 90-80 MCG/ACT aerosol, Inhale 2 puffs Every 6-8 Hours As Needed., Disp: 10.7 g, Rfl: 0    atorvastatin (LIPITOR) 20 MG tablet, Take 1 tablet by mouth Daily., Disp: 90 tablet, Rfl: 1    B Complex Vitamins (vitamin b complex) capsule capsule, Take 1 capsule by mouth Daily., Disp: , Rfl:     carvedilol (COREG) 12.5 MG tablet, Take 1 tablet by mouth 2 (Two) Times a Day., Disp: 180 tablet,  Rfl: 1    Cholecalciferol 25 MCG (1000 UT) tablet, Take 2 tablets by mouth Daily., Disp: , Rfl:     cyclobenzaprine (FLEXERIL) 10 MG tablet, Take 1 tablet by mouth 3 (Three) Times a Day As Needed for Muscle Spasms., Disp: 12 tablet, Rfl: 0    Dupilumab (Dupixent) 300 MG/2ML solution pen-injector, Inject 2 mL under the skin into the appropriate area as directed Every 14 (Fourteen) Days., Disp: 4 mL, Rfl: 11    EPINEPHrine (EPIPEN) 0.3 MG/0.3ML solution auto-injector injection, Use as directed for acute allergic reaction., Disp: 2 each, Rfl: 3    Fluticasone-Salmeterol (Advair Diskus) 500-50 MCG/ACT DISKUS, Inhale 1 puff by mouth 2 (Two) Times a Day., Disp: 180 each, Rfl: 3    levocetirizine (XYZAL) 5 MG tablet, Take 1 tablet by mouth Daily., Disp: 90 tablet, Rfl: 3    levothyroxine (Synthroid) 88 MCG tablet, Take 1 tablet by mouth Daily., Disp: 90 tablet, Rfl: 1    magnesium gluconate (MAGONATE) 500 MG tablet, Take 1 tablet by mouth Daily., Disp: , Rfl:     metFORMIN ER (GLUCOPHAGE-XR) 500 MG 24 hr tablet, Take 1 tablet by mouth Daily., Disp: 90 tablet, Rfl: 1    montelukast (SINGULAIR) 10 MG tablet, Take 1 tablet by mouth every night at bedtime., Disp: 90 tablet, Rfl: 3    Multiple Vitamins-Minerals (CENTRUM SILVER PO), Centrum Silver, Disp: , Rfl:     omeprazole (priLOSEC) 40 MG capsule, Take 1 capsule by mouth Daily., Disp: 30 capsule, Rfl: 1    Rimegepant Sulfate (NURTEC) 75 MG tablet dispersible tablet, Take 1 tablet by mouth Daily As Needed (migraine). Do not take more than 1 tablet in 24 hours, Disp: 16 tablet, Rfl: 3    spironolactone-hydrochlorothiazide (ALDACTAZIDE) 25-25 MG tablet, Take 1 tablet by mouth Daily., Disp: 90 tablet, Rfl: 1    Tiotropium Bromide Monohydrate (Spiriva Respimat) 1.25 MCG/ACT aerosol solution inhaler, Inhale 2 puffs by mouth Daily as directed., Disp: 12 g, Rfl: 3    venlafaxine XR (EFFEXOR-XR) 75 MG 24 hr capsule, Take 1 capsule by mouth Daily., Disp: 90 capsule, Rfl: 3     multivitamin with minerals (HAIR SKIN & NAILS ADVANCED PO), Take 1 tablet by mouth Daily. (Patient not taking: Reported on 12/23/2024), Disp: , Rfl:     venlafaxine XR (EFFEXOR-XR) 37.5 MG 24 hr capsule, Take 1 capsule by mouth Daily. (Patient not taking: Reported on 12/23/2024), Disp: 30 capsule, Rfl: 1    Patient Active Problem List    Diagnosis     Hypertensive left ventricular hypertrophy, without heart failure [I11.9]     Chronic cough [R05.3]     Environmental and seasonal allergies [J30.89]     Age-related nuclear cataract of both eyes [H25.13]     Allergic conjunctivitis of both eyes [H10.13]     Basal cell carcinoma (BCC) of right eyelid [C44.111]     Early dry stage nonexudative age-related macular degeneration of both eyes [H35.3131]     Myopia of both eyes with astigmatism and presbyopia [H52.13, H52.203, H52.4]     Ocular hypertension, bilateral [H40.053]     Obesity (BMI 35.0-39.9 without comorbidity) [E66.9]     Encounter for gynecological examination without abnormal finding [Z01.419]     Encounter for screening mammogram for malignant neoplasm of breast [Z12.31]     Prediabetes [R73.03]     Idiopathic peripheral neuropathy [G60.9]     Median neuropathy at elbow, right [G56.11]     Ulnar neuropathy at elbow of right upper extremity [G56.21]     Bowel habit changes [R19.4]     Rectal bleeding [K62.5]     Hypothyroidism [E03.9]     Severe persistent asthma [J45.50]     H/O left breast biopsy 1981 [Z98.890]     Adenomatous colon polyp; 2012, 2017 [D12.6]     Menopausal symptoms [N95.1]     Polyp of colon [K63.5]     Chronic idiopathic constipation [K59.04]     Restless legs syndrome (RLS) [G25.81]     Periodic headache syndrome, not intractable [G43.C0]     Hypertrophy of nasal turbinates [J34.3]     Abnormal blood chemistry level [R79.9]     Backache [M54.9]     Neck pain [M54.2]     Headache disorder [R51.9]     Chronic neck and back pain [M54.2, M54.9, G89.29]     Disequilibrium [R42]     Peptic  ulcer [K27.9]     Shortness of breath [R06.02]     Anxiety [F41.9]     Anemia [D64.9]     Benign essential hypertension [I10]     Obstructive sleep apnea syndrome [G47.33]     Chronic obstructive lung disease [J44.9]     Hypertensive disorder [I10]      Review of Systems   Constitutional:  Negative for activity change, appetite change and unexpected weight change.   HENT:  Negative for congestion and rhinorrhea.    Eyes:  Negative for visual disturbance.   Respiratory:  Negative for cough and shortness of breath.    Cardiovascular:  Negative for palpitations and leg swelling.   Gastrointestinal:  Negative for constipation, diarrhea and nausea.   Genitourinary:  Negative for hematuria.   Musculoskeletal:  Negative for arthralgias, back pain, gait problem, joint swelling and myalgias.   Skin:  Negative for color change, rash and wound.   Allergic/Immunologic: Negative for environmental allergies, food allergies and immunocompromised state.   Neurological:  Negative for dizziness, weakness and light-headedness.   Psychiatric/Behavioral:  Negative for confusion, decreased concentration, dysphoric mood and sleep disturbance. The patient is not nervous/anxious.      Social History     Socioeconomic History    Marital status: Single   Tobacco Use    Smoking status: Never     Passive exposure: Never    Smokeless tobacco: Never   Vaping Use    Vaping status: Never Used   Substance and Sexual Activity    Alcohol use: Yes     Alcohol/week: 2.0 standard drinks of alcohol     Types: 2 Glasses of wine per week     Comment: 2 glasses of wine about 4 x's per year    Drug use: No    Sexual activity: Not Currently     Partners: Male     Birth control/protection: Abstinence     Comment: single     Family History   Problem Relation Age of Onset    Heart disease Mother         cardiomyopathy, Hypertension, Heart Failure    Hypertension Mother     Sleep apnea Mother     Hyperlipidemia Mother     Heart attack Mother     Heart failure  "Mother     COPD Father         Father is alive at age 80 with COPD and asthma    Asthma Father         COPD    Basal cell carcinoma Father     Cancer Father         Skin    Hyperlipidemia Father     Melanoma Father     Ulcerative colitis Cousin     Crohn's disease Cousin     Cancer Niece     Melanoma Niece     Narcolepsy Sister     Sleep apnea Sister     Anemia Sister         Aplastic Anemia    Diabetes Sister         Type 2 2ndry to steroid use    Thyroid disease Sister         had 2 parathyroid removed    Asthma Sister         Asthma    Diabetes Brother         Type 1 or Juvenile    Cancer Maternal Grandfather         Multiple  Myeloma - not sure of name    Lung cancer Paternal Grandmother     Cancer Paternal Grandmother         Lung Cancer    Heart disease Paternal Grandfather         unknown    Hyperlipidemia Paternal Grandfather     Heart attack Paternal Grandfather     Heart failure Paternal Grandfather         Unsure as he  before I was born from heart issues    Diabetes Paternal Aunt         type 2    Breast cancer Neg Hx     Ovarian cancer Neg Hx      /88 (BP Location: Right arm, Patient Position: Sitting, Cuff Size: Adult)   Pulse 75   Resp 16   Ht 170.2 cm (67.01\")   Wt 106 kg (233 lb 3.2 oz)   LMP  (LMP Unknown)   SpO2 98%   BMI 36.52 kg/m²   Physical Exam  Vitals and nursing note reviewed.   Constitutional:       Appearance: Normal appearance. She is well-developed.   HENT:      Head: Normocephalic and atraumatic.   Eyes:      General: Lids are normal.      Extraocular Movements: Extraocular movements intact.      Conjunctiva/sclera: Conjunctivae normal.      Pupils: Pupils are equal, round, and reactive to light.   Neck:      Thyroid: No thyroid mass or thyromegaly.      Vascular: No carotid bruit.      Trachea: Trachea normal. No tracheal deviation.   Cardiovascular:      Rate and Rhythm: Normal rate and regular rhythm.      Heart sounds: Normal heart sounds. No murmur heard.     No " friction rub. No gallop.   Pulmonary:      Effort: Pulmonary effort is normal. No respiratory distress.      Breath sounds: Normal breath sounds. No wheezing.   Musculoskeletal:         General: Tenderness and deformity present. Normal range of motion.      Cervical back: Normal range of motion and neck supple.   Lymphadenopathy:      Cervical: No cervical adenopathy.   Skin:     General: Skin is warm and dry.      Findings: No erythema or rash.      Nails: There is no clubbing.   Neurological:      General: No focal deficit present.      Mental Status: She is alert and oriented to person, place, and time.      Cranial Nerves: No cranial nerve deficit.      Deep Tendon Reflexes: Reflexes are normal and symmetric. Reflexes normal.   Psychiatric:         Mood and Affect: Mood normal.         Speech: Speech normal.         Behavior: Behavior normal.         Thought Content: Thought content normal.         Judgment: Judgment normal.       Results for orders placed or performed in visit on 12/23/24   POC Glycosylated Hemoglobin (Hb A1C)    Collection Time: 12/23/24 11:00 AM    Specimen: Blood   Result Value Ref Range    Hemoglobin A1C 5.8 (A) 4.5 - 5.7 %    Lot Number [id number]     Expiration Date 2026-10-11    POC Glucose, Blood    Collection Time: 12/23/24 11:00 AM    Specimen: Blood   Result Value Ref Range    Glucose 137 (A) 70 - 130 mg/dL    Lot Number [id number]     Expiration Date 2025-07-26      *Note: Due to a large number of results and/or encounters for the requested time period, some results have not been displayed. A complete set of results can be found in Results Review.     Diagnoses and all orders for this visit:    1. Prediabetes (Primary)  Assessment & Plan:  Better control based on A1c  Results for orders placed or performed in visit on 12/23/24   POC Glycosylated Hemoglobin (Hb A1C)    Collection Time: 12/23/24 11:00 AM    Specimen: Blood   Result Value Ref Range    Hemoglobin A1C 5.8 (A) 4.5 - 5.7 %     Lot Number [id number]     Expiration Date 2026-10-11    POC Glucose, Blood    Collection Time: 12/23/24 11:00 AM    Specimen: Blood   Result Value Ref Range    Glucose 137 (A) 70 - 130 mg/dL    Lot Number [id number]     Expiration Date 2025-07-26      *Note: Due to a large number of results and/or encounters for the requested time period, some results have not been displayed. A complete set of results can be found in Results Review.     Continue monitoring and PCP added metformin- continue to follow   Doing well overall   She would like to try weight loss drug- rx zepbound sent     Orders:  -     POC Glycosylated Hemoglobin (Hb A1C)  -     POC Glucose, Blood    2. Benign essential hypertension  Assessment & Plan:  BP is high- recheck good  Continue monitoring and medications       3. Acquired hypothyroidism  Assessment & Plan:  Taking levothyoxine 88 mcg daily   Check tfts     Orders:  -     T4, Free; Future  -     TSH; Future  -     Comprehensive Metabolic Panel; Future  -     Lipid Panel; Future  -     Vitamin B12; Future  -     T4, Free  -     TSH  -     Comprehensive Metabolic Panel  -     Lipid Panel  -     Vitamin B12    Return in about 3 months (around 3/23/2025) for Recheck.    Electronically signed by: Amanda Nugent MD

## 2024-12-23 NOTE — ASSESSMENT & PLAN NOTE
Better control based on A1c  Results for orders placed or performed in visit on 12/23/24   POC Glycosylated Hemoglobin (Hb A1C)    Collection Time: 12/23/24 11:00 AM    Specimen: Blood   Result Value Ref Range    Hemoglobin A1C 5.8 (A) 4.5 - 5.7 %    Lot Number [id number]     Expiration Date 2026-10-11    POC Glucose, Blood    Collection Time: 12/23/24 11:00 AM    Specimen: Blood   Result Value Ref Range    Glucose 137 (A) 70 - 130 mg/dL    Lot Number [id number]     Expiration Date 2025-07-26      *Note: Due to a large number of results and/or encounters for the requested time period, some results have not been displayed. A complete set of results can be found in Results Review.     Continue monitoring and PCP added metformin- continue to follow   Doing well overall   She would like to try weight loss drug- rx zepbound sent

## 2024-12-27 RX ORDER — VENLAFAXINE HYDROCHLORIDE 75 MG/1
75 CAPSULE, EXTENDED RELEASE ORAL DAILY
Qty: 90 CAPSULE | Refills: 3 | OUTPATIENT
Start: 2024-12-27

## 2024-12-30 ENCOUNTER — SPECIALTY PHARMACY (OUTPATIENT)
Dept: PHARMACY | Facility: TELEHEALTH | Age: 64
End: 2024-12-30
Payer: COMMERCIAL

## 2024-12-30 NOTE — PROGRESS NOTES
"Specialty Pharmacy Refill Coordination Note     Brittny \"Padmini\" is a 64 y.o. female contacted today regarding refills of  Dupixent specialty medication(s).    Reviewed and verified with patient:  Allergies  Meds       Specialty medication(s) and dose(s) confirmed: yes    Refill Questions      Flowsheet Row Most Recent Value   Changes to allergies? No   Changes to medications? No   New conditions or infections since last clinic visit No   Unplanned office visit, urgent care, ED, or hospital admission in the last 4 weeks  No   How does patient/caregiver feel medication is working? Very good   Financial problems or insurance changes  No   If yes, describe changes in insurance or financial issues. N/A   Since the previous refill, were any specialty medication doses or scheduled injections missed or delayed?  No  [Next dose due on 1/4]   If yes, please provide the amount N/A   Why were doses missed? N/A   Does this patient require a clinical escalation to a pharmacist? No            Delivery Questions      Flowsheet Row Most Recent Value   Delivery method UPS   Delivery address verified with patient/caregiver? Yes   Delivery address Home   Number of medications in delivery 1   Medication(s) being filled and delivered Dupilumab (Dupixent)   Doses left of specialty medications 2   Copay verified? Yes   Copay amount $980.94   Copay form of payment Credit/debit on file  [Dupixent [facility]]   Ship Date 12/30   Delivery Date 12/31   Signature Required No              Medication Adherence    Adherence tools used: directed education  Support network for adherence: family member          Follow-up: 21 day(s)     Lorie Arellano, Pharmacy Technician  Specialty Pharmacy Technician        "

## 2025-01-10 ENCOUNTER — APPOINTMENT (OUTPATIENT)
Dept: GENERAL RADIOLOGY | Facility: HOSPITAL | Age: 65
End: 2025-01-10
Payer: COMMERCIAL

## 2025-01-10 ENCOUNTER — HOSPITAL ENCOUNTER (EMERGENCY)
Facility: HOSPITAL | Age: 65
Discharge: HOME | End: 2025-01-10
Attending: EMERGENCY MEDICINE
Payer: COMMERCIAL

## 2025-01-10 ENCOUNTER — TELEPHONE (OUTPATIENT)
Dept: PULMONOLOGY | Facility: CLINIC | Age: 65
End: 2025-01-10
Payer: COMMERCIAL

## 2025-01-10 VITALS
SYSTOLIC BLOOD PRESSURE: 117 MMHG | BODY MASS INDEX: 35.79 KG/M2 | HEART RATE: 83 BPM | HEIGHT: 67 IN | RESPIRATION RATE: 19 BRPM | OXYGEN SATURATION: 96 % | DIASTOLIC BLOOD PRESSURE: 64 MMHG | TEMPERATURE: 99.4 F | WEIGHT: 228 LBS

## 2025-01-10 DIAGNOSIS — J20.8 ACUTE BRONCHITIS DUE TO COVID-19 VIRUS: Primary | ICD-10-CM

## 2025-01-10 DIAGNOSIS — U07.1 ACUTE BRONCHITIS DUE TO COVID-19 VIRUS: Primary | ICD-10-CM

## 2025-01-10 LAB
ALBUMIN SERPL-MCNC: 4.4 G/DL (ref 3.5–5.2)
ALBUMIN/GLOB SERPL: 1.3 G/DL
ALP SERPL-CCNC: 114 U/L (ref 39–117)
ALT SERPL W P-5'-P-CCNC: 47 U/L (ref 1–33)
ANION GAP SERPL CALCULATED.3IONS-SCNC: 12 MMOL/L (ref 5–15)
AST SERPL-CCNC: 39 U/L (ref 1–32)
BASOPHILS # BLD AUTO: 0.03 10*3/MM3 (ref 0–0.2)
BASOPHILS NFR BLD AUTO: 0.6 % (ref 0–1.5)
BILIRUB SERPL-MCNC: 0.2 MG/DL (ref 0–1.2)
BUN SERPL-MCNC: 13 MG/DL (ref 8–23)
BUN/CREAT SERPL: 14.9 (ref 7–25)
CALCIUM SPEC-SCNC: 9.2 MG/DL (ref 8.6–10.5)
CHLORIDE SERPL-SCNC: 100 MMOL/L (ref 98–107)
CO2 SERPL-SCNC: 26 MMOL/L (ref 22–29)
CREAT SERPL-MCNC: 0.87 MG/DL (ref 0.57–1)
DEPRECATED RDW RBC AUTO: 45.1 FL (ref 37–54)
EGFRCR SERPLBLD CKD-EPI 2021: 75.4 ML/MIN/1.73
EOSINOPHIL # BLD AUTO: 0.34 10*3/MM3 (ref 0–0.4)
EOSINOPHIL NFR BLD AUTO: 6.8 % (ref 0.3–6.2)
ERYTHROCYTE [DISTWIDTH] IN BLOOD BY AUTOMATED COUNT: 13.2 % (ref 12.3–15.4)
FLUAV SUBTYP SPEC NAA+PROBE: NOT DETECTED
FLUBV RNA ISLT QL NAA+PROBE: NOT DETECTED
GLOBULIN UR ELPH-MCNC: 3.3 GM/DL
GLUCOSE SERPL-MCNC: 96 MG/DL (ref 65–99)
HCT VFR BLD AUTO: 41.4 % (ref 34–46.6)
HGB BLD-MCNC: 13.8 G/DL (ref 12–15.9)
HOLD SPECIMEN: NORMAL
IMM GRANULOCYTES # BLD AUTO: 0.01 10*3/MM3 (ref 0–0.05)
IMM GRANULOCYTES NFR BLD AUTO: 0.2 % (ref 0–0.5)
LYMPHOCYTES # BLD AUTO: 1.2 10*3/MM3 (ref 0.7–3.1)
LYMPHOCYTES NFR BLD AUTO: 24 % (ref 19.6–45.3)
MCH RBC QN AUTO: 30.9 PG (ref 26.6–33)
MCHC RBC AUTO-ENTMCNC: 33.3 G/DL (ref 31.5–35.7)
MCV RBC AUTO: 92.8 FL (ref 79–97)
MONOCYTES # BLD AUTO: 0.74 10*3/MM3 (ref 0.1–0.9)
MONOCYTES NFR BLD AUTO: 14.8 % (ref 5–12)
NEUTROPHILS NFR BLD AUTO: 2.68 10*3/MM3 (ref 1.7–7)
NEUTROPHILS NFR BLD AUTO: 53.6 % (ref 42.7–76)
NRBC BLD AUTO-RTO: 0 /100 WBC (ref 0–0.2)
NT-PROBNP SERPL-MCNC: <36 PG/ML (ref 0–900)
PLATELET # BLD AUTO: 254 10*3/MM3 (ref 140–450)
PMV BLD AUTO: 8.7 FL (ref 6–12)
POTASSIUM SERPL-SCNC: 3.8 MMOL/L (ref 3.5–5.2)
PROT SERPL-MCNC: 7.7 G/DL (ref 6–8.5)
QT INTERVAL: 392 MS
QTC INTERVAL: 452 MS
RBC # BLD AUTO: 4.46 10*6/MM3 (ref 3.77–5.28)
SARS-COV-2 RNA RESP QL NAA+PROBE: DETECTED
SODIUM SERPL-SCNC: 138 MMOL/L (ref 136–145)
TROPONIN T SERPL HS-MCNC: 11 NG/L
WBC NRBC COR # BLD AUTO: 5 10*3/MM3 (ref 3.4–10.8)
WHOLE BLOOD HOLD COAG: NORMAL
WHOLE BLOOD HOLD SPECIMEN: NORMAL

## 2025-01-10 PROCEDURE — 71045 X-RAY EXAM CHEST 1 VIEW: CPT

## 2025-01-10 PROCEDURE — 87636 SARSCOV2 & INF A&B AMP PRB: CPT | Performed by: PHYSICIAN ASSISTANT

## 2025-01-10 PROCEDURE — 80053 COMPREHEN METABOLIC PANEL: CPT | Performed by: EMERGENCY MEDICINE

## 2025-01-10 PROCEDURE — 85025 COMPLETE CBC W/AUTO DIFF WBC: CPT | Performed by: EMERGENCY MEDICINE

## 2025-01-10 PROCEDURE — 83880 ASSAY OF NATRIURETIC PEPTIDE: CPT | Performed by: EMERGENCY MEDICINE

## 2025-01-10 PROCEDURE — 99284 EMERGENCY DEPT VISIT MOD MDM: CPT

## 2025-01-10 PROCEDURE — 93005 ELECTROCARDIOGRAM TRACING: CPT | Performed by: EMERGENCY MEDICINE

## 2025-01-10 PROCEDURE — 84484 ASSAY OF TROPONIN QUANT: CPT | Performed by: EMERGENCY MEDICINE

## 2025-01-10 RX ORDER — BENZONATATE 100 MG/1
100 CAPSULE ORAL 2 TIMES DAILY PRN
Qty: 12 CAPSULE | Refills: 0 | Status: SHIPPED | OUTPATIENT
Start: 2025-01-10

## 2025-01-10 RX ORDER — HYDROCODONE POLISTIREX AND CHLORPHENIRAMINE POLISTIREX 10; 8 MG/5ML; MG/5ML
5 SUSPENSION, EXTENDED RELEASE ORAL ONCE
Status: COMPLETED | OUTPATIENT
Start: 2025-01-10 | End: 2025-01-10

## 2025-01-10 RX ORDER — ALBUTEROL SULFATE 0.63 MG/3ML
1 SOLUTION RESPIRATORY (INHALATION) EVERY 6 HOURS PRN
Qty: 90 ML | Refills: 0 | Status: SHIPPED | OUTPATIENT
Start: 2025-01-10

## 2025-01-10 RX ORDER — SODIUM CHLORIDE 0.9 % (FLUSH) 0.9 %
10 SYRINGE (ML) INJECTION AS NEEDED
Status: DISCONTINUED | OUTPATIENT
Start: 2025-01-10 | End: 2025-01-10 | Stop reason: HOSPADM

## 2025-01-10 RX ADMIN — HYDROCODONE POLISTIREX AND CHLORPHENIRAMINE POLISTIREX 5 ML: 10; 8 SUSPENSION, EXTENDED RELEASE ORAL at 20:01

## 2025-01-10 NOTE — Clinical Note
[facility] EMERGENCY DEPARTMENT  [address]  [facility] [address]  Phone: [phone]    Brittny Han was seen and treated in our emergency department on 1/10/2025.  She may return to work on 01/13/2025.         Thank you for choosing [facility].    Saba Hart, PA

## 2025-01-10 NOTE — TELEPHONE ENCOUNTER
Pt called and stated she had been exposed to Covid and Flu and tested negative after 3 days. Pt advised to go to urgent care or ED.

## 2025-01-11 NOTE — ED PROVIDER NOTES
Subjective   History of Present Illness  Pt is a 63 yo female presenting to ED with complaints of SOB and cough. PMHx significant for COPD, ALANNA, DM (non insulin), Hypothyroidism, Neuropathy, PUD and Anxiety. Pt complains of cough and congestion for the past 3 days. She complains of SOB, body aches, fatigue and nausea but denies chest pain, dizziness, abdominal pain, leg swelling, fevers, V/D or urinary sx. Pt has been around roommate who has had Covid and Flu. Denies recent antibiotics. Pt uses CPAP at home and does not wear O2. She has been using an inhaler. She has a nebulizer but hasn't been using since meds old. She denies tobacco use.     History provided by:  Medical records and patient      Review of Systems   Constitutional:  Positive for chills and fatigue. Negative for fever.   HENT:  Positive for congestion. Negative for sore throat and trouble swallowing.    Eyes:  Negative for visual disturbance.   Respiratory:  Positive for cough and shortness of breath.    Cardiovascular:  Negative for chest pain and leg swelling.   Gastrointestinal:  Negative for abdominal pain, diarrhea, nausea and vomiting.   Musculoskeletal:  Negative for arthralgias and back pain.   Neurological:  Negative for dizziness, weakness, numbness and headaches.   Psychiatric/Behavioral:  Negative for confusion.        Past Medical History:   Diagnosis Date    Allergic     Allergic rhinitis     Anemia     Anxiety     Asthma     MODERATE, PERSISTANT    Asthma, extrinsic 2007    Progressively gets worse with age    BCC (basal cell carcinoma) 2000    RIGHT EYELID    Bowel habit changes     BPPV (benign paroxysmal positional vertigo) 01/2020    Bronchitis     Cat bite of left hand 05/16/2020    SEEN AT [facility]    Chronic bilateral low back pain with bilateral sciatica     Chronic bronchitis Aug 2018    also seasonal bronchitis every May and December    Chronic idiopathic constipation     Chronic obstructive lung disease 03/02/2016    Colon  polyps     FOLLOWED BY DR. LORENA RICO    COPD (chronic obstructive pulmonary disease)     CTS (carpal tunnel syndrome) 2010    BILATERAL, Wear wrist braces at night    Demyelinating disease     Depression     Diabetes mellitus Jan 2021    Pre-Diabetic    Disease of thyroid gland     HYPOTHYROIDISM    DINERO (dyspnea on exertion) 06/2019    Essential hypertension     Fatigue     Gastroenteritis 02/2020    GERD (gastroesophageal reflux disease)     Head injury when I was 10    concussion    Headache disorder 09/07/2016    Hemorrhoids     Hepatomegaly 11/2018    History of echocardiogram 05/31/2011    mild ascending aortic root dilatation; mod RVC enlargement; global left EF 0.72 (normal 0.55-0.75) mild concentric LV hypertrophy; trace MR; mild TR     History of PFTs 02/02/2016    good effort; normal lung volumes    Hyperlipidemia     Hyperthyroidism 2019    Hashimoto's    Hypertrophy, nasal, turbinate     Hypothyroidism 2019    Idiopathic peripheral neuropathy 10/21/2021    Irritable bowel syndrome     Midline cystocele     Neuropathy of both feet 01/2019    FOLLOWED BY DR. VARSHA IRELAND    Numbness and tingling in both hands 07/19/2021    Numbness and tingling of both feet 01/30/2019    ALANNA on CPAP 2007    Followed by iJn Cat @ [facility]    Peptic ulceration     PUD    Periodic headache syndrome, not intractable     Plantar fasciitis, left 01/2019    FOLLOWED BY DR. VARSHA IRELAND    Primary central sleep apnea 2006    Sleep apnea not sure if central or not    Rectal bleeding 08/2020    Rectal bleeding     Rectal mass 08/2020    RLS (restless legs syndrome) 06/2017    Shortness of breath     Strain of thoracic region 04/2017    KALYAN (stress urinary incontinence, female)     Uterine fibroid     Varicella 1966    This is a guess    Vision loss 2008    wear glasses    Vitamin B 12 deficiency     Vitamin D deficiency        Allergies   Allergen Reactions    Percocet [Oxycodone-Acetaminophen] Anaphylaxis      Breathing difficulty, tongue swelling, severe     Brevital Sodium [Methohexital] Paresthesia     CHILLS, SHAKING    Banana Unknown - Low Severity    Pork-Derived Products Unknown - Low Severity       Past Surgical History:   Procedure Laterality Date    BLADDER SLING MODIFIED, ANTERIOR AND POSTERIOR VAGINAL REPAIR  2011    BREAST EXCISIONAL BIOPSY Left 1981    BREAST SURGERY      fibroid tumor removal    COLON SURGERY  2021    Rectocele repair    COLONOSCOPY N/A 2019    Dr. Sammy [facility].    COLONOSCOPY N/A 09/23/2020    2 TUBULAR ADENOMA POLYPS IN TRANSVERSE, 6 MM TUBULAR ADENOMA POLYP IN DESCENDING, 3 TUBULAR ADENOMA POLYPS IN SIGMOID, ANAL FISSURE, RESCOPE IN 3 YRS, DR. LORENA RICO AT [facility]    COLONOSCOPY W/ POLYPECTOMY N/A 09/14/2012    HEMORRHOIDS, 2 TUBULAR ADENOMA POLYPS IN SIGMOID, 5 TUBULAR ADENOMA POLYPS IN RECTUM, SPASTIC COLON IN DESCENDING AND SIGMOID, RESCOPE IN 5 YRS, DR. SAMMY REICH AT [facility]    COLONOSCOPY W/ POLYPECTOMY N/A 10/16/2017    TUBULAR ADENOMA POLYP IN LEFT COLON, DR. RACHANA GAYLE AT [facility]    HYSTERECTOMY N/A 10/24/2011    LAPAROSCOPIC, WITH BILATERAL SALPINGECTOMY, DR. ROSITA MATA AT [facility]    RECTAL PROLAPSE REPAIR      SKIN CANCER EXCISION Right 2000    BCC ON RIGHT EYELID    SKIN CANCER EXCISION Right 2002    BCC OF RIGHT EYE    SUBTOTAL HYSTERECTOMY  10/24/2011    TRANSVAGINAL TAPING SUSPENSION N/A 10/24/2011    WITH CYSTOSCOPY, DR. ROSITA MATA AT [facility]    VAGINAL HYSTERECTOMY  2011    VAGINAL PROLAPSE REPAIR  2011    bladder sling    WISDOM TOOTH EXTRACTION  1979 ?       Family History   Problem Relation Age of Onset    Heart disease Mother         cardiomyopathy, Hypertension, Heart Failure    Hypertension Mother     Sleep apnea Mother     Hyperlipidemia Mother     Heart attack Mother     Heart failure Mother     COPD Father         Father is alive at age 80 with COPD and asthma    Asthma Father         COPD    Basal cell carcinoma Father     Cancer Father         Skin     Hyperlipidemia Father     Melanoma Father     Ulcerative colitis Cousin     Crohn's disease Cousin     Cancer Niece     Melanoma Niece     Narcolepsy Sister     Sleep apnea Sister     Anemia Sister         Aplastic Anemia    Diabetes Sister         Type 2 2ndry to steroid use    Thyroid disease Sister         had 2 parathyroid removed    Asthma Sister         Asthma    Diabetes Brother         Type 1 or Juvenile    Cancer Maternal Grandfather         Multiple  Myeloma - not sure of name    Lung cancer Paternal Grandmother     Cancer Paternal Grandmother         Lung Cancer    Heart disease Paternal Grandfather         unknown    Hyperlipidemia Paternal Grandfather     Heart attack Paternal Grandfather     Heart failure Paternal Grandfather         Unsure as he  before I was born from heart issues    Diabetes Paternal Aunt         type 2    Breast cancer Neg Hx     Ovarian cancer Neg Hx        Social History     Socioeconomic History    Marital status: Single   Tobacco Use    Smoking status: Never     Passive exposure: Never    Smokeless tobacco: Never   Vaping Use    Vaping status: Never Used   Substance and Sexual Activity    Alcohol use: Yes     Alcohol/week: 2.0 standard drinks of alcohol     Types: 2 Glasses of wine per week     Comment: 2 glasses of wine about 4 x's per year    Drug use: No    Sexual activity: Not Currently     Partners: Male     Birth control/protection: Abstinence     Comment: single           Objective   Physical Exam  Vitals and nursing note reviewed.   Constitutional:       General: She is not in acute distress.     Appearance: She is well-developed. She is obese.   HENT:      Head: Atraumatic.      Nose: Nose normal.   Eyes:      General: Lids are normal.      Conjunctiva/sclera: Conjunctivae normal.      Pupils: Pupils are equal, round, and reactive to light.   Cardiovascular:      Rate and Rhythm: Normal rate and regular rhythm.      Heart sounds: Normal heart sounds.   Pulmonary:       Effort: Pulmonary effort is normal.      Breath sounds: Normal breath sounds. No decreased breath sounds or wheezing.   Abdominal:      General: There is no distension.      Palpations: Abdomen is soft.      Tenderness: There is no abdominal tenderness. There is no guarding or rebound.   Musculoskeletal:         General: No tenderness. Normal range of motion.      Cervical back: Normal range of motion and neck supple.   Skin:     General: Skin is warm and dry.      Findings: No erythema or rash.   Neurological:      Mental Status: She is alert and oriented to person, place, and time.      Sensory: No sensory deficit.   Psychiatric:         Speech: Speech normal.         Behavior: Behavior normal.         Procedures           ED Course  ED Course as of 01/10/25 2129   Fri Daniel 10, 2025   1741 I personally reviewed and interpreted labs results and went over with patient. I personally reviewed and interpreted vitals. [RT]   1742 SpO2: 97 %  RA [RT]   1742 Temp: 99.4 °F (37.4 °C) [RT]   1850 I personally and independently reviewed CXR and agree with the radiology interpretation specifically no acute findings.  [RT]   1852 SpO2: 97 % [RT]   1911 WBC: 5.00 [RT]   1928 COVID19(!!): Detected [RT]   1928 Glucose: 96 [RT]   1928 Creatinine: 0.87 [RT]   1928 Potassium: 3.8 [RT]   1928 HS Troponin T: 11 [RT]   1928 proBNP: <36.0 [RT]   2000 Discussed results and tx plan with patient. Will dc home with neb solution Rx. Vitals stable in ED. She is agreeable with plan and will return if new / worse sx.  [RT]      ED Course User Index  [RT] Saba Hart PA      Recent Results (from the past 24 hours)   ECG 12 Lead ED Triage Standing Order; SOA    Collection Time: 01/10/25  5:38 PM   Result Value Ref Range    QT Interval 392 ms    QTC Interval 452 ms   COVID-19 and FLU A/B PCR, 1 HR TAT - Swab, Nasopharynx    Collection Time: 01/10/25  6:39 PM    Specimen: Nasopharynx; Swab   Result Value Ref Range    COVID19 Detected (C)  Not Detected - Ref. Range    Influenza A PCR Not Detected Not Detected    Influenza B PCR Not Detected Not Detected   Comprehensive Metabolic Panel    Collection Time: 01/10/25  6:43 PM    Specimen: Blood   Result Value Ref Range    Glucose 96 65 - 99 mg/dL    BUN 13 8 - 23 mg/dL    Creatinine 0.87 0.57 - 1.00 mg/dL    Sodium 138 136 - 145 mmol/L    Potassium 3.8 3.5 - 5.2 mmol/L    Chloride 100 98 - 107 mmol/L    CO2 26.0 22.0 - 29.0 mmol/L    Calcium 9.2 8.6 - 10.5 mg/dL    Total Protein 7.7 6.0 - 8.5 g/dL    Albumin 4.4 3.5 - 5.2 g/dL    ALT (SGPT) 47 (H) 1 - 33 U/L    AST (SGOT) 39 (H) 1 - 32 U/L    Alkaline Phosphatase 114 39 - 117 U/L    Total Bilirubin 0.2 0.0 - 1.2 mg/dL    Globulin 3.3 gm/dL    A/G Ratio 1.3 g/dL    BUN/Creatinine Ratio 14.9 7.0 - 25.0    Anion Gap 12.0 5.0 - 15.0 mmol/L    eGFR 75.4 >60.0 mL/min/1.73   BNP    Collection Time: 01/10/25  6:43 PM    Specimen: Blood   Result Value Ref Range    proBNP <36.0 0.0 - 900.0 pg/mL   High Sensitivity Troponin T    Collection Time: 01/10/25  6:43 PM    Specimen: Blood   Result Value Ref Range    HS Troponin T 11 <14 ng/L   Green Top (Gel)    Collection Time: 01/10/25  6:43 PM   Result Value Ref Range    Extra Tube Hold for add-ons.    Lavender Top    Collection Time: 01/10/25  6:43 PM   Result Value Ref Range    Extra Tube hold for add-on    Gold Top - SST    Collection Time: 01/10/25  6:43 PM   Result Value Ref Range    Extra Tube Hold for add-ons.    Gray Top    Collection Time: 01/10/25  6:43 PM   Result Value Ref Range    Extra Tube Hold for add-ons.    Light Blue Top    Collection Time: 01/10/25  6:43 PM   Result Value Ref Range    Extra Tube Hold for add-ons.    CBC Auto Differential    Collection Time: 01/10/25  6:43 PM    Specimen: Blood   Result Value Ref Range    WBC 5.00 3.40 - 10.80 10*3/mm3    RBC 4.46 3.77 - 5.28 10*6/mm3    Hemoglobin 13.8 12.0 - 15.9 g/dL    Hematocrit 41.4 34.0 - 46.6 %    MCV 92.8 79.0 - 97.0 fL    MCH 30.9 26.6 -  33.0 pg    MCHC 33.3 31.5 - 35.7 g/dL    RDW 13.2 12.3 - 15.4 %    RDW-SD 45.1 37.0 - 54.0 fl    MPV 8.7 6.0 - 12.0 fL    Platelets 254 140 - 450 10*3/mm3    Neutrophil % 53.6 42.7 - 76.0 %    Lymphocyte % 24.0 19.6 - 45.3 %    Monocyte % 14.8 (H) 5.0 - 12.0 %    Eosinophil % 6.8 (H) 0.3 - 6.2 %    Basophil % 0.6 0.0 - 1.5 %    Immature Grans % 0.2 0.0 - 0.5 %    Neutrophils, Absolute 2.68 1.70 - 7.00 10*3/mm3    Lymphocytes, Absolute 1.20 0.70 - 3.10 10*3/mm3    Monocytes, Absolute 0.74 0.10 - 0.90 10*3/mm3    Eosinophils, Absolute 0.34 0.00 - 0.40 10*3/mm3    Basophils, Absolute 0.03 0.00 - 0.20 10*3/mm3    Immature Grans, Absolute 0.01 0.00 - 0.05 10*3/mm3    nRBC 0.0 0.0 - 0.2 /100 WBC     Note: In addition to lab results from this visit, the labs listed above may include labs taken at another facility or during a different encounter within the last 24 hours. Please correlate lab times with ED admission and discharge times for further clarification of the services performed during this visit.    XR Chest 1 View   Final Result   Impression:   No acute cardiopulmonary abnormality.            Electronically Signed: Danny Sellers     1/10/2025 6:38 PM EST     Workstation ID: [id number]        Vitals:    01/10/25 1843 01/10/25 1850 01/10/25 1900 01/10/25 2000   BP:   132/78 117/64   BP Location:    Left arm   Patient Position:    Sitting   Pulse: 83 81 84 83   Resp:       Temp:       TempSrc:       SpO2: 95% 97% 95% 96%   Weight:       Height:         Medications   sodium chloride 0.9 % flush 10 mL (has no administration in time range)   Hydrocod Jaime-Chlorphe Jaime ER (TUSSIONEX PENNKINETIC) 10-8 MG/5ML ER suspension 5 mL (5 mL Oral Given 1/10/25 2001)     ECG/EMG Results (last 24 hours)       Procedure Component Value Units Date/Time    ECG 12 Lead ED Triage Standing Order; SOA [[id number]] Collected: 01/10/25 1738     Updated: 01/10/25 1738     QT Interval 392 ms      QTC Interval 452 ms     Narrative:      Test  Reason : ED Triage Standing Order~  Blood Pressure :   */*   mmHG  Vent. Rate :  80 BPM     Atrial Rate :  80 BPM     P-R Int : 172 ms          QRS Dur :  78 ms      QT Int : 392 ms       P-R-T Axes :  43  -5  20 degrees    QTcB Int : 452 ms    Normal sinus rhythm  Cannot rule out Anterior infarct (cited on or before 26-Sep-2023)  Abnormal ECG  When compared with ECG of 26-Sep-2023 09:05,  No significant change was found    Referred By: EDMD           Confirmed By:           ECG 12 Lead ED Triage Standing Order; SOA   Preliminary Result   Test Reason : ED Triage Standing Order~   Blood Pressure :   */*   mmHG   Vent. Rate :  80 BPM     Atrial Rate :  80 BPM      P-R Int : 172 ms          QRS Dur :  78 ms       QT Int : 392 ms       P-R-T Axes :  43  -5  20 degrees     QTcB Int : 452 ms      Normal sinus rhythm   Cannot rule out Anterior infarct (cited on or before 26-Sep-2023)   Abnormal ECG   When compared with ECG of 26-Sep-2023 09:05,   No significant change was found      Referred By: EDMD           Confirmed By:                                                            Medical Decision Making  Pt is a 63 yo female presenting to ED with complaints of cough and SOB with congestion. I had a discussion with the patient / family regarding ED course, diagnosis, diagnostic results and treatment plan including medications and admission / discharge. Discussed if new or worse symptoms / concerns to return to ED for further evaluation. Discussed need for close follow up with PCP / specialists.     DDx  Covid, Flu, Pneumonia, resp failure, JORGE L, CHF, COPD exacerbation     Problems Addressed:  Acute bronchitis due to COVID-19 virus: complicated acute illness or injury    Amount and/or Complexity of Data Reviewed  External Data Reviewed: notes.     Details: Reviewed previous non ED visits including prior labs, imaging, available notes, medications, allergies and surgical hx.   Endocrine 12-23-24 follow up chronic conditions    Labs: ordered. Decision-making details documented in ED Course.  Radiology: ordered. Decision-making details documented in ED Course.  ECG/medicine tests: ordered. Decision-making details documented in ED Course.    Risk  Prescription drug management.        Final diagnoses:   Acute bronchitis due to COVID-19 virus       ED Disposition  ED Disposition       ED Disposition   Discharge    Condition   Stable    Comment   --               Olaf Lisa, DO  [address] [address]  [phone]      As needed    [facility] EMERGENCY DEPARTMENT  [address] [id number] [phone]    If symptoms worsen         Medication List        New Prescriptions      benzonatate 100 MG capsule  Commonly known as: TESSALON  Take 1 capsule by mouth 2 (Two) Times a Day As Needed for Cough for up to 12 doses.            Changed      * Ventolin  (90 Base) MCG/ACT inhaler  Generic drug: albuterol sulfate HFA  Inhale 2 puffs Every 6 (Six) Hours.  What changed:   when to take this  reasons to take this     * albuterol 0.63 MG/3ML nebulizer solution  Commonly known as: ACCUNEB  Take 3 mL by nebulization Every 6 (Six) Hours As Needed for Wheezing or Shortness of Air.  What changed: You were already taking a medication with the same name, and this prescription was added. Make sure you understand how and when to take each.           * This list has 2 medication(s) that are the same as other medications prescribed for you. Read the directions carefully, and ask your doctor or other care provider to review them with you.                   Where to Get Your Medications        These medications were sent to [facility] [address] [address]      Hours: Monday to Friday 7 AM to 5:30 PM, Saturday & Sunday 8 AM to 4:30 PM Phone: [phone]   albuterol 0.63 MG/3ML nebulizer solution  benzonatate 100 MG capsule             Saba Hart PA  01/10/25 2024       Saba Hart PA  01/10/25 2129

## 2025-01-14 RX ORDER — VENLAFAXINE HYDROCHLORIDE 75 MG/1
75 CAPSULE, EXTENDED RELEASE ORAL DAILY
Qty: 90 CAPSULE | Refills: 3 | OUTPATIENT
Start: 2025-01-14

## 2025-01-24 LAB
QT INTERVAL: 392 MS
QTC INTERVAL: 452 MS

## 2025-01-28 ENCOUNTER — TELEPHONE (OUTPATIENT)
Dept: GASTROENTEROLOGY | Facility: CLINIC | Age: 65
End: 2025-01-28

## 2025-01-28 NOTE — TELEPHONE ENCOUNTER
Hub staff attempted to follow warm transfer process and was unsuccessful     Caller: LACIE AVITIA    Relationship to patient: SELF    Best call back number: [phone]     Patient is needing: MS AVITIA WOULD LIKE TO SCHEDULE AN APPT DUE TO HAVING BLOOD IN STOOLS FOR THE PAST YEAR. STATES IT IS EVERY TIME SHE HAS A BOWEL MOVEMENT. PLEASE REVIEW AND ADVISE.    OK TO CALL BACK ANYTIME. OK TO LEAVE .

## 2025-01-31 NOTE — PROGRESS NOTES
Physical Therapy Daily Progress Note  Visit: 13    Brittny Han reports: I think my shoulder pain at night is coming from my neck. I am having a hard time finding a good position to sleep in. I need to be more consistent with my exercises    Subjective     Objective   See Exercise, Manual, and Modality Logs for complete treatment.       Assessment & Plan     Assessment  Assessment details: Pt reminded of importance of performing exercises. Pt tolerated treatment well. Added supine chin tucks and wall slides to HEP    Plan  Plan details: Add rows next session. Begin prone PA mobs to thoracic spine        Manual Therapy:    10     mins  61085;  Therapeutic Exercise:    24     mins  83190;     Neuromuscular Charley:    -    mins  18933;    Therapeutic Activity:     -     mins  51053;     Gait Training:      -     mins  59471;     Ultrasound:     10     mins  62374;    Electrical Stimulation:    -     mins  03667 ( );  Dry Needling     -     mins self-pay    Timed Treatment:   44   mins   Total Treatment:     45   mins    Kourtney Story PT  [place] License #: [id number]    Physical Therapist      
Never smoker

## 2025-02-03 ENCOUNTER — SPECIALTY PHARMACY (OUTPATIENT)
Dept: PHARMACY | Facility: TELEHEALTH | Age: 65
End: 2025-02-03
Payer: COMMERCIAL

## 2025-02-03 NOTE — PROGRESS NOTES
"Specialty Pharmacy Refill Coordination Note     Brittny \"Padmini\" is a 65 y.o. female contacted today regarding refills of  Dupixent specialty medication(s).    Reviewed and verified with patient:  Allergies  Meds       Specialty medication(s) and dose(s) confirmed: yes    Refill Questions      Flowsheet Row Most Recent Value   Changes to allergies? No   Changes to medications? No   New conditions or infections since last clinic visit No   Unplanned office visit, urgent care, ED, or hospital admission in the last 4 weeks  No   How does patient/caregiver feel medication is working? Very good   Financial problems or insurance changes  No   If yes, describe changes in insurance or financial issues. N/A   Since the previous refill, were any specialty medication doses or scheduled injections missed or delayed?  No  [Next dose due 2/14/2025]   If yes, please provide the amount N/A   Why were doses missed? N/A   Does this patient require a clinical escalation to a pharmacist? No            Delivery Questions      Flowsheet Row Most Recent Value   Delivery method UPS   Delivery address verified with patient/caregiver? Yes   Delivery address Home   Number of medications in delivery 1   Medication(s) being filled and delivered Dupilumab (Dupixent)   Doses left of specialty medications 1   Copay verified? Yes   Copay amount $57.50- DUPIXENT [facility]   Copay form of payment Credit/debit on file   Ship Date 2/3/2025   Delivery Date Selection 02/04/25   Signature Required No              Medication Adherence    Adherence tools used: directed education  Support network for adherence: family member          Follow-up: 23 day(s)     Je No, Pharmacy Technician  Specialty Pharmacy Technician        "

## 2025-02-07 ENCOUNTER — TELEPHONE (OUTPATIENT)
Dept: CARDIOLOGY | Facility: CLINIC | Age: 65
End: 2025-02-07
Payer: COMMERCIAL

## 2025-02-07 DIAGNOSIS — I10 BENIGN ESSENTIAL HYPERTENSION: ICD-10-CM

## 2025-02-07 RX ORDER — LEVOTHYROXINE SODIUM 88 UG/1
88 TABLET ORAL DAILY
Qty: 45 TABLET | Refills: 0 | Status: CANCELLED | OUTPATIENT
Start: 2025-02-07 | End: 2026-02-07

## 2025-02-07 RX ORDER — SPIRONOLACTONE AND HYDROCHLOROTHIAZIDE 25; 25 MG/1; MG/1
1 TABLET ORAL DAILY
Qty: 90 TABLET | Refills: 1 | Status: SHIPPED | OUTPATIENT
Start: 2025-02-07 | End: 2025-08-06

## 2025-02-07 RX ORDER — SPIRONOLACTONE AND HYDROCHLOROTHIAZIDE 25; 25 MG/1; MG/1
1 TABLET ORAL DAILY
Qty: 90 TABLET | Refills: 1 | OUTPATIENT
Start: 2025-02-07 | End: 2025-08-06

## 2025-02-07 NOTE — TELEPHONE ENCOUNTER
Rx Refill Note  Requested Prescriptions     Pending Prescriptions Disp Refills    levothyroxine (Synthroid) 88 MCG tablet 45 tablet 0     Sig: Take 1 tablet by mouth Daily.      Last office visit with prescribing clinician: 12/23/2024     Next office visit with prescribing clinician: 6/23/2025   {Ronni Watkins MA  02/07/25, 08:48 EST

## 2025-02-07 NOTE — TELEPHONE ENCOUNTER
Lab Results   Component Value Date    GLUCOSE 96 01/10/2025    BUN 13 01/10/2025    CREATININE 0.87 01/10/2025     01/10/2025    K 3.8 01/10/2025     01/10/2025    CALCIUM 9.2 01/10/2025    PROTEINTOT 7.7 01/10/2025    ALBUMIN 4.4 01/10/2025    ALT 47 (H) 01/10/2025    AST 39 (H) 01/10/2025    ALKPHOS 114 01/10/2025    BILITOT 0.2 01/10/2025    GLOB 3.3 01/10/2025    AGRATIO 1.3 01/10/2025    BCR 14.9 01/10/2025    ANIONGAP 12.0 01/10/2025    EGFR 75.4 01/10/2025

## 2025-02-07 NOTE — TELEPHONE ENCOUNTER
Last visit 8/5/24, next visit 9/11/25 with Dr Jacome. Please refill if appropriate, deny or re-route.  Alexander Krueger MA

## 2025-02-10 RX ORDER — LEVOTHYROXINE SODIUM 88 UG/1
88 TABLET ORAL DAILY
Qty: 90 TABLET | Refills: 1 | Status: SHIPPED | OUTPATIENT
Start: 2025-02-10 | End: 2026-02-10

## 2025-02-25 ENCOUNTER — SPECIALTY PHARMACY (OUTPATIENT)
Dept: PHARMACY | Facility: TELEHEALTH | Age: 65
End: 2025-02-25
Payer: COMMERCIAL

## 2025-02-25 NOTE — PROGRESS NOTES
Specialty Pharmacy Patient Management Program  Reassessment     Brittny Han is a 65 y.o. female with asthma and enrolled in the Patient Management program offered by [facility]. A follow-up outreach was conducted, including assessment of continued therapy appropriateness, medication adherence, and side effect incidence and management for dupixent.     Changes to Insurance Coverage or Financial Support  none    Relevant Past Medical History and Comorbidities  Relevant medical history and concomitant health conditions were discussed with the patient. The patient's chart has been reviewed for relevant past medical history and comorbid health conditions and updated as necessary.   Past Medical History:   Diagnosis Date    Allergic     Allergic rhinitis     Anemia     Anxiety     Asthma     MODERATE, PERSISTANT    Asthma, extrinsic 2007    Progressively gets worse with age    BCC (basal cell carcinoma) 2000    RIGHT EYELID    Bowel habit changes     BPPV (benign paroxysmal positional vertigo) 01/2020    Bronchitis     Cat bite of left hand 05/16/2020    SEEN AT [facility]    Chronic bilateral low back pain with bilateral sciatica     Chronic bronchitis Aug 2018    also seasonal bronchitis every May and December    Chronic idiopathic constipation     Chronic obstructive lung disease 03/02/2016    Colon polyps     FOLLOWED BY DR. LORENA RICO    COPD (chronic obstructive pulmonary disease)     CTS (carpal tunnel syndrome) 2010    BILATERAL, Wear wrist braces at night    Demyelinating disease     Depression     Diabetes mellitus Jan 2021    Pre-Diabetic    Disease of thyroid gland     HYPOTHYROIDISM    DINERO (dyspnea on exertion) 06/2019    Essential hypertension     Fatigue     Gastroenteritis 02/2020    GERD (gastroesophageal reflux disease)     Head injury when I was 10    concussion    Headache disorder 09/07/2016    Hemorrhoids     Hepatomegaly 11/2018    History of echocardiogram 05/31/2011     mild ascending aortic root dilatation; mod RVC enlargement; global left EF 0.72 (normal 0.55-0.75) mild concentric LV hypertrophy; trace MR; mild TR     History of PFTs 02/02/2016    good effort; normal lung volumes    Hyperlipidemia     Hyperthyroidism 2019    Hashimoto's    Hypertrophy, nasal, turbinate     Hypothyroidism 2019    Idiopathic peripheral neuropathy 10/21/2021    Irritable bowel syndrome     Midline cystocele     Neuropathy of both feet 01/2019    FOLLOWED BY DR. VARSHA IRELAND    Numbness and tingling in both hands 07/19/2021    Numbness and tingling of both feet 01/30/2019    ALANNA on CPAP 2007    Followed by Jin Cat @ [facility]    Peptic ulceration     PUD    Periodic headache syndrome, not intractable     Plantar fasciitis, left 01/2019    FOLLOWED BY DR. VARSHA IRELAND    Primary central sleep apnea 2006    Sleep apnea not sure if central or not    Rectal bleeding 08/2020    Rectal bleeding     Rectal mass 08/2020    RLS (restless legs syndrome) 06/2017    Shortness of breath     Strain of thoracic region 04/2017    KALYAN (stress urinary incontinence, female)     Uterine fibroid     Varicella 1966    This is a guess    Vision loss 2008    wear glasses    Vitamin B 12 deficiency     Vitamin D deficiency      Social History     Socioeconomic History    Marital status: Single   Tobacco Use    Smoking status: Never     Passive exposure: Never    Smokeless tobacco: Never   Vaping Use    Vaping status: Never Used   Substance and Sexual Activity    Alcohol use: Yes     Alcohol/week: 2.0 standard drinks of alcohol     Types: 2 Glasses of wine per week     Comment: 2 glasses of wine about 4 x's per year    Drug use: No    Sexual activity: Not Currently     Partners: Male     Birth control/protection: Abstinence     Comment: single     Problem list reviewed by Luciano Smith RPH on 2/25/2025 at  9:31 AM    Allergies  Known allergies and reactions were discussed with the patient. The patient's  chart has been reviewed for allergy information and updated as necessary.   Allergies   Allergen Reactions    Percocet [Oxycodone-Acetaminophen] Anaphylaxis     Breathing difficulty, tongue swelling, severe     Brevital Sodium [Methohexital] Paresthesia     CHILLS, SHAKING    Banana Unknown - Low Severity    Pork-Derived Products Unknown - Low Severity     Allergies reviewed by Luciano Smith RPH on 2/25/2025 at  9:31 AM    Relevant Laboratory Values  Lab Results   Component Value Date    GLUCOSE 96 01/10/2025    CALCIUM 9.2 01/10/2025     01/10/2025    K 3.8 01/10/2025    CO2 26.0 01/10/2025     01/10/2025    BUN 13 01/10/2025    CREATININE 0.87 01/10/2025    BCR 14.9 01/10/2025    ANIONGAP 12.0 01/10/2025     Lab Results   Component Value Date    WBC 5.00 01/10/2025    HGB 13.8 01/10/2025    HCT 41.4 01/10/2025    MCV 92.8 01/10/2025     01/10/2025     Lab Value Review  The above lab values have been reviewed; the following specialty medication(s) dose adjustment(s) are recommended: none.    Current Medication List  This medication list has been reviewed with the patient and evaluated for any interactions or necessary modifications/recommendations, and updated to include all prescription medications, OTC medications, and supplements the patient is currently taking. This list reflects what is contained in the patient's profile, which has also been marked as reviewed to communicate to other providers it is the most up to date version of the patient's current medication therapy.     Current Outpatient Medications:     albuterol (ACCUNEB) 0.63 MG/3ML nebulizer solution, Take 3 mL by nebulization Every 6 (Six) Hours As Needed for Wheezing or Shortness of Air., Disp: 90 mL, Rfl: 0    albuterol sulfate  (90 Base) MCG/ACT inhaler, Inhale 2 puffs Every 6 (Six) Hours. (Patient taking differently: Inhale 2 puffs Every 6 (Six) Hours As Needed.), Disp: 18 g, Rfl: 1    Albuterol-Budesonide (Airsupra)  90-80 MCG/ACT aerosol, Inhale 2 puffs Every 6-8 Hours As Needed., Disp: 10.7 g, Rfl: 0    atorvastatin (LIPITOR) 20 MG tablet, Take 1 tablet by mouth Daily., Disp: 90 tablet, Rfl: 1    B Complex Vitamins (vitamin b complex) capsule capsule, Take 1 capsule by mouth Daily., Disp: , Rfl:     benzonatate (TESSALON) 100 MG capsule, Take 1 capsule by mouth 2 (Two) Times a Day As Needed for Cough for up to 12 doses., Disp: 12 capsule, Rfl: 0    carvedilol (COREG) 12.5 MG tablet, Take 1 tablet by mouth 2 (Two) Times a Day., Disp: 180 tablet, Rfl: 1    Cholecalciferol 25 MCG (1000 UT) tablet, Take 2 tablets by mouth Daily., Disp: , Rfl:     cyclobenzaprine (FLEXERIL) 10 MG tablet, Take 1 tablet by mouth 3 (Three) Times a Day As Needed for Muscle Spasms., Disp: 12 tablet, Rfl: 0    Dupilumab (Dupixent) 300 MG/2ML solution auto-injector injection, Inject 2 mL under the skin into the appropriate area as directed Every 14 (Fourteen) Days., Disp: 4 mL, Rfl: 11    EPINEPHrine (EPIPEN) 0.3 MG/0.3ML solution auto-injector injection, Use as directed for acute allergic reaction., Disp: 2 each, Rfl: 3    Fluticasone-Salmeterol (Advair Diskus) 500-50 MCG/ACT DISKUS, Inhale 1 puff by mouth 2 (Two) Times a Day., Disp: 180 each, Rfl: 3    levocetirizine (XYZAL) 5 MG tablet, Take 1 tablet by mouth Daily., Disp: 90 tablet, Rfl: 3    levothyroxine (Synthroid) 88 MCG tablet, Take 1 tablet by mouth Daily., Disp: 90 tablet, Rfl: 1    magnesium gluconate (MAGONATE) 500 MG tablet, Take 1 tablet by mouth Daily., Disp: , Rfl:     metFORMIN ER (GLUCOPHAGE-XR) 500 MG 24 hr tablet, Take 1 tablet by mouth Daily., Disp: 90 tablet, Rfl: 1    montelukast (SINGULAIR) 10 MG tablet, Take 1 tablet by mouth every night at bedtime., Disp: 90 tablet, Rfl: 3    Multiple Vitamins-Minerals (CENTRUM SILVER PO), Centrum Silver, Disp: , Rfl:     multivitamin with minerals (HAIR SKIN & NAILS ADVANCED PO), Take 1 tablet by mouth Daily. (Patient not taking: Reported on  12/23/2024), Disp: , Rfl:     omeprazole (priLOSEC) 40 MG capsule, Take 1 capsule by mouth Daily., Disp: 30 capsule, Rfl: 1    Rimegepant Sulfate (NURTEC) 75 MG tablet dispersible tablet, Take 1 tablet by mouth Daily As Needed (migraine). Do not take more than 1 tablet in 24 hours, Disp: 16 tablet, Rfl: 3    spironolactone-hydrochlorothiazide (ALDACTAZIDE) 25-25 MG tablet, Take 1 tablet by mouth Daily., Disp: 90 tablet, Rfl: 1    Tiotropium Bromide Monohydrate (Spiriva Respimat) 1.25 MCG/ACT aerosol solution inhaler, Inhale 2 puffs by mouth Daily as directed., Disp: 12 g, Rfl: 3    Tirzepatide-Weight Management (ZEPBOUND) 2.5 MG/0.5ML solution auto-injector, Inject 0.5 mL under the skin into the appropriate area as directed 1 (One) Time Per Week., Disp: 2 mL, Rfl: 2    venlafaxine XR (EFFEXOR-XR) 37.5 MG 24 hr capsule, Take 1 capsule by mouth Daily., Disp: 30 capsule, Rfl: 0  Medicines reviewed by Luciano Smith [facility] on 2/25/2025 at  9:31 AM    Drug Interactions  none     Adverse Drug Reactions  Medication tolerability: Tolerating with no to minimal ADRs  Medication plan: Continue therapy with normal follow-up  Plan for ADR Management: none    Hospitalizations and Urgent Care Since Last Assessment  Hospitalizations or Admissions: none  ED Visits: none  Urgent Office Visits: none     Adherence, Self-Administration, and Current Therapy Problems  Adherence related to the patient's specialty therapy was discussed with the patient. The Adherence segment of this outreach has been reviewed and updated.     Adherence Questions  Linked Medication(s) Assessed: Dupilumab (Dupixent)  On average, how many doses/injections does the patient miss per month?: 0  What are the identified reasons for non-adherence or missed doses? : no problems identified  What is the estimated medication adherence level?: %  Based on the patient/caregiver response and refill history, does this patient require an MTP to track adherence  improvements?: no    Additional Barriers to Patient Self-Administration: none  Methods for Supporting Patient Self-Administration: none    Open Medication Therapy Problems  No medication therapy recommendations to display    Goals of Therapy  Goals related to the patient's specialty therapy were discussed with the patient. The Patient Goals segment of this outreach has been reviewed and updated.   Goals Addressed Today        Specialty Pharmacy General Goal- dupixent      Better compliance with taking injection q 14 days  Still having difficulty breathing, hoping to ease that   Patient mentioned her voice is hoarse all the time.  Recommended taking a full glass of water with her Advair inhaler to rinse the powder residue from the mouth and throat.              Progress Toward Meeting Patient-Identified Goals of Therapy: On Track  New Patient-Identified Goals, If Applicable:     Progress Toward Meeting Clinical Goals or Therapeutic Targets: On Track  New Clinical Goals or Therapeutic Targets, If Applicable:     Quality of Life Assessment   Quality of Life related to the patient's enrollment in the patient management program and services provided was discussed with the patient. The QOL segment of this outreach has been reviewed and updated.  Quality of Life Improvement Scale: 9-A good deal better    Reassessment Plan & Follow-Up  Medication Therapy Changes: none  Additional Plans, Therapy Recommendations, or Therapy Problems to Be Addressed: none   Pharmacist to perform regular reassessments no more than (6) months from the previous assessment.  Care Coordinator to set up future refill outreaches, coordinate prescription delivery, and escalate clinical questions to pharmacist.     Attestation  I attest the patient was actively involved in and has agreed to the above plan of care. I attest that the specialty medication(s) addressed above are appropriate for the patient based on my reassessment. If the prescribed  therapy is at any point deemed not appropriate based on the current or future assessments, a consultation will be initiated with the patient's specialty care provider to determine the best course of action. The revised plan of therapy will be documented along with any required assessments and/or additional patient education provided.     Electronically signed by Luciano Smith RPH, 02/25/25, 9:31 AM EST.

## 2025-02-25 NOTE — PROGRESS NOTES
" Specialty Pharmacy Patient Management Program  Call Center Refill Outreach      Brittny \"Padmini\" is a 65 y.o. female contacted today regarding refills of her medication(s).    Specialty medication(s) and dose(s) confirmed: dupixent  Other medications being refilled: none    Refill Questions      Flowsheet Row Most Recent Value   Changes to allergies? No   Changes to medications? No   New conditions or infections since last clinic visit No   Unplanned office visit, urgent care, ED, or hospital admission in the last 4 weeks  No   How does patient/caregiver feel medication is working? Very good   Financial problems or insurance changes  No   If yes, describe changes in insurance or financial issues. n/a   Since the previous refill, were any specialty medication doses or scheduled injections missed or delayed?  No   If yes, please provide the amount n/a   Why were doses missed? n/a   Does this patient require a clinical escalation to a pharmacist? No            Delivery Questions      Flowsheet Row Most Recent Value   Delivery method UPS   Delivery address verified with patient/caregiver? Yes   Delivery address Home   Number of medications in delivery 1   Medication(s) being filled and delivered Dupilumab (Dupixent)   Copay verified? Yes   Copay amount 57.50   Copay form of payment Credit/debit on file   Delivery Date Selection 02/27/25   Signature Required No            Medication Adherence    Adherence tools used: directed education  Support network for adherence: family member            Follow-up: 3 weeks     Luciano Smith RPH  Specialty Pharmacist  2/25/2025  09:31 EST    "

## 2025-03-24 ENCOUNTER — OFFICE VISIT (OUTPATIENT)
Dept: ENDOCRINOLOGY | Facility: CLINIC | Age: 65
End: 2025-03-24
Payer: COMMERCIAL

## 2025-03-24 VITALS
WEIGHT: 227 LBS | HEIGHT: 68 IN | DIASTOLIC BLOOD PRESSURE: 75 MMHG | SYSTOLIC BLOOD PRESSURE: 116 MMHG | HEART RATE: 84 BPM | BODY MASS INDEX: 34.4 KG/M2 | OXYGEN SATURATION: 94 %

## 2025-03-24 DIAGNOSIS — E03.9 ACQUIRED HYPOTHYROIDISM: ICD-10-CM

## 2025-03-24 DIAGNOSIS — R73.03 PREDIABETES: Primary | ICD-10-CM

## 2025-03-24 LAB
EXPIRATION DATE: NORMAL
EXPIRATION DATE: NORMAL
GLUCOSE BLDC GLUCOMTR-MCNC: 97 MG/DL (ref 70–130)
HBA1C MFR BLD: 5.7 % (ref 4.5–5.7)
Lab: NORMAL
Lab: NORMAL

## 2025-03-24 PROCEDURE — 83036 HEMOGLOBIN GLYCOSYLATED A1C: CPT | Performed by: PHYSICIAN ASSISTANT

## 2025-03-24 PROCEDURE — 84443 ASSAY THYROID STIM HORMONE: CPT | Performed by: PHYSICIAN ASSISTANT

## 2025-03-24 PROCEDURE — 99213 OFFICE O/P EST LOW 20 MIN: CPT | Performed by: PHYSICIAN ASSISTANT

## 2025-03-24 PROCEDURE — 82947 ASSAY GLUCOSE BLOOD QUANT: CPT | Performed by: PHYSICIAN ASSISTANT

## 2025-03-24 PROCEDURE — 84439 ASSAY OF FREE THYROXINE: CPT | Performed by: PHYSICIAN ASSISTANT

## 2025-03-24 RX ORDER — LANCETS
1 EACH MISCELLANEOUS 3 TIMES DAILY PRN
Qty: 100 EACH | Refills: 3 | Status: SHIPPED | OUTPATIENT
Start: 2025-03-24

## 2025-03-24 RX ORDER — BLOOD-GLUCOSE METER
1 EACH MISCELLANEOUS 3 TIMES DAILY PRN
Qty: 1 KIT | Refills: 0 | Status: SHIPPED | OUTPATIENT
Start: 2025-03-24

## 2025-03-24 NOTE — PROGRESS NOTES
"     Office Note      Date: 2025  Patient Name: Brittny Han  MRN: [id number]  : 1960    Chief Complaint   Patient presents with    Prediabetes       History of Present Illness:   Brittny Han is a 65 y.o. female who presents for Diabetes {Desc; diabetes type:89820}.   Diagnosed: ***  Current RX: ***    Bg checks are done:***  Hypoglycemia :***      Last A1c:  Hemoglobin A1C   Date Value Ref Range Status   2025 5.7 4.5 - 5.7 % Final   08/15/2024 6.30 (H) 4.80 - 5.60 % Final       Changes in health since last visit: ***.     DM Health Maintenance:  Ophtho: ***  Monofilament / Foot exam: ***  Lipids/Statin: {taking_nottakin} a statin with last FLP showing LDL ***  MADI: ***  TSH: ***  Aspirin: {taking_nottakin}  ACE/ARB: ***     Diabetic Complications:  Eyes: {YES-DESCRIBE/NO:79522}  Kidneys: {YES-DESCRIBE/NO:48691}  Feet: {YES-DESCRIBE/NO:67530}  Heart: {YES-DESCRIBE/NO:50084}    Subjective          Review of Systems:   Review of Systems    The following portions of the patient's history were reviewed and updated as appropriate: {history reviewed:::\"allergies\",\"current medications\",\"past family history\",\"past medical history\",\"past social history\",\"past surgical history\",\"problem list\"}.    Objective     Visit Vitals  /75   Pulse 84   Ht 172.7 cm (68\")   Wt 103 kg (227 lb)   LMP  (LMP Unknown)   SpO2 94%   BMI 34.52 kg/m²           Physical Exam:  Physical Exam     Assessment / Plan      Assessment & Plan:  Diagnoses and all orders for this visit:    1. Prediabetes (Primary)  -     POC Glucose, Blood  -     POC Glycosylated Hemoglobin (Hb A1C)        No follow-ups on file.    Portions of this note were completed with voice recognition program.  Electronically signed by Shania Aguila PA-C  [facility] [place]  2025  "

## 2025-03-24 NOTE — PROGRESS NOTES
"     Office Note      Date: 2025  Patient Name: Brittny Han  MRN: [id number]  : 1960    Chief Complaint   Patient presents with    Prediabetes       History of Present Illness:   Brittny Han is a 65 y.o. female who presents for Prediabetes     Current rx: metformin  mg daily  Levothyroxine 88 mcg    Patient has been on metformin for about 3 months.     She has been doing elimination diet to reduce inflammation, now is reducing her diet further based on latex diet. Eating a very healthy, generally low carb diet.    Dose of levothyroxine was increased last year to 88 mcg after TSH was elevated. Recheck of TSH was back to normal in December. Taking levothyroxine as directed daily on an empty stomach. Feeling well. Denies weight gain, fatigue. No palpitations, tremors, anxiety.    Steroid: none  Biotin: yes, takes       Lab Results   Component Value Date    HGBA1C 5.7 2025         Subjective          Review of Systems:   Review of Systems   Constitutional:  Negative for activity change, appetite change and fatigue.   Respiratory:  Negative for chest tightness and shortness of breath.    Gastrointestinal:  Negative for abdominal pain.   Musculoskeletal:  Negative for myalgias.   Neurological:  Negative for numbness.   Psychiatric/Behavioral:  The patient is not nervous/anxious.        The following portions of the patient's history were reviewed and updated as appropriate: allergies, current medications, past family history, past medical history, past social history, past surgical history, and problem list.    Objective     Visit Vitals  /75   Pulse 84   Ht 172.7 cm (68\")   Wt 103 kg (227 lb)   LMP  (LMP Unknown)   SpO2 94%   BMI 34.52 kg/m²           Physical Exam:  Physical Exam  Vitals and nursing note reviewed.   Constitutional:       Appearance: She is well-developed.   HENT:      Head: Normocephalic and atraumatic.   Eyes:      Conjunctiva/sclera: Conjunctivae " normal.   Neck:      Thyroid: No thyroid mass, thyromegaly or thyroid tenderness.   Cardiovascular:      Rate and Rhythm: Normal rate and regular rhythm.   Pulmonary:      Effort: Pulmonary effort is normal.      Breath sounds: Normal breath sounds.   Musculoskeletal:         General: Normal range of motion.      Cervical back: Normal range of motion.   Skin:     General: Skin is warm and dry.   Neurological:      Motor: No tremor.   Psychiatric:         Behavior: Behavior normal.         Assessment / Plan      Assessment & Plan:  Problem List Items Addressed This Visit          Endocrine and Metabolic    Hypothyroidism    Current Assessment & Plan   Appears clinically euthyroid on levothyroxine 88 mcg daily. Check thyroid function tests today. Further recommendations based on results.           Relevant Medications    carvedilol (COREG) 12.5 MG tablet    levothyroxine (Synthroid) 88 MCG tablet    Other Relevant Orders    T4, Free (Completed)    TSH (Completed)    Prediabetes - Primary    Current Assessment & Plan   Stable with well controlled a1c of 5.7. Continue current dose of Metformin  mg daily, along with healthy lifestyle.          Relevant Medications    Blood Glucose Monitoring Suppl (Accu-Chek Guide Me) w/Device kit    glucose blood test strip    Accu-Chek Softclix Lancets lancets    Other Relevant Orders    POC Glucose, Blood (Completed)    POC Glycosylated Hemoglobin (Hb A1C) (Completed)          Portions of this note were completed with voice recognition program.  Shania Aguila PA-C  [facility] [place]  03/24/2025

## 2025-03-25 LAB
T4 FREE SERPL-MCNC: 1.38 NG/DL (ref 0.92–1.68)
TSH SERPL DL<=0.05 MIU/L-ACNC: 2.5 UIU/ML (ref 0.27–4.2)

## 2025-03-26 NOTE — ASSESSMENT & PLAN NOTE
Stable with well controlled a1c of 5.7. Continue current dose of Metformin  mg daily, along with healthy lifestyle.

## 2025-03-26 NOTE — ASSESSMENT & PLAN NOTE
Appears clinically euthyroid on levothyroxine 88 mcg daily. Check thyroid function tests today. Further recommendations based on results.

## 2025-04-15 DIAGNOSIS — J45.50 SEVERE PERSISTENT ASTHMA WITHOUT COMPLICATION: ICD-10-CM

## 2025-04-17 ENCOUNTER — SPECIALTY PHARMACY (OUTPATIENT)
Dept: PHARMACY | Facility: TELEHEALTH | Age: 65
End: 2025-04-17
Payer: COMMERCIAL

## 2025-04-17 RX ORDER — DUPILUMAB 300 MG/2ML
300 INJECTION, SOLUTION SUBCUTANEOUS
Qty: 4 ML | Refills: 3 | Status: SHIPPED | OUTPATIENT
Start: 2025-04-17

## 2025-04-17 NOTE — PROGRESS NOTES
"   Specialty Pharmacy Patient Management Program  Refill Outreach     Brittny \"Padmini\" was contacted today regarding refills of their medication(s).    Refill Questions      Flowsheet Row Most Recent Value   Changes to allergies? No   Changes to medications? No   New conditions or infections since last clinic visit No   Unplanned office visit, urgent care, ED, or hospital admission in the last 4 weeks  No   How does patient/caregiver feel medication is working? Very good   Financial problems or insurance changes  No   If yes, describe changes in insurance or financial issues. N/A   Since the previous refill, were any specialty medication doses or scheduled injections missed or delayed?  No  [Next dose due on 4/25]   If yes, please provide the amount N/A   Why were doses missed? N/A   Does this patient require a clinical escalation to a pharmacist? No            Delivery Questions      Flowsheet Row Most Recent Value   Delivery method UPS   Delivery address verified with patient/caregiver? Yes   Delivery address Home   Other address preferred N/A   Number of medications in delivery 1   Medication(s) being filled and delivered Dupilumab (Dupixent)   Doses left of specialty medications 1   Copay verified? Yes   Copay amount $57.50 **DUPIXENT [facility]COF**   Copay form of payment Credit/debit on file   Delivery Date Selection 04/18/25   Signature Required No   Do you consent to receive electronic handouts?  No            Medication Adherence    Adherence tools used: directed education  Support network for adherence: family member          Follow-up: 21 day(s)     Lorie Arellano, Pharmacy Technician  4/17/2025  10:11 EDT    "

## 2025-05-05 ENCOUNTER — OFFICE VISIT (OUTPATIENT)
Dept: FAMILY MEDICINE CLINIC | Facility: CLINIC | Age: 65
End: 2025-05-05
Payer: COMMERCIAL

## 2025-05-05 VITALS
BODY MASS INDEX: 33.95 KG/M2 | HEIGHT: 68 IN | WEIGHT: 224 LBS | OXYGEN SATURATION: 98 % | DIASTOLIC BLOOD PRESSURE: 80 MMHG | HEART RATE: 69 BPM | SYSTOLIC BLOOD PRESSURE: 124 MMHG

## 2025-05-05 DIAGNOSIS — J45.50 SEVERE PERSISTENT ASTHMA, UNSPECIFIED WHETHER COMPLICATED: ICD-10-CM

## 2025-05-05 DIAGNOSIS — Z00.00 PREVENTATIVE HEALTH CARE: Primary | ICD-10-CM

## 2025-05-05 DIAGNOSIS — I10 BENIGN ESSENTIAL HYPERTENSION: ICD-10-CM

## 2025-05-05 PROCEDURE — 99396 PREV VISIT EST AGE 40-64: CPT | Performed by: INTERNAL MEDICINE

## 2025-05-06 ENCOUNTER — OFFICE VISIT (OUTPATIENT)
Dept: PULMONOLOGY | Facility: CLINIC | Age: 65
End: 2025-05-06
Payer: COMMERCIAL

## 2025-05-06 VITALS
BODY MASS INDEX: 34.25 KG/M2 | WEIGHT: 226 LBS | OXYGEN SATURATION: 95 % | TEMPERATURE: 98.2 F | DIASTOLIC BLOOD PRESSURE: 70 MMHG | HEIGHT: 68 IN | SYSTOLIC BLOOD PRESSURE: 110 MMHG | HEART RATE: 77 BPM

## 2025-05-06 DIAGNOSIS — G47.33 OBSTRUCTIVE SLEEP APNEA SYNDROME: ICD-10-CM

## 2025-05-06 DIAGNOSIS — J45.50 SEVERE PERSISTENT ASTHMA, UNSPECIFIED WHETHER COMPLICATED: Primary | ICD-10-CM

## 2025-05-06 DIAGNOSIS — Z00.00 PREVENTATIVE HEALTH CARE: ICD-10-CM

## 2025-05-06 DIAGNOSIS — J30.89 ENVIRONMENTAL AND SEASONAL ALLERGIES: ICD-10-CM

## 2025-05-06 LAB
BILIRUB UR QL STRIP: NEGATIVE
CLARITY UR: CLEAR
COLOR UR: YELLOW
GLUCOSE UR STRIP-MCNC: NEGATIVE MG/DL
HGB UR QL STRIP.AUTO: NEGATIVE
HOLD SPECIMEN: NORMAL
KETONES UR QL STRIP: NEGATIVE
LEUKOCYTE ESTERASE UR QL STRIP.AUTO: NEGATIVE
NITRITE UR QL STRIP: NEGATIVE
PH UR STRIP.AUTO: 6.5 [PH] (ref 5–8)
PROT UR QL STRIP: NEGATIVE
SP GR UR STRIP: 1.01 (ref 1–1.03)
UROBILINOGEN UR QL STRIP: NORMAL

## 2025-05-06 PROCEDURE — 80050 GENERAL HEALTH PANEL: CPT | Performed by: INTERNAL MEDICINE

## 2025-05-06 PROCEDURE — 82785 ASSAY OF IGE: CPT | Performed by: NURSE PRACTITIONER

## 2025-05-06 PROCEDURE — 82306 VITAMIN D 25 HYDROXY: CPT | Performed by: INTERNAL MEDICINE

## 2025-05-06 PROCEDURE — 81003 URINALYSIS AUTO W/O SCOPE: CPT | Performed by: INTERNAL MEDICINE

## 2025-05-06 PROCEDURE — 83036 HEMOGLOBIN GLYCOSYLATED A1C: CPT | Performed by: INTERNAL MEDICINE

## 2025-05-06 PROCEDURE — 80061 LIPID PANEL: CPT | Performed by: INTERNAL MEDICINE

## 2025-05-06 PROCEDURE — 84439 ASSAY OF FREE THYROXINE: CPT | Performed by: INTERNAL MEDICINE

## 2025-05-06 NOTE — PROGRESS NOTES
"[facility] Pulmonary Follow up      Chief Complaint  Asthma    Subjective          Brittny Han presents to [facility] for follow-up on her severe persistent asthma with obstruction.    She does feel like she has been a bit more short of breath with some chest tightness lately.  She had an exacerbation a couple weeks ago due to her allergies and was on a round of antibiotics and Medrol.    Currently she is on fluticasone/albuterol 500 mcg dose, she uses the generic Diskus.  She is also on Spiriva Respimat daily.  She does use her albuterol nebulizers and or albuterol HFA as needed.  She had a trial of Airsupra but that was not covered on her insurance.    She also continues on her antihistamine daily as well as her Singulair and just resumed her Astelin nasal spray.  She is also receiving allergy injections with her ENT.    Has been on Dupixent since 2020.  She did feel like that helped quite a bit initially but does not feel like it is helping as much anymore.  Prior to that she believes she was on Xolair.          Objective     Vital Signs:   /70   Pulse 77   Temp 98.2 °F (36.8 °C)   Ht 172.7 cm (67.99\")   Wt 103 kg (226 lb)   SpO2 95% Comment: room air at rest  BMI 34.37 kg/m²       Immunization History   Administered Date(s) Administered    ABRYSVO (RSV, 60+ or pregnant women 32-36 wks) 10/19/2023    COVID-19 (PFIZER) Purple Cap Monovalent 04/19/2021, 05/10/2021, 12/27/2021    Flu Vaccine Intradermal Quad 18-64YR 10/06/2014, 10/01/2015    Flu Vaccine Quad PF >36MO 10/10/2018    Flu Vaccine Split Quad 10/10/2018    Flublok 18+yrs 10/03/2020, 11/15/2022, 10/19/2023    Fluzone (or Fluarix & Flulaval for VFC) >6mos 11/03/2021    Influenza TIV (IM) 10/06/2014, 10/01/2015    Influenza recombinant 10/23/2024    Influenza, Unspecified 10/21/2019, 09/23/2020    Pneumococcal Conjugate 13-Valent (PCV13) 04/04/2016    Pneumococcal Conjugate 20-Valent " (PCV20) 11/06/2024    Pneumococcal Polysaccharide (PPSV23) 10/06/2009, 10/10/2014, 12/23/2020    Shingrix 03/01/2021, 06/04/2021    TD Preservative Free (Tenivac) 05/16/2020    Tdap 02/12/2010       Physical Exam  Vitals reviewed.   Constitutional:       Appearance: She is well-developed.   HENT:      Head: Normocephalic and atraumatic.   Eyes:      Pupils: Pupils are equal, round, and reactive to light.   Cardiovascular:      Rate and Rhythm: Normal rate and regular rhythm.      Heart sounds: No murmur heard.  Pulmonary:      Effort: Pulmonary effort is normal. No respiratory distress.      Breath sounds: Normal breath sounds. No wheezing or rales.   Abdominal:      General: Bowel sounds are normal. There is no distension.      Palpations: Abdomen is soft.   Musculoskeletal:         General: Normal range of motion.      Cervical back: Normal range of motion and neck supple.   Skin:     General: Skin is warm and dry.      Findings: No erythema.   Neurological:      Mental Status: She is alert and oriented to person, place, and time.   Psychiatric:         Behavior: Behavior normal.          Result Review :                           Assessment and Plan    Problem List Items Addressed This Visit          Allergies and Adverse Reactions    Environmental and seasonal allergies       Pulmonary and Pneumonias    Severe persistent asthma - Primary    Relevant Medications    Fluticasone-Umeclidin-Vilant (TRELEGY) 100-62.5-25 MCG/ACT inhaler    Other Relevant Orders    IgE       Sleep    Obstructive sleep apnea syndrome     Other Visit Diagnoses         Preventative health care              Mrs. Han does have severe persistent asthma.  She has been on Dupixent for about 5 years.  She is wondering if a different biologic agent may be more helpful in controlling her daily symptoms and exacerbations.    Will go ahead and follow-up on an IgE and get her CBC with differential done to check her eosinophils.  We did discuss  different options including Trezspire today in the office, I will follow-up with her after her labs return.    She is also on allergy injections with ENT.  She continues on her antihistamine and montelukast.    We also discussed different inhaler options.  She did feel like in the past when she was on Advair HFA that did better with her symptoms.  We discussed Breztri as an option but it appears that is not covered on her insurance.  I did give her some samples of Trelegy today in the office to see if that controls her symptoms any better than her current regimen.    Continue on her CPAP at night for her obstructive sleep apnea.    Routine follow-up in 6 months with full PFTs and chest x-ray.      Follow Up     Return in about 6 months (around 11/6/2025).  Patient was given instructions and counseling regarding her condition or for health maintenance advice. Please see specific information pulled into the AVS if appropriate.         Moderate level of Medical Decision Making complexity based on 2 or more chronic stable illnesses and prescription drug management.    BLANCA Jackson, ACNP  [facility]  Electronically signed

## 2025-05-07 LAB
25(OH)D3 SERPL-MCNC: 44.6 NG/ML (ref 30–100)
ALBUMIN SERPL-MCNC: 4.2 G/DL (ref 3.5–5.2)
ALBUMIN/GLOB SERPL: 1.2 G/DL
ALP SERPL-CCNC: 120 U/L (ref 39–117)
ALT SERPL W P-5'-P-CCNC: 27 U/L (ref 1–33)
ANION GAP SERPL CALCULATED.3IONS-SCNC: 10 MMOL/L (ref 5–15)
AST SERPL-CCNC: 25 U/L (ref 1–32)
BASOPHILS # BLD AUTO: 0.05 10*3/MM3 (ref 0–0.2)
BASOPHILS NFR BLD AUTO: 1 % (ref 0–1.5)
BILIRUB SERPL-MCNC: 0.2 MG/DL (ref 0–1.2)
BUN SERPL-MCNC: 21 MG/DL (ref 8–23)
BUN/CREAT SERPL: 24.7 (ref 7–25)
CALCIUM SPEC-SCNC: 9.8 MG/DL (ref 8.6–10.5)
CHLORIDE SERPL-SCNC: 102 MMOL/L (ref 98–107)
CHOLEST SERPL-MCNC: 209 MG/DL (ref 0–200)
CO2 SERPL-SCNC: 24 MMOL/L (ref 22–29)
CREAT SERPL-MCNC: 0.85 MG/DL (ref 0.57–1)
DEPRECATED RDW RBC AUTO: 41 FL (ref 37–54)
EGFRCR SERPLBLD CKD-EPI 2021: 77.1 ML/MIN/1.73
EOSINOPHIL # BLD AUTO: 0.23 10*3/MM3 (ref 0–0.4)
EOSINOPHIL NFR BLD AUTO: 4.4 % (ref 0.3–6.2)
ERYTHROCYTE [DISTWIDTH] IN BLOOD BY AUTOMATED COUNT: 12.3 % (ref 12.3–15.4)
GLOBULIN UR ELPH-MCNC: 3.4 GM/DL
GLUCOSE SERPL-MCNC: 101 MG/DL (ref 65–99)
HBA1C MFR BLD: 6 % (ref 4.8–5.6)
HCT VFR BLD AUTO: 40 % (ref 34–46.6)
HDLC SERPL-MCNC: 51 MG/DL (ref 40–60)
HGB BLD-MCNC: 13.4 G/DL (ref 12–15.9)
IMM GRANULOCYTES # BLD AUTO: 0.01 10*3/MM3 (ref 0–0.05)
IMM GRANULOCYTES NFR BLD AUTO: 0.2 % (ref 0–0.5)
LDLC SERPL CALC-MCNC: 139 MG/DL (ref 0–100)
LDLC/HDLC SERPL: 2.69 {RATIO}
LYMPHOCYTES # BLD AUTO: 1.5 10*3/MM3 (ref 0.7–3.1)
LYMPHOCYTES NFR BLD AUTO: 28.8 % (ref 19.6–45.3)
MCH RBC QN AUTO: 30.9 PG (ref 26.6–33)
MCHC RBC AUTO-ENTMCNC: 33.5 G/DL (ref 31.5–35.7)
MCV RBC AUTO: 92.2 FL (ref 79–97)
MONOCYTES # BLD AUTO: 0.45 10*3/MM3 (ref 0.1–0.9)
MONOCYTES NFR BLD AUTO: 8.6 % (ref 5–12)
NEUTROPHILS NFR BLD AUTO: 2.97 10*3/MM3 (ref 1.7–7)
NEUTROPHILS NFR BLD AUTO: 57 % (ref 42.7–76)
NRBC BLD AUTO-RTO: 0 /100 WBC (ref 0–0.2)
PLATELET # BLD AUTO: 287 10*3/MM3 (ref 140–450)
PMV BLD AUTO: 9.8 FL (ref 6–12)
POTASSIUM SERPL-SCNC: 3.6 MMOL/L (ref 3.5–5.2)
PROT SERPL-MCNC: 7.6 G/DL (ref 6–8.5)
RBC # BLD AUTO: 4.34 10*6/MM3 (ref 3.77–5.28)
SODIUM SERPL-SCNC: 136 MMOL/L (ref 136–145)
T4 FREE SERPL-MCNC: 1.2 NG/DL (ref 0.92–1.68)
TRIGL SERPL-MCNC: 104 MG/DL (ref 0–150)
TSH SERPL DL<=0.05 MIU/L-ACNC: 3.49 UIU/ML (ref 0.27–4.2)
VLDLC SERPL-MCNC: 19 MG/DL (ref 5–40)
WBC NRBC COR # BLD AUTO: 5.21 10*3/MM3 (ref 3.4–10.8)

## 2025-05-07 NOTE — PROGRESS NOTES
Chief Complaint   Patient presents with    Annual Exam       HPI:  Brittny Han is a 65 y.o. female who presents today for annual exam.    ROS:  Constitutional: no fevers, night sweats or unexplained weight loss  Eyes: no vision changes  ENT: no runny nose, ear pain, sore throat  Cardio: no chest pain, palpitations  Pulm: no shortness of breath, wheezing, or cough  GI: no abdominal pain or changes in bowel movements  : no difficulty urinating  MSK: no difficulty ambulating, no joint pain  Neuro: no weakness, dizziness or headache  Psych: no trouble sleeping  Endo: no change in appetite      Past Medical History:   Diagnosis Date    Allergic     Allergic rhinitis     Anemia     Anxiety     Asthma     MODERATE, PERSISTANT    Asthma, extrinsic 2007    Progressively gets worse with age    BCC (basal cell carcinoma) 2000    RIGHT EYELID    Bowel habit changes     BPPV (benign paroxysmal positional vertigo) 01/2020    Bronchitis     Cat bite of left hand 05/16/2020    SEEN AT [facility]    Chronic bilateral low back pain with bilateral sciatica     Chronic bronchitis Aug 2018    also seasonal bronchitis every May and December    Chronic idiopathic constipation     Chronic obstructive lung disease 03/02/2016    Colon polyps     FOLLOWED BY DR. LORENA RICO    COPD (chronic obstructive pulmonary disease)     CTS (carpal tunnel syndrome) 2010    BILATERAL, Wear wrist braces at night    Demyelinating disease     Depression     Diabetes mellitus Jan 2021    Pre-Diabetic    Disease of thyroid gland     HYPOTHYROIDISM    DINERO (dyspnea on exertion) 06/2019    Essential hypertension     Fatigue     Gastroenteritis 02/2020    GERD (gastroesophageal reflux disease)     Hashimoto's thyroiditis     Head injury when I was 10    concussion    Headache disorder 09/07/2016    Hemorrhoids     Hepatomegaly 11/2018    History of echocardiogram 05/31/2011    mild ascending aortic root dilatation; mod RVC enlargement; global left EF 0.72  (normal 0.55-0.75) mild concentric LV hypertrophy; trace MR; mild TR     History of PFTs 02/02/2016    good effort; normal lung volumes    Hyperlipidemia     Hyperthyroidism 2019    Hashimoto's    Hypertrophy, nasal, turbinate     Hypothyroidism 2019    Idiopathic peripheral neuropathy 10/21/2021    Irritable bowel syndrome     Midline cystocele     Neuropathy of both feet 01/2019    FOLLOWED BY DR. VARSHA IRELAND    Numbness and tingling in both hands 07/19/2021    Numbness and tingling of both feet 01/30/2019    ALANNA on CPAP 2007    Followed by Jin Cat @ [facility]    Peptic ulceration     PUD    Periodic headache syndrome, not intractable     Plantar fasciitis, left 01/2019    FOLLOWED BY DR. VARSHA IRELAND    Primary central sleep apnea 2006    Sleep apnea not sure if central or not    Rectal bleeding 08/2020    Rectal bleeding     Rectal mass 08/2020    RLS (restless legs syndrome) 06/2017    Shortness of breath     Strain of thoracic region 04/2017    KALYAN (stress urinary incontinence, female)     Uterine fibroid     Varicella 1966    This is a guess    Vision loss 2008    wear glasses    Vitamin B 12 deficiency     Vitamin D deficiency       Family History   Problem Relation Age of Onset    Heart disease Mother         cardiomyopathy, Hypertension, Heart Failure    Hypertension Mother     Sleep apnea Mother     Hyperlipidemia Mother     Heart attack Mother     Heart failure Mother     Anxiety disorder Mother     Arthritis Mother     Irritable bowel syndrome Mother     COPD Father         Father is alive at age 80 with COPD and asthma    Asthma Father         COPD    Basal cell carcinoma Father     Cancer Father         Skin    Hyperlipidemia Father     Melanoma Father     Colon polyps Father     Ulcerative colitis Cousin     Crohn's disease Cousin     Cancer Niece     Melanoma Niece     Narcolepsy Sister     Sleep apnea Sister     Anemia Sister         Aplastic Anemia    Diabetes Sister         Type  2 2ndry to steroid use    Thyroid disease Sister         had 2 parathyroid removed    Asthma Sister         Asthma    Colon polyps Sister     Irritable bowel syndrome Sister     Diabetes Brother         Type 1 or Juvenile    Heart failure Maternal Grandmother     Cancer Maternal Grandfather         Multiple  Myeloma - not sure of name    Lung cancer Paternal Grandmother     Cancer Paternal Grandmother         Lung Cancer    Irritable bowel syndrome Paternal Grandmother     Heart disease Paternal Grandfather         unknown    Hyperlipidemia Paternal Grandfather     Heart attack Paternal Grandfather     Heart failure Paternal Grandfather         Unsure as he  before I was born from heart issues    Diabetes Paternal Aunt         type 2    Colon polyps Paternal Aunt     Irritable bowel syndrome Paternal Aunt     Diabetes Brother         Type 1 or Juvenile    Colon polyps Brother     Irritable bowel syndrome Brother     Diabetes Paternal Aunt         type II    Diabetes Paternal Aunt         type 2    Anemia Sister         Aplastic Anemia    Diabetes Sister         Type 2 -    Breast cancer Neg Hx     Ovarian cancer Neg Hx       Social History     Socioeconomic History    Marital status: Single   Tobacco Use    Smoking status: Never     Passive exposure: Never    Smokeless tobacco: Never   Vaping Use    Vaping status: Never Used   Substance and Sexual Activity    Alcohol use: Yes     Alcohol/week: 2.0 standard drinks of alcohol     Types: 2 Glasses of wine per week     Comment: 2 glasses of wine about 4 x's per year    Drug use: No    Sexual activity: Not Currently     Partners: Male     Birth control/protection: Abstinence     Comment: single      Allergies   Allergen Reactions    Percocet [Oxycodone-Acetaminophen] Anaphylaxis     Breathing difficulty, tongue swelling, severe     Brevital Sodium [Methohexital] Paresthesia     CHILLS, SHAKING    Banana Unknown - Low Severity    Pork-Derived Products Unknown -  Low Severity      Immunization History   Administered Date(s) Administered    ABRYSVO (RSV, 60+ or pregnant women 32-36 wks) 10/19/2023    COVID-19 (PFIZER) Purple Cap Monovalent 04/19/2021, 05/10/2021, 12/27/2021    Flu Vaccine Intradermal Quad 18-64YR 10/06/2014, 10/01/2015    Flu Vaccine Quad PF >36MO 10/10/2018    Flu Vaccine Split Quad 10/10/2018    Flublok 18+yrs 10/03/2020, 11/15/2022, 10/19/2023    Fluzone (or Fluarix & Flulaval for VFC) >6mos 11/03/2021    Influenza TIV (IM) 10/06/2014, 10/01/2015    Influenza recombinant 10/23/2024    Influenza, Unspecified 10/21/2019, 09/23/2020    Pneumococcal Conjugate 13-Valent (PCV13) 04/04/2016    Pneumococcal Conjugate 20-Valent (PCV20) 11/06/2024    Pneumococcal Polysaccharide (PPSV23) 10/06/2009, 10/10/2014, 12/23/2020    Shingrix 03/01/2021, 06/04/2021    TD Preservative Free (Tenivac) 05/16/2020    Tdap 02/12/2010        PE:  Vitals:    05/05/25 1400   BP: 124/80   Pulse: 69   SpO2: 98%      Body mass index is 34.07 kg/m².    Gen Appearance: NAD  HEENT: Normocephalic, PERRLA, no thyromegaly, trache midline  Heart: RRR, normal S1 and S2, no murmur  Lungs: CTA b/l, no wheezing, no crackles  Abdomen: Soft, non-tender, non-distended, no guarding and BSx4  MSK: Moves all extremities well, normal gait, no peripheral edema  Pulses: Palpable and equal b/l  Lymph nodes: No palpable lymphadenopathy   Neuro: No focal deficits      Current Outpatient Medications   Medication Sig Dispense Refill    Accu-Chek Softclix Lancets lancets Use as directed to check blood glucose up to 3 times daily as needed 100 each 3    albuterol (ACCUNEB) 0.63 MG/3ML nebulizer solution Take 3 mL by nebulization Every 6 (Six) Hours As Needed for Wheezing or Shortness of Air. 90 mL 0    albuterol sulfate  (90 Base) MCG/ACT inhaler Inhale 2 puffs Every 6 (Six) Hours. (Patient taking differently: Inhale 2 puffs Every 6 (Six) Hours As Needed.) 18 g 1    albuterol sulfate  (90 Base)  MCG/ACT inhaler Inhale 2 puffs every 6 (six) to 8 (eight) hours as needed. 8.5 g 3    Albuterol-Budesonide (Airsupra) 90-80 MCG/ACT aerosol Inhale 2 puffs Every 6-8 Hours As Needed. 10.7 g 3    Azelastine HCl 137 MCG/SPRAY solution Administer 2 sprays into the nostril(s) as directed by provider 2 (Two) Times a Day. 30 mL 0    B Complex Vitamins (vitamin b complex) capsule capsule Take 1 capsule by mouth Daily.      benzonatate (TESSALON) 100 MG capsule Take 1 capsule by mouth 2 (Two) Times a Day As Needed for Cough for up to 12 doses. 12 capsule 0    Blood Glucose Monitoring Suppl (Accu-Chek Guide Me) w/Device kit Use as directed to check blood glucose up to 3 times daily as needed 1 kit 0    Cholecalciferol 25 MCG (1000 UT) tablet Take 2 tablets by mouth Daily.      cromolyn (GASTROCROM) 100 MG/5ML solution Take 1 ampule in 2 oz of water as directed 3 times daily with food 450 mL 3    Dupilumab (Dupixent) 300 MG/2ML solution auto-injector injection Inject 2 mL under the skin into the appropriate area as directed Every 14 (Fourteen) Days. 4 mL 3    EPINEPHrine (EPIPEN) 0.3 MG/0.3ML solution auto-injector injection Use as directed for acute allergic reaction. 2 each 3    Fluticasone-Salmeterol (Advair Diskus) 500-50 MCG/ACT DISKUS Inhale 1 puff by mouth 2 (Two) Times a Day. 180 each 3    glucose blood test strip Use as directed to check blood glucose up to 3 times daily as needed 100 each 2    hydroCHLOROthiazide 25 MG tablet Take 1 tablet by mouth Daily. (*STOP taking spironolactone/hctz combo*) 30 tablet 5    levocetirizine (XYZAL) 5 MG tablet Take 1 tablet by mouth Daily. 90 tablet 3    levothyroxine (Synthroid) 88 MCG tablet Take 1 tablet by mouth Daily. 90 tablet 1    magnesium gluconate (MAGONATE) 500 MG tablet Take 1 tablet by mouth Daily.      metFORMIN ER (GLUCOPHAGE-XR) 500 MG 24 hr tablet Take 1 tablet by mouth Daily. 90 tablet 1    montelukast (SINGULAIR) 10 MG tablet Take 1 tablet by mouth every night at  bedtime. 90 tablet 3    multivitamin with minerals (HAIR SKIN & NAILS ADVANCED PO) Take 1 tablet by mouth Daily.      Pancrelipase, Lip-Prot-Amyl, (Zenpep) 95983-62133 units capsule delayed-release particles Take 1 capsule by mouth 2 (Two) Times a Day. 60 capsule 3    Tiotropium Bromide Monohydrate (Spiriva Respimat) 1.25 MCG/ACT aerosol solution inhaler Inhale 2 puffs by mouth Daily as directed. 12 g 3    atorvastatin (LIPITOR) 20 MG tablet Take 1 tablet by mouth Daily. (Patient not taking: Reported on 5/6/2025) 90 tablet 1    carvedilol (COREG) 12.5 MG tablet Take 1 tablet by mouth 2 (Two) Times a Day. (Patient not taking: Reported on 5/6/2025) 180 tablet 1    cyclobenzaprine (FLEXERIL) 10 MG tablet Take 1 tablet by mouth 3 (Three) Times a Day As Needed for Muscle Spasms. 12 tablet 0    doxycycline (MONODOX) 100 MG capsule Take 1 capsule by mouth 2 (Two) Times a Day. (Patient not taking: Reported on 5/6/2025) 20 capsule 0    Fluticasone-Umeclidin-Vilant (TRELEGY) 100-62.5-25 MCG/ACT inhaler Inhale 1 puff Daily. 2 each 0    methylPREDNISolone (MEDROL) 4 MG dose pack Take as directed on package instructions. (Patient not taking: Reported on 5/6/2025) 21 tablet 0    Multiple Vitamins-Minerals (CENTRUM SILVER PO) Centrum Silver      omeprazole (priLOSEC) 40 MG capsule Take 1 capsule by mouth Daily. 30 capsule 1    Rimegepant Sulfate (NURTEC) 75 MG tablet dispersible tablet Take 1 tablet by mouth Daily As Needed (migraine). Do not take more than 1 tablet in 24 hours 16 tablet 3    spironolactone-hydrochlorothiazide (ALDACTAZIDE) 25-25 MG tablet Take 1 tablet by mouth Daily. (Patient not taking: Reported on 5/6/2025) 90 tablet 1    Tirzepatide-Weight Management (ZEPBOUND) 2.5 MG/0.5ML solution auto-injector Inject 0.5 mL under the skin into the appropriate area as directed 1 (One) Time Per Week. 2 mL 2     No current facility-administered medications for this visit.        Diagnoses and all orders for this visit:    1.  Preventative health care (Primary)  -     CBC & Differential; Future  -     Comprehensive Metabolic Panel; Future  -     Lipid Panel; Future  -     Hemoglobin A1c; Future  -     TSH+Free T4; Future  -     Urinalysis With Culture If Indicated - Urine, Clean Catch; Future  -     Vitamin D,25-Hydroxy; Future  Counseled on healthy weight, nutrition, physical activity, cancer screening, and immunizations.    2. Severe persistent asthma, unspecified whether complicated    3. Benign essential hypertension         Return in about 1 year (around 5/5/2026) for Annual physical.     Dictated Utilizing Dragon Dictation    Please note that portions of this note were completed with a voice recognition program.    Part of this note may be an electronic transcription/translation of spoken language to printed text using the Dragon Dictation System.

## 2025-05-11 LAB — IGE SERPL-ACNC: 89 IU/ML (ref 6–495)

## 2025-05-12 ENCOUNTER — SPECIALTY PHARMACY (OUTPATIENT)
Dept: PHARMACY | Facility: TELEHEALTH | Age: 65
End: 2025-05-12
Payer: COMMERCIAL

## 2025-05-12 NOTE — PROGRESS NOTES
"   Specialty Pharmacy Patient Management Program  Refill Outreach     Brittny \"Padmini\" was contacted today regarding refills of their medication(s).    Refill Questions      Flowsheet Row Most Recent Value   Changes to allergies? No   Changes to medications? No   New conditions or infections since last clinic visit Yes    If yes, please describe  Patient has Sinus infection    Unplanned office visit, urgent care, ED, or hospital admission in the last 4 weeks  No   How does patient/caregiver feel medication is working? Very good   Financial problems or insurance changes  No   If yes, describe changes in insurance or financial issues. N/A   Since the previous refill, were any specialty medication doses or scheduled injections missed or delayed?  Yes   [Taking next dose today (5/12/2025)]   If yes, please provide the amount 1 dose delayed    Why were doses missed? Patient forgot to take this weekend.    Does this patient require a clinical escalation to a pharmacist? Yes             Delivery Questions      Flowsheet Row Most Recent Value   Delivery method UPS   Delivery address verified with patient/caregiver? Yes   Delivery address Home   Other address preferred N/A   Number of medications in delivery 3   Medication(s) being filled and delivered Dupilumab (Dupixent), Levocetirizine Dihydrochloride (XYZAL), Levothyroxine Sodium (SYNTHROID, LEVOTHROID)   Doses left of specialty medications 2 doses left per patient.   Copay verified? Yes   Copay amount $57.50 **DUPIXENT CCOF**   Copay form of payment Credit/debit on file   Delivery Date Selection 05/13/25   Signature Required No   Do you consent to receive electronic handouts?  Yes            Medication Adherence    Adherence tools used: directed education  Support network for adherence: family member          Follow-up: 23 day(s)     Je No, Pharmacy Technician  5/12/2025  11:05 EDT    " 18 Patient requests all Lab, Cardiology, and Radiology Results on their Discharge Instructions

## 2025-05-20 ENCOUNTER — PATIENT OUTREACH (OUTPATIENT)
Dept: CASE MANAGEMENT | Facility: OTHER | Age: 65
End: 2025-05-20
Payer: COMMERCIAL

## 2025-05-20 NOTE — PLAN OF CARE
Problem: Wellness  Goal: Optimal Wellbeing  Intervention: Alleviate Barriers to Increasing Activity Level  Flowsheets (Taken 5/20/2025 7477)  Alleviate Barriers to Increasing Activity Level:   praise for success provided   support and encouragement provided   participation in physical therapy encouraged

## 2025-05-20 NOTE — OUTREACH NOTE
"Adult Patient Profile  Questions/Answers      Flowsheet Row Most Recent Value   Symptoms/Conditions Managed at Home other (see comments)  [weight management, preventing diabetes]   Diabetes Management Strategies diet modification, medication therapy  [preventing diabetes]   Last A1C Result 6.0 on 5/6/25   Diabetes Comment Telephone call with patient.  Discussed preventing diabetes.  Last a1c was 6.0 but patient reports she was on Medrol and this might have contributed. Patient reports she is trying to eat  natural foods that have not been processed.   Endocrine Symptoms/Conditions thyroid disorder   Endocrine Management Strategies diet modification, medication therapy   Endocrine Comment Patient reports she is taking levothyroxine. Had TSH and FreeT4 labs completed on 5/6/25 and both were in normal range.        AMBULATORY CASE MANAGEMENT NOTE    Names and Relationships of Patient/Support Persons: Contact: Brittny Han \"Padmini\"; Relationship: Self -     Patient Outreach    Telephone call with patient.  Discussed preventing diabetes and weight management with patient.  Provided patient information on [employer] to discuss increasing activity options.  Encouraged patient to share her diet concerns with Endocrinologist at the appointment scheduled on 6/23/25.  Encouraged her to share of food log with her provider.   Asked her to reach out as needed.   Education Documentation  No documentation found.        SARAI ABREU  Ambulatory Case Management    5/20/2025, 15:04 EDT  "

## 2025-06-06 ENCOUNTER — SPECIALTY PHARMACY (OUTPATIENT)
Dept: PHARMACY | Facility: TELEHEALTH | Age: 65
End: 2025-06-06
Payer: COMMERCIAL

## 2025-06-06 NOTE — PROGRESS NOTES
"   Specialty Pharmacy Patient Management Program  Refill Outreach     Brittny \"Padmini\" was contacted today regarding refills of their medication(s).    Refill Questions      Flowsheet Row Most Recent Value   Changes to allergies? No   Changes to medications? No   New conditions or infections since last clinic visit No   Unplanned office visit, urgent care, ED, or hospital admission in the last 4 weeks  No   How does patient/caregiver feel medication is working? Very good   Financial problems or insurance changes  No   If yes, describe changes in insurance or financial issues. N/A   Since the previous refill, were any specialty medication doses or scheduled injections missed or delayed?  No  [Next dose due on 6/12]   Does this patient require a clinical escalation to a pharmacist? No            Delivery Questions      Flowsheet Row Most Recent Value   Delivery method UPS   Delivery address verified with patient/caregiver? Yes   Delivery address Home   Other address preferred N/A   Number of medications in delivery 1   Medication(s) being filled and delivered Dupilumab (Dupixent)   Doses left of specialty medications 1   Copay verified? Yes   Copay amount $57.50 **DUPIXENT CCOF**   Copay form of payment Credit/debit on file   Delivery Date Selection 06/10/25   Signature Required No   Do you consent to receive electronic handouts?  No            Medication Adherence    Adherence tools used: directed education  Support network for adherence: family member          Follow-up: 21 day(s)     Lorie Arellano, Pharmacy Technician  6/6/2025  11:24 EDT    "

## 2025-06-23 ENCOUNTER — OFFICE VISIT (OUTPATIENT)
Dept: ENDOCRINOLOGY | Facility: CLINIC | Age: 65
End: 2025-06-23
Payer: COMMERCIAL

## 2025-06-23 VITALS
HEART RATE: 73 BPM | HEIGHT: 68 IN | DIASTOLIC BLOOD PRESSURE: 68 MMHG | SYSTOLIC BLOOD PRESSURE: 120 MMHG | BODY MASS INDEX: 34.1 KG/M2 | WEIGHT: 225 LBS

## 2025-06-23 DIAGNOSIS — E03.9 ACQUIRED HYPOTHYROIDISM: ICD-10-CM

## 2025-06-23 DIAGNOSIS — R73.03 PREDIABETES: Primary | ICD-10-CM

## 2025-06-23 LAB
EXPIRATION DATE: NORMAL
EXPIRATION DATE: NORMAL
GLUCOSE BLDC GLUCOMTR-MCNC: 92 MG/DL (ref 70–130)
HBA1C MFR BLD: 5.4 % (ref 4.5–5.7)
Lab: NORMAL
Lab: NORMAL

## 2025-06-23 PROCEDURE — 82947 ASSAY GLUCOSE BLOOD QUANT: CPT | Performed by: INTERNAL MEDICINE

## 2025-06-23 PROCEDURE — 83036 HEMOGLOBIN GLYCOSYLATED A1C: CPT | Performed by: INTERNAL MEDICINE

## 2025-06-23 PROCEDURE — 99214 OFFICE O/P EST MOD 30 MIN: CPT | Performed by: INTERNAL MEDICINE

## 2025-06-23 RX ORDER — METFORMIN HYDROCHLORIDE 500 MG/1
TABLET, EXTENDED RELEASE ORAL
Qty: 180 TABLET | Refills: 1 | Status: SHIPPED | OUTPATIENT
Start: 2025-06-23 | End: 2025-09-28

## 2025-06-23 NOTE — PROGRESS NOTES
Brittny Han 65 y.o.  CC: follow up prediabetes, hypothyroid     Hoopa: follow up prediabetes, hypothyroid     Bp is good taking hctz  Is off spironolactone  Blood sugar and 90 day average sugar reviewed  Results for orders placed or performed in visit on 06/23/25   POC Glucose, Blood    Collection Time: 06/23/25 12:00 PM    Specimen: Blood   Result Value Ref Range    Glucose 92 70 - 130 mg/dL    Lot Number [id number]     Expiration Date 08/30/2025    POC Glycosylated Hemoglobin (Hb A1C)    Collection Time: 06/23/25 12:00 PM    Specimen: Blood   Result Value Ref Range    Hemoglobin A1C 5.4 4.5 - 5.7 %    Lot Number [id number]     Expiration Date 11/06/2026      *Note: Due to a large number of results and/or encounters for the requested time period, some results have not been displayed. A complete set of results can be found in Results Review.     Is taking levothyroxine 88 mcg daily - recent tsh 3  Prior A1c 6 5/25  Commended improvement in average sugar- recommended continuing to work on diet, stay active   Metformin and zepbound never got approved   Frustrated with diet- has eliminated processed foods, fast food   Has seen weight management     Allergies   Allergen Reactions    Percocet [Oxycodone-Acetaminophen] Anaphylaxis     Breathing difficulty, tongue swelling, severe     Brevital Sodium [Methohexital] Paresthesia     CHILLS, SHAKING    Banana Unknown - Low Severity    Pork-Derived Products Unknown - Low Severity       Current Outpatient Medications:     Accu-Chek Softclix Lancets lancets, Use as directed to check blood glucose up to 3 times daily as needed, Disp: 100 each, Rfl: 3    albuterol (ACCUNEB) 0.63 MG/3ML nebulizer solution, Take 3 mL by nebulization Every 6 (Six) Hours As Needed for Wheezing or Shortness of Air., Disp: 90 mL, Rfl: 0    albuterol sulfate  (90 Base) MCG/ACT inhaler, Inhale 2 puffs every 6 (six) to 8 (eight) hours as needed., Disp: 8.5 g, Rfl: 3    Albuterol-Budesonide  (Airsupra) 90-80 MCG/ACT aerosol, Inhale 2 puffs Every 6-8 Hours As Needed., Disp: 10.7 g, Rfl: 3    azelastine (ASTELIN) 0.1 % nasal spray, Administer 1 spray into the nostril(s) as directed by provider 1 to 4 Times a Day as needed, Disp: 30 mL, Rfl: 3    Azelastine HCl 137 MCG/SPRAY solution, Administer 2 sprays into the nostril(s) as directed by provider 2 (Two) Times a Day., Disp: 30 mL, Rfl: 0    B Complex Vitamins (vitamin b complex) capsule capsule, Take 1 capsule by mouth Daily., Disp: , Rfl:     benzonatate (TESSALON) 100 MG capsule, Take 1 capsule by mouth 2 (Two) Times a Day As Needed for Cough for up to 12 doses., Disp: 12 capsule, Rfl: 0    Blood Glucose Monitoring Suppl (Accu-Chek Guide Me) w/Device kit, Use as directed to check blood glucose up to 3 times daily as needed, Disp: 1 kit, Rfl: 0    Cholecalciferol 25 MCG (1000 UT) tablet, Take 2 tablets by mouth Daily., Disp: , Rfl:     Dupilumab (Dupixent) 300 MG/2ML solution auto-injector injection, Inject 2 mL under the skin into the appropriate area as directed Every 14 (Fourteen) Days., Disp: 4 mL, Rfl: 3    EPINEPHrine (EPIPEN) 0.3 MG/0.3ML solution auto-injector injection, Use as directed for acute allergic reaction., Disp: 2 each, Rfl: 3    Fluticasone-Salmeterol (Advair Diskus) 500-50 MCG/ACT DISKUS, Inhale 1 puff by mouth 2 (Two) Times a Day., Disp: 180 each, Rfl: 3    Fluticasone-Umeclidin-Vilant (TRELEGY) 100-62.5-25 MCG/ACT inhaler, Inhale 1 puff Daily., Disp: 2 each, Rfl: 0    glucose blood test strip, Use as directed to check blood glucose up to 3 times daily as needed, Disp: 100 each, Rfl: 2    hydroCHLOROthiazide 25 MG tablet, Take 1 tablet by mouth Daily. (*STOP taking spironolactone/hctz combo*), Disp: 30 tablet, Rfl: 5    levocetirizine (XYZAL) 5 MG tablet, Take 1 tablet by mouth Daily., Disp: 90 tablet, Rfl: 3    levothyroxine (Synthroid) 88 MCG tablet, Take 1 tablet by mouth Daily., Disp: 90 tablet, Rfl: 1    magnesium gluconate  (MAGONATE) 500 MG tablet, Take 1 tablet by mouth Daily., Disp: , Rfl:     metFORMIN ER (GLUCOPHAGE-XR) 500 MG 24 hr tablet, Take 1 pill daily x 2 weeks then 2 pills daily, Disp: 180 tablet, Rfl: 1    montelukast (SINGULAIR) 10 MG tablet, Take 1 tablet by mouth every night at bedtime., Disp: 90 tablet, Rfl: 3    Multiple Vitamins-Minerals (CENTRUM SILVER PO), Centrum Silver, Disp: , Rfl:     multivitamin with minerals (HAIR SKIN & NAILS ADVANCED PO), Take 1 tablet by mouth Daily., Disp: , Rfl:     Rimegepant Sulfate (NURTEC) 75 MG tablet dispersible tablet, Take 1 tablet by mouth Daily As Needed (migraine). Do not take more than 1 tablet in 24 hours, Disp: 16 tablet, Rfl: 3    Tiotropium Bromide Monohydrate (Spiriva Respimat) 1.25 MCG/ACT aerosol solution inhaler, Inhale 2 puffs by mouth Daily as directed., Disp: 12 g, Rfl: 3    Patient Active Problem List    Diagnosis     Hypertensive left ventricular hypertrophy, without heart failure [I11.9]     Chronic cough [R05.3]     Environmental and seasonal allergies [J30.89]     Age-related nuclear cataract of both eyes [H25.13]     Allergic conjunctivitis of both eyes [H10.13]     Basal cell carcinoma (BCC) of right eyelid [C44.111]     Early dry stage nonexudative age-related macular degeneration of both eyes [H35.3131]     Myopia of both eyes with astigmatism and presbyopia [H52.13, H52.203, H52.4]     Ocular hypertension, bilateral [H40.053]     Obesity (BMI 35.0-39.9 without comorbidity) [E66.9]     Encounter for gynecological examination without abnormal finding [Z01.419]     Encounter for screening mammogram for malignant neoplasm of breast [Z12.31]     Prediabetes [R73.03]     Idiopathic peripheral neuropathy [G60.9]     Median neuropathy at elbow, right [G56.11]     Ulnar neuropathy at elbow of right upper extremity [G56.21]     Bowel habit changes [R19.4]     Rectal bleeding [K62.5]     Hypothyroidism [E03.9]     Severe persistent asthma [J45.50]     H/O left  breast biopsy 1981 [Z98.890]     Adenomatous colon polyp; 2012, 2017 [D12.6]     Menopausal symptoms [N95.1]     Polyp of colon [K63.5]     Chronic idiopathic constipation [K59.04]     Restless legs syndrome (RLS) [G25.81]     Periodic headache syndrome, not intractable [G43.C0]     Hypertrophy of nasal turbinates [J34.3]     Abnormal blood chemistry level [R79.9]     Backache [M54.9]     Neck pain [M54.2]     Headache disorder [R51.9]     Chronic neck and back pain [M54.2, M54.9, G89.29]     Disequilibrium [R42]     Peptic ulcer [K27.9]     Shortness of breath [R06.02]     Anxiety [F41.9]     Anemia [D64.9]     Benign essential hypertension [I10]     Obstructive sleep apnea syndrome [G47.33]     Chronic obstructive lung disease [J44.9]     Hypertensive disorder [I10]      Review of Systems   Constitutional:  Negative for activity change, appetite change and unexpected weight change.   HENT:  Negative for congestion and rhinorrhea.    Eyes:  Negative for visual disturbance.   Respiratory:  Negative for cough and shortness of breath.    Cardiovascular:  Negative for palpitations and leg swelling.   Gastrointestinal:  Negative for constipation, diarrhea and nausea.   Genitourinary:  Negative for hematuria.   Musculoskeletal:  Negative for arthralgias, back pain, gait problem, joint swelling and myalgias.   Skin:  Negative for color change, rash and wound.   Allergic/Immunologic: Negative for environmental allergies, food allergies and immunocompromised state.   Neurological:  Negative for dizziness, weakness and light-headedness.   Psychiatric/Behavioral:  Negative for confusion, decreased concentration, dysphoric mood and sleep disturbance. The patient is not nervous/anxious.      Social History     Socioeconomic History    Marital status: Single   Tobacco Use    Smoking status: Never     Passive exposure: Never    Smokeless tobacco: Never   Vaping Use    Vaping status: Never Used   Substance and Sexual Activity     Alcohol use: Yes     Alcohol/week: 2.0 standard drinks of alcohol     Types: 2 Glasses of wine per week     Comment: 2 glasses of wine about 4 x's per year    Drug use: No    Sexual activity: Not Currently     Partners: Male     Birth control/protection: Abstinence     Comment: single     Family History   Problem Relation Age of Onset    Heart disease Mother         cardiomyopathy, Hypertension, Heart Failure    Hypertension Mother     Sleep apnea Mother     Hyperlipidemia Mother     Heart attack Mother     Heart failure Mother     Anxiety disorder Mother     Arthritis Mother     Irritable bowel syndrome Mother     COPD Father         Father is alive at age 80 with COPD and asthma    Asthma Father         COPD    Basal cell carcinoma Father     Cancer Father         Skin    Hyperlipidemia Father     Melanoma Father     Colon polyps Father     Ulcerative colitis Cousin     Crohn's disease Cousin     Cancer Niece     Melanoma Niece     Narcolepsy Sister     Sleep apnea Sister     Anemia Sister         Aplastic Anemia    Diabetes Sister         Type 2 2ndry to steroid use    Thyroid disease Sister         had 2 parathyroid removed    Asthma Sister         Asthma    Colon polyps Sister     Irritable bowel syndrome Sister     Diabetes Brother         Type 1 or Juvenile    Heart failure Maternal Grandmother     Cancer Maternal Grandfather         Multiple  Myeloma - not sure of name    Lung cancer Paternal Grandmother     Cancer Paternal Grandmother         Lung Cancer    Irritable bowel syndrome Paternal Grandmother     Heart disease Paternal Grandfather         unknown    Hyperlipidemia Paternal Grandfather     Heart attack Paternal Grandfather     Heart failure Paternal Grandfather         Unsure as he  before I was born from heart issues    Diabetes Paternal Aunt         type 2    Colon polyps Paternal Aunt     Irritable bowel syndrome Paternal Aunt     Diabetes Brother         Type 1 or Juvenile    Colon polyps  "Brother     Irritable bowel syndrome Brother     Diabetes Paternal Aunt         type II    Diabetes Paternal Aunt         type 2    Anemia Sister         Aplastic Anemia    Diabetes Sister         Type 2 -2021    Breast cancer Neg Hx     Ovarian cancer Neg Hx      /68   Pulse 73   Ht 172.7 cm (67.99\")   Wt 102 kg (225 lb)   LMP  (LMP Unknown)   BMI 34.22 kg/m²   Physical Exam  Vitals and nursing note reviewed.   Constitutional:       Appearance: Normal appearance. She is well-developed.   HENT:      Head: Normocephalic and atraumatic.   Eyes:      General: Lids are normal.      Extraocular Movements: Extraocular movements intact.      Conjunctiva/sclera: Conjunctivae normal.      Pupils: Pupils are equal, round, and reactive to light.   Neck:      Thyroid: No thyroid mass or thyromegaly.   Cardiovascular:      Rate and Rhythm: Normal rate and regular rhythm.      Pulses: Normal pulses.      Heart sounds: Normal heart sounds. No murmur heard.     No friction rub. No gallop.   Pulmonary:      Effort: Pulmonary effort is normal. No respiratory distress.      Breath sounds: Normal breath sounds. No wheezing.   Musculoskeletal:         General: No swelling or deformity. Normal range of motion.      Cervical back: Normal range of motion and neck supple.   Lymphadenopathy:      Cervical: No cervical adenopathy.   Skin:     General: Skin is warm and dry.      Findings: No erythema or rash.      Nails: There is no clubbing.   Neurological:      General: No focal deficit present.      Mental Status: She is alert and oriented to person, place, and time.      Cranial Nerves: No cranial nerve deficit.      Deep Tendon Reflexes: Reflexes are normal and symmetric. Reflexes normal.   Psychiatric:         Mood and Affect: Mood normal.         Speech: Speech normal.         Behavior: Behavior normal.         Thought Content: Thought content normal.         Judgment: Judgment normal.       Results for orders placed or " performed in visit on 06/23/25   POC Glucose, Blood    Collection Time: 06/23/25 12:00 PM    Specimen: Blood   Result Value Ref Range    Glucose 92 70 - 130 mg/dL    Lot Number [id number]     Expiration Date 08/30/2025    POC Glycosylated Hemoglobin (Hb A1C)    Collection Time: 06/23/25 12:00 PM    Specimen: Blood   Result Value Ref Range    Hemoglobin A1C 5.4 4.5 - 5.7 %    Lot Number [id number]     Expiration Date 11/06/2026      *Note: Due to a large number of results and/or encounters for the requested time period, some results have not been displayed. A complete set of results can be found in Results Review.     Diagnoses and all orders for this visit:    1. Prediabetes (Primary)  Assessment & Plan:  Discussed role of diet and activity - work on weight reduction     Orders:  -     POC Glucose, Blood  -     POC Glycosylated Hemoglobin (Hb A1C)  -     metFORMIN ER (GLUCOPHAGE-XR) 500 MG 24 hr tablet; Take 1 pill daily x 2 weeks then 2 pills daily  Dispense: 180 tablet; Refill: 1    2. Acquired hypothyroidism  Assessment & Plan:  Recent normal TSH   Continue synthroid 88 mcg daily       Return in about 6 months (around 12/23/2025) for Recheck.    Electronically signed by: Amanda Nugent MD

## 2025-07-21 ENCOUNTER — SPECIALTY PHARMACY (OUTPATIENT)
Dept: PHARMACY | Facility: TELEHEALTH | Age: 65
End: 2025-07-21
Payer: COMMERCIAL

## 2025-07-21 NOTE — PROGRESS NOTES
Specialty Pharmacy Patient Management Program  Reassessment     Brittny Han is a 65 y.o. female with asthma and enrolled in the Patient Management program offered by [facility]. A follow-up outreach was conducted, including assessment of continued therapy appropriateness, medication adherence, and side effect incidence and management for Dupixent 300mg/2 ml auto injector.     Changes to Insurance Coverage or Financial Support  none    Relevant Past Medical History and Comorbidities  Relevant medical history and concomitant health conditions were discussed with the patient. The patient's chart has been reviewed for relevant past medical history and comorbid health conditions and updated as necessary.   Past Medical History:   Diagnosis Date    Allergic     Allergic rhinitis     Anemia     Anxiety     Asthma     MODERATE, PERSISTANT    Asthma, extrinsic 2007    Progressively gets worse with age    BCC (basal cell carcinoma) 2000    RIGHT EYELID    Bowel habit changes     BPPV (benign paroxysmal positional vertigo) 01/2020    Bronchitis     Cat bite of left hand 05/16/2020    SEEN AT [facility]    Chronic bilateral low back pain with bilateral sciatica     Chronic bronchitis Aug 2018    also seasonal bronchitis every May and December    Chronic idiopathic constipation     Chronic obstructive lung disease 03/02/2016    Colon polyps     FOLLOWED BY DR. LORENA RICO    COPD (chronic obstructive pulmonary disease)     CTS (carpal tunnel syndrome) 2010    BILATERAL, Wear wrist braces at night    Demyelinating disease     Depression     Diabetes mellitus Jan 2021    Pre-Diabetic    Disease of thyroid gland     HYPOTHYROIDISM    DINERO (dyspnea on exertion) 06/2019    Essential hypertension     Fatigue     Gastroenteritis 02/2020    GERD (gastroesophageal reflux disease)     Hashimoto's thyroiditis     Head injury when I was 10    concussion    Headache disorder 09/07/2016    Hemorrhoids      Hepatomegaly 11/2018    History of echocardiogram 05/31/2011    mild ascending aortic root dilatation; mod RVC enlargement; global left EF 0.72 (normal 0.55-0.75) mild concentric LV hypertrophy; trace MR; mild TR     History of PFTs 02/02/2016    good effort; normal lung volumes    Hyperlipidemia     Hyperthyroidism 2019    Hashimoto's    Hypertrophy, nasal, turbinate     Hypothyroidism 2019    Idiopathic peripheral neuropathy 10/21/2021    Irritable bowel syndrome     Midline cystocele     Neuropathy of both feet 01/2019    FOLLOWED BY DR. VARSHA IRELAND    Numbness and tingling in both hands 07/19/2021    Numbness and tingling of both feet 01/30/2019    ALANNA on CPAP 2007    Followed by Jin Cat @ [facility]    Peptic ulceration     PUD    Periodic headache syndrome, not intractable     Plantar fasciitis, left 01/2019    FOLLOWED BY DR. VARSHA IRELAND    Primary central sleep apnea 2006    Sleep apnea not sure if central or not    Rectal bleeding 08/2020    Rectal bleeding     Rectal mass 08/2020    RLS (restless legs syndrome) 06/2017    Shortness of breath     Strain of thoracic region 04/2017    KALYAN (stress urinary incontinence, female)     Uterine fibroid     Varicella 1966    This is a guess    Vision loss 2008    wear glasses    Vitamin B 12 deficiency     Vitamin D deficiency      Social History     Socioeconomic History    Marital status: Single   Tobacco Use    Smoking status: Never     Passive exposure: Never    Smokeless tobacco: Never   Vaping Use    Vaping status: Never Used   Substance and Sexual Activity    Alcohol use: Yes     Alcohol/week: 2.0 standard drinks of alcohol     Types: 2 Glasses of wine per week     Comment: 2 glasses of wine about 4 x's per year    Drug use: No    Sexual activity: Not Currently     Partners: Male     Birth control/protection: Abstinence     Comment: single     Problem list reviewed by Shani Lott, PharmD on 7/21/2025 at  1:32 PM    Allergies  Known  allergies and reactions were discussed with the patient. The patient's chart has been reviewed for allergy information and updated as necessary.   Allergies   Allergen Reactions    Percocet [Oxycodone-Acetaminophen] Anaphylaxis     Breathing difficulty, tongue swelling, severe     Brevital Sodium [Methohexital] Paresthesia     CHILLS, SHAKING    Banana Unknown - Low Severity    Pork-Derived Products Unknown - Low Severity     Allergies reviewed by Shani Lott, PharmD on 7/21/2025 at  1:31 PM    Relevant Laboratory Values  Lab Results   Component Value Date    GLUCOSE 101 (H) 05/06/2025    CALCIUM 9.8 05/06/2025     05/06/2025    K 3.6 05/06/2025    CO2 24.0 05/06/2025     05/06/2025    BUN 21 05/06/2025    CREATININE 0.85 05/06/2025    BCR 24.7 05/06/2025    ANIONGAP 10.0 05/06/2025     Lab Results   Component Value Date    WBC 5.21 05/06/2025    HGB 13.4 05/06/2025    HCT 40.0 05/06/2025    MCV 92.2 05/06/2025     05/06/2025     Lab Value Review  The above lab values have been reviewed; the following specialty medication(s) dose adjustment(s) are recommended: none.    Current Medication List  This medication list has been reviewed with the patient and evaluated for any interactions or necessary modifications/recommendations, and updated to include all prescription medications, OTC medications, and supplements the patient is currently taking. This list reflects what is contained in the patient's profile, which has also been marked as reviewed to communicate to other providers it is the most up to date version of the patient's current medication therapy.     Current Outpatient Medications:     Accu-Chek Softclix Lancets lancets, Use as directed to check blood glucose up to 3 times daily as needed, Disp: 100 each, Rfl: 3    albuterol (ACCUNEB) 0.63 MG/3ML nebulizer solution, Take 3 mL by nebulization Every 6 (Six) Hours As Needed for Wheezing or Shortness of Air., Disp: 90 mL, Rfl: 0     albuterol sulfate  (90 Base) MCG/ACT inhaler, Inhale 2 puffs every 6 (six) to 8 (eight) hours as needed., Disp: 8.5 g, Rfl: 3    Albuterol-Budesonide (Airsupra) 90-80 MCG/ACT aerosol, Inhale 2 puffs Every 6-8 Hours As Needed., Disp: 10.7 g, Rfl: 3    azelastine (ASTELIN) 0.1 % nasal spray, Administer 1 spray into the nostril(s) as directed by provider 1 to 4 Times a Day as needed, Disp: 30 mL, Rfl: 3    Azelastine HCl 137 MCG/SPRAY solution, Administer 2 sprays into the nostril(s) as directed by provider 2 (Two) Times a Day., Disp: 30 mL, Rfl: 0    B Complex Vitamins (vitamin b complex) capsule capsule, Take 1 capsule by mouth Daily., Disp: , Rfl:     benzonatate (TESSALON) 100 MG capsule, Take 1 capsule by mouth 2 (Two) Times a Day As Needed for Cough for up to 12 doses., Disp: 12 capsule, Rfl: 0    Blood Glucose Monitoring Suppl (Accu-Chek Guide Me) w/Device kit, Use as directed to check blood glucose up to 3 times daily as needed, Disp: 1 kit, Rfl: 0    Cholecalciferol 25 MCG (1000 UT) tablet, Take 2 tablets by mouth Daily., Disp: , Rfl:     Dupilumab (Dupixent) 300 MG/2ML solution auto-injector injection, Inject 2 mL under the skin into the appropriate area as directed Every 14 (Fourteen) Days., Disp: 4 mL, Rfl: 3    EPINEPHrine (EpiPen 2-Hector) 0.3 MG/0.3ML solution auto-injector injection, Inject 3 mL into the appropriate muscle as directed by prescriber As Needed for anaphylaxis., Disp: 2 each, Rfl: 2    EPINEPHrine (EPIPEN) 0.3 MG/0.3ML solution auto-injector injection, Use as directed for acute allergic reaction., Disp: 2 each, Rfl: 3    Fluticasone-Salmeterol (Advair Diskus) 500-50 MCG/ACT DISKUS, Inhale 1 puff by mouth 2 (Two) Times a Day., Disp: 180 each, Rfl: 3    Fluticasone-Umeclidin-Vilant (TRELEGY) 100-62.5-25 MCG/ACT inhaler, Inhale 1 puff Daily., Disp: 2 each, Rfl: 0    glucose blood test strip, Use as directed to check blood glucose up to 3 times daily as needed, Disp: 100 each, Rfl: 2     hydroCHLOROthiazide 25 MG tablet, Take 1 tablet by mouth Daily. (*STOP taking spironolactone/hctz combo*), Disp: 30 tablet, Rfl: 5    levocetirizine (XYZAL) 5 MG tablet, Take 1 tablet by mouth Daily., Disp: 90 tablet, Rfl: 3    levothyroxine (Synthroid) 88 MCG tablet, Take 1 tablet by mouth Daily., Disp: 90 tablet, Rfl: 1    magnesium gluconate (MAGONATE) 500 MG tablet, Take 1 tablet by mouth Daily., Disp: , Rfl:     metFORMIN ER (GLUCOPHAGE-XR) 500 MG 24 hr tablet, Take 1 tablet by mouth Daily for 14 days, THEN 2 tablets Daily thereafter, Disp: 180 tablet, Rfl: 1    montelukast (SINGULAIR) 10 MG tablet, Take 1 tablet by mouth every night at bedtime., Disp: 90 tablet, Rfl: 3    Multiple Vitamins-Minerals (CENTRUM SILVER PO), Centrum Silver, Disp: , Rfl:     multivitamin with minerals (HAIR SKIN & NAILS ADVANCED PO), Take 1 tablet by mouth Daily., Disp: , Rfl:     Rimegepant Sulfate (NURTEC) 75 MG tablet dispersible tablet, Take 1 tablet by mouth Daily As Needed (migraine). Do not take more than 1 tablet in 24 hours, Disp: 16 tablet, Rfl: 3    Tiotropium Bromide Monohydrate (Spiriva Respimat) 1.25 MCG/ACT aerosol solution inhaler, Inhale 2 puffs by mouth Daily as directed., Disp: 12 g, Rfl: 3  Medicines reviewed by Shani Lott, PharmD on 7/21/2025 at  1:32 PM    Drug Interactions  none     Adverse Drug Reactions  Medication tolerability: Tolerating with no to minimal ADRs  Medication plan: Continue therapy with normal follow-up  Plan for ADR Management: none    Hospitalizations and Urgent Care Since Last Assessment  Hospitalizations or Admissions: none  ED Visits: none  Urgent Office Visits: April for acute sinus infection     Adherence, Self-Administration, and Current Therapy Problems  Adherence related to the patient's specialty therapy was discussed with the patient. The Adherence segment of this outreach has been reviewed and updated.     Adherence Questions  Linked Medication(s) Assessed: Dupilumab  (Dupixent)  On average, how many doses/injections does the patient miss per month?: 0 (patient reports 0)  What are the identified reasons for non-adherence or missed doses? : no problems identified  What is the estimated medication adherence level?: 70-79% (pt reports no delayed or missed dose)  Based on the patient/caregiver response and refill history, does this patient require an MTP to track adherence improvements?: no    Additional Barriers to Patient Self-Administration: none  Methods for Supporting Patient Self-Administration: n/a    Open Medication Therapy Problems  No medication therapy recommendations to display    Goals of Therapy  Goals related to the patient's specialty therapy were discussed with the patient. The Patient Goals segment of this outreach has been reviewed and updated.   Goals Addressed Today        Specialty Pharmacy General Goal- dupixent      Better compliance with taking injection q 14 days  Still having difficulty breathing, hoping to ease that   Patient mentioned her voice is hoarse all the time.  Recommended taking a full glass of water with her Advair inhaler to rinse the powder residue from the mouth and throat.    7/21/25 Patient tolerating well, no ADRs to report at this time.               Progress Toward Meeting Patient-Identified Goals of Therapy: On Track  New Patient-Identified Goals, If Applicable:     Progress Toward Meeting Clinical Goals or Therapeutic Targets: On Track  New Clinical Goals or Therapeutic Targets, If Applicable:     Quality of Life Assessment   Quality of Life related to the patient's enrollment in the patient management program and services provided was discussed with the patient. The QOL segment of this outreach has been reviewed and updated.  Quality of Life Improvement Scale: 7-Somewhat better    Reassessment Plan & Follow-Up  Medication Therapy Changes: none  Additional Plans, Therapy Recommendations, or Therapy Problems to Be Addressed: none    Pharmacist to perform regular reassessments no more than (6) months from the previous assessment.  Care Coordinator to set up future refill outreaches, coordinate prescription delivery, and escalate clinical questions to pharmacist.     Attestation  I attest the patient was actively involved in and has agreed to the above plan of care. I attest that the specialty medication(s) addressed above are appropriate for the patient based on my reassessment. If the prescribed therapy is at any point deemed not appropriate based on the current or future assessments, a consultation will be initiated with the patient's specialty care provider to determine the best course of action. The revised plan of therapy will be documented along with any required assessments and/or additional patient education provided.     Electronically signed by Shani Lott PharmD, 07/21/25, 1:37 PM EDT.

## 2025-07-21 NOTE — PROGRESS NOTES
" Specialty Pharmacy Patient Management Program  Call Center Refill Outreach      Brittny \"Padmini\" is a 65 y.o. female contacted today regarding refills of her medication(s).    Specialty medication(s) and dose(s) confirmed: Dupixent 300 mg/2ml auto injector  Other medications being refilled: none    Refill Questions      Flowsheet Row Most Recent Value   Changes to allergies? No   Changes to medications? No   New conditions or infections since last clinic visit No   If yes, please describe  n/a   Unplanned office visit, urgent care, ED, or hospital admission in the last 4 weeks  No   How does patient/caregiver feel medication is working? Good   Financial problems or insurance changes  No   If yes, describe changes in insurance or financial issues. n/a   Since the previous refill, were any specialty medication doses or scheduled injections missed or delayed?  No  [took last dose this past weekend. Will be due weekend August 2nd]   If yes, please provide the amount n/a   Why were doses missed? n/a   Does this patient require a clinical escalation to a pharmacist? No            Delivery Questions      Flowsheet Row Most Recent Value   Delivery method [employer]   Delivery address verified with patient/caregiver? Yes   Delivery address Home   Other address preferred n/a   Number of medications in delivery 1   Medication(s) being filled and delivered Dupilumab (Dupixent)   Doses left of specialty medications none, next dose weekend 8/2   Copay verified? Yes   Copay amount $0.00   Copay form of payment No copayment ($0)   Delivery Date Selection 07/23/25   Signature Required No   Do you consent to receive electronic handouts?  Yes            Medication Adherence    Adherence tools used: directed education  Support network for adherence: family member            Follow-up: 21 days     Shani Lott SOFIE  Specialty Pharmacist  7/21/2025  13:37 EDT   "

## 2025-07-24 DIAGNOSIS — I10 BENIGN ESSENTIAL HYPERTENSION: ICD-10-CM

## 2025-07-24 RX ORDER — SPIRONOLACTONE AND HYDROCHLOROTHIAZIDE 25; 25 MG/1; MG/1
1 TABLET ORAL DAILY
Qty: 90 TABLET | Refills: 1 | OUTPATIENT
Start: 2025-07-24 | End: 2026-01-20

## 2025-07-28 DIAGNOSIS — J30.9 ALLERGIC RHINITIS, UNSPECIFIED: ICD-10-CM

## 2025-07-28 RX ORDER — LEVOTHYROXINE SODIUM 88 UG/1
88 TABLET ORAL DAILY
Qty: 90 TABLET | Refills: 1 | Status: SHIPPED | OUTPATIENT
Start: 2025-07-28 | End: 2026-07-28

## 2025-07-28 RX ORDER — FLUTICASONE PROPIONATE AND SALMETEROL 500; 50 UG/1; UG/1
1 POWDER RESPIRATORY (INHALATION) 2 TIMES DAILY
Qty: 180 EACH | Refills: 3 | OUTPATIENT
Start: 2025-07-28

## 2025-07-28 RX ORDER — LEVOCETIRIZINE DIHYDROCHLORIDE 5 MG/1
5 TABLET, FILM COATED ORAL DAILY
Qty: 90 TABLET | Refills: 3 | Status: SHIPPED | OUTPATIENT
Start: 2025-07-28

## 2025-07-28 NOTE — TELEPHONE ENCOUNTER
Rx Refill Note  Requested Prescriptions     Pending Prescriptions Disp Refills    levothyroxine (Synthroid) 88 MCG tablet 90 tablet 1     Sig: Take 1 tablet by mouth Daily.      Last office visit with prescribing clinician: 6/23/2025   Last telemedicine visit with prescribing clinician: Visit date not found   Next office visit with prescribing clinician: 12/16/2025                         Would you like a call back once the refill request has been completed: [] Yes [] No    If the office needs to give you a call back, can they leave a voicemail: [] Yes [] No    Joyce Pemberton MA  07/28/25, 14:41 EDT

## 2025-07-29 ENCOUNTER — TELEPHONE (OUTPATIENT)
Dept: ENDOCRINOLOGY | Facility: CLINIC | Age: 65
End: 2025-07-29
Payer: COMMERCIAL

## 2025-07-29 NOTE — TELEPHONE ENCOUNTER
Called the pt and she said the Zepbound nor Wegovy. I told her the Ozempic or the Mounjaro won't be covered due to her not being DM 2.    She would like to have a letter written for [employer] for a possible wt loss of 10%. She is thinking about joining.    Please advise.

## 2025-07-29 NOTE — TELEPHONE ENCOUNTER
PT CALLED STATING ZEPBOUND IS NOT COVERED BY HER INSURANCE BUT OZEMPIC IS COVERED W/ A PA. SHE REQUESTED A CALL BACK TO CONSULT.

## 2025-08-14 DIAGNOSIS — J45.50 SEVERE PERSISTENT ASTHMA WITHOUT COMPLICATION: ICD-10-CM

## 2025-08-14 RX ORDER — DUPILUMAB 300 MG/2ML
300 INJECTION, SOLUTION SUBCUTANEOUS
Qty: 4 ML | Refills: 3 | Status: CANCELLED | OUTPATIENT
Start: 2025-08-14

## 2025-08-15 RX ORDER — DUPILUMAB 300 MG/2ML
300 INJECTION, SOLUTION SUBCUTANEOUS
Qty: 4 ML | Refills: 3 | Status: SHIPPED | OUTPATIENT
Start: 2025-08-15

## 2025-08-18 ENCOUNTER — SPECIALTY PHARMACY (OUTPATIENT)
Dept: PHARMACY | Facility: TELEHEALTH | Age: 65
End: 2025-08-18
Payer: COMMERCIAL

## (undated) DEVICE — ADAPT CLN BIOGUARD AIR/H2O DISP

## (undated) DEVICE — THE TORRENT IRRIGATION SCOPE CONNECTOR IS USED WITH THE TORRENT IRRIGATION TUBING TO PROVIDE IRRIGATION FLUIDS SUCH AS STERILE WATER DURING GASTROINTESTINAL ENDOSCOPIC PROCEDURES WHEN USED IN CONJUNCTION WITH AN IRRIGATION PUMP (OR ELECTROSURGICAL UNIT).: Brand: TORRENT

## (undated) DEVICE — CANN O2 ETCO2 FITS ALL CONN CO2 SMPL A/ 7IN DISP LF

## (undated) DEVICE — THE SINGLE USE ETRAP – POLYP TRAP IS USED FOR SUCTION RETRIEVAL OF ENDOSCOPICALLY REMOVED POLYPS.: Brand: ETRAP

## (undated) DEVICE — GOWN SURG AERO CHROME XL

## (undated) DEVICE — KT ORCA ORCAPOD DISP STRL

## (undated) DEVICE — LN SMPL CO2 SHTRM SD STREAM W/M LUER

## (undated) DEVICE — PAD GRND REM POLYHESIVE A/ DISP

## (undated) DEVICE — SENSR O2 OXIMAX FNGR A/ 18IN NONSTR

## (undated) DEVICE — SNAR POLYP SENSATION STDOVL 27 240 BX40

## (undated) DEVICE — TUBING, SUCTION, 1/4" X 10', STRAIGHT: Brand: MEDLINE